# Patient Record
Sex: FEMALE | Race: WHITE | NOT HISPANIC OR LATINO | Employment: OTHER | URBAN - METROPOLITAN AREA
[De-identification: names, ages, dates, MRNs, and addresses within clinical notes are randomized per-mention and may not be internally consistent; named-entity substitution may affect disease eponyms.]

---

## 2018-04-13 ENCOUNTER — OFFICE VISIT (OUTPATIENT)
Dept: FAMILY MEDICINE | Facility: CLINIC | Age: 68
End: 2018-04-13
Payer: MEDICARE

## 2018-04-13 VITALS
HEIGHT: 60 IN | HEART RATE: 80 BPM | RESPIRATION RATE: 18 BRPM | DIASTOLIC BLOOD PRESSURE: 60 MMHG | BODY MASS INDEX: 23.56 KG/M2 | TEMPERATURE: 99 F | SYSTOLIC BLOOD PRESSURE: 129 MMHG | OXYGEN SATURATION: 97 % | WEIGHT: 120 LBS

## 2018-04-13 DIAGNOSIS — K12.1 MOUTH ULCERS: ICD-10-CM

## 2018-04-13 DIAGNOSIS — I10 ESSENTIAL HYPERTENSION: ICD-10-CM

## 2018-04-13 DIAGNOSIS — F41.1 GAD (GENERALIZED ANXIETY DISORDER): Primary | ICD-10-CM

## 2018-04-13 PROCEDURE — 99214 OFFICE O/P EST MOD 30 MIN: CPT | Mod: S$PBB,,, | Performed by: FAMILY MEDICINE

## 2018-04-13 PROCEDURE — 99999 PR PBB SHADOW E&M-EST. PATIENT-LVL III: CPT | Mod: PBBFAC,,, | Performed by: FAMILY MEDICINE

## 2018-04-13 PROCEDURE — 99213 OFFICE O/P EST LOW 20 MIN: CPT | Mod: PBBFAC,PN | Performed by: FAMILY MEDICINE

## 2018-04-13 RX ORDER — FOLIC ACID 1 MG/1
TABLET ORAL
Status: ON HOLD | COMMUNITY
End: 2019-12-10

## 2018-04-13 RX ORDER — FUROSEMIDE 40 MG/1
40 TABLET ORAL 2 TIMES DAILY
Refills: 6 | COMMUNITY
Start: 2018-02-10 | End: 2018-08-29

## 2018-04-13 RX ORDER — CIPROFLOXACIN 500 MG/1
TABLET ORAL
COMMUNITY
End: 2018-07-05

## 2018-04-13 RX ORDER — POTASSIUM CHLORIDE 1500 MG/1
TABLET, EXTENDED RELEASE ORAL
COMMUNITY
End: 2018-08-29

## 2018-04-13 RX ORDER — BUSPIRONE HYDROCHLORIDE 7.5 MG/1
7.5 TABLET ORAL
COMMUNITY
End: 2018-04-13

## 2018-04-13 RX ORDER — CARVEDILOL 3.12 MG/1
3.12 TABLET ORAL 2 TIMES DAILY
Refills: 6 | COMMUNITY
Start: 2018-03-17 | End: 2018-08-29

## 2018-04-13 RX ORDER — SUCRALFATE 1 G/1
TABLET ORAL
COMMUNITY
End: 2019-04-05

## 2018-04-13 RX ORDER — TRIAMCINOLONE ACETONIDE 1 MG/ML
LOTION TOPICAL
Status: ON HOLD | COMMUNITY
End: 2019-12-10

## 2018-04-13 RX ORDER — LANOLIN ALCOHOL/MO/W.PET/CERES
CREAM (GRAM) TOPICAL
COMMUNITY
End: 2019-04-05

## 2018-04-13 RX ORDER — LIDOCAINE HYDROCHLORIDE 20 MG/ML
SOLUTION OROPHARYNGEAL
Status: ON HOLD | COMMUNITY
End: 2019-12-10

## 2018-04-13 RX ORDER — ESCITALOPRAM OXALATE 20 MG/1
20 TABLET ORAL
COMMUNITY
End: 2018-05-23 | Stop reason: SDUPTHER

## 2018-04-13 RX ORDER — FLUTICASONE PROPIONATE 50 MCG
SPRAY, SUSPENSION (ML) NASAL
COMMUNITY

## 2018-04-13 RX ORDER — MONTELUKAST SODIUM 10 MG/1
TABLET ORAL
COMMUNITY
End: 2018-05-24 | Stop reason: SDUPTHER

## 2018-04-13 RX ORDER — BISACODYL 10 MG
SUPPOSITORY, RECTAL RECTAL
COMMUNITY
End: 2018-08-29

## 2018-04-13 RX ORDER — ALLOPURINOL 300 MG/1
300 TABLET ORAL DAILY
Refills: 11 | COMMUNITY
Start: 2018-03-19 | End: 2018-06-12 | Stop reason: SDUPTHER

## 2018-04-13 RX ORDER — GABAPENTIN 300 MG/1
900 CAPSULE ORAL 3 TIMES DAILY
Refills: 11 | COMMUNITY
Start: 2018-01-17 | End: 2018-07-05 | Stop reason: SDUPTHER

## 2018-04-13 RX ORDER — ZOLPIDEM TARTRATE 5 MG/1
TABLET ORAL
COMMUNITY
End: 2019-04-05

## 2018-04-13 RX ORDER — GABAPENTIN 100 MG/1
CAPSULE ORAL
COMMUNITY
End: 2018-07-05

## 2018-04-13 RX ORDER — LOSARTAN POTASSIUM 50 MG/1
TABLET ORAL
COMMUNITY
Start: 2018-03-12 | End: 2018-08-29

## 2018-04-13 RX ORDER — OXYBUTYNIN CHLORIDE 5 MG/1
TABLET, EXTENDED RELEASE ORAL
COMMUNITY
End: 2020-03-05

## 2018-04-13 RX ORDER — ONDANSETRON 4 MG/1
TABLET, ORALLY DISINTEGRATING ORAL
Status: ON HOLD | COMMUNITY
End: 2019-12-10

## 2018-04-13 RX ORDER — BUDESONIDE AND FORMOTEROL FUMARATE DIHYDRATE 160; 4.5 UG/1; UG/1
AEROSOL RESPIRATORY (INHALATION)
COMMUNITY

## 2018-04-13 RX ORDER — DIPHENHYDRAMINE HCL 25 MG
CAPSULE ORAL
COMMUNITY
End: 2019-08-28

## 2018-04-13 RX ORDER — ARIPIPRAZOLE 5 MG/1
TABLET ORAL
COMMUNITY
End: 2019-10-02 | Stop reason: SDUPTHER

## 2018-04-13 RX ORDER — FLUCONAZOLE 150 MG/1
150 TABLET ORAL DAILY
Refills: 0 | COMMUNITY
Start: 2018-02-23 | End: 2018-04-13

## 2018-04-13 RX ORDER — ACETAMINOPHEN AND CODEINE PHOSPHATE 300; 60 MG/1; MG/1
TABLET ORAL
COMMUNITY
End: 2018-06-18

## 2018-04-13 RX ORDER — ESCITALOPRAM OXALATE 20 MG/1
TABLET ORAL
COMMUNITY
End: 2018-05-23 | Stop reason: SDUPTHER

## 2018-04-13 RX ORDER — CYCLOBENZAPRINE HCL 5 MG
TABLET ORAL
COMMUNITY
End: 2018-06-18

## 2018-04-13 RX ORDER — LANCETS 33 GAUGE
EACH MISCELLANEOUS
Status: ON HOLD | COMMUNITY
End: 2019-12-10

## 2018-04-13 RX ORDER — NYSTATIN 100000 [USP'U]/ML
SUSPENSION ORAL
COMMUNITY
End: 2018-04-13

## 2018-04-13 RX ORDER — BUSPIRONE HYDROCHLORIDE 5 MG/1
TABLET ORAL
COMMUNITY
End: 2018-04-13

## 2018-04-13 RX ORDER — PREDNISONE 10 MG/1
TABLET ORAL
COMMUNITY
End: 2018-08-29

## 2018-04-13 RX ORDER — POTASSIUM CHLORIDE 750 MG/1
TABLET, EXTENDED RELEASE ORAL
COMMUNITY
End: 2018-08-29

## 2018-04-13 RX ORDER — PANTOPRAZOLE SODIUM 40 MG/1
TABLET, DELAYED RELEASE ORAL
COMMUNITY
End: 2018-05-24 | Stop reason: SDUPTHER

## 2018-04-13 RX ORDER — BUSPIRONE HYDROCHLORIDE 7.5 MG/1
TABLET ORAL
COMMUNITY
End: 2018-04-13

## 2018-04-13 RX ORDER — NYSTATIN 100000 [USP'U]/ML
4 SUSPENSION ORAL 4 TIMES DAILY
Qty: 160 ML | Refills: 0 | Status: SHIPPED | OUTPATIENT
Start: 2018-04-13 | End: 2018-04-23

## 2018-04-13 RX ORDER — IBUPROFEN 800 MG/1
800 TABLET ORAL 3 TIMES DAILY
Refills: 3 | COMMUNITY
Start: 2018-02-19 | End: 2018-09-17 | Stop reason: SDUPTHER

## 2018-04-13 RX ORDER — LORAZEPAM 0.5 MG/1
TABLET ORAL
COMMUNITY
End: 2018-04-13

## 2018-04-13 RX ORDER — INSULIN PUMP SYRINGE, 3 ML
EACH MISCELLANEOUS
Status: ON HOLD | COMMUNITY
End: 2019-12-10

## 2018-04-13 RX ORDER — BACLOFEN 10 MG/1
TABLET ORAL
COMMUNITY
End: 2018-06-18

## 2018-04-13 RX ORDER — PENICILLIN V POTASSIUM 500 MG/1
TABLET, FILM COATED ORAL
Refills: 0 | COMMUNITY
Start: 2018-02-02 | End: 2018-08-29

## 2018-04-13 RX ORDER — IBUPROFEN 800 MG/1
TABLET ORAL
COMMUNITY
End: 2018-04-20 | Stop reason: SDUPTHER

## 2018-04-13 RX ORDER — BUSPIRONE HYDROCHLORIDE 5 MG/1
5 TABLET ORAL 2 TIMES DAILY
Qty: 60 TABLET | Refills: 11 | Status: SHIPPED | OUTPATIENT
Start: 2018-04-13 | End: 2020-03-18 | Stop reason: SDUPTHER

## 2018-04-13 RX ORDER — CYCLOBENZAPRINE HCL 10 MG
TABLET ORAL
COMMUNITY
End: 2018-06-18 | Stop reason: SDUPTHER

## 2018-04-13 RX ORDER — MUPIROCIN 20 MG/G
OINTMENT TOPICAL
COMMUNITY

## 2018-04-13 RX ORDER — OXYCODONE AND ACETAMINOPHEN 5; 325 MG/1; MG/1
TABLET ORAL
COMMUNITY
End: 2018-06-18 | Stop reason: SDUPTHER

## 2018-04-13 RX ORDER — OXYCODONE HYDROCHLORIDE 10 MG/1
10 TABLET ORAL
COMMUNITY
End: 2018-06-18

## 2018-04-13 NOTE — PROGRESS NOTES
Subjective:       Patient ID: Obdulia Yepez is a 67 y.o. female.    Chief Complaint: Anxiety and Mouth Lesions    Patient complains of anxiety, reports that buspirone works, but is causing some somnolence.    In regards to the patient's hypertension, patient denies chest pain/sob, and has been compliant with the medication regimen.         Review of Systems   Constitutional: Negative for activity change, appetite change, chills, fatigue and fever.   HENT: Negative for congestion, dental problem, facial swelling, nosebleeds, postnasal drip, sinus pain, sore throat, trouble swallowing and voice change.    Eyes: Negative for pain, discharge and visual disturbance.   Respiratory: Negative for apnea, cough, chest tightness and shortness of breath.    Cardiovascular: Negative for chest pain and palpitations.   Gastrointestinal: Negative for abdominal pain, blood in stool, constipation and nausea.   Endocrine: Negative for cold intolerance, polydipsia and polyuria.   Genitourinary: Negative for difficulty urinating, enuresis and flank pain.   Musculoskeletal: Positive for arthralgias and back pain.   Skin: Positive for rash and wound. Negative for color change.   Allergic/Immunologic: Negative for environmental allergies and immunocompromised state.   Neurological: Negative for dizziness and light-headedness.   Hematological: Negative for adenopathy.   Psychiatric/Behavioral: Positive for sleep disturbance. Negative for agitation, behavioral problems, decreased concentration and dysphoric mood. The patient is nervous/anxious.    All other systems reviewed and are negative.      Past Medical History:   Diagnosis Date    Allergy     Arthritis     Asthma     Depression     Gout     Hypertension      Past Surgical History:   Procedure Laterality Date    APPENDECTOMY       SECTION      CHOLECYSTECTOMY      HYSTERECTOMY      STOMACH SURGERY      WRIST SURGERY Left      Social History     Social History     Marital status:      Spouse name: N/A    Number of children: N/A    Years of education: N/A     Occupational History    Not on file.     Social History Main Topics    Smoking status: Never Smoker    Smokeless tobacco: Never Used    Alcohol use Yes    Drug use: No    Sexual activity: Not on file     Other Topics Concern    Not on file     Social History Narrative    No narrative on file     History reviewed. No pertinent family history.    Objective:      /60 (BP Location: Right arm, Patient Position: Sitting)   Pulse 80   Temp 99.1 °F (37.3 °C)   Resp 18   Ht 5' (1.524 m)   Wt 54.4 kg (120 lb)   SpO2 97%   BMI 23.44 kg/m²   Physical Exam   Constitutional: She is oriented to person, place, and time. She appears well-developed and well-nourished. No distress.   HENT:   Head: Normocephalic and atraumatic.   Nose: Nose normal.   Mouth/Throat: Oropharynx is clear and moist. No oropharyngeal exudate.   Eyes: Conjunctivae and EOM are normal. Pupils are equal, round, and reactive to light. No scleral icterus.   Neck: Normal range of motion. Neck supple. No thyromegaly present.   Cardiovascular: Normal rate, regular rhythm and normal heart sounds.  Exam reveals no gallop and no friction rub.    No murmur heard.  Pulmonary/Chest: Effort normal and breath sounds normal. No respiratory distress. She has no wheezes. She has no rales. She exhibits no tenderness.   Abdominal: Soft. Bowel sounds are normal. She exhibits no distension. There is no tenderness. There is no guarding.   Musculoskeletal: Normal range of motion. She exhibits tenderness and deformity. She exhibits no edema.   Lymphadenopathy:     She has no cervical adenopathy.   Neurological: She is alert and oriented to person, place, and time. She displays normal reflexes. No cranial nerve deficit or sensory deficit. She exhibits normal muscle tone.   Skin: Skin is warm and dry. No rash noted. She is not diaphoretic. No erythema. No pallor.    Psychiatric: She has a normal mood and affect. Her behavior is normal. Judgment and thought content normal.   Nursing note and vitals reviewed.      Assessment:       1. JOYCE (generalized anxiety disorder)    2. Mouth ulcers    3. Essential hypertension        Plan:       JOYCE (generalized anxiety disorder)  -     busPIRone (BUSPAR) 5 MG Tab; Take 1 tablet (5 mg total) by mouth 2 (two) times daily.  Dispense: 60 tablet; Refill: 11    Mouth ulcers  -     nystatin (MYCOSTATIN) 100,000 unit/mL suspension; Take 4 mLs (400,000 Units total) by mouth 4 (four) times daily.  Dispense: 160 mL; Refill: 0    Essential hypertension  -     TSH; Future; Expected date: 04/13/2018  -     Comprehensive metabolic panel; Future; Expected date: 04/13/2018  -     CBC auto differential; Future; Expected date: 04/13/2018            Risks, benefits, and side effects were discussed with the patient. All questions were answered to the fullest satisfaction of the patient, and pt verbalized understanding and agreement to treatment plan. Pt was to call with any new or worsening symptoms, or present to the ER.

## 2018-04-16 ENCOUNTER — LAB VISIT (OUTPATIENT)
Dept: LAB | Facility: HOSPITAL | Age: 68
End: 2018-04-16
Attending: FAMILY MEDICINE
Payer: MEDICARE

## 2018-04-16 DIAGNOSIS — I10 ESSENTIAL HYPERTENSION: ICD-10-CM

## 2018-04-16 LAB
ALBUMIN SERPL BCP-MCNC: 4.5 G/DL
ALP SERPL-CCNC: 57 U/L
ALT SERPL W/O P-5'-P-CCNC: 19 U/L
ANION GAP SERPL CALC-SCNC: 9 MMOL/L
AST SERPL-CCNC: 41 U/L
BASOPHILS # BLD AUTO: 0.02 K/UL
BASOPHILS NFR BLD: 0.5 %
BILIRUB SERPL-MCNC: 0.2 MG/DL
BUN SERPL-MCNC: 16 MG/DL
CALCIUM SERPL-MCNC: 10 MG/DL
CHLORIDE SERPL-SCNC: 94 MMOL/L
CO2 SERPL-SCNC: 27 MMOL/L
CREAT SERPL-MCNC: 0.9 MG/DL
DIFFERENTIAL METHOD: ABNORMAL
EOSINOPHIL # BLD AUTO: 0.1 K/UL
EOSINOPHIL NFR BLD: 2.1 %
ERYTHROCYTE [DISTWIDTH] IN BLOOD BY AUTOMATED COUNT: 14.6 %
EST. GFR  (AFRICAN AMERICAN): >60 ML/MIN/1.73 M^2
EST. GFR  (NON AFRICAN AMERICAN): >60 ML/MIN/1.73 M^2
GLUCOSE SERPL-MCNC: 79 MG/DL
HCT VFR BLD AUTO: 34.8 %
HGB BLD-MCNC: 11.9 G/DL
IMM GRANULOCYTES # BLD AUTO: 0.02 K/UL
IMM GRANULOCYTES NFR BLD AUTO: 0.5 %
LYMPHOCYTES # BLD AUTO: 0.9 K/UL
LYMPHOCYTES NFR BLD: 23.7 %
MCH RBC QN AUTO: 32.7 PG
MCHC RBC AUTO-ENTMCNC: 34.2 G/DL
MCV RBC AUTO: 96 FL
MONOCYTES # BLD AUTO: 0.3 K/UL
MONOCYTES NFR BLD: 8.1 %
NEUTROPHILS # BLD AUTO: 2.5 K/UL
NEUTROPHILS NFR BLD: 65.1 %
NRBC BLD-RTO: 0 /100 WBC
PLATELET # BLD AUTO: 188 K/UL
PMV BLD AUTO: 9.3 FL
POTASSIUM SERPL-SCNC: 5.2 MMOL/L
PROT SERPL-MCNC: 6.8 G/DL
RBC # BLD AUTO: 3.64 M/UL
SODIUM SERPL-SCNC: 130 MMOL/L
TSH SERPL DL<=0.005 MIU/L-ACNC: 1.19 UIU/ML
WBC # BLD AUTO: 3.84 K/UL

## 2018-04-16 PROCEDURE — 85025 COMPLETE CBC W/AUTO DIFF WBC: CPT

## 2018-04-16 PROCEDURE — 84443 ASSAY THYROID STIM HORMONE: CPT

## 2018-04-16 PROCEDURE — 80053 COMPREHEN METABOLIC PANEL: CPT

## 2018-04-16 PROCEDURE — 36415 COLL VENOUS BLD VENIPUNCTURE: CPT

## 2018-04-18 ENCOUNTER — TELEPHONE (OUTPATIENT)
Dept: FAMILY MEDICINE | Facility: CLINIC | Age: 68
End: 2018-04-18

## 2018-04-18 NOTE — TELEPHONE ENCOUNTER
----- Message from Og Perez MD sent at 4/18/2018  8:06 AM CDT -----  Would you let this lady know that her results were essentially fine, save that her sodium was a touch low, and that her K was a touch high. I would like for her eat a little more salt, and if she is taking any potassium supplement to discontinue it.

## 2018-04-19 NOTE — TELEPHONE ENCOUNTER
----- Message from Millie Jones sent at 4/19/2018  7:49 AM CDT -----  Contact: self  Type:  RX Refill Request    Who Called:  self  Refill or New Rx:  refill  RX Name and Strength:  Ib profen 800 mg  How is the patient currently taking it? (ex. 1XDay):  1XDay  Is this a 30 day or 90 day RX:  30  Preferred Pharmacy with phone number:  [facility]  Local or Mail Order:  local  Ordering Provider:  Dr Chris Lebron Call Back Number:  [phone]  Additional Information:  Patient states she needs it filled today because she only has 2 pills left

## 2018-04-20 RX ORDER — IBUPROFEN 800 MG/1
800 TABLET ORAL 3 TIMES DAILY PRN
Qty: 90 TABLET | Refills: 3 | Status: SHIPPED | OUTPATIENT
Start: 2018-04-20 | End: 2018-05-20

## 2018-04-20 NOTE — TELEPHONE ENCOUNTER
----- Message from Ivelisse Medina sent at 4/20/2018  9:11 AM CDT -----  Contact: patient   Patient calling to request a new prescription for ibuprofen (ADVIL,MOTRIN) 800 MG tablet. Please advise. Patient states she needs this medication today.   Call back    Thanks!    [facility]/pharmacy #[id number] - [place] - [address]  [address] [place] [address]  Phone: [phone] Fax: [phone]

## 2018-05-23 RX ORDER — ESCITALOPRAM OXALATE 20 MG/1
TABLET ORAL
Qty: 30 TABLET | Refills: 10 | Status: SHIPPED | OUTPATIENT
Start: 2018-05-23 | End: 2018-07-23

## 2018-05-24 RX ORDER — PANTOPRAZOLE SODIUM 40 MG/1
TABLET, DELAYED RELEASE ORAL
Qty: 30 TABLET | Refills: 8 | Status: SHIPPED | OUTPATIENT
Start: 2018-05-24 | End: 2019-03-25 | Stop reason: SDUPTHER

## 2018-05-24 RX ORDER — MONTELUKAST SODIUM 10 MG/1
TABLET ORAL
Qty: 30 TABLET | Refills: 8 | Status: SHIPPED | OUTPATIENT
Start: 2018-05-24 | End: 2019-03-25 | Stop reason: SDUPTHER

## 2018-05-30 ENCOUNTER — TELEPHONE (OUTPATIENT)
Dept: FAMILY MEDICINE | Facility: CLINIC | Age: 68
End: 2018-05-30

## 2018-05-30 NOTE — TELEPHONE ENCOUNTER
----- Message from Irasema Pickering sent at 5/30/2018 11:01 AM CDT -----  Contact: Self  Type:  Patient Returning Call    Who Called:  Patient   Who Left Message for Patient: Tennille   Does the patient know what this is regarding?:  Yes  Best Call Back Number:  [phone] (home)     Additional Information:

## 2018-05-30 NOTE — TELEPHONE ENCOUNTER
Spoke with pt.  She has taken the Losartan this morning but understands to hold it tomorrow morning and we will see her tomorrow. Shahana

## 2018-05-30 NOTE — TELEPHONE ENCOUNTER
Appt made for tomorrow @ 1:50.  Christiaan, home health nurse, also wanted to report pt is on low dose Losartan and Coreg and BPs are running low, last check 98/52 this morning.  Thanks, Shahana

## 2018-05-30 NOTE — TELEPHONE ENCOUNTER
----- Message from Silvina Mchugh sent at 5/30/2018 10:11 AM CDT -----  Contact: Christiana from [facility]  Calling as patient was discharged from [facility] 5/24/18; Dx sepsis with UTI. Patient is scheduled for 7/3/18 but paperwork is asking for follow up within week. Please call [phone]. Thanks!

## 2018-05-31 ENCOUNTER — OFFICE VISIT (OUTPATIENT)
Dept: FAMILY MEDICINE | Facility: CLINIC | Age: 68
End: 2018-05-31
Payer: MEDICARE

## 2018-05-31 VITALS
SYSTOLIC BLOOD PRESSURE: 98 MMHG | HEART RATE: 89 BPM | WEIGHT: 114 LBS | TEMPERATURE: 99 F | HEIGHT: 60 IN | BODY MASS INDEX: 22.38 KG/M2 | OXYGEN SATURATION: 95 % | DIASTOLIC BLOOD PRESSURE: 60 MMHG | RESPIRATION RATE: 16 BRPM

## 2018-05-31 DIAGNOSIS — N39.0 RECURRENT SEPSIS DUE TO URINARY TRACT INFECTION: ICD-10-CM

## 2018-05-31 DIAGNOSIS — N39.0 RECURRENT UTI: Primary | ICD-10-CM

## 2018-05-31 DIAGNOSIS — A41.9 RECURRENT SEPSIS DUE TO URINARY TRACT INFECTION: ICD-10-CM

## 2018-05-31 DIAGNOSIS — J30.5 ALLERGIC RHINITIS DUE TO FOOD: ICD-10-CM

## 2018-05-31 PROCEDURE — 99214 OFFICE O/P EST MOD 30 MIN: CPT | Mod: S$PBB,,, | Performed by: FAMILY MEDICINE

## 2018-05-31 PROCEDURE — 99999 PR PBB SHADOW E&M-EST. PATIENT-LVL III: CPT | Mod: PBBFAC,,, | Performed by: FAMILY MEDICINE

## 2018-05-31 PROCEDURE — 87086 URINE CULTURE/COLONY COUNT: CPT

## 2018-05-31 PROCEDURE — 99213 OFFICE O/P EST LOW 20 MIN: CPT | Mod: PBBFAC,PN | Performed by: FAMILY MEDICINE

## 2018-05-31 RX ORDER — IPRATROPIUM BROMIDE 21 UG/1
2 SPRAY, METERED NASAL 3 TIMES DAILY
Qty: 30 ML | Refills: 2 | Status: SHIPPED | OUTPATIENT
Start: 2018-05-31

## 2018-05-31 NOTE — PROGRESS NOTES
Subjective:       Patient ID: Obdulia Yepez is a 68 y.o. female.    Chief Complaint: Follow-up (blood pressure)    Pt here for hospital follow up.    Had uti, urosepsis, recurrent. States that she hasn't been having dysuria since discharge, but feeling weak, blood pressure low.      Review of Systems   Constitutional: Negative for activity change, appetite change, chills, fatigue and fever.   HENT: Negative for congestion, dental problem, facial swelling, nosebleeds, postnasal drip, sinus pain, sore throat, trouble swallowing and voice change.    Eyes: Negative for pain, discharge and visual disturbance.   Respiratory: Negative for apnea, cough, chest tightness and shortness of breath.    Cardiovascular: Negative for chest pain and palpitations.   Gastrointestinal: Negative for abdominal pain, blood in stool, constipation and nausea.   Endocrine: Negative for cold intolerance, polydipsia and polyuria.   Genitourinary: Negative for difficulty urinating, enuresis and flank pain.   Musculoskeletal: Positive for arthralgias and back pain.   Skin: Positive for rash and wound. Negative for color change.   Allergic/Immunologic: Negative for environmental allergies and immunocompromised state.   Neurological: Negative for dizziness and light-headedness.   Hematological: Negative for adenopathy.   Psychiatric/Behavioral: Positive for sleep disturbance. Negative for agitation, behavioral problems, decreased concentration and dysphoric mood. The patient is nervous/anxious.    All other systems reviewed and are negative.        Reviewed family, medical, surgical, and social history.    Objective:      BP 98/60 (BP Location: Left arm, Patient Position: Sitting, BP Method: Small (Automatic))   Pulse 89   Temp 98.8 °F (37.1 °C) (Tympanic)   Resp 16   Ht 5' (1.524 m)   Wt 51.7 kg (114 lb)   SpO2 95%   BMI 22.26 kg/m²   Physical Exam   Constitutional: She is oriented to person, place, and time. She appears well-developed and  well-nourished. No distress.   HENT:   Head: Normocephalic and atraumatic.   Nose: Nose normal.   Mouth/Throat: Oropharynx is clear and moist. No oropharyngeal exudate.   Eyes: Conjunctivae and EOM are normal. Pupils are equal, round, and reactive to light. No scleral icterus.   Neck: Normal range of motion. Neck supple. No thyromegaly present.   Cardiovascular: Normal rate, regular rhythm and normal heart sounds.  Exam reveals no gallop and no friction rub.    No murmur heard.  Pulmonary/Chest: Effort normal and breath sounds normal. No respiratory distress. She has no wheezes. She has no rales. She exhibits no tenderness.   Abdominal: Soft. Bowel sounds are normal. She exhibits no distension. There is no tenderness. There is no guarding.   Musculoskeletal: Normal range of motion. She exhibits tenderness and deformity. She exhibits no edema.   Lymphadenopathy:     She has no cervical adenopathy.   Neurological: She is alert and oriented to person, place, and time. She displays normal reflexes. No cranial nerve deficit or sensory deficit. She exhibits normal muscle tone.   Skin: Skin is warm and dry. No rash noted. She is not diaphoretic. No erythema. No pallor.   Psychiatric: She has a normal mood and affect. Her behavior is normal. Judgment and thought content normal.   Nursing note and vitals reviewed.      Assessment:       1. Recurrent UTI    2. Recurrent sepsis due to urinary tract infection    3. Allergic rhinitis due to food        Plan:       Recurrent UTI  -     Ambulatory referral to Infectious Disease  -     Urine culture    Recurrent sepsis due to urinary tract infection  -     Ambulatory referral to Infectious Disease  -     CBC auto differential; Future; Expected date: 05/31/2018  -     Comprehensive metabolic panel; Future; Expected date: 05/31/2018    Allergic rhinitis due to food  -     ipratropium (ATROVENT) 0.03 % nasal spray; 2 sprays by Nasal route 3 (three) times daily.  Dispense: 30 mL;  Refill: 2            Risks, benefits, and side effects were discussed with the patient. All questions were answered to the fullest satisfaction of the patient, and pt verbalized understanding and agreement to treatment plan. Pt was to call with any new or worsening symptoms, or present to the ER.

## 2018-06-01 ENCOUNTER — LAB VISIT (OUTPATIENT)
Dept: LAB | Facility: HOSPITAL | Age: 68
End: 2018-06-01
Attending: FAMILY MEDICINE
Payer: MEDICARE

## 2018-06-01 DIAGNOSIS — A41.9 RECURRENT SEPSIS DUE TO URINARY TRACT INFECTION: ICD-10-CM

## 2018-06-01 DIAGNOSIS — N39.0 RECURRENT SEPSIS DUE TO URINARY TRACT INFECTION: ICD-10-CM

## 2018-06-01 LAB
ALBUMIN SERPL BCP-MCNC: 4.1 G/DL
ALP SERPL-CCNC: 64 U/L
ALT SERPL W/O P-5'-P-CCNC: 14 U/L
ANION GAP SERPL CALC-SCNC: 10 MMOL/L
AST SERPL-CCNC: 29 U/L
BACTERIA UR CULT: NORMAL
BACTERIA UR CULT: NORMAL
BASOPHILS # BLD AUTO: 0.05 K/UL
BASOPHILS NFR BLD: 1.2 %
BILIRUB SERPL-MCNC: 0.4 MG/DL
BUN SERPL-MCNC: 10 MG/DL
CALCIUM SERPL-MCNC: 9.9 MG/DL
CHLORIDE SERPL-SCNC: 89 MMOL/L
CO2 SERPL-SCNC: 25 MMOL/L
CREAT SERPL-MCNC: 0.8 MG/DL
DIFFERENTIAL METHOD: ABNORMAL
EOSINOPHIL # BLD AUTO: 0.1 K/UL
EOSINOPHIL NFR BLD: 1.9 %
ERYTHROCYTE [DISTWIDTH] IN BLOOD BY AUTOMATED COUNT: 12.7 %
EST. GFR  (AFRICAN AMERICAN): >60 ML/MIN/1.73 M^2
EST. GFR  (NON AFRICAN AMERICAN): >60 ML/MIN/1.73 M^2
GLUCOSE SERPL-MCNC: 82 MG/DL
HCT VFR BLD AUTO: 31.8 %
HGB BLD-MCNC: 11.1 G/DL
IMM GRANULOCYTES # BLD AUTO: 0 K/UL
IMM GRANULOCYTES NFR BLD AUTO: 0 %
LYMPHOCYTES # BLD AUTO: 1.1 K/UL
LYMPHOCYTES NFR BLD: 26.6 %
MCH RBC QN AUTO: 33.4 PG
MCHC RBC AUTO-ENTMCNC: 34.9 G/DL
MCV RBC AUTO: 96 FL
MONOCYTES # BLD AUTO: 0.3 K/UL
MONOCYTES NFR BLD: 7.8 %
NEUTROPHILS # BLD AUTO: 2.7 K/UL
NEUTROPHILS NFR BLD: 62.5 %
NRBC BLD-RTO: 0 /100 WBC
PLATELET # BLD AUTO: 300 K/UL
PMV BLD AUTO: 9.5 FL
POTASSIUM SERPL-SCNC: 4.5 MMOL/L
PROT SERPL-MCNC: 7 G/DL
RBC # BLD AUTO: 3.32 M/UL
SODIUM SERPL-SCNC: 124 MMOL/L
WBC # BLD AUTO: 4.25 K/UL

## 2018-06-01 PROCEDURE — 36415 COLL VENOUS BLD VENIPUNCTURE: CPT

## 2018-06-01 PROCEDURE — 85025 COMPLETE CBC W/AUTO DIFF WBC: CPT

## 2018-06-01 PROCEDURE — 80053 COMPREHEN METABOLIC PANEL: CPT

## 2018-06-03 DIAGNOSIS — E87.1 HYPONATREMIA: Primary | ICD-10-CM

## 2018-06-04 ENCOUNTER — TELEPHONE (OUTPATIENT)
Dept: FAMILY MEDICINE | Facility: CLINIC | Age: 68
End: 2018-06-04

## 2018-06-04 NOTE — TELEPHONE ENCOUNTER
Advised pt of lab results and to increase her salt intake and have labs redrawn this week.  Pt verbalized understanding.  Shahana

## 2018-06-04 NOTE — TELEPHONE ENCOUNTER
----- Message from Og Perez MD sent at 6/3/2018  1:55 PM CDT -----  Please let this lady know that her sodium is low, and I would like for her increase her salt intake, and recheck this week. I'll place the order.

## 2018-06-04 NOTE — TELEPHONE ENCOUNTER
----- Message from Brian MENDOZA [facility] sent at 6/4/2018  1:54 PM CDT -----  Contact: Dave/[facility]  Dave called in and wanted to report that patient had fallen yesterday 6/3/18.  Patient was at home in laundry room &  was present.  Patient was picked up by  & put in wheelchair.  Patient stated she hurt her left knee.  Knee was examined & nothing appears broken.  Could have left knee strain & is currently using ice.  No cuts or bruising.    Dave' call back number is [phone]

## 2018-06-05 ENCOUNTER — TELEPHONE (OUTPATIENT)
Dept: FAMILY MEDICINE | Facility: CLINIC | Age: 68
End: 2018-06-05

## 2018-06-05 NOTE — TELEPHONE ENCOUNTER
Home health nurse called to report that pt fell Sunday at home and has a skin tear to rt forearm,  lt knee is swollen and bruised.  Pt states she is in some pain from the fall.  Physical therapist examined knee, he thinks it is a sprain.  She has appt here on 6/14.  ThanksShahana

## 2018-06-12 RX ORDER — ALLOPURINOL 300 MG/1
TABLET ORAL
Qty: 90 TABLET | Refills: 4 | Status: SHIPPED | OUTPATIENT
Start: 2018-06-12 | End: 2019-07-03 | Stop reason: SDUPTHER

## 2018-06-14 ENCOUNTER — HOSPITAL ENCOUNTER (OUTPATIENT)
Dept: RADIOLOGY | Facility: HOSPITAL | Age: 68
Discharge: HOME | End: 2018-06-14
Attending: FAMILY MEDICINE
Payer: MEDICARE

## 2018-06-14 ENCOUNTER — OFFICE VISIT (OUTPATIENT)
Dept: FAMILY MEDICINE | Facility: CLINIC | Age: 68
End: 2018-06-14
Payer: MEDICARE

## 2018-06-14 VITALS
BODY MASS INDEX: 22.58 KG/M2 | DIASTOLIC BLOOD PRESSURE: 62 MMHG | SYSTOLIC BLOOD PRESSURE: 124 MMHG | TEMPERATURE: 99 F | OXYGEN SATURATION: 97 % | HEART RATE: 83 BPM | WEIGHT: 115 LBS | RESPIRATION RATE: 18 BRPM | HEIGHT: 60 IN

## 2018-06-14 DIAGNOSIS — M25.562 ACUTE PAIN OF LEFT KNEE: Primary | ICD-10-CM

## 2018-06-14 DIAGNOSIS — M25.562 ACUTE PAIN OF LEFT KNEE: ICD-10-CM

## 2018-06-14 DIAGNOSIS — E87.1 HYPONATREMIA: ICD-10-CM

## 2018-06-14 PROCEDURE — 99999 PR PBB SHADOW E&M-EST. PATIENT-LVL III: CPT | Mod: 25,PBBFAC,, | Performed by: FAMILY MEDICINE

## 2018-06-14 PROCEDURE — 99213 OFFICE O/P EST LOW 20 MIN: CPT | Mod: 25,PBBFAC,PN | Performed by: FAMILY MEDICINE

## 2018-06-14 PROCEDURE — 99213 OFFICE O/P EST LOW 20 MIN: CPT | Mod: S$PBB,,, | Performed by: FAMILY MEDICINE

## 2018-06-14 PROCEDURE — 73562 X-RAY EXAM OF KNEE 3: CPT | Mod: 26,LT,, | Performed by: RADIOLOGY

## 2018-06-14 PROCEDURE — 73562 X-RAY EXAM OF KNEE 3: CPT | Mod: TC,FY,LT

## 2018-06-14 NOTE — PROGRESS NOTES
Subjective:       Patient ID: Obdulia Yepez is a 68 y.o. female.    Chief Complaint: Follow-up and Leg Injury (L leg pain )    Patient had a fall last Sunday, reports left knee pain, with instability in her legs.      Review of Systems   Constitutional: Negative for activity change, appetite change, chills, fatigue and fever.   HENT: Negative for congestion, dental problem, facial swelling, nosebleeds, postnasal drip, sinus pain, sore throat, trouble swallowing and voice change.    Eyes: Negative for pain, discharge and visual disturbance.   Respiratory: Negative for apnea, cough, chest tightness and shortness of breath.    Cardiovascular: Negative for chest pain and palpitations.   Gastrointestinal: Negative for abdominal pain, blood in stool, constipation and nausea.   Endocrine: Negative for cold intolerance, polydipsia and polyuria.   Genitourinary: Negative for difficulty urinating, enuresis and flank pain.   Musculoskeletal: Positive for arthralgias, back pain and myalgias.   Skin: Positive for rash and wound. Negative for color change.   Allergic/Immunologic: Negative for environmental allergies and immunocompromised state.   Neurological: Negative for dizziness and light-headedness.   Hematological: Negative for adenopathy.   Psychiatric/Behavioral: Positive for sleep disturbance. Negative for agitation, behavioral problems, decreased concentration and dysphoric mood. The patient is not nervous/anxious.    All other systems reviewed and are negative.        Reviewed family, medical, surgical, and social history.    Objective:      /62 (BP Location: Left arm, Patient Position: Sitting, BP Method: Small (Automatic))   Pulse 83   Temp 98.7 °F (37.1 °C) (Tympanic)   Resp 18   Ht 5' (1.524 m)   Wt 52.2 kg (115 lb)   SpO2 97%   BMI 22.46 kg/m²   Physical Exam   Constitutional: She is oriented to person, place, and time. She appears well-developed and well-nourished. No distress.   HENT:   Head:  Normocephalic and atraumatic.   Nose: Nose normal.   Mouth/Throat: Oropharynx is clear and moist. No oropharyngeal exudate.   Eyes: Conjunctivae and EOM are normal. Pupils are equal, round, and reactive to light. No scleral icterus.   Neck: Normal range of motion. Neck supple. No thyromegaly present.   Cardiovascular: Normal rate, regular rhythm and normal heart sounds.  Exam reveals no gallop and no friction rub.    No murmur heard.  Pulmonary/Chest: Effort normal and breath sounds normal. No respiratory distress. She has no wheezes. She has no rales. She exhibits no tenderness.   Abdominal: Soft. Bowel sounds are normal. She exhibits no distension. There is no tenderness. There is no guarding.   Musculoskeletal: Normal range of motion. She exhibits tenderness and deformity. She exhibits no edema.   Lymphadenopathy:     She has no cervical adenopathy.   Neurological: She is alert and oriented to person, place, and time. She displays normal reflexes. No cranial nerve deficit or sensory deficit. She exhibits normal muscle tone.   Skin: Skin is warm and dry. No rash noted. She is not diaphoretic. No erythema. No pallor.   Psychiatric: She has a normal mood and affect. Her behavior is normal. Judgment and thought content normal.   Nursing note and vitals reviewed.      Assessment:       1. Acute pain of left knee    2. Hyponatremia        Plan:       Acute pain of left knee  -     X-Ray Knee 3 View Left; Future; Expected date: 06/14/2018    Hyponatremia  -     Basic metabolic panel; Future; Expected date: 06/14/2018            Risks, benefits, and side effects were discussed with the patient. All questions were answered to the fullest satisfaction of the patient, and pt verbalized understanding and agreement to treatment plan. Pt was to call with any new or worsening symptoms, or present to the ER.

## 2018-06-15 ENCOUNTER — TELEPHONE (OUTPATIENT)
Dept: FAMILY MEDICINE | Facility: CLINIC | Age: 68
End: 2018-06-15

## 2018-06-15 DIAGNOSIS — E87.1 HYPONATREMIA: ICD-10-CM

## 2018-06-15 DIAGNOSIS — S82.035A CLOSED NONDISPLACED TRANSVERSE FRACTURE OF LEFT PATELLA, INITIAL ENCOUNTER: Primary | ICD-10-CM

## 2018-06-15 NOTE — TELEPHONE ENCOUNTER
Informed pt. Pt does not have a preference on either the nephrologist nor the orthopedist; she asked that we please get her in to the ortho as soon as we can.  Pt is requesting something for pain.  Thank you, Chelsi

## 2018-06-15 NOTE — TELEPHONE ENCOUNTER
----- Message from Og Perez MD sent at 6/15/2018  9:30 AM CDT -----  Please let the lady know that her sodium was still low, although slightly higher that last time, and I would like to refer her to a nephrologist as per our discussion at the last visit. Also, she has a fracture of her knee cap, and she will need an orthopedist.  If she has a specific recommendation, let me know, otherwise I am happy to choose some good doctors for her.

## 2018-06-18 ENCOUNTER — TELEPHONE (OUTPATIENT)
Dept: FAMILY MEDICINE | Facility: CLINIC | Age: 68
End: 2018-06-18

## 2018-06-18 DIAGNOSIS — S09.90XA HEAD TRAUMA, INITIAL ENCOUNTER: ICD-10-CM

## 2018-06-18 DIAGNOSIS — S82.035A CLOSED NONDISPLACED TRANSVERSE FRACTURE OF LEFT PATELLA, INITIAL ENCOUNTER: Primary | ICD-10-CM

## 2018-06-18 DIAGNOSIS — S09.90XA TRAUMATIC INJURY OF HEAD, INITIAL ENCOUNTER: ICD-10-CM

## 2018-06-18 RX ORDER — CYCLOBENZAPRINE HCL 10 MG
10 TABLET ORAL 2 TIMES DAILY PRN
Qty: 60 TABLET | Refills: 2 | Status: SHIPPED | OUTPATIENT
Start: 2018-06-18 | End: 2019-04-05

## 2018-06-18 RX ORDER — OXYCODONE AND ACETAMINOPHEN 5; 325 MG/1; MG/1
1 TABLET ORAL EVERY 6 HOURS PRN
Qty: 30 TABLET | Refills: 0 | Status: SHIPPED | OUTPATIENT
Start: 2018-06-18 | End: 2018-07-05 | Stop reason: SDUPTHER

## 2018-06-18 NOTE — TELEPHONE ENCOUNTER
----- Message from Ariella Machado sent at 6/18/2018  9:10 AM CDT -----  Contact: self   Patient need to speak with a nurse regarding orders for pain medication, please call back at [phone].

## 2018-06-19 ENCOUNTER — HOSPITAL ENCOUNTER (OUTPATIENT)
Dept: RADIOLOGY | Facility: HOSPITAL | Age: 68
Discharge: HOME | End: 2018-06-19
Attending: FAMILY MEDICINE
Payer: MEDICARE

## 2018-06-19 ENCOUNTER — TELEPHONE (OUTPATIENT)
Dept: FAMILY MEDICINE | Facility: CLINIC | Age: 68
End: 2018-06-19

## 2018-06-19 DIAGNOSIS — S09.90XA HEAD TRAUMA, INITIAL ENCOUNTER: ICD-10-CM

## 2018-06-19 PROCEDURE — 70450 CT HEAD/BRAIN W/O DYE: CPT | Mod: TC

## 2018-06-19 PROCEDURE — 70450 CT HEAD/BRAIN W/O DYE: CPT | Mod: 26,,, | Performed by: RADIOLOGY

## 2018-06-19 NOTE — TELEPHONE ENCOUNTER
----- Message from Chetna Noe sent at 6/19/2018  2:19 PM CDT -----  Contact: [phone]  Patient is returning nurse's phone call.  Please call patient back at [phone].

## 2018-06-19 NOTE — TELEPHONE ENCOUNTER
Spoke with Christiana, home health nurse, who called to report that pt fell on Saturday night.  Has bruising  on the left side of her head.    stated that pt was confused Saturday night and Sunday, Christiana stated that she is less confused today.  Informed that CT scan of the head has been ordered.  Ct of the head scheduled for 3:15 this afternoon, pt informed and understands to arrive 30 minutes prior to appt.  Tennille

## 2018-06-19 NOTE — TELEPHONE ENCOUNTER
Spoke with pt, advised her that I have not called since we spoke earlier regarding her CT scan. Pt also thought CT was scheduled for 3:45, reminded her that it is scheduled for 3:15 and they need to be there 30 minutes prior to the exam, which is in 10 minutes.  Tennille

## 2018-06-19 NOTE — TELEPHONE ENCOUNTER
----- Message from Yadi Aguilar sent at 6/19/2018 10:30 AM CDT -----  Contact: Getachew Villeda in [facility]  Calling to report another fall. Patient has bruising on back of her head. Very urgent...Please call [phone]

## 2018-06-20 ENCOUNTER — TELEPHONE (OUTPATIENT)
Dept: FAMILY MEDICINE | Facility: CLINIC | Age: 68
End: 2018-06-20
Payer: MEDICARE

## 2018-06-20 ENCOUNTER — HOSPITAL ENCOUNTER (OUTPATIENT)
Dept: RADIOLOGY | Facility: HOSPITAL | Age: 68
Discharge: HOME | End: 2018-06-20
Attending: FAMILY MEDICINE
Payer: MEDICARE

## 2018-06-20 ENCOUNTER — TELEPHONE (OUTPATIENT)
Dept: FAMILY MEDICINE | Facility: CLINIC | Age: 68
End: 2018-06-20

## 2018-06-20 DIAGNOSIS — S19.80XA BLUNT TRAUMA OF NECK, INITIAL ENCOUNTER: ICD-10-CM

## 2018-06-20 DIAGNOSIS — S19.80XA BLUNT TRAUMA OF NECK, INITIAL ENCOUNTER: Primary | ICD-10-CM

## 2018-06-20 PROCEDURE — 72125 CT NECK SPINE W/O DYE: CPT | Mod: 26,,, | Performed by: RADIOLOGY

## 2018-06-20 PROCEDURE — 72125 CT NECK SPINE W/O DYE: CPT | Mod: TC

## 2018-06-20 NOTE — TELEPHONE ENCOUNTER
----- Message from Og Perez MD sent at 6/20/2018  3:13 PM CDT -----  Please let this lady know that her CT neck and head did not show any fractures, however her CT of her head did show a bruise, but no fracture.

## 2018-06-21 ENCOUNTER — TELEPHONE (OUTPATIENT)
Dept: FAMILY MEDICINE | Facility: CLINIC | Age: 68
End: 2018-06-21

## 2018-06-21 ENCOUNTER — OFFICE VISIT (OUTPATIENT)
Dept: FAMILY MEDICINE | Facility: CLINIC | Age: 68
End: 2018-06-21
Payer: MEDICARE

## 2018-06-21 VITALS
OXYGEN SATURATION: 99 % | RESPIRATION RATE: 20 BRPM | HEART RATE: 103 BPM | SYSTOLIC BLOOD PRESSURE: 120 MMHG | DIASTOLIC BLOOD PRESSURE: 82 MMHG

## 2018-06-21 DIAGNOSIS — M62.838 MUSCLE SPASMS OF BOTH LOWER EXTREMITIES: ICD-10-CM

## 2018-06-21 DIAGNOSIS — E87.1 HYPONATREMIA: Primary | ICD-10-CM

## 2018-06-21 DIAGNOSIS — S82.045A CLOSED NONDISPLACED COMMINUTED FRACTURE OF LEFT PATELLA, INITIAL ENCOUNTER: ICD-10-CM

## 2018-06-21 PROCEDURE — 99214 OFFICE O/P EST MOD 30 MIN: CPT | Mod: S$PBB,,, | Performed by: FAMILY MEDICINE

## 2018-06-21 PROCEDURE — 99213 OFFICE O/P EST LOW 20 MIN: CPT | Mod: PBBFAC,PN | Performed by: FAMILY MEDICINE

## 2018-06-21 PROCEDURE — 99999 PR PBB SHADOW E&M-EST. PATIENT-LVL III: CPT | Mod: PBBFAC,,, | Performed by: FAMILY MEDICINE

## 2018-06-21 RX ORDER — TIZANIDINE HYDROCHLORIDE 2 MG/1
1 CAPSULE, GELATIN COATED ORAL
Qty: 30 CAPSULE | Refills: 1 | Status: SHIPPED | OUTPATIENT
Start: 2018-06-21 | End: 2018-07-01

## 2018-06-21 NOTE — TELEPHONE ENCOUNTER
PA approved for cyclobenzaprine 10 mg @ [phone].  [id number]    Effective 05/22/201806/21/2019.     Pharmacy notified.  Tennille

## 2018-06-21 NOTE — TELEPHONE ENCOUNTER
----- Message from Millie Jones sent at 6/21/2018  1:12 PM CDT -----  Contact: Manuela with [facility]   Manuela with [facility]  pharmacy [phone] called regarding cyclobenzaprine (FLEXERIL) 10 MG tablet for above patient. They are requesting one of the following: refill request. Pharmacy sent previous request on 06/18/18 Thanks!

## 2018-06-21 NOTE — PROGRESS NOTES
Subjective:       Patient ID: Obdulia Yepez is a 68 y.o. female.    Chief Complaint: Follow-up    Patient had a fall last Sunday, reports left knee pain, with instability in her legs. Since the last visit, her xray revealed a patellar fracture.    Has low sodium, still has not seen nephrologist. Continue to report an increase in her falls.          Review of Systems   Constitutional: Negative for activity change, appetite change, chills, fatigue and fever.   HENT: Negative for congestion, dental problem, facial swelling, nosebleeds, postnasal drip, sinus pain, sore throat, trouble swallowing and voice change.    Eyes: Negative for pain, discharge and visual disturbance.   Respiratory: Negative for apnea, cough, chest tightness and shortness of breath.    Cardiovascular: Negative for chest pain and palpitations.   Gastrointestinal: Negative for abdominal pain, blood in stool, constipation and nausea.   Endocrine: Negative for cold intolerance, polydipsia and polyuria.   Genitourinary: Negative for difficulty urinating, enuresis and flank pain.   Musculoskeletal: Positive for arthralgias, back pain and myalgias.   Skin: Positive for rash and wound. Negative for color change.   Allergic/Immunologic: Negative for environmental allergies and immunocompromised state.   Neurological: Negative for dizziness and light-headedness.   Hematological: Negative for adenopathy.   Psychiatric/Behavioral: Positive for sleep disturbance. Negative for agitation, behavioral problems, decreased concentration and dysphoric mood. The patient is not nervous/anxious.    All other systems reviewed and are negative.        Reviewed family, medical, surgical, and social history.    Objective:      /82 (BP Location: Left arm, Patient Position: Sitting, BP Method: Small (Automatic))   Pulse 103   Resp 20   SpO2 99%   Physical Exam   Constitutional: She is oriented to person, place, and time. No distress.   HENT:   Head: Normocephalic and  atraumatic.   Nose: Nose normal.   Mouth/Throat: Oropharynx is clear and moist. No oropharyngeal exudate.   Eyes: Conjunctivae and EOM are normal. Pupils are equal, round, and reactive to light. No scleral icterus.   Neck: Normal range of motion. Neck supple. No thyromegaly present.   Cardiovascular: Normal rate, regular rhythm and normal heart sounds.  Exam reveals no gallop and no friction rub.    No murmur heard.  Pulmonary/Chest: Effort normal and breath sounds normal. No respiratory distress. She has no wheezes. She has no rales. She exhibits no tenderness.   Abdominal: Soft. Bowel sounds are normal. She exhibits no distension. There is no tenderness. There is no guarding.   Musculoskeletal: Normal range of motion. She exhibits tenderness and deformity. She exhibits no edema.   Lymphadenopathy:     She has no cervical adenopathy.   Neurological: She is alert and oriented to person, place, and time. She displays normal reflexes. No cranial nerve deficit or sensory deficit. She exhibits normal muscle tone.   Skin: Skin is warm and dry. Rash noted. She is not diaphoretic. No erythema. No pallor.   Psychiatric: She has a normal mood and affect. Her behavior is normal. Judgment and thought content normal.   Nursing note and vitals reviewed.      Assessment:       1. Hyponatremia    2. Closed nondisplaced comminuted fracture of left patella, initial encounter    3. Muscle spasms of both lower extremities        Plan:       Hyponatremia    Closed nondisplaced comminuted fracture of left patella, initial encounter    Muscle spasms of both lower extremities  -     tiZANidine 2 mg Cap; Take 1 capsule (2 mg total) by mouth 3 (three) times daily after meals. for 10 days  Dispense: 30 capsule; Refill: 1    Will ensure that referrals were sent, will prescribe low dose muscle relaxant, and patient was counseled about further risk of sedation, but would patient would like further pain control.        Risks, benefits, and side  effects were discussed with the patient. All questions were answered to the fullest satisfaction of the patient, and pt verbalized understanding and agreement to treatment plan. Pt was to call with any new or worsening symptoms, or present to the ER.

## 2018-06-29 ENCOUNTER — TELEPHONE (OUTPATIENT)
Dept: ORTHOPEDICS | Facility: CLINIC | Age: 68
End: 2018-06-29

## 2018-06-29 NOTE — TELEPHONE ENCOUNTER
Called patient to schedule appt. Appointment scheduled for Tuesday, July 3rd at 1:00PM. Patient verbalized understanding of appointment date, time, and location directions.

## 2018-07-02 DIAGNOSIS — M25.562 LEFT KNEE PAIN, UNSPECIFIED CHRONICITY: Primary | ICD-10-CM

## 2018-07-03 ENCOUNTER — OFFICE VISIT (OUTPATIENT)
Dept: ORTHOPEDICS | Facility: CLINIC | Age: 68
End: 2018-07-03
Payer: MEDICARE

## 2018-07-03 ENCOUNTER — HOSPITAL ENCOUNTER (OUTPATIENT)
Dept: RADIOLOGY | Facility: HOSPITAL | Age: 68
Discharge: HOME | End: 2018-07-03
Attending: ORTHOPAEDIC SURGERY
Payer: MEDICARE

## 2018-07-03 ENCOUNTER — TELEPHONE (OUTPATIENT)
Dept: FAMILY MEDICINE | Facility: CLINIC | Age: 68
End: 2018-07-03

## 2018-07-03 VITALS
WEIGHT: 115.06 LBS | DIASTOLIC BLOOD PRESSURE: 63 MMHG | SYSTOLIC BLOOD PRESSURE: 97 MMHG | BODY MASS INDEX: 22.59 KG/M2 | HEIGHT: 60 IN | HEART RATE: 82 BPM

## 2018-07-03 DIAGNOSIS — M25.562 LEFT KNEE PAIN, UNSPECIFIED CHRONICITY: ICD-10-CM

## 2018-07-03 DIAGNOSIS — S82.035D CLOSED NONDISPLACED TRANSVERSE FRACTURE OF LEFT PATELLA WITH ROUTINE HEALING: Primary | ICD-10-CM

## 2018-07-03 DIAGNOSIS — M17.12 ARTHRITIS OF LEFT KNEE: ICD-10-CM

## 2018-07-03 PROCEDURE — 73562 X-RAY EXAM OF KNEE 3: CPT | Mod: TC,FY,LT

## 2018-07-03 PROCEDURE — 27520 TREAT KNEECAP FRACTURE: CPT | Mod: S$PBB,LT,, | Performed by: ORTHOPAEDIC SURGERY

## 2018-07-03 PROCEDURE — 99215 OFFICE O/P EST HI 40 MIN: CPT | Mod: 25,PBBFAC | Performed by: ORTHOPAEDIC SURGERY

## 2018-07-03 PROCEDURE — 73562 X-RAY EXAM OF KNEE 3: CPT | Mod: 26,LT,, | Performed by: RADIOLOGY

## 2018-07-03 PROCEDURE — 27520 TREAT KNEECAP FRACTURE: CPT | Mod: PBBFAC | Performed by: ORTHOPAEDIC SURGERY

## 2018-07-03 PROCEDURE — 99999 PR PBB SHADOW E&M-EST. PATIENT-LVL V: CPT | Mod: 25,PBBFAC,, | Performed by: ORTHOPAEDIC SURGERY

## 2018-07-03 PROCEDURE — 99203 OFFICE O/P NEW LOW 30 MIN: CPT | Mod: S$PBB,57,, | Performed by: ORTHOPAEDIC SURGERY

## 2018-07-03 NOTE — TELEPHONE ENCOUNTER
Spoke with Christiana, gave her VO as below.  She will also fax in updated orders to be signed.  Tennille

## 2018-07-03 NOTE — TELEPHONE ENCOUNTER
----- Message from Naomy Wiseman sent at 7/3/2018 10:22 AM CDT -----  Call Christiana / nurse with [facility] at pt's home now / [phone] .. Has a new wound to bottom area ... Requires orders

## 2018-07-03 NOTE — TELEPHONE ENCOUNTER
Spoke with Christiana. She called to report that pt has a new stage II to her coccyx and may she put Duoderm on it and also, she sees her once a week but is requesting orders to see her 2 x a week for a while.  They would like to add a home health aide to assist in the home once a week;  has hurt his back.  Please adviseShahana

## 2018-07-03 NOTE — LETTER
July 3, 2018      Og Perez MD  [address] [place] [address]           [facility]  [address] [place] [address]  Phone: [phone]  Fax: [phone]          Patient: Obdulia Yepez   MR Number: [id number]   YOB: 1950   Date of Visit: 7/3/2018       Dear Dr. Og Perez:    Thank you for referring Obdulia Yepez to me for evaluation. Attached you will find relevant portions of my assessment and plan of care.    If you have questions, please do not hesitate to call me. I look forward to following Obdulia Yepez along with you.    Sincerely,    Esteban Remy, DO    Enclosure  CC:  No Recipients    If you would like to receive this communication electronically, please contact [email or web address] or [phone] to request more information on [employer] access.    For providers and/or their staff who would like to refer a patient to [facility], please contact us through our one-stop-shop provider referral line, [facility], at [phone].    If you feel you have received this communication in error or would no longer like to receive these types of communications, please e-mail [email or web address]

## 2018-07-03 NOTE — PROGRESS NOTES
Subjective:      Patient ID: Obdulia Yepez is a 68 y.o. female.    Chief Complaint: Injury of the Left Knee    Referring Provider: Og Perez Md  [address] [place] [address]     HPI:  Ms. Yepez is a 68 year old female who was referred by Dr. Perez for consultation on 3 weeks of left knee pain/patella fracture which was incurred when the patient fell while entering her garage on 06/10/2018.  She waited several days before presenting to her PCM where x-rays showed a nondisplaced patella fracture. She has not been wearing her brace, but has seen home PT which recommended she limit flexing her knee. She stated her pain is tolerable.    Past Medical History:   Diagnosis Date    Allergy     Arthritis     Asthma     Depression     Gout     Hypertension      Past Surgical History:   Procedure Laterality Date    APPENDECTOMY       SECTION      CHOLECYSTECTOMY      HYSTERECTOMY      STOMACH SURGERY      WRIST SURGERY Left      Review of patient's allergies indicates:   Allergen Reactions    Latex     Milk containing products     Opioids - morphine analogues     Peanut     Sodium phosphate     Soy     Sulfa (sulfonamide antibiotics)      Social History     Occupational History    Not on file.     Social History Main Topics    Smoking status: Never Smoker    Smokeless tobacco: Never Used    Alcohol use Yes    Drug use: No    Sexual activity: Not on file      History reviewed. No pertinent family history.     Occupation: Retired Administrative Assistance.    Previous Hospitalizations:  Appendectomy, childbirth X3, Gastric bypass     Review of Systems   Constitution: Negative for chills and fever.   HENT: Negative for hoarse voice and sore throat.    Eyes: Negative for blurred vision and redness.   Cardiovascular: Negative for irregular heartbeat.   Respiratory: Negative for cough and snoring.    Endocrine: Negative for polyphagia.   Hematologic/Lymphatic: Does not  bruise/bleed easily.   Skin: Negative for itching and rash.   Musculoskeletal: Positive for joint pain and joint swelling.   Gastrointestinal: Positive for diarrhea. Negative for nausea and vomiting.   Genitourinary: Negative for frequency and urgency.   Neurological: Negative for seizures and sensory change.   Psychiatric/Behavioral: Positive for depression. Negative for substance abuse.          Objective:      Physical Exam:  General: AAOx3.  No acute distress  HEENT: Normocephalic, PEARLA EOMI, Fair Dentition  Neck: Supple, No JVD  Chest: Symetric, equal excursion on inspiration  Abdomen: Soft NTND  Vascular:  Pulses intact and equal bilaterally.  Capillary refill less than 3 seconds and equal bilaterally.  Neurologic:  Pinprick and soft touch intact and equal bilaterally  Integment:  No ecchymosis, no errythema  Extremity: Knee: Extension/flexion left knee 0/>90 degrees. No effusion. Mild crepitus with motion. Relatively nontender with motion or palpation. Mildly positive patella load/compression.  Negative patella apprehension/relocation. Patient able to fully extend left knee against resistance.  Gap palpable mid patella.  Increased excursion with valgus stressing left knee.  Increased excursion with Lachman/drawer both knees. No joint line tenderness. Clarke negative.  Enrrique negative.   Nontender over the medial or lateral femoral epicondyles. Nontender at the anserine insertion bilaterally.  No swelling at the anserine insertion bilaterally.   Radiography:  Personally reviewed x-rays of the left knee completed on 06/14/2018 and 07/03/2018 which showed a nondisplaced transverse fracture of the mid patella.  No change in position of the fracture fragments between 06/14 and 07/03 x-rays. Arthritic changes present.      Assessment:       Impression:     1. Closed nondisplaced transverse fracture of left patella with routine healing    2.      Arthritis, left knee.      Plan:       1.  Discussed physical  examination and radiographic findings with the patient. Obdulia understands that she has a nondisplaced fracture of her  Left patella, but since she has good extension strength and it has not changed position from her index x-rays she could be treated conservatively, but if it changes position thens surgery could be entertained.  Also discussed proceeding with surgery now, she stated she would rather trial conservative treatment first.  2.  Knee immobilizer left knee, she understands it must be worn at all times, even when sleeping.  3.  Since her pain is well controlled, she can continue with pain control with OTC meds dosed per box instructions.  4.  May ambulate with a walker with weight bearing at tolerance.  5.  Hold on PT until fracture is better healed and then will start gentle motion exercises.  Expect to start PT at next visit.  6.  FU in approximately 4 to 5 week with x-ray left knee.  She understands she should follow up sooner if there is a sudden change in pain or ability to move knee.  2.

## 2018-07-05 ENCOUNTER — TELEPHONE (OUTPATIENT)
Dept: FAMILY MEDICINE | Facility: CLINIC | Age: 68
End: 2018-07-05

## 2018-07-05 ENCOUNTER — OFFICE VISIT (OUTPATIENT)
Dept: FAMILY MEDICINE | Facility: CLINIC | Age: 68
End: 2018-07-05
Payer: MEDICARE

## 2018-07-05 VITALS
DIASTOLIC BLOOD PRESSURE: 64 MMHG | OXYGEN SATURATION: 94 % | WEIGHT: 106 LBS | SYSTOLIC BLOOD PRESSURE: 135 MMHG | RESPIRATION RATE: 18 BRPM | BODY MASS INDEX: 20.81 KG/M2 | HEART RATE: 121 BPM | HEIGHT: 60 IN

## 2018-07-05 DIAGNOSIS — G44.329 CHRONIC POST-TRAUMATIC HEADACHE, NOT INTRACTABLE: ICD-10-CM

## 2018-07-05 DIAGNOSIS — E87.1 HYPONATREMIA: Primary | ICD-10-CM

## 2018-07-05 DIAGNOSIS — S09.90XA HEAD TRAUMA, INITIAL ENCOUNTER: ICD-10-CM

## 2018-07-05 DIAGNOSIS — N30.00 ACUTE CYSTITIS WITHOUT HEMATURIA: ICD-10-CM

## 2018-07-05 DIAGNOSIS — M47.12 OSTEOARTHRITIS OF CERVICAL SPINE WITH MYELOPATHY: ICD-10-CM

## 2018-07-05 PROCEDURE — 99214 OFFICE O/P EST MOD 30 MIN: CPT | Mod: S$PBB,,, | Performed by: FAMILY MEDICINE

## 2018-07-05 PROCEDURE — 99999 PR PBB SHADOW E&M-EST. PATIENT-LVL III: CPT | Mod: PBBFAC,,, | Performed by: FAMILY MEDICINE

## 2018-07-05 PROCEDURE — 99213 OFFICE O/P EST LOW 20 MIN: CPT | Mod: PBBFAC,PN | Performed by: FAMILY MEDICINE

## 2018-07-05 RX ORDER — OXYCODONE AND ACETAMINOPHEN 5; 325 MG/1; MG/1
1 TABLET ORAL EVERY 6 HOURS PRN
Qty: 30 TABLET | Refills: 0 | Status: SHIPPED | OUTPATIENT
Start: 2018-07-05 | End: 2018-07-23 | Stop reason: SDUPTHER

## 2018-07-05 RX ORDER — NITROFURANTOIN MACROCRYSTALS 50 MG/1
50 CAPSULE ORAL 4 TIMES DAILY
Qty: 28 CAPSULE | Refills: 0 | Status: SHIPPED | OUTPATIENT
Start: 2018-07-05 | End: 2018-07-12

## 2018-07-05 RX ORDER — GABAPENTIN 300 MG/1
300 CAPSULE ORAL 3 TIMES DAILY
Qty: 90 CAPSULE | Refills: 3 | Status: SHIPPED | OUTPATIENT
Start: 2018-07-05 | End: 2018-10-29 | Stop reason: SDUPTHER

## 2018-07-05 NOTE — PROGRESS NOTES
Subjective:       Patient ID: Obdulia Yepez is a 68 y.o. female.    Chief Complaint: Follow-up and Medication Refill    Patient had a fall last Sunday, reports left knee pain, with instability in her legs. Since the last visit, her xray revealed a patellar fracture for which she has seen ortho. Requests refill on pain medicine today. Also having heaaches with ataxia since her fall, CT head neagtive.    Has low sodium, still has not seen nephrologist. Continue to report an increase in her falls.          Review of Systems   Constitutional: Negative for activity change, appetite change, chills, fatigue and fever.   HENT: Negative for congestion, dental problem, facial swelling, nosebleeds, postnasal drip, sinus pain, sore throat, trouble swallowing and voice change.    Eyes: Negative for pain, discharge and visual disturbance.   Respiratory: Negative for apnea, cough, chest tightness and shortness of breath.    Cardiovascular: Negative for chest pain and palpitations.   Gastrointestinal: Negative for abdominal pain, blood in stool, constipation and nausea.   Endocrine: Negative for cold intolerance, polydipsia and polyuria.   Genitourinary: Negative for difficulty urinating, enuresis and flank pain.   Musculoskeletal: Positive for arthralgias, back pain and myalgias.   Skin: Positive for rash and wound. Negative for color change.   Allergic/Immunologic: Negative for environmental allergies and immunocompromised state.   Neurological: Positive for dizziness and headaches. Negative for light-headedness.   Hematological: Negative for adenopathy.   Psychiatric/Behavioral: Positive for sleep disturbance. Negative for agitation, behavioral problems, decreased concentration and dysphoric mood. The patient is not nervous/anxious.    All other systems reviewed and are negative.        Reviewed family, medical, surgical, and social history.    Objective:      /64 (BP Location: Right arm, Patient Position: Sitting, BP  Method: Small (Automatic))   Pulse (!) 121   Resp 18   Ht 5' (1.524 m)   Wt 48.1 kg (106 lb)   SpO2 (!) 94%   BMI 20.70 kg/m²   Physical Exam   Constitutional: She is oriented to person, place, and time. No distress.   HENT:   Head: Normocephalic and atraumatic.   Nose: Nose normal.   Mouth/Throat: Oropharynx is clear and moist. No oropharyngeal exudate.   Eyes: Conjunctivae and EOM are normal. Pupils are equal, round, and reactive to light. No scleral icterus.   Neck: Normal range of motion. Neck supple. No thyromegaly present.   Cardiovascular: Normal rate, regular rhythm and normal heart sounds.  Exam reveals no gallop and no friction rub.    No murmur heard.  Pulmonary/Chest: Effort normal and breath sounds normal. No respiratory distress. She has no wheezes. She has no rales. She exhibits no tenderness.   Abdominal: Soft. Bowel sounds are normal. She exhibits no distension. There is no tenderness. There is no guarding.   Musculoskeletal: Normal range of motion. She exhibits tenderness and deformity. She exhibits no edema.   Lymphadenopathy:     She has no cervical adenopathy.   Neurological: She is alert and oriented to person, place, and time. She displays normal reflexes. No cranial nerve deficit or sensory deficit. She exhibits normal muscle tone.   Skin: Skin is warm and dry. Rash noted. She is not diaphoretic. No erythema. No pallor.   Psychiatric: She has a normal mood and affect. Her behavior is normal. Judgment and thought content normal.   Nursing note and vitals reviewed.      Assessment:       1. Hyponatremia    2. Osteoarthritis of cervical spine with myelopathy    3. Chronic post-traumatic headache, not intractable    4. Head trauma, initial encounter    5. Acute cystitis without hematuria        Plan:       Hyponatremia    Osteoarthritis of cervical spine with myelopathy    Chronic post-traumatic headache, not intractable    Head trauma, initial encounter  -     MRI Brain Without Contrast;  Future; Expected date: 07/05/2018    Acute cystitis without hematuria  -     nitrofurantoin (MACRODANTIN) 50 MG capsule; Take 1 capsule (50 mg total) by mouth 4 (four) times daily. for 7 days  Dispense: 28 capsule; Refill: 0    Other orders  -     oxyCODONE-acetaminophen (PERCOCET) 5-325 mg per tablet; Take 1 tablet by mouth every 6 (six) hours as needed for Pain.  Dispense: 30 tablet; Refill: 0    25 minutes was spent face to face, with over half in counseling.        Risks, benefits, and side effects were discussed with the patient. All questions were answered to the fullest satisfaction of the patient, and pt verbalized understanding and agreement to treatment plan. Pt was to call with any new or worsening symptoms, or present to the ER.

## 2018-07-05 NOTE — TELEPHONE ENCOUNTER
----- Message from Trevon Fowler sent at 7/5/2018  1:26 PM CDT -----  Contact: Pt  - gustavo   States he's calling for a refill and can be reached at [phone]//thanks/dbw     1. What is the name of the medication you are requesting? gabepentin   2. What is the dose? 300mg   3. How do you take the medication? Orally, topically, etc? Orally   4. How often do you take this medication? 3 times   5. Do you need a 30 day or 90 day supply? 30 days  6. How many refills are you requesting?   7. What is your preferred pharmacy and location of the pharmacy?   8. Who can we contact with further questions? Pt      [facility]/pharmacy #[id number] - [place] - [address]  [address] [place] [address]  Phone: [phone] Fax: [phone]

## 2018-07-09 ENCOUNTER — TELEPHONE (OUTPATIENT)
Dept: FAMILY MEDICINE | Facility: CLINIC | Age: 68
End: 2018-07-09

## 2018-07-09 NOTE — TELEPHONE ENCOUNTER
----- Message from Brian Banegas sent at 7/9/2018 11:33 AM CDT -----  Contact: same  Patient called in and stated Dr. Dno Arroyo never received her approved referral.  Patient would like a call placed to Dr. Arroyo at [phone]    Patient call back number is [phone]

## 2018-07-10 ENCOUNTER — HOSPITAL ENCOUNTER (OUTPATIENT)
Dept: RADIOLOGY | Facility: HOSPITAL | Age: 68
Discharge: HOME | End: 2018-07-10
Attending: FAMILY MEDICINE
Payer: MEDICARE

## 2018-07-10 DIAGNOSIS — S09.90XA HEAD TRAUMA, INITIAL ENCOUNTER: ICD-10-CM

## 2018-07-10 PROCEDURE — 70551 MRI BRAIN STEM W/O DYE: CPT | Mod: 26,,, | Performed by: RADIOLOGY

## 2018-07-10 PROCEDURE — 70551 MRI BRAIN STEM W/O DYE: CPT | Mod: TC

## 2018-07-12 DIAGNOSIS — M25.561 RIGHT KNEE PAIN, UNSPECIFIED CHRONICITY: Primary | ICD-10-CM

## 2018-07-18 ENCOUNTER — OFFICE VISIT (OUTPATIENT)
Dept: ORTHOPEDICS | Facility: CLINIC | Age: 68
End: 2018-07-18
Payer: MEDICARE

## 2018-07-18 ENCOUNTER — HOSPITAL ENCOUNTER (OUTPATIENT)
Dept: RADIOLOGY | Facility: HOSPITAL | Age: 68
Discharge: HOME | End: 2018-07-18
Attending: ORTHOPAEDIC SURGERY
Payer: MEDICARE

## 2018-07-18 ENCOUNTER — TELEPHONE (OUTPATIENT)
Dept: FAMILY MEDICINE | Facility: CLINIC | Age: 68
End: 2018-07-18

## 2018-07-18 VITALS
SYSTOLIC BLOOD PRESSURE: 131 MMHG | DIASTOLIC BLOOD PRESSURE: 81 MMHG | HEART RATE: 102 BPM | BODY MASS INDEX: 20.82 KG/M2 | WEIGHT: 106.06 LBS | HEIGHT: 60 IN

## 2018-07-18 DIAGNOSIS — M25.562 LEFT KNEE PAIN, UNSPECIFIED CHRONICITY: Primary | ICD-10-CM

## 2018-07-18 DIAGNOSIS — M25.561 RIGHT KNEE PAIN, UNSPECIFIED CHRONICITY: ICD-10-CM

## 2018-07-18 DIAGNOSIS — S82.002D CLOSED NONDISPLACED FRACTURE OF LEFT PATELLA WITH ROUTINE HEALING, SUBSEQUENT ENCOUNTER: Primary | ICD-10-CM

## 2018-07-18 PROCEDURE — 99213 OFFICE O/P EST LOW 20 MIN: CPT | Mod: 25,PBBFAC | Performed by: ORTHOPAEDIC SURGERY

## 2018-07-18 PROCEDURE — 99024 POSTOP FOLLOW-UP VISIT: CPT | Mod: S$PBB,,, | Performed by: ORTHOPAEDIC SURGERY

## 2018-07-18 PROCEDURE — 73560 X-RAY EXAM OF KNEE 1 OR 2: CPT | Mod: TC,FY,LT

## 2018-07-18 PROCEDURE — 99999 PR PBB SHADOW E&M-EST. PATIENT-LVL III: CPT | Mod: 25,PBBFAC,, | Performed by: ORTHOPAEDIC SURGERY

## 2018-07-18 PROCEDURE — 73560 X-RAY EXAM OF KNEE 1 OR 2: CPT | Mod: 26,LT,, | Performed by: RADIOLOGY

## 2018-07-18 NOTE — PROGRESS NOTES
Subjective:      Patient ID: Obdulia Yepez is a 68 y.o. female.    Chief Complaint: Pain of the Left Knee    HPI:Ms. Yepez returned today for reevaluation of her left knee.  At her last visit on 07/03/2018 she was treated with a knee immobilizer for a minimally displaced patella fracture of her left knee.  She stated she cant wear the knee immobilizer as it is too long and irritating her, causing pain.  ROS      Objective:    Physical Exam:  Vascular:  Pulses intact and equal bilaterally.  Capillary refill less than 3 seconds and equal bilaterally  Neurologic:  Pinprick and soft touch intact and equal bilaterally  Integment:  Resolving ecchymosis right pretibial area.  Extremity:  Knee: Extension/flexion left knee 0/>90 degrees. No effusion. Mild crepitus with motion. Relatively nontender with motion or palpation. Mildly positive patella load/compression.  Negative patella apprehension/relocation. Patient able to fully extend left knee against resistance.  Gap palpable mid patella.  Increased excursion with valgus stressing left knee.  Increased excursion with Lachman/drawer both knees. No joint line tenderness. Clarke negative.  [place] negative.   Nontender over the medial or lateral femoral epicondyles. Nontender at the anserine insertion bilaterally.  No swelling at the anserine insertion bilaterally.   Radiography:  Personally reviewed x-rays of the left knee completed on 07/18/2018  which showed no change in position of  a minimally displaced transverse fracture of the mid patella.  compared with x-rays completed on 07/03/2018.         Assessment:       Impression:    Closed minimally displaced transverse fracture of left patella with routine healing             Plan:       1.  Discussed physical examination and radiographic findings with the patient. Obdulia understands that she has not changed the position of the patela fracture, but in order to ensure she does not displace it she needs to wear a brace  locked in extension.  If the immobilizer is causing her issues she could get an ROM brace, but must wear the immobilizer until an ROM brace is available.     2.  ROM brace left knee, a prescription was given.  3.  May ambulate with weight bearing at tolerance, but must have the brace on at all times, even when in bed.  4.  Pain control with OTC meds dosed per box instructions.  5.  May continue with home exercises such as straight leg raises.  6.  At her next visit may consider changing motion to 0- 90 degrees depending on xray results.  7.  FU in one frank with an x-ray of the left knee.

## 2018-07-18 NOTE — TELEPHONE ENCOUNTER
----- Message from Irasema Pickering sent at 7/18/2018  9:53 AM CDT -----  Contact: Se lf  Patient missed her appt yesterday and needs to know if she can come today     Please call back [phone]

## 2018-07-19 DIAGNOSIS — M25.562 LEFT KNEE PAIN, UNSPECIFIED CHRONICITY: Primary | ICD-10-CM

## 2018-07-23 ENCOUNTER — OFFICE VISIT (OUTPATIENT)
Dept: FAMILY MEDICINE | Facility: CLINIC | Age: 68
End: 2018-07-23
Payer: MEDICARE

## 2018-07-23 VITALS
RESPIRATION RATE: 18 BRPM | OXYGEN SATURATION: 98 % | HEART RATE: 97 BPM | SYSTOLIC BLOOD PRESSURE: 153 MMHG | DIASTOLIC BLOOD PRESSURE: 99 MMHG

## 2018-07-23 DIAGNOSIS — E87.1 HYPONATREMIA: Primary | ICD-10-CM

## 2018-07-23 DIAGNOSIS — M47.12 OSTEOARTHRITIS OF CERVICAL SPINE WITH MYELOPATHY: ICD-10-CM

## 2018-07-23 PROCEDURE — 99213 OFFICE O/P EST LOW 20 MIN: CPT | Mod: PBBFAC,PN | Performed by: FAMILY MEDICINE

## 2018-07-23 PROCEDURE — 99213 OFFICE O/P EST LOW 20 MIN: CPT | Mod: S$PBB,,, | Performed by: FAMILY MEDICINE

## 2018-07-23 PROCEDURE — 99999 PR PBB SHADOW E&M-EST. PATIENT-LVL III: CPT | Mod: PBBFAC,,, | Performed by: FAMILY MEDICINE

## 2018-07-23 RX ORDER — OXYCODONE AND ACETAMINOPHEN 5; 325 MG/1; MG/1
1 TABLET ORAL EVERY 6 HOURS PRN
Qty: 90 TABLET | Refills: 0 | Status: SHIPPED | OUTPATIENT
Start: 2018-07-23 | End: 2019-01-08 | Stop reason: SDUPTHER

## 2018-07-23 NOTE — PROGRESS NOTES
Subjective:       Patient ID: Obdulia Yepez is a 68 y.o. female.    Chief Complaint: Follow-up    Has not been set up with nephrology, stopped lexapro, which has mildly exacerbated her depression, without si/hi.    Pain from patellar fracture controlled, appropriate follow up with ortho.          Review of Systems   Constitutional: Negative for activity change, appetite change, chills, fatigue and fever.   HENT: Negative for congestion, dental problem, facial swelling, nosebleeds, postnasal drip, sinus pain, sore throat, trouble swallowing and voice change.    Eyes: Negative for pain, discharge and visual disturbance.   Respiratory: Negative for apnea, cough, chest tightness and shortness of breath.    Cardiovascular: Negative for chest pain and palpitations.   Gastrointestinal: Negative for abdominal pain, blood in stool, constipation and nausea.   Endocrine: Negative for cold intolerance, polydipsia and polyuria.   Genitourinary: Negative for difficulty urinating, enuresis and flank pain.   Musculoskeletal: Positive for arthralgias, back pain and myalgias.   Skin: Positive for rash and wound. Negative for color change.   Allergic/Immunologic: Negative for environmental allergies and immunocompromised state.   Neurological: Positive for dizziness and headaches. Negative for light-headedness.   Hematological: Negative for adenopathy.   Psychiatric/Behavioral: Positive for sleep disturbance. Negative for agitation, behavioral problems, decreased concentration and dysphoric mood. The patient is not nervous/anxious.    All other systems reviewed and are negative.        Reviewed family, medical, surgical, and social history.    Objective:      BP (!) 153/99 (BP Location: Left arm, Patient Position: Sitting, BP Method: Small (Automatic))   Pulse 97   Resp 18   SpO2 98%   Physical Exam   Constitutional: She is oriented to person, place, and time. No distress.   HENT:   Head: Normocephalic and atraumatic.   Nose: Nose  normal.   Mouth/Throat: Oropharynx is clear and moist. No oropharyngeal exudate.   Eyes: Conjunctivae and EOM are normal. Pupils are equal, round, and reactive to light. No scleral icterus.   Neck: Normal range of motion. Neck supple. No thyromegaly present.   Cardiovascular: Normal rate, regular rhythm and normal heart sounds.  Exam reveals no gallop and no friction rub.    No murmur heard.  Pulmonary/Chest: Effort normal and breath sounds normal. No respiratory distress. She has no wheezes. She has no rales. She exhibits no tenderness.   Abdominal: Soft. Bowel sounds are normal. She exhibits no distension. There is no tenderness. There is no guarding.   Musculoskeletal: Normal range of motion. She exhibits tenderness and deformity. She exhibits no edema.   Lymphadenopathy:     She has no cervical adenopathy.   Neurological: She is alert and oriented to person, place, and time. She displays normal reflexes. No cranial nerve deficit or sensory deficit. She exhibits normal muscle tone.   Skin: Skin is warm and dry. Rash noted. She is not diaphoretic. No erythema. No pallor.   Psychiatric: She has a normal mood and affect. Her behavior is normal. Judgment and thought content normal.   Nursing note and vitals reviewed.      Assessment:       1. Hyponatremia    2. Osteoarthritis of cervical spine with myelopathy        Plan:       Hyponatremia    Osteoarthritis of cervical spine with myelopathy  -     oxyCODONE-acetaminophen (PERCOCET) 5-325 mg per tablet; Take 1 tablet by mouth every 6 (six) hours as needed for Pain.  Dispense: 90 tablet; Refill: 0    Refill meds, will continue follow up with specialists.        Risks, benefits, and side effects were discussed with the patient. All questions were answered to the fullest satisfaction of the patient, and pt verbalized understanding and agreement to treatment plan. Pt was to call with any new or worsening symptoms, or present to the ER.

## 2018-08-07 ENCOUNTER — TELEPHONE (OUTPATIENT)
Dept: ORTHOPEDICS | Facility: CLINIC | Age: 68
End: 2018-08-07

## 2018-08-07 NOTE — TELEPHONE ENCOUNTER
Called patient to inform her that Dr. akbar will not be in [place] for he appt. Patient agreed to reschedule for [place] location.

## 2018-08-15 ENCOUNTER — OFFICE VISIT (OUTPATIENT)
Dept: ORTHOPEDICS | Facility: CLINIC | Age: 68
End: 2018-08-15
Payer: MEDICARE

## 2018-08-15 ENCOUNTER — HOSPITAL ENCOUNTER (OUTPATIENT)
Dept: RADIOLOGY | Facility: HOSPITAL | Age: 68
Discharge: HOME | End: 2018-08-15
Attending: ORTHOPAEDIC SURGERY
Payer: MEDICARE

## 2018-08-15 VITALS
DIASTOLIC BLOOD PRESSURE: 62 MMHG | SYSTOLIC BLOOD PRESSURE: 105 MMHG | HEART RATE: 84 BPM | WEIGHT: 106.06 LBS | HEIGHT: 60 IN | BODY MASS INDEX: 20.82 KG/M2

## 2018-08-15 DIAGNOSIS — S82.035G: Primary | ICD-10-CM

## 2018-08-15 DIAGNOSIS — M25.562 LEFT KNEE PAIN, UNSPECIFIED CHRONICITY: ICD-10-CM

## 2018-08-15 PROCEDURE — 99999 PR PBB SHADOW E&M-EST. PATIENT-LVL III: CPT | Mod: PBBFAC,,, | Performed by: ORTHOPAEDIC SURGERY

## 2018-08-15 PROCEDURE — 99024 POSTOP FOLLOW-UP VISIT: CPT | Mod: S$PBB,,, | Performed by: ORTHOPAEDIC SURGERY

## 2018-08-15 PROCEDURE — 73560 X-RAY EXAM OF KNEE 1 OR 2: CPT | Mod: 26,LT,, | Performed by: RADIOLOGY

## 2018-08-15 PROCEDURE — 99213 OFFICE O/P EST LOW 20 MIN: CPT | Mod: PBBFAC,25,PN | Performed by: ORTHOPAEDIC SURGERY

## 2018-08-15 PROCEDURE — 73560 X-RAY EXAM OF KNEE 1 OR 2: CPT | Mod: TC,PN,LT

## 2018-08-15 NOTE — PROGRESS NOTES
Subjective:      Patient ID: Obdulia Yepez is a 68 y.o. female.    Chief Complaint: Pain of the Left Knee    HPI:  Ms. Yepez return today for approximate 6 week follow-up on a transverse fracture of her left patella treated with ROM brace.  She stated she is doing well and has been doing physical therapy.  She has been ambulating 1-2 blocks today without pain. She transitioned herself from her ROM brace to a hinged knee brace.  She feels she has good strength in her knee.    ROS:  No new diagnosis/surgery/prescriptions since last visit on 07/18/2018.      Objective:    Physical Exam:  General: AAOx3.  No acute distress  Vascular:  Pulses intact and equal bilaterally.  Capillary refill less than 3 seconds and equal bilaterally  Neurologic:  Pinprick and soft touch intact and equal bilaterally  Integment:  No ecchymosis, no errythema  Extremity:  Knee:  Extension/flexion equal bilaterally 0/128 degrees. No effusion. Mild crepitus with motion both knees.  Negative patellar load/compression both knees.  Negative patella apprehension/relocation both knees.  Patient able to hold her knee extended against strong resistance.  Nontender with resisted extension left knee. Stable with varus/valgus stressing both knees.  Stable with Lachman/drawer both knees.  Clarke negative both knees.  Enrrique negative both knees.  Nontender at the anserine insertion both knees.  Radiography:  Personally reviewed x-rays of the left knee completed on 08/15/2018 which showed no change in position of a transverse fracture of the left patella. No demonstrable callus is noted        Assessment:       Impression:  Nondisplaced transverse fracture patella, left knee.        Plan:       1.  Discussed physical examination and radiographic findings with the patient. 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  Since she has good strength in her leg without  an extension lag she can be treated with further bracing and let it heal with a fibrous union instead of surgery. She stated she would prefer not to do  surgery.  2.  Continue with knee brace for 1 more month then can discontinue.  3.  Continue with physical therapy.  4.  Continue with walker or cane when ambulating for stability.  5.  Any pain can be controlled with over-the-counter medications dosed per box instructions.  6.  Follow up in approximately 6 weeks with an x-ray of the left knee, expect to return the patient to full unrestricted activities at that time.

## 2018-08-22 ENCOUNTER — TELEPHONE (OUTPATIENT)
Dept: FAMILY MEDICINE | Facility: CLINIC | Age: 68
End: 2018-08-22

## 2018-08-22 RX ORDER — CIPROFLOXACIN 500 MG/1
500 TABLET ORAL 2 TIMES DAILY
Qty: 14 TABLET | Refills: 0 | Status: SHIPPED | OUTPATIENT
Start: 2018-08-22 | End: 2018-08-29

## 2018-08-22 NOTE — TELEPHONE ENCOUNTER
----- Message from Miriam Nice sent at 8/22/2018 10:04 AM CDT -----  Contact: Obdulia  She needs a non sulphur antibiotic is requested for a uti.   Do Not Send G.e. Nitrosur-antein ncr 15 mg cap (she tried to spell it)    [facility]/pharmacy #[id number] - [place], [place] - [address]  [address] [place] [address]  Phone: [phone] Fax: [phone]    Please call her when she can  [phone] (home)   thanks

## 2018-08-23 ENCOUNTER — TELEPHONE (OUTPATIENT)
Dept: FAMILY MEDICINE | Facility: CLINIC | Age: 68
End: 2018-08-23

## 2018-08-23 NOTE — TELEPHONE ENCOUNTER
I have spoken with  Christiana and given VO for home health aide to assist with bathing.  Tennille

## 2018-08-23 NOTE — TELEPHONE ENCOUNTER
----- Message from Jacqueline Tolbert sent at 8/23/2018  1:51 PM CDT -----  Type: Needs Medical Advice    Who Called:  Donna in Home/Danette  Best Call Back Number: [phone]  Additional Information: Calling because patient needs a home health aide to help her bathe. They need an order for this. Please fax to [phone].

## 2018-08-29 ENCOUNTER — OFFICE VISIT (OUTPATIENT)
Dept: FAMILY MEDICINE | Facility: CLINIC | Age: 68
End: 2018-08-29
Payer: MEDICARE

## 2018-08-29 VITALS
OXYGEN SATURATION: 97 % | HEIGHT: 60 IN | DIASTOLIC BLOOD PRESSURE: 100 MMHG | WEIGHT: 104 LBS | HEART RATE: 120 BPM | SYSTOLIC BLOOD PRESSURE: 153 MMHG | BODY MASS INDEX: 20.42 KG/M2 | RESPIRATION RATE: 18 BRPM

## 2018-08-29 DIAGNOSIS — N39.0 RECURRENT UTI: Primary | ICD-10-CM

## 2018-08-29 PROCEDURE — 99999 PR PBB SHADOW E&M-EST. PATIENT-LVL III: CPT | Mod: PBBFAC,,, | Performed by: FAMILY MEDICINE

## 2018-08-29 PROCEDURE — 99214 OFFICE O/P EST MOD 30 MIN: CPT | Mod: S$PBB,,, | Performed by: FAMILY MEDICINE

## 2018-08-29 PROCEDURE — 99213 OFFICE O/P EST LOW 20 MIN: CPT | Mod: PBBFAC,PN | Performed by: FAMILY MEDICINE

## 2018-08-29 NOTE — PROGRESS NOTES
Subjective:       Patient ID: Obdulia Yepez is a 68 y.o. female.    Chief Complaint: Follow-up    Follow up    Seeing ortho, knee improving, feeling well.    Still complaining of recurrent uti, on abx, concerned about future infections.      Review of Systems   Constitutional: Negative for activity change, appetite change, chills, fatigue and fever.   HENT: Negative for congestion, dental problem, facial swelling, nosebleeds, postnasal drip, sinus pain, sore throat, trouble swallowing and voice change.    Eyes: Negative for pain, discharge and visual disturbance.   Respiratory: Negative for apnea, cough, chest tightness and shortness of breath.    Cardiovascular: Negative for chest pain and palpitations.   Gastrointestinal: Negative for abdominal pain, blood in stool, constipation and nausea.   Endocrine: Negative for cold intolerance, polydipsia and polyuria.   Genitourinary: Negative for difficulty urinating, enuresis and flank pain.   Musculoskeletal: Positive for arthralgias, back pain and myalgias.   Skin: Positive for rash and wound. Negative for color change.   Allergic/Immunologic: Negative for environmental allergies and immunocompromised state.   Neurological: Positive for dizziness and headaches. Negative for light-headedness.   Hematological: Negative for adenopathy.   Psychiatric/Behavioral: Positive for sleep disturbance. Negative for agitation, behavioral problems, decreased concentration and dysphoric mood. The patient is not nervous/anxious.    All other systems reviewed and are negative.        Reviewed family, medical, surgical, and social history.    Objective:      BP (!) 153/100 (BP Location: Left arm, Patient Position: Sitting, BP Method: Small (Automatic))   Pulse (!) 120   Resp 18   Ht 5' (1.524 m)   Wt 47.2 kg (104 lb)   SpO2 97%   BMI 20.31 kg/m²   Physical Exam   Constitutional: She is oriented to person, place, and time. No distress.   HENT:   Head: Normocephalic and atraumatic.    Nose: Nose normal.   Mouth/Throat: Oropharynx is clear and moist. No oropharyngeal exudate.   Eyes: Conjunctivae and EOM are normal. Pupils are equal, round, and reactive to light. No scleral icterus.   Neck: Normal range of motion. Neck supple. No thyromegaly present.   Cardiovascular: Normal rate, regular rhythm and normal heart sounds. Exam reveals no gallop and no friction rub.   No murmur heard.  Pulmonary/Chest: Effort normal and breath sounds normal. No respiratory distress. She has no wheezes. She has no rales. She exhibits no tenderness.   Abdominal: Soft. Bowel sounds are normal. She exhibits no distension. There is no tenderness. There is no guarding.   Musculoskeletal: Normal range of motion. She exhibits tenderness and deformity. She exhibits no edema.   Lymphadenopathy:     She has no cervical adenopathy.   Neurological: She is alert and oriented to person, place, and time. She displays normal reflexes. No cranial nerve deficit or sensory deficit. She exhibits normal muscle tone.   Skin: Skin is warm and dry. Rash noted. She is not diaphoretic. No erythema. No pallor.   Psychiatric: She has a normal mood and affect. Her behavior is normal. Judgment and thought content normal.   Nursing note and vitals reviewed.      Assessment:       1. Recurrent UTI        Plan:       Recurrent UTI  -     Ambulatory referral to Urology    25 minutes was spent face to face, with over half in counseling.        Risks, benefits, and side effects were discussed with the patient. All questions were answered to the fullest satisfaction of the patient, and pt verbalized understanding and agreement to treatment plan. Pt was to call with any new or worsening symptoms, or present to the ER.

## 2018-09-06 ENCOUNTER — TELEPHONE (OUTPATIENT)
Dept: FAMILY MEDICINE | Facility: CLINIC | Age: 68
End: 2018-09-06

## 2018-09-06 DIAGNOSIS — S82.035D CLOSED NONDISPLACED TRANSVERSE FRACTURE OF LEFT PATELLA WITH ROUTINE HEALING: Primary | ICD-10-CM

## 2018-09-06 DIAGNOSIS — M25.562 LEFT KNEE PAIN, UNSPECIFIED CHRONICITY: Primary | ICD-10-CM

## 2018-09-06 NOTE — TELEPHONE ENCOUNTER
----- Message from Zina Marquez sent at 9/6/2018  1:06 PM CDT -----  Contact: Pt  Name of Who is Calling: Obdulia      What is the request in detail: Patient is calling requesting to have an order for an x ray to be put in the system because she has to see  tomorrow      Can the clinic reply by [facility]: no      [facility] [phone] (home)

## 2018-09-07 ENCOUNTER — OFFICE VISIT (OUTPATIENT)
Dept: ORTHOPEDICS | Facility: CLINIC | Age: 68
End: 2018-09-07
Payer: MEDICARE

## 2018-09-07 ENCOUNTER — HOSPITAL ENCOUNTER (OUTPATIENT)
Dept: RADIOLOGY | Facility: HOSPITAL | Age: 68
Discharge: HOME | End: 2018-09-07
Attending: ORTHOPAEDIC SURGERY
Payer: MEDICARE

## 2018-09-07 VITALS
SYSTOLIC BLOOD PRESSURE: 134 MMHG | HEIGHT: 60 IN | BODY MASS INDEX: 20.43 KG/M2 | DIASTOLIC BLOOD PRESSURE: 81 MMHG | HEART RATE: 80 BPM | WEIGHT: 104.06 LBS

## 2018-09-07 DIAGNOSIS — M25.562 LEFT KNEE PAIN, UNSPECIFIED CHRONICITY: ICD-10-CM

## 2018-09-07 DIAGNOSIS — S82.002A CLOSED NONDISPLACED FRACTURE OF LEFT PATELLA, UNSPECIFIED FRACTURE MORPHOLOGY, INITIAL ENCOUNTER: Primary | ICD-10-CM

## 2018-09-07 PROCEDURE — 99999 PR PBB SHADOW E&M-EST. PATIENT-LVL III: CPT | Mod: PBBFAC,,, | Performed by: ORTHOPAEDIC SURGERY

## 2018-09-07 PROCEDURE — 73562 X-RAY EXAM OF KNEE 3: CPT | Mod: 26,LT,, | Performed by: RADIOLOGY

## 2018-09-07 PROCEDURE — 99024 POSTOP FOLLOW-UP VISIT: CPT | Mod: POP,,, | Performed by: ORTHOPAEDIC SURGERY

## 2018-09-07 PROCEDURE — 99213 OFFICE O/P EST LOW 20 MIN: CPT | Mod: PBBFAC,25,PN | Performed by: ORTHOPAEDIC SURGERY

## 2018-09-07 PROCEDURE — 73562 X-RAY EXAM OF KNEE 3: CPT | Mod: TC,PN,LT

## 2018-09-07 NOTE — PROGRESS NOTES
Subjective:      Patient ID: Obdulia Yepez is a 68 y.o. female.    Chief Complaint: Injury and Pain of the Left Knee      HPI:    Ms. Yepez returns today for follow-up on a nondisplaced patella fracture of her left knee.  She stated she does not like wearing the knee brace.  She has been ambulating with full weight-bearing with a walker and is doing well.  Her pain is well controlled.  She is now approximately 3 months since she injured her knee cap on 06/10/2018 when she fell while entering her garage.    ROS:   No new diagnosis / surgery/prescriptions since last office visit on 08/15/2018.        Objective:    Physical Exam:  General: AAOx3.  No acute distress  Vascular:  Pulses intact and equal bilaterally.  Capillary refill less than 3 seconds and equal bilaterally  Neurologic:  Pinprick and soft touch intact and equal bilaterally  Integment:  No ecchymosis, no errythema  Extremity:  Knee:  Extension / flexion equal bilaterally 0/128 degrees.  Mild crepitus with motion both knees.  No effusion either knee.  Negative patellar load /compression both knees.  Negative patella apprehension/relocation both knees.  Palpable defect left patella.  Good strength with full extension with resisted extension of the left knee.  Equal excursion with varus /valgus stressing both knees.  Equal excursion with Lachman/ drawer both knees.  No joint line tenderness either knee.  Radiography:  Personally reviewed  X-rays of the left knee completed on 09/07/2018 which showed no change in position of a transverse patella fracture with minimal displacement when compared to previous x-rays.        Assessment:       Impression:     1.   Healing relatively nondisplaced transverse patella fracture, left knee             Plan:       1.  Discussed physical examination and radiographic findings with the patient. Obdulia understands that she is healing her patella fracture, but it is healing with a fibrous union.  Since she has good strength with  extension and has no lag she can continue to be treated without surgery.  2. May discontinue ROM bridge and wear other brace as needed.  3.  Any minor pain can be treated with over-the-counter medications dosed per box instructions.  4.  Home exercises to include quadriceps and hamstring exercises were shown discussed with the patient.  5. Continue to use walker as the patient feels she is unstable.    6. Refer to outpatient physical therapy to start motion and strengthening exercises. She is currently doing home health physical therapy, but feel she would get better results if she would attend outpatient physical therapy as there is more quit meant to be utilized.  7.  Follow up p.r.n..

## 2018-09-10 ENCOUNTER — TELEPHONE (OUTPATIENT)
Dept: FAMILY MEDICINE | Facility: CLINIC | Age: 68
End: 2018-09-10

## 2018-09-10 NOTE — TELEPHONE ENCOUNTER
----- Message from Estrella Saab sent at 9/10/2018  9:47 AM CDT -----  Contact: pt  Pt calling states that she needs approval to fill her Lexapro 20 mg..[phone] (home)           .  [facility]/pharmacy #[id number] - [place], [place] - [address]  [address] [place] [address]  Phone: [phone] Fax: [phone]

## 2018-09-17 RX ORDER — ESCITALOPRAM OXALATE 20 MG/1
20 TABLET ORAL DAILY
Qty: 90 TABLET | Refills: 3 | Status: SHIPPED | OUTPATIENT
Start: 2018-09-17 | End: 2019-10-01 | Stop reason: SDUPTHER

## 2018-09-17 RX ORDER — ESCITALOPRAM OXALATE 20 MG/1
20 TABLET ORAL DAILY
COMMUNITY
End: 2018-09-17 | Stop reason: SDUPTHER

## 2018-09-17 RX ORDER — IBUPROFEN 800 MG/1
800 TABLET ORAL 3 TIMES DAILY
Qty: 90 TABLET | Refills: 3 | Status: SHIPPED | OUTPATIENT
Start: 2018-09-17 | End: 2019-01-08 | Stop reason: SDUPTHER

## 2018-09-18 ENCOUNTER — CLINICAL SUPPORT (OUTPATIENT)
Dept: REHABILITATION | Facility: HOSPITAL | Age: 68
End: 2018-09-18
Attending: ORTHOPAEDIC SURGERY
Payer: MEDICARE

## 2018-09-18 DIAGNOSIS — G89.29 CHRONIC PAIN OF LEFT KNEE: ICD-10-CM

## 2018-09-18 DIAGNOSIS — M25.562 CHRONIC PAIN OF LEFT KNEE: ICD-10-CM

## 2018-09-18 DIAGNOSIS — R26.81 GAIT INSTABILITY: Primary | ICD-10-CM

## 2018-09-18 PROCEDURE — G8978 MOBILITY CURRENT STATUS: HCPCS | Mod: CL,PN

## 2018-09-18 PROCEDURE — G8979 MOBILITY GOAL STATUS: HCPCS | Mod: CJ,PN

## 2018-09-18 PROCEDURE — 97161 PT EVAL LOW COMPLEX 20 MIN: CPT | Mod: PN

## 2018-09-18 NOTE — PLAN OF CARE
TIME RECORD    Date: 09/18/2018    Start Time:  10:15 AM  Stop Time:  11:00 AM    OUTPATIENT PHYSICAL THERAPY   PATIENT EVALUATION  Onset Date: 6/10/18  Primary Diagnosis:   1. Gait instability     2. Chronic pain of left knee       Treatment Diagnosis: Non-displaced transverse fracture of left patella  Past Medical History:   Diagnosis Date    Allergy     Arthritis     Asthma     Depression     Gout     Hypertension      Precautions: falls; standard  Prior Therapy: Obdulia has been seen in our physical therapy clinic in the past - it has been over a year ago - following her neck surgery - frequent falling, general debilitation. She has been receiving HHPT since May of this year up until last week.  She has extensive medical history including pelvic fracture following a fall - resulting in cervical fusion later due to cord compression and associated mm weakness. She had an extensive hospitalization a year ago following a fall and  Development of UTI, dehydration and pulmonary problems.  She has been receiving HHPT for several months now - before and after this recent fall in June.     Medications: Obdulia Yepez has a current medication list which includes the following prescription(s): allopurinol, aripiprazole, blood sugar diagnostic, blood-glucose meter, budesonide-formoterol 160-4.5 mcg, buspirone, cholestyramine-aspartame, cyanocobalamin-cobamamide, cyclobenzaprine, diphenhydramine, escitalopram oxalate, fluticasone, folic acid, gabapentin, hydrocortisone, ibuprofen, ipratropium, lancets, lidocaine hcl 2%, loratadine-pseudoephedrine 5-120 mg, montelukast, mupirocin, ondansetron, lancets, oxybutynin, oxycodone-acetaminophen, pantoprazole, pneumoc 13-kandi conj-dip cr(pf), polyethylene glycol 3350, sucralfate, thiamine, triamcinolone acetonide 0.1%, and zolpidem.  Nutrition:  Thin  History of Present Illness: patient fell going into her garage in June - fell and fractured her left patella.   Prior Level of  Function: Assistive Device - using cane into clinic; has RW at home that she uses at night and first thing in the mornings.  Social History: Obdulia lives with her , Xander; he works during the day, so she is home alone. Stated that she is functioning fairly well.  She has hx of frequent falls, but stated that they have now found the reason for her falls - problem with adrenal glands- under producing and now that she is on steroids this has helped with her balance.  Place of Residence (Steps/Adaptations): single story home; enters through the garage which is flat.   Functional Deficits Leading to Referral/Nature of Injury: falls  Patient Therapy Goals: To gain strength and improve her functional mobility.    Subjective     Obdulia Yepez states Now that they have found out what is causing my falls, I am so much better.  The steroids seem to be working well and I haven't fallen since I started on them. Patient stated that she hada problem with her Adrenal gland - creating hyponatremia problems.  Since starting steroids, she is feeling much better. Obdulia reports that she is so glad to be back in therapy at our clinic.  She is looking forward to progressing her activity more.     Pain:  Location: knee   Description: Aching  Activities Which Increase Pain: Sitting and Standing  Activities Which Decrease Pain: rest  Pain Scale: 3/10 at best 3/10 no-/5; w  5/10 at worst    Objective     Posture: Slight forward head posture with flattening of her cervical lordosis due to fusion surgery.  She demonstrates muscle wasting; wide based gait and stance with ataxic gait pattern.   Palpation: no tenderness around knee noted.   Sensation: intact to light touch and pin prick    Range of Motion/Strength: Left Knee:  1 - 138 degrees active;   Right Knee 0 - 138 degrees.   Hip AROM is WFL; decreased ankle dorsiflexion bilateral due to neuropathy.   Left Abduction 4+/5; Left IR 3+/5; Left Hip Extension 4-/5; Knee Flexion 4-/5  Right  Abduction 4-/5; Left IR 3-/5 ; Right Hip Extension 4-/5; Knee Flexion 4-/5    Flexibility: extremity flexibility is good; static and dynamic balance is not therefore she is unable to safely perform activities that would take advantage of this flexibility  Gait: With AD.  Device Used -  Cane and Rolling walker  Analysis: Assistance Obdulia is Independent with ambulation using both her walker and cane, but safety is a continual issue due to sever neuropathy and ataxic gait pattern. Wide-based gait, ataxic LE pattern. FWB   Bed Mobility:Independent  Transfers: Assistive Device     Special Tests: 30 sec sit <>stand from chair:  7 reps - uncontrolled sitting at times; over compensation upon standing resulting in hyper extension of knees and lumbar extension  Rhomberg: Firm surface: EO x 1 min; EC: immediate LOB  Foam Surface: EO x 20 secs; EC x immediate LOB  Functional Reach: 4 inches    Other: Functional Reporting Tool:  LEFS - current 67% limitation; Goal is <40% limitation based on LEFS    Treatment: Low Complexity evaluation completed.     Assessment       Initial Assessment (Pertinent finding, problem list and factors affecting outcome): Obdulia is a 69 yo with recent fall and fracture of her left patella.  Fracture was non-displaced - required no surgery.  She is FWB with use of cane and/or RW. She is able to drive; she has PMHX of cord compression requiring cervical fusion - resulting in ataxic gait pattern and dynamic balance issues, peripheral neuropathy is also present.  She has a history of multiple falls. Obdulia demonstrates gait/balance deficits, strength loss and will benefit from Skilled Physical Therapy Intervention to address above.   Rehab Potiential: good    Short Term Goals (4 Weeks):   1. Improvement in LE strength by 1/2 grade  2. Improvement in ability to perform sit <>stand from chair to 9 reps without LOB  3. Able to stand on compliant surface x 1 min without LOB  4. LEFS improved to <50 %  limitation  Long Term Goals (6 Weeks):   1. Patient Independent with HEP and management of symptoms  2. Patient to have no falls in 6 weeks  3. LEFS improved to <40% limitation    Plan     Certification Period: 9/18/2018 to 10/31/2018  Recommended Treatment Plan: 2 times per week for 6 weeks: Gait Training, Neuromuscular Re-ed, Patient Education, Therapeutic Activites and Therapeutic Exercise      Therapist: Rosalba Thurman PT    I CERTIFY THE NEED FOR THESE SERVICES FURNISHED UNDER THIS PLAN OF TREATMENT AND WHILE UNDER MY CARE    Physician's comments: ________________________________________________________________________________________________________________________________________________      Physician's Name: ___________________________________

## 2018-09-21 ENCOUNTER — OFFICE VISIT (OUTPATIENT)
Dept: ORTHOPEDICS | Facility: CLINIC | Age: 68
End: 2018-09-21
Payer: MEDICARE

## 2018-09-21 ENCOUNTER — HOSPITAL ENCOUNTER (OUTPATIENT)
Dept: RADIOLOGY | Facility: HOSPITAL | Age: 68
Discharge: HOME | End: 2018-09-21
Attending: ORTHOPAEDIC SURGERY
Payer: MEDICARE

## 2018-09-21 ENCOUNTER — DOCUMENTATION ONLY (OUTPATIENT)
Dept: REHABILITATION | Facility: HOSPITAL | Age: 68
End: 2018-09-21

## 2018-09-21 VITALS
HEART RATE: 73 BPM | BODY MASS INDEX: 20.42 KG/M2 | HEIGHT: 60 IN | WEIGHT: 104 LBS | DIASTOLIC BLOOD PRESSURE: 88 MMHG | SYSTOLIC BLOOD PRESSURE: 148 MMHG

## 2018-09-21 DIAGNOSIS — M54.10 RADICULAR SYNDROME OF RIGHT LOWER EXTREMITY: ICD-10-CM

## 2018-09-21 DIAGNOSIS — M25.551 RIGHT HIP PAIN: ICD-10-CM

## 2018-09-21 DIAGNOSIS — M25.551 RIGHT HIP PAIN: Primary | ICD-10-CM

## 2018-09-21 DIAGNOSIS — M25.561 RIGHT KNEE PAIN, UNSPECIFIED CHRONICITY: ICD-10-CM

## 2018-09-21 PROCEDURE — 99213 OFFICE O/P EST LOW 20 MIN: CPT | Mod: S$PBB,24,, | Performed by: ORTHOPAEDIC SURGERY

## 2018-09-21 PROCEDURE — 73502 X-RAY EXAM HIP UNI 2-3 VIEWS: CPT | Mod: 26,RT,, | Performed by: RADIOLOGY

## 2018-09-21 PROCEDURE — 99999 PR PBB SHADOW E&M-EST. PATIENT-LVL III: CPT | Mod: PBBFAC,,, | Performed by: ORTHOPAEDIC SURGERY

## 2018-09-21 PROCEDURE — 73502 X-RAY EXAM HIP UNI 2-3 VIEWS: CPT | Mod: TC,PN,RT

## 2018-09-21 PROCEDURE — 99213 OFFICE O/P EST LOW 20 MIN: CPT | Mod: PBBFAC,25,PN | Performed by: ORTHOPAEDIC SURGERY

## 2018-09-21 NOTE — PROGRESS NOTES
Patient called and canceled appointment this morning. Stated she had fallen again and has an appointment with Dr. Stoner today.    Jonathan Favre, LPTA

## 2018-09-24 ENCOUNTER — TELEPHONE (OUTPATIENT)
Dept: FAMILY MEDICINE | Facility: CLINIC | Age: 68
End: 2018-09-24

## 2018-09-24 NOTE — TELEPHONE ENCOUNTER
LVM to inform pt that we have no available appts today and encouraged pt to go to ER/ Urgent care.       ----- Message from Grecia Rojas RT sent at 9/24/2018 10:42 AM CDT -----  Contact: pt    pt , requesting an appt to be worked in today she fell yesterday, thanks.

## 2018-09-24 NOTE — TELEPHONE ENCOUNTER
Spoke with pt, and notified her that we do not have any sooner appts. Pt has appt scheduled for 10.02.18. Advised pt to go to the urgent care or ER to be evaluated- pt refused to go. Pt states she is not injured or hurting.  No other concerns at this time.       ----- Message from [employer] May sent at 9/24/2018 11:08 AM CDT -----  Contact: self  Type:  Patient Returning Call    Who Called: patient   Who Left Message for Patient:  Nurse   Does the patient know what this is regarding?:    Best Call Back Number:[phone]  Additional Information:

## 2018-09-24 NOTE — TELEPHONE ENCOUNTER
LVM x2 to inform pt that our next available appt is 11.06.18- and encouraged her to go to the ER/Urgent care.       ----- Message from Yadi Aguilar sent at 9/24/2018 10:51 AM CDT -----  Contact: patient  Returning missed call. Please call back [phone]

## 2018-09-25 PROBLEM — M54.10: Status: ACTIVE | Noted: 2018-09-25

## 2018-09-25 NOTE — PROGRESS NOTES
Subjective:      Patient ID: Obdulia Yepez is a 68 y.o. female.    Chief Complaint: Pain of the Right Hip and Pain of the Right Knee      HPI:  Ms. Yepez returns today after she fell 2 days ago while transferring from her bed to a portable body and fell onto a carpeted floor. She stated she has pain in her right buttocks that radiates below her knee of a burning constant nature.  She has taken NSAIDs without help.  Movement increases her symptoms while nothing improves them.    ROS:  No new diagnosis/surgery/prescriptions since last visit on 09/07/2018.        Objective:      Physical Exam:   General: AAOx3.  No acute distress  Vascular:  Pulses intact and equal bilaterally.  Capillary refill less than 3 seconds and equal bilaterally  Neurologic:  Pinprick and soft touch intact and equal bilaterally positive straight leg raising right lower extremity.  Integment:  No ecchymosis, no errythema  Extremity:  Hip:  Flexion/extension equal bilaterally greater than 95/10 degrees. Nontender with hip flexion/extension.  Internal/external rotation equal bilaterally. Mild tenderness in the buttocks with internal rotation. Teo/fabere/logroll/push-pull negative both hips.  Nontender over the greater trochanter both hips.  0bers negative both hips.  Nontender with groin palpation both hips.                     Lumbosacral spine:  Forward flexion/backward flexion 60/10 degrees. Right/left rotation 55/65°, increased symptoms with right rotation. Right/left side bending 25/35°.  Increased symptoms with right side bending.  Tender with palpation lumbosacral spine. Able to heel and toe walk.  Radiography:  Personally reviewed x-rays of the right hip to include an AP pelvis which showed femoral acetabular arthritis and a ruffled greater trochanteric border.        Assessment:       Impression:     1. Radicular syndrome of right lower extremity          Plan:       1.  Discussed physical examination and radiographic findings with the  patient. Obdulia understands that she appears to have radicular right lower extremity pain. Explained to the patient that typically hip pain is felt in the groin and greater trochanteric pain is elicited with palpation of the greater trochanter both of which she does not have.  2.  Discussed with the patient possible referral to spine surgery, she stated she would prefer to talk to her PCM and have them refer her.  3.  Any pain can be treated with over-the-counter medications dosed per box instructions.  4.  Follow-up p.r.n.

## 2018-10-02 ENCOUNTER — TELEPHONE (OUTPATIENT)
Dept: FAMILY MEDICINE | Facility: CLINIC | Age: 68
End: 2018-10-02

## 2018-10-02 ENCOUNTER — OFFICE VISIT (OUTPATIENT)
Dept: FAMILY MEDICINE | Facility: CLINIC | Age: 68
End: 2018-10-02
Payer: MEDICARE

## 2018-10-02 VITALS
WEIGHT: 104 LBS | BODY MASS INDEX: 20.42 KG/M2 | RESPIRATION RATE: 18 BRPM | DIASTOLIC BLOOD PRESSURE: 99 MMHG | OXYGEN SATURATION: 99 % | HEART RATE: 98 BPM | SYSTOLIC BLOOD PRESSURE: 161 MMHG | HEIGHT: 60 IN

## 2018-10-02 DIAGNOSIS — S82.035D CLOSED NONDISPLACED TRANSVERSE FRACTURE OF LEFT PATELLA WITH ROUTINE HEALING: Primary | ICD-10-CM

## 2018-10-02 DIAGNOSIS — M54.30 SCIATICA, UNSPECIFIED LATERALITY: Primary | ICD-10-CM

## 2018-10-02 DIAGNOSIS — I10 ESSENTIAL HYPERTENSION: ICD-10-CM

## 2018-10-02 DIAGNOSIS — N39.0 RECURRENT UTI: ICD-10-CM

## 2018-10-02 DIAGNOSIS — F32.A DEPRESSION, UNSPECIFIED DEPRESSION TYPE: ICD-10-CM

## 2018-10-02 LAB
BILIRUB UR QL STRIP: NEGATIVE
CLARITY UR: CLEAR
COLOR UR: YELLOW
GLUCOSE UR QL STRIP: NEGATIVE
HGB UR QL STRIP: NEGATIVE
KETONES UR QL STRIP: NEGATIVE
LEUKOCYTE ESTERASE UR QL STRIP: NEGATIVE
NITRITE UR QL STRIP: NEGATIVE
PH UR STRIP: 7 [PH] (ref 5–8)
PROT UR QL STRIP: NEGATIVE
SP GR UR STRIP: 1.01 (ref 1–1.03)
URN SPEC COLLECT METH UR: NORMAL
UROBILINOGEN UR STRIP-ACNC: NEGATIVE EU/DL

## 2018-10-02 PROCEDURE — 81003 URINALYSIS AUTO W/O SCOPE: CPT

## 2018-10-02 PROCEDURE — 99999 PR PBB SHADOW E&M-EST. PATIENT-LVL IV: CPT | Mod: PBBFAC,,, | Performed by: FAMILY MEDICINE

## 2018-10-02 PROCEDURE — 87086 URINE CULTURE/COLONY COUNT: CPT

## 2018-10-02 PROCEDURE — 99214 OFFICE O/P EST MOD 30 MIN: CPT | Mod: PBBFAC,PN | Performed by: FAMILY MEDICINE

## 2018-10-02 PROCEDURE — 99214 OFFICE O/P EST MOD 30 MIN: CPT | Mod: S$PBB,,, | Performed by: FAMILY MEDICINE

## 2018-10-02 RX ORDER — MIRTAZAPINE 7.5 MG/1
7.5 TABLET, FILM COATED ORAL NIGHTLY
Qty: 30 TABLET | Refills: 11 | Status: SHIPPED | OUTPATIENT
Start: 2018-10-02 | End: 2019-02-05 | Stop reason: SDUPTHER

## 2018-10-02 NOTE — PROGRESS NOTES
Subjective:       Patient ID: Obdulia Yepez is a 68 y.o. female.    Chief Complaint: Follow-up and right leg pain    Follow up    Another fall  Scraped  R arm  No signs of infection  Complains of knee instability    Also with worsening depression  No si/hi  Taking escitalopram with partial relief.      Review of Systems   Constitutional: Negative for activity change, appetite change, chills, fatigue and fever.   HENT: Negative for congestion, dental problem, facial swelling, nosebleeds, postnasal drip, sinus pain, sore throat, trouble swallowing and voice change.    Eyes: Negative for pain, discharge and visual disturbance.   Respiratory: Negative for apnea, cough, chest tightness and shortness of breath.    Cardiovascular: Negative for chest pain and palpitations.   Gastrointestinal: Negative for abdominal pain, blood in stool, constipation and nausea.   Endocrine: Negative for cold intolerance, polydipsia and polyuria.   Genitourinary: Negative for difficulty urinating, enuresis and flank pain.   Musculoskeletal: Positive for arthralgias, back pain and myalgias.   Skin: Positive for rash and wound. Negative for color change.   Allergic/Immunologic: Negative for environmental allergies and immunocompromised state.   Neurological: Positive for dizziness and headaches. Negative for light-headedness.   Hematological: Negative for adenopathy.   Psychiatric/Behavioral: Positive for sleep disturbance. Negative for agitation, behavioral problems, decreased concentration and dysphoric mood. The patient is not nervous/anxious.    All other systems reviewed and are negative.        Reviewed family, medical, surgical, and social history.    Objective:      BP (!) 161/99 (BP Location: Left arm, Patient Position: Sitting, BP Method: Small (Manual))   Pulse 98   Resp 18   Ht 5' (1.524 m)   Wt 47.2 kg (104 lb)   SpO2 99%   BMI 20.31 kg/m²   Physical Exam   Constitutional: She is oriented to person, place, and time. No  distress.   HENT:   Head: Normocephalic and atraumatic.   Nose: Nose normal.   Mouth/Throat: Oropharynx is clear and moist. No oropharyngeal exudate.   Eyes: Conjunctivae and EOM are normal. Pupils are equal, round, and reactive to light. No scleral icterus.   Neck: Normal range of motion. Neck supple. No thyromegaly present.   Cardiovascular: Normal rate, regular rhythm and normal heart sounds. Exam reveals no gallop and no friction rub.   No murmur heard.  Pulmonary/Chest: Effort normal and breath sounds normal. No respiratory distress. She has no wheezes. She has no rales. She exhibits no tenderness.   Abdominal: Soft. Bowel sounds are normal. She exhibits no distension. There is no tenderness. There is no guarding.   Musculoskeletal: Normal range of motion. She exhibits tenderness and deformity. She exhibits no edema.   Lymphadenopathy:     She has no cervical adenopathy.   Neurological: She is alert and oriented to person, place, and time. She displays normal reflexes. No cranial nerve deficit or sensory deficit. She exhibits normal muscle tone.   Skin: Skin is warm and dry. Rash noted. She is not diaphoretic. No erythema. No pallor.   Psychiatric: She has a normal mood and affect. Her behavior is normal. Judgment and thought content normal.   Nursing note and vitals reviewed.      Assessment:       1. Closed nondisplaced transverse fracture of left patella with routine healing    2. Essential hypertension    3. Recurrent UTI    4. Depression, unspecified depression type        Plan:       Closed nondisplaced transverse fracture of left patella with routine healing    Essential hypertension    Recurrent UTI  -     Ambulatory referral to Urology  -     Urinalysis  -     Urine culture    Depression, unspecified depression type  -     mirtazapine (REMERON) 7.5 MG Tab; Take 1 tablet (7.5 mg total) by mouth every evening.  Dispense: 30 tablet; Refill: 11  -     Basic metabolic panel; Future; Expected date:  10/02/2018    Will monitor sodium, add remeron.        Risks, benefits, and side effects were discussed with the patient. All questions were answered to the fullest satisfaction of the patient, and pt verbalized understanding and agreement to treatment plan. Pt was to call with any new or worsening symptoms, or present to the ER.

## 2018-10-02 NOTE — TELEPHONE ENCOUNTER
Upon checkout, Mrs. Yepez mentioned that she has sciatica and would like this to be included in her physical therapy with [facility] next door; she would like to please have this diagnosis added to her therapy orders.  Please advise and thank you, Tennille

## 2018-10-04 LAB — BACTERIA UR CULT: NORMAL

## 2018-10-09 ENCOUNTER — CLINICAL SUPPORT (OUTPATIENT)
Dept: REHABILITATION | Facility: HOSPITAL | Age: 68
End: 2018-10-09
Attending: FAMILY MEDICINE
Payer: MEDICARE

## 2018-10-09 DIAGNOSIS — R26.81 GAIT INSTABILITY: ICD-10-CM

## 2018-10-09 DIAGNOSIS — S82.035D CLOSED NONDISPLACED TRANSVERSE FRACTURE OF LEFT PATELLA WITH ROUTINE HEALING: Primary | ICD-10-CM

## 2018-10-09 PROCEDURE — 97110 THERAPEUTIC EXERCISES: CPT | Mod: PN

## 2018-10-09 NOTE — PROGRESS NOTES
Physical Therapy Daily Note     Name: Obdulia Yepez  Clinic Number: [id number]  Diagnosis:   Encounter Diagnoses   Name Primary?    Closed nondisplaced transverse fracture of left patella with routine healing Yes    Gait instability      Physician: Og Perez MD  Precautions: falls   Visit #: 2 of 12  PTA Visit #: 0  Time In: 10:00 AM  Time Out: 11:30 AM    Subjective     Pt reports: Obdulia has not been here since her evaluation back in September - she was to come in for 1st visit on 2018, but she fell again on the  and was going to the orthopedist.  Obdulia stated that she was coming from the [facility] with friends and her walker tipped over the curb and she fell.  Hurt her left hip, but xrays were negative.  Because her skin is so fragile, she sustained some skin tears and had to wait for these to heel.    Obdulia is not longer c/o knee pain, stated her knee feels pretty good.   Pain Scale: Obdulia rates pain on a scale of 0-10 to be 4 currently.    Objective     Obdulia received individual therapeutic exercises to develop strength, posture and core stabilization for 45 minutes includin. Recumbent Bike x 20 mins  2. Gait with cane: verbal cueing on technique for using cane - neglect at times - cane lags behind and becomes a safety hazard; several episodes of LOB noted with cane, crosses feet,   3. Bridging x 20   4. Ball Squeezes x 3 mins  5. Side-Stepping on blue mat - required cga to maintain balance;   6. 1/2 kneeling on mat - very difficult for her to maintain balance; unable to get herself back up into standing; practiced using the mat to pull herself up (like from a fall at home) she struggled terribly to pull herself up onto the mat.         Obdulia received the following manual therapy techniques:  were applied to the:  for  minutes including:       The patient received the following direct contact modalities after being cleared for  contraindications:     The patient received the following supervised modalities after being cleared for contradictions:     Education provided re:Recommended to Obdulia that she use the walker exclusively.  She is not safe to use her cane - very unsteady, does not use it correctly and the cane will not keep her from falling at all.  She uses her RW at home all of the time, so suggested that she use it in community as well.   Obdulia verbalized good understanding of education provided.   No spiritual or educational barriers to learning provided    Assessment     Patient tolerated treatment fairly well.  She is very depressed about her present functional level.  This recent fall seems to have knocked her down some.  Gait stability is worse, LE function is worse.   This is a 68 y.o. female referred to outpatient physical therapy and presents with a medical diagnosis of gait instability; LE weakness and demonstrates limitations as described in the problem list. Pt prognosis is Fair. Pt will continue to benefit from skilled outpatient physical therapy to address the deficits listed in the problem list, provide pt/family education and to maximize pt's level of independence in the home and community environment.     Goals as follows:    Short Term Goals (4 Weeks):   1. Improvement in LE strength by 1/2 grade  2. Improvement in ability to perform sit <>stand from chair to 9 reps without LOB  3. Able to stand on compliant surface x 1 min without LOB  4. LEFS improved to <50 % limitation  Long Term Goals (6 Weeks):   1. Patient Independent with HEP and management of symptoms  2. Patient to have no falls in 6 weeks  3. LEFS improved to <40% limitation     Plan     Continue with established Plan of Care towards PT goals.    Therapist: Rosalba Thurman, PT  10/9/2018

## 2018-10-12 ENCOUNTER — CLINICAL SUPPORT (OUTPATIENT)
Dept: REHABILITATION | Facility: HOSPITAL | Age: 68
End: 2018-10-12
Attending: FAMILY MEDICINE
Payer: MEDICARE

## 2018-10-12 DIAGNOSIS — R26.81 GAIT INSTABILITY: Primary | ICD-10-CM

## 2018-10-12 DIAGNOSIS — G89.29 CHRONIC PAIN OF LEFT KNEE: ICD-10-CM

## 2018-10-12 DIAGNOSIS — M25.562 CHRONIC PAIN OF LEFT KNEE: ICD-10-CM

## 2018-10-12 PROCEDURE — 97110 THERAPEUTIC EXERCISES: CPT | Mod: PN

## 2018-10-12 PROCEDURE — 97112 NEUROMUSCULAR REEDUCATION: CPT | Mod: PN

## 2018-10-12 NOTE — PROGRESS NOTES
Physical Therapy Daily Note     Name: Obdulia Yepez  Clinic Number: [id number]  Diagnosis:   Encounter Diagnoses   Name Primary?    Gait instability Yes    Chronic pain of left knee      Physician: Og Perez MD  Precautions: falls   Visit  4 of 12  PTA Visit #: 0  Time In: 11:00 AM  Time Out: 12:15  PM    Subjective     Pt reports: I can take 400 mg of ibuprofen 2x/day to help with my knee pain, so that's what I do.  It makes a difference.    Pain Scale: Obdulia rates pain on a scale of 0-10 to be 4 currently.    Objective     Obdulia received individual therapeutic exercises to develop strength, posture and core stabilization for 45 minutes includin. Recumbent Bike x 20 mins  2. Quadraped - alternate leg - needs cga to keep from falling ,   3. Bridging x 20   4. Ball Squeezes x 3 mins  5. Side-Stepping on blue mat - required cga to maintain balance;   6. 1/2 kneeling on mat - very difficult for her to maintain balance; unable to get herself back up into standing; practiced using the mat to pull herself up (like from a fall at home) she struggled terribly to pull herself up onto the mat.         Obdulia received the following manual therapy techniques:  were applied to the:  for  minutes including:       The patient received the following direct contact modalities after being cleared for contraindications:     The patient received the following supervised modalities after being cleared for contradictions:     Education provided re:Recommended to Obdulia that she use the walker exclusively.  She is not safe to use her cane - very unsteady, does not use it correctly and the cane will not keep her from falling at all.  She uses her RW at home all of the time, so suggested that she use it in community as well.   Obdulia verbalized good understanding of education provided.   No spiritual or educational barriers to learning provided    Assessment     Patient  tolerated treatment fairly well.  She is using her RW all of the time now, which will hopefully help with safety.  She appears in abetter mood today, alittle more upbeat about what she needs to work on.   This is a 68 y.o. female referred to outpatient physical therapy and presents with a medical diagnosis of gait instability; LE weakness and demonstrates limitations as described in the problem list. Pt prognosis is Fair. Pt will continue to benefit from skilled outpatient physical therapy to address the deficits listed in the problem list, provide pt/family education and to maximize pt's level of independence in the home and community environment.     Goals as follows:    Short Term Goals (4 Weeks):   1. Improvement in LE strength by 1/2 grade  2. Improvement in ability to perform sit <>stand from chair to 9 reps without LOB  3. Able to stand on compliant surface x 1 min without LOB  4. LEFS improved to <50 % limitation  Long Term Goals (6 Weeks):   1. Patient Independent with HEP and management of symptoms  2. Patient to have no falls in 6 weeks  3. LEFS improved to <40% limitation     Plan     Continue with established Plan of Care towards PT goals.    Therapist: Rosalba Thurman, PT  10/12/2018

## 2018-10-16 ENCOUNTER — CLINICAL SUPPORT (OUTPATIENT)
Dept: REHABILITATION | Facility: HOSPITAL | Age: 68
End: 2018-10-16
Attending: FAMILY MEDICINE
Payer: MEDICARE

## 2018-10-16 DIAGNOSIS — G89.29 CHRONIC PAIN OF LEFT KNEE: ICD-10-CM

## 2018-10-16 DIAGNOSIS — R26.81 GAIT INSTABILITY: Primary | ICD-10-CM

## 2018-10-16 DIAGNOSIS — M25.562 CHRONIC PAIN OF LEFT KNEE: ICD-10-CM

## 2018-10-16 PROCEDURE — 97110 THERAPEUTIC EXERCISES: CPT | Mod: PN

## 2018-10-16 NOTE — PROGRESS NOTES
"                                                    Physical Therapy Daily Note     Name: Obdulia Yepez  Clinic Number: [id number]  Diagnosis:   Encounter Diagnoses   Name Primary?    Gait instability Yes    Chronic pain of left knee      Physician: Esteban Remy, DO  Precautions: falls   Visit  5 of 12  PTA Visit #: 0  Time In: 10:00 AM  Time Out: 11:20  AM    Subjective     Pt reports:"you're going to kill me" Obdulia fell again last Friday evening.  She was getting up from couch, walker not close and fell onto the coffee table that had some ceramic lighthouses on it - she cut her left arm - required stitches from Urgent care. Arm is wrapped with coban - she is going back to Urgent care today for bandage change.   Pain Scale: Obdulia rates pain on a scale of 0-10 to be 4 currently.    Objective     Obdulia received individual therapeutic exercises to develop strength, posture and core stabilization for 45 minutes includin. Recumbent Bike x 20 mins  2. Quadraped - alternate leg - needs cga to keep from falling ,   3. Bridging x 20   4. Ball Squeezes x 3 mins  5. Side-Stepping on blue mat - required cga to maintain balance;   6. 1/2 kneeling on mat - very difficult for her to maintain balance; unable to get herself back up into standing; practiced using the mat to pull herself up (like from a fall at home) she struggled terribly to pull herself up onto the mat.         Obdulia received the following manual therapy techniques:  were applied to the:  for  minutes including:       The patient received the following direct contact modalities after being cleared for contraindications:     The patient received the following supervised modalities after being cleared for contradictions:     Education provided re:Recommended to Obdulia that she use the walker exclusively.  She is not safe to use her cane - very unsteady, does not use it correctly and the cane will not keep her from falling at all.  She uses her RW at home " all of the time, so suggested that she use it in community as well.   Obdulia verbalized good understanding of education provided.   No spiritual or educational barriers to learning provided    Assessment     Patient tolerated treatment fairly well.  She was not using safety measures when she fell - walker was not by her as she was getting off the couch and she had to walk unspported to get it and fell.  Again reminded her that one of these times she will sustain a severe injury that takes more then suters to heal.  She has got to make sure that her walker is by her at all times. Advised her to discuss wound care needs with the urgent care when she goes back today.    She is using her RW all of the time now, which will hopefully help with safety.  She appears in abetter mood today, alittle more upbeat about what she needs to work on.   This is a 68 y.o. female referred to outpatient physical therapy and presents with a medical diagnosis of gait instability; LE weakness and demonstrates limitations as described in the problem list. Pt prognosis is Fair. Pt will continue to benefit from skilled outpatient physical therapy to address the deficits listed in the problem list, provide pt/family education and to maximize pt's level of independence in the home and community environment.     Goals as follows:    Short Term Goals (4 Weeks):   1. Improvement in LE strength by 1/2 grade  2. Improvement in ability to perform sit <>stand from chair to 9 reps without LOB  3. Able to stand on compliant surface x 1 min without LOB  4. LEFS improved to <50 % limitation  Long Term Goals (6 Weeks):   1. Patient Independent with HEP and management of symptoms  2. Patient to have no falls in 6 weeks  3. LEFS improved to <40% limitation     Plan     Continue with established Plan of Care towards PT goals.    Therapist: Rosalba Thurman, PT  10/16/2018

## 2018-10-18 ENCOUNTER — LAB VISIT (OUTPATIENT)
Dept: LAB | Facility: HOSPITAL | Age: 68
End: 2018-10-18
Attending: FAMILY MEDICINE
Payer: MEDICARE

## 2018-10-18 ENCOUNTER — CLINICAL SUPPORT (OUTPATIENT)
Dept: REHABILITATION | Facility: HOSPITAL | Age: 68
End: 2018-10-18
Attending: FAMILY MEDICINE
Payer: MEDICARE

## 2018-10-18 DIAGNOSIS — M47.12 OSTEOARTHRITIS OF CERVICAL SPINE WITH MYELOPATHY: ICD-10-CM

## 2018-10-18 DIAGNOSIS — M25.562 CHRONIC PAIN OF LEFT KNEE: ICD-10-CM

## 2018-10-18 DIAGNOSIS — R26.81 GAIT INSTABILITY: Primary | ICD-10-CM

## 2018-10-18 DIAGNOSIS — F32.A DEPRESSION, UNSPECIFIED DEPRESSION TYPE: ICD-10-CM

## 2018-10-18 DIAGNOSIS — G89.29 CHRONIC PAIN OF LEFT KNEE: ICD-10-CM

## 2018-10-18 LAB
ANION GAP SERPL CALC-SCNC: 11 MMOL/L
BUN SERPL-MCNC: 13 MG/DL
CALCIUM SERPL-MCNC: 9.3 MG/DL
CHLORIDE SERPL-SCNC: 100 MMOL/L
CO2 SERPL-SCNC: 27 MMOL/L
CREAT SERPL-MCNC: 0.8 MG/DL
EST. GFR  (AFRICAN AMERICAN): >60 ML/MIN/1.73 M^2
EST. GFR  (NON AFRICAN AMERICAN): >60 ML/MIN/1.73 M^2
GLUCOSE SERPL-MCNC: 77 MG/DL
POTASSIUM SERPL-SCNC: 4.7 MMOL/L
SODIUM SERPL-SCNC: 138 MMOL/L

## 2018-10-18 PROCEDURE — 36415 COLL VENOUS BLD VENIPUNCTURE: CPT

## 2018-10-18 PROCEDURE — 97110 THERAPEUTIC EXERCISES: CPT | Mod: PN

## 2018-10-18 PROCEDURE — 80048 BASIC METABOLIC PNL TOTAL CA: CPT

## 2018-10-18 NOTE — PROGRESS NOTES
Physical Therapy Daily Note     Name: Obdulia Yepez  Clinic Number: [id number]  Diagnosis:   Encounter Diagnoses   Name Primary?    Gait instability Yes    Chronic pain of left knee     Osteoarthritis of cervical spine with myelopathy      Physician: Esteban Remy, DO  Precautions: falls   Visit  6 of 12  PTA Visit #: 0  Time In: 10:00 AM  Time Out: 11:00  AM    Subjective     Pt reports: Obdulia reporting nothing new today;  Still has stiches in her arm until next week.  No open wounds - areas scabbed over.   Pain Scale: Obdulia rates pain on a scale of 0-10 to be 4 currently.    Objective     Obdulia received individual therapeutic exercises to develop strength, posture and core stabilization for 45 minutes includin. Recumbent Bike x 20 mins  2. Quadraped - alternate leg - needs cga to keep from falling ,   3. Bridging x 20   4. Ball Squeezes x 3 mins  5. Side-Stepping on blue mat - required cga to maintain balance  6. Stepping on/off the Air-Ex mat - CGA to maintain balance           Obdulia received the following manual therapy techniques:  were applied to the:  for  minutes including:       The patient received the following direct contact modalities after being cleared for contraindications:     The patient received the following supervised modalities after being cleared for contradictions:     Education provided re:Recommended to Obdulia that she use the walker exclusively.  She is not safe to use her cane - very unsteady, does not use it correctly and the cane will not keep her from falling at all.  She uses her RW at home all of the time, so suggested that she use it in community as well.   Obdulia verbalized good understanding of education provided.   No spiritual or educational barriers to learning provided    Assessment     Patient tolerated treatment fairly well.  Using walker all of the time; very unstable at times - posterior LOB when she falters.     She is using her RW all of the time now, which will hopefully help with safety.  She appears in abetter mood today, alittle more upbeat about what she needs to work on.   This is a 68 y.o. female referred to outpatient physical therapy and presents with a medical diagnosis of gait instability; LE weakness and demonstrates limitations as described in the problem list. Pt prognosis is Fair. Pt will continue to benefit from skilled outpatient physical therapy to address the deficits listed in the problem list, provide pt/family education and to maximize pt's level of independence in the home and community environment.     Goals as follows:    Short Term Goals (4 Weeks):   1. Improvement in LE strength by 1/2 grade  2. Improvement in ability to perform sit <>stand from chair to 9 reps without LOB  3. Able to stand on compliant surface x 1 min without LOB  4. LEFS improved to <50 % limitation  Long Term Goals (6 Weeks):   1. Patient Independent with HEP and management of symptoms  2. Patient to have no falls in 6 weeks  3. LEFS improved to <40% limitation     Plan     Continue with established Plan of Care towards PT goals.    Therapist: Rosalba Thurman, PT  10/18/2018

## 2018-10-19 ENCOUNTER — TELEPHONE (OUTPATIENT)
Dept: FAMILY MEDICINE | Facility: CLINIC | Age: 68
End: 2018-10-19

## 2018-10-19 NOTE — TELEPHONE ENCOUNTER
----- Message from Og Perez MD sent at 10/19/2018  4:40 PM CDT -----  Sodium and kidney function are great!

## 2018-10-23 ENCOUNTER — TELEPHONE (OUTPATIENT)
Dept: FAMILY MEDICINE | Facility: CLINIC | Age: 68
End: 2018-10-23

## 2018-10-23 NOTE — TELEPHONE ENCOUNTER
Pt rescheduled appt for tomorrow, same time.   No other concerns at this time.       ----- Message from Nasreen Chapman sent at 10/23/2018 10:30 AM CDT -----  .Type:  Sooner Apoointment Request    Caller is requesting a sooner appointment.  Caller declined first available appointment listed below.  Caller will not accept being placed on the waitlist and is requesting a message be sent to doctor.    Name of Caller: pt  When is the first available appointment?  asap  Symptoms:  stitches  remove  l arm  Best Call Back Number:  [phone]  Additional Information:   Wants to  Be asap

## 2018-10-24 ENCOUNTER — OFFICE VISIT (OUTPATIENT)
Dept: FAMILY MEDICINE | Facility: CLINIC | Age: 68
End: 2018-10-24
Payer: MEDICARE

## 2018-10-24 VITALS
HEIGHT: 60 IN | BODY MASS INDEX: 20.62 KG/M2 | HEART RATE: 102 BPM | OXYGEN SATURATION: 98 % | TEMPERATURE: 97 F | DIASTOLIC BLOOD PRESSURE: 80 MMHG | SYSTOLIC BLOOD PRESSURE: 168 MMHG | WEIGHT: 105 LBS

## 2018-10-24 DIAGNOSIS — Z48.02 VISIT FOR SUTURE REMOVAL: Primary | ICD-10-CM

## 2018-10-24 PROCEDURE — 99999 PR PBB SHADOW E&M-EST. PATIENT-LVL III: CPT | Mod: PBBFAC,,, | Performed by: NURSE PRACTITIONER

## 2018-10-24 PROCEDURE — 99213 OFFICE O/P EST LOW 20 MIN: CPT | Mod: PBBFAC,PN | Performed by: NURSE PRACTITIONER

## 2018-10-24 PROCEDURE — 99024 POSTOP FOLLOW-UP VISIT: CPT | Mod: POP,,, | Performed by: NURSE PRACTITIONER

## 2018-10-24 NOTE — PROGRESS NOTES
Chief Complaint  Chief Complaint   Patient presents with    Suture / Staple Removal     Pt to clinic s/p a fall about 10 days ago and got a laceration to her left forearm that required 9 stitches per patient. Pt is here for suture removal.     Suture / Staple Removal   The sutures were placed 7 to 10 days ago. She tried regular soap and water washings and antibiotic ointment use since the wound repair. The treatment provided significant relief. There has been no drainage from the wound. There is no redness present. There is no swelling present. There is no pain present. She has no difficulty moving the affected extremity or digit.       PAST MEDICAL HISTORY:  Past Medical History:   Diagnosis Date    Allergy     Arthritis     Asthma     Depression     Gout     Hypertension        PAST SURGICAL HISTORY:  Past Surgical History:   Procedure Laterality Date    APPENDECTOMY       SECTION      CHOLECYSTECTOMY      HYSTERECTOMY      STOMACH SURGERY      WRIST SURGERY Left        SOCIAL HISTORY:  Social History     Socioeconomic History    Marital status:      Spouse name: Not on file    Number of children: Not on file    Years of education: Not on file    Highest education level: Not on file   Social Needs    Financial resource strain: Not on file    Food insecurity - worry: Not on file    Food insecurity - inability: Not on file    Transportation needs - medical: Not on file    Transportation needs - non-medical: Not on file   Occupational History    Not on file   Tobacco Use    Smoking status: Never Smoker    Smokeless tobacco: Never Used   Substance and Sexual Activity    Alcohol use: Yes    Drug use: No    Sexual activity: Not on file   Other Topics Concern    Not on file   Social History Narrative    Not on file       FAMILY HISTORY:  History reviewed. No pertinent family history.    ALLERGIES AND MEDICATIONS: updated and reviewed.  Review of patient's allergies indicates:    Allergen Reactions    Latex     Milk containing products     Opioids - morphine analogues     Peanut     Sodium phosphate     Soy     Sulfa (sulfonamide antibiotics)      Current Outpatient Medications   Medication Sig Dispense Refill    allopurinol (ZYLOPRIM) 300 MG tablet TAKE 1 TABLET BY MOUTH DAILY 90 tablet 4    ARIPiprazole (ABILIFY) 5 MG Tab aripiprazole 5 mg tablet   TAKE 2 TABLET BY MOUTH AT BEDTIMES      blood sugar diagnostic (BLOOD GLUCOSE TEST) Strp OneTouch Ultra Test strips   USE AS DIRECTED      blood-glucose meter (ONETOUCH ULTRA2) kit OneTouch Ultra2 kit   USE AS DIRECTED      budesonide-formoterol 160-4.5 mcg (SYMBICORT) 160-4.5 mcg/actuation HFAA Symbicort 160 mcg-4.5 mcg/actuation HFA aerosol inhaler   Inhale 2 puffs twice a day by inhalation route.      busPIRone (BUSPAR) 5 MG Tab Take 1 tablet (5 mg total) by mouth 2 (two) times daily. 60 tablet 11    cholestyramine-aspartame 4 gram Powd cholestyramine (with sugar) 4 gram powder for susp in a packet   DISSOLVE 1 PACKET AS DIRECTED AND TK D      cyanocobalamin-cobamamide 5,000-100 mcg Lozg 1 mL.      cyclobenzaprine (FLEXERIL) 10 MG tablet Take 1 tablet (10 mg total) by mouth 2 (two) times daily as needed for Muscle spasms. 60 tablet 2    diphenhydrAMINE (BENADRYL) 25 mg capsule diphenhydramine 25 mg capsule   Take 2 capsules every 4 hours by oral route.      escitalopram oxalate (LEXAPRO) 20 MG tablet Take 1 tablet (20 mg total) by mouth once daily. 90 tablet 3    fluticasone (FLONASE) 50 mcg/actuation nasal spray fluticasone 50 mcg/actuation nasal spray,suspension   Inhale 1 spray every day by intranasal route.      folic acid (FOLVITE) 1 MG tablet folic acid 1 mg tablet   TAKE 1 TABLET BY MOUTH EVERY DAY      gabapentin (NEURONTIN) 300 MG capsule Take 1 capsule (300 mg total) by mouth 3 (three) times daily. 90 capsule 3    HYDROCORTISONE ORAL Take by mouth.      ibuprofen (ADVIL,MOTRIN) 800 MG tablet Take 1 tablet  (800 mg total) by mouth 3 (three) times daily. 90 tablet 3    ipratropium (ATROVENT) 0.03 % nasal spray 2 sprays by Nasal route 3 (three) times daily. 30 mL 2    lancets (ONETOUCH DELICA LANCETS) 33 gauge Misc OneTouch Delica Lancets 33 gauge   USE AS DIRECTED      lidocaine HCl 2% (LIDOCAINE VISCOUS) 2 % Soln Lidocaine Viscous 2 % mucosal solution   TAKE 5 ML EVERY 3 HOURS BY ORAL ROUTE AS NEEDED.      loratadine-pseudoephedrine 5-120 mg (CLARITIN-D 12-HOUR) 5-120 mg per tablet loratadine 5 mg-pseudoephedrine  mg tablet,extended release,12hr   Take 1 tablet every 12 hours by oral route.      mirtazapine (REMERON) 7.5 MG Tab Take 1 tablet (7.5 mg total) by mouth every evening. 30 tablet 11    montelukast (SINGULAIR) 10 mg tablet TAKE 1 TABLET(S) EVERY DAY BY ORAL ROUTE. 30 tablet 8    mupirocin (BACTROBAN) 2 % ointment mupirocin 2 % topical ointment   APPLY A SMALL AMOUNT TO THE AFFECTED AREA BY TOPICAL ROUTE 2 TIMES PER DAY      ondansetron (ZOFRAN-ODT) 4 MG TbDL ondansetron 4 mg disintegrating tablet   DISINTEGRATE 1 TABLET BY MOUTH EVERY 6 HOURS AS NEEDED FOR NAUSEA AND VOMITING      ONETOUCH ULTRASOFT LANCETS MISC OneTouch UltraSoft Lancets   USE AS DIRECTED      oxybutynin (DITROPAN-XL) 5 MG TR24 oxybutynin chloride ER 5 mg tablet,extended release 24 hr   TAKE 1 TABLET BY MOUTH EVERY DAY      oxyCODONE-acetaminophen (PERCOCET) 5-325 mg per tablet Take 1 tablet by mouth every 6 (six) hours as needed for Pain. 90 tablet 0    pantoprazole (PROTONIX) 40 MG tablet TAKE 1 TABLET(S) EVERY DAY BY ORAL ROUTE. 30 tablet 8    pneumoc 13-kandi conj-dip cr,PF, (PREVNAR 13, PF,) 0.5 mL Syrg Prevnar 13 (PF) 0.5 mL intramuscular syringe   TO BE ADMINISTERED BY PHARMACIST FOR IMMUNIZATION      POLYETHYLENE GLYCOL 3350 (MIRALAX ORAL) Miralax      sucralfate (CARAFATE) 1 gram tablet sucralfate 1 gram tablet   Take 1 tablet 3 times a day by oral route.      thiamine (VITAMIN B-1) 100 MG tablet Vitamin B-1 100  "mg tablet   TAKE 1 TABLET(S) EVERY DAY BY ORAL ROUTE.      triamcinolone acetonide 0.1% (KENALOG) 0.1 % Lotn triamcinolone acetonide 0.1 % lotion   APPLY A THIN LAYER TO THE AFFECTED AREA(S) BY TOPICAL ROUTE 2 TIMES PER DAY      zolpidem (AMBIEN) 5 MG Tab zolpidem 5 mg tablet   Take 1 tablet every day by oral route at bedtime for 10 days.       No current facility-administered medications for this visit.          ROS  Review of Systems   Constitutional: Negative for appetite change, chills, fatigue and fever.   HENT: Negative for congestion, postnasal drip, rhinorrhea and sore throat.    Genitourinary: Negative for dysuria.   Musculoskeletal: Positive for arthralgias. Negative for myalgias.   Skin: Positive for wound. Negative for color change and rash.   Neurological: Negative for dizziness, weakness and headaches.   Psychiatric/Behavioral: Negative for sleep disturbance. The patient is not nervous/anxious.            PHYSICAL EXAM  Vitals:    10/24/18 1131   BP: (!) 168/80   BP Location: Right arm   Patient Position: Sitting   BP Method: Large (Automatic)   Pulse: 102   Temp: 97 °F (36.1 °C)   TempSrc: Oral   SpO2: 98%   Weight: 47.6 kg (105 lb)   Height: 5' (1.524 m)    Body mass index is 20.51 kg/m².  Weight: 47.6 kg (105 lb)   Height: 5' (152.4 cm)       Physical Exam   Constitutional: She is oriented to person, place, and time. She appears well-developed and well-nourished.   HENT:   Head: Normocephalic.   Eyes: Pupils are equal, round, and reactive to light.   Neck: Normal range of motion.   Cardiovascular: Normal rate, S1 normal and S2 normal.   Pulmonary/Chest: Effort normal.   Abdominal: Normal appearance.   Musculoskeletal:   Walks with walker to maintain gait. Knees "give out" at times.    Neurological: She is alert and oriented to person, place, and time.   Skin: Skin is warm and dry. Capillary refill takes less than 2 seconds.        Psychiatric: Her speech is normal. Cognition and memory are normal. "   Vitals reviewed.        Health Maintenance       Date Due Completion Date    Hepatitis C Screening 1950 ---    Lipid Panel 1950 ---    TETANUS VACCINE 05/13/1970 ---    Mammogram 05/13/1992 ---    DEXA SCAN 05/13/1992 ---    Zoster Vaccine 05/13/2012 ---    Pneumococcal (65+) (1 of 2 - PCV13) 05/13/2017 ---    Influenza Vaccine 08/01/2018 ---    Colonoscopy 08/01/2027 8/1/2017               Assessment & Plan    Obdulia was seen today for suture / staple removal.    Diagnoses and all orders for this visit:    Visit for suture removal  -     SUTURE REMOVAL        Follow-up: No Follow-up on file.      Risks, benefits, and side effects were discussed with the patient. All questions were answered to the fullest satisfaction of the patient, and pt verbalized understanding and agreement to treatment plan. Pt was to call with any new or worsening symptoms, or present to the ER.

## 2018-10-29 RX ORDER — GABAPENTIN 300 MG/1
CAPSULE ORAL
Qty: 90 CAPSULE | Refills: 3 | Status: SHIPPED | OUTPATIENT
Start: 2018-10-29 | End: 2019-06-10 | Stop reason: SDUPTHER

## 2018-10-30 ENCOUNTER — CLINICAL SUPPORT (OUTPATIENT)
Dept: REHABILITATION | Facility: HOSPITAL | Age: 68
End: 2018-10-30
Attending: FAMILY MEDICINE
Payer: MEDICARE

## 2018-10-30 DIAGNOSIS — R26.81 GAIT INSTABILITY: Primary | ICD-10-CM

## 2018-10-30 DIAGNOSIS — M25.562 CHRONIC PAIN OF LEFT KNEE: ICD-10-CM

## 2018-10-30 DIAGNOSIS — G89.29 CHRONIC PAIN OF LEFT KNEE: ICD-10-CM

## 2018-10-30 PROCEDURE — 97110 THERAPEUTIC EXERCISES: CPT | Mod: PN

## 2018-10-30 NOTE — PROGRESS NOTES
Physical Therapy Daily Note     Name: Obdulia Yepez  Clinic Number: [id number]  Diagnosis:   Encounter Diagnoses   Name Primary?    Gait instability Yes    Chronic pain of left knee      Physician: Esteban Remy, DO  Precautions: falls   Visit  7 of   PTA Visit #: 0  Time In: 10:00 AM  Time Out: 11:30 AM    Subjective     Pt reports: Obdulia reporting nothing new today;  Has had no falls in the past   Pain Scale: Obdulia rates pain on a scale of 0-10 to be 4 currently.    Objective     Obdulia received individual therapeutic exercises to develop strength, posture and core stabilization for 45 minutes includin. Recumbent Bike x 20 mins  2. Quadraped - alternate leg - needs cga to keep from falling ,   3. Bridging x 20 - theraband around thighs to engage gluteals  4. Ball Squeezes x 3 mins  5. Side-Stepping on blue mat - required cga to maintain balance  6. Stepping on/off the Air-Ex mat - CGA to maintain balance   7. Clams: 20 x each side with blue band  8. Sit <>stand from chair - focus on keeping feet on ground and controlled sitting.  9. Transverse Ab activation with progression to posterior pelvic tilt  10. Nu-Step x 15 mins          Obdulia received the following manual therapy techniques:  were applied to the:  for  minutes including:       The patient received the following direct contact modalities after being cleared for contraindications:     The patient received the following supervised modalities after being cleared for contradictions:     Education provided re:  Rec to continue use of rolling walker at all times.     Obdulia verbalized good understanding of education provided.   No spiritual or educational barriers to learning provided    Assessment     Patient tolerated treatment well.  She is getting over upper respiratory infection, so endurance is down. Continues to demonstrate myelopathic gait with peripheral neuropathy - very unsteady with  supportive device.    She is using her RW all of the time now, which will hopefully help with safety.  She appears in abetter mood today, alittle more upbeat about what she needs to work on.   This is a 68 y.o. female referred to outpatient physical therapy and presents with a medical diagnosis of gait instability; LE weakness and demonstrates limitations as described in the problem list. Pt prognosis is Fair. Pt will continue to benefit from skilled outpatient physical therapy to address the deficits listed in the problem list, provide pt/family education and to maximize pt's level of independence in the home and community environment.     Goals as follows:    Short Term Goals (4 Weeks):   1. Improvement in LE strength by 1/2 grade  2. Improvement in ability to perform sit <>stand from chair to 9 reps without LOB  3. Able to stand on compliant surface x 1 min without LOB  4. LEFS improved to <50 % limitation  Long Term Goals (6 Weeks):   1. Patient Independent with HEP and management of symptoms  2. Patient to have no falls in 6 weeks  3. LEFS improved to <40% limitation     Plan     Continue with established Plan of Care towards PT goals.    Therapist: Rosalba Thurman, PT  10/30/2018

## 2018-11-01 ENCOUNTER — CLINICAL SUPPORT (OUTPATIENT)
Dept: REHABILITATION | Facility: HOSPITAL | Age: 68
End: 2018-11-01
Attending: FAMILY MEDICINE
Payer: MEDICARE

## 2018-11-01 DIAGNOSIS — G89.29 CHRONIC PAIN OF LEFT KNEE: ICD-10-CM

## 2018-11-01 DIAGNOSIS — R26.81 GAIT INSTABILITY: Primary | ICD-10-CM

## 2018-11-01 DIAGNOSIS — M25.562 CHRONIC PAIN OF LEFT KNEE: ICD-10-CM

## 2018-11-01 PROCEDURE — 97110 THERAPEUTIC EXERCISES: CPT | Mod: PN

## 2018-11-01 NOTE — PROGRESS NOTES
Physical Therapy Daily Note     Name: Obdulia Yepez  Clinic Number: [id number]  Diagnosis:   Encounter Diagnoses   Name Primary?    Gait instability Yes    Chronic pain of left knee      Physician: Esteban Remy, DO  Precautions: falls   Visit  7   PTA Visit #: 0  Time In: 10:00 AM  Time Out: 11:30 AM    Subjective     Pt reports: Obdulia reporting nothing new today;  Has had no falls in the past. Obdulia stated that she is using her walker all of the time, trying to watch where she walks to prevent a fall.   Pain Scale: Obdulia rates pain on a scale of 0-10 to be 4 currently.    Objective     Obdulia received individual therapeutic exercises to develop strength, posture and core stabilization for 45 minutes includin. Recumbent Bike x 20 mins  2. Quadraped - alternate leg - needs cga to keep from falling ,   3. Bridging x 20 - theraband around thighs to engage gluteals  4. Ball Squeezes x 3 mins  5. Side-Stepping on blue mat - required cga to maintain balance  6. Stepping on/off the Air-Ex mat - CGA to maintain balance   7. Clams: 20 x each side with blue band  8. Sit <>stand from chair - focus on keeping feet on ground and controlled sitting.  9. Transverse Ab activation with progression to posterior pelvic tilt  10. Nu-Step x 15 mins          Obdulia received the following manual therapy techniques:  were applied to the:  for  minutes including:       The patient received the following direct contact modalities after being cleared for contraindications:     The patient received the following supervised modalities after being cleared for contradictions:     Education provided re:  Rec to continue use of rolling walker at all times.     Obdulia verbalized good understanding of education provided.   No spiritual or educational barriers to learning provided    Assessment     Patient tolerated treatment well.  She is getting over upper respiratory infection, so  endurance is down. Continues to demonstrate myelopathic gait with peripheral neuropathy - very unsteady with supportive device.    She is using her RW all of the time now, which will hopefully help with safety.  She appears in abetter mood today, alittle more upbeat about what she needs to work on.   This is a 68 y.o. female referred to outpatient physical therapy and presents with a medical diagnosis of gait instability; LE weakness and demonstrates limitations as described in the problem list. Pt prognosis is Fair. Pt will continue to benefit from skilled outpatient physical therapy to address the deficits listed in the problem list, provide pt/family education and to maximize pt's level of independence in the home and community environment.     Goals as follows:    Short Term Goals (4 Weeks):   1. Improvement in LE strength by 1/2 grade  2. Improvement in ability to perform sit <>stand from chair to 9 reps without LOB  3. Able to stand on compliant surface x 1 min without LOB  4. LEFS improved to <50 % limitation  Long Term Goals (6 Weeks):   1. Patient Independent with HEP and management of symptoms  2. Patient to have no falls in 6 weeks  3. LEFS improved to <40% limitation     Plan     Continue with established Plan of Care towards PT goals.    Therapist: Rosalba Thurman, PT  11/1/2018

## 2018-11-06 ENCOUNTER — OFFICE VISIT (OUTPATIENT)
Dept: FAMILY MEDICINE | Facility: CLINIC | Age: 68
End: 2018-11-06
Payer: MEDICARE

## 2018-11-06 VITALS
DIASTOLIC BLOOD PRESSURE: 98 MMHG | SYSTOLIC BLOOD PRESSURE: 170 MMHG | HEIGHT: 60 IN | BODY MASS INDEX: 21.46 KG/M2 | WEIGHT: 109.31 LBS | HEART RATE: 77 BPM | RESPIRATION RATE: 18 BRPM | OXYGEN SATURATION: 98 %

## 2018-11-06 DIAGNOSIS — S09.93XA FACIAL INJURY, INITIAL ENCOUNTER: Primary | ICD-10-CM

## 2018-11-06 DIAGNOSIS — S09.90XA HEAD TRAUMA, INITIAL ENCOUNTER: ICD-10-CM

## 2018-11-06 PROCEDURE — 99215 OFFICE O/P EST HI 40 MIN: CPT | Mod: PBBFAC,PN | Performed by: FAMILY MEDICINE

## 2018-11-06 PROCEDURE — 99999 PR PBB SHADOW E&M-EST. PATIENT-LVL V: CPT | Mod: PBBFAC,,, | Performed by: FAMILY MEDICINE

## 2018-11-06 PROCEDURE — 99213 OFFICE O/P EST LOW 20 MIN: CPT | Mod: S$PBB,,, | Performed by: FAMILY MEDICINE

## 2018-11-06 NOTE — PROGRESS NOTES
"Subjective:       Patient ID: Obdulia Yepez is a 68 y.o. female.    Chief Complaint: Follow-up; Eye Problem; and Knee Pain (bilateral knee pain, would like injections)    Follow up    Slipped out of bed  Middle of the night  Struck head above eye  + blurry vision  No pain in eye      Review of Systems   Constitutional: Negative for appetite change, chills, fatigue and fever.   HENT: Negative for congestion, postnasal drip, rhinorrhea and sore throat.    Genitourinary: Negative for dysuria.   Musculoskeletal: Positive for arthralgias. Negative for myalgias.   Skin: Positive for wound. Negative for color change and rash.   Neurological: Negative for dizziness, weakness and headaches.   Psychiatric/Behavioral: Negative for sleep disturbance. The patient is not nervous/anxious.          Reviewed family, medical, surgical, and social history.    Objective:      BP (!) 170/98 (BP Location: Left arm, Patient Position: Sitting, BP Method: Small (Automatic))   Pulse 77   Resp 18   Ht 5' (1.524 m)   Wt 49.6 kg (109 lb 4.8 oz)   SpO2 98%   BMI 21.35 kg/m²   Physical Exam   Constitutional: She is oriented to person, place, and time. She appears well-developed and well-nourished.   HENT:   Head: Normocephalic.   Eyes: Pupils are equal, round, and reactive to light.   Neck: Normal range of motion.   Cardiovascular: Normal rate, S1 normal and S2 normal.   Pulmonary/Chest: Effort normal.   Abdominal: Normal appearance.   Musculoskeletal:   Walks with walker to maintain gait. Knees "give out" at times.    Neurological: She is alert and oriented to person, place, and time.   Skin: Skin is warm and dry. Capillary refill takes less than 2 seconds.        Psychiatric: Her speech is normal. Cognition and memory are normal.   Vitals reviewed.      Assessment:       1. Facial injury, initial encounter    2. Head trauma, initial encounter        Plan:       Facial injury, initial encounter  -     CT Orbits Sella Post Fossa Without " Cont; Future; Expected date: 11/06/2018    Head trauma, initial encounter  -     CT Orbits Sella Post Fossa Without Cont; Future; Expected date: 11/06/2018    Check CT as shown.        Risks, benefits, and side effects were discussed with the patient. All questions were answered to the fullest satisfaction of the patient, and pt verbalized understanding and agreement to treatment plan. Pt was to call with any new or worsening symptoms, or present to the ER.

## 2018-11-07 ENCOUNTER — TELEPHONE (OUTPATIENT)
Dept: FAMILY MEDICINE | Facility: CLINIC | Age: 68
End: 2018-11-07

## 2018-11-07 ENCOUNTER — CLINICAL SUPPORT (OUTPATIENT)
Dept: REHABILITATION | Facility: HOSPITAL | Age: 68
End: 2018-11-07
Attending: FAMILY MEDICINE
Payer: MEDICARE

## 2018-11-07 ENCOUNTER — HOSPITAL ENCOUNTER (OUTPATIENT)
Dept: RADIOLOGY | Facility: HOSPITAL | Age: 68
Discharge: HOME | End: 2018-11-07
Attending: FAMILY MEDICINE
Payer: MEDICARE

## 2018-11-07 DIAGNOSIS — G89.29 CHRONIC PAIN OF LEFT KNEE: ICD-10-CM

## 2018-11-07 DIAGNOSIS — S09.93XA FACIAL INJURY, INITIAL ENCOUNTER: ICD-10-CM

## 2018-11-07 DIAGNOSIS — I10 ESSENTIAL HYPERTENSION: Primary | ICD-10-CM

## 2018-11-07 DIAGNOSIS — R26.81 GAIT INSTABILITY: Primary | ICD-10-CM

## 2018-11-07 DIAGNOSIS — M25.562 CHRONIC PAIN OF LEFT KNEE: ICD-10-CM

## 2018-11-07 DIAGNOSIS — S09.90XA HEAD TRAUMA, INITIAL ENCOUNTER: ICD-10-CM

## 2018-11-07 PROCEDURE — 97112 NEUROMUSCULAR REEDUCATION: CPT | Mod: PN

## 2018-11-07 PROCEDURE — 97110 THERAPEUTIC EXERCISES: CPT | Mod: PN

## 2018-11-07 PROCEDURE — 70480 CT ORBIT/EAR/FOSSA W/O DYE: CPT | Mod: 26,,, | Performed by: RADIOLOGY

## 2018-11-07 PROCEDURE — 70480 CT ORBIT/EAR/FOSSA W/O DYE: CPT | Mod: TC

## 2018-11-07 NOTE — PROGRESS NOTES
Physical Therapy Daily Note     Name: Obdulia Yepez  Clinic Number: [id number]  Diagnosis:   Encounter Diagnoses   Name Primary?    Gait instability Yes    Chronic pain of left knee      Physician: Esteban Remy, DO  Precautions: falls   Visit  8 of 12  PTA Visit #: 0  Time In: :1:00 PM  Time Out:  2: 40 PM    Subjective     Pt reports: Obdulia reporting that she hit her right eye brow area on the corner of the nightstand - . She was trying to get some TUMS out of the drawer while laying in bed and she misjudged things and her right eye hit the edge of the nightstand.  She went to see Dr. Perez yesterday - CT scan to head this morning.  She is going for another CT scan for assessment of swollen node area on clavicle area.    Pain Scale: Obdulia rates pain on a scale of 0-10 to be 4 currently.    Objective     Obdulia received individual therapeutic exercises to develop strength, posture and core stabilization for 45 minutes includin. Recumbent Bike x 20 mins  2. Quadraped - alternate leg - needs cga to keep from falling ,   3. Bridging x 20 - theraband around thighs to engage gluteals  4. Ball Squeezes x 3 mins  5. Lateral and front walking on/off Air-Ex mat with CGA   6. Stepping on/off the Air-Ex mat - CGA to maintain balance   7. Clams: 20 x each side with blue band  8. Sit <>stand from chair - focus on keeping feet on ground and controlled sitting.  9. Transverse Ab activation with progression to posterior pelvic tilt  10. Nu-Step x 15 mins          Obdulia received the following manual therapy techniques:  were applied to the:  for  minutes including:       The patient received the following direct contact modalities after being cleared for contraindications:     The patient received the following supervised modalities after being cleared for contradictions:     Education provided re:  Rec to continue use of rolling walker at all times.     Obdulia  verbalized good understanding of education provided.   No spiritual or educational barriers to learning provided    Assessment     Patient tolerated treatment well.  She is demonstrating improved strength in her LE's - now able to ride the bicycle independently for full 20 mins - can keep the pedals moving by herself. Worked on kneeling - half and double on mat - able to maintain her balance with CGA.  Continues to demonstrate myelopathic gait with peripheral neuropathy - improving in her stability with use of the walker.    She is using her RW all of the time now, which will hopefully help with safety.  She appears in abetter mood today, alittle more upbeat about what she needs to work on.   This is a 68 y.o. female referred to outpatient physical therapy and presents with a medical diagnosis of gait instability; LE weakness and demonstrates limitations as described in the problem list. Pt prognosis is Fair. Pt will continue to benefit from skilled outpatient physical therapy to address the deficits listed in the problem list, provide pt/family education and to maximize pt's level of independence in the home and community environment.     Goals as follows:    Short Term Goals (4 Weeks):   1. Improvement in LE strength by 1/2 grade  2. Improvement in ability to perform sit <>stand from chair to 9 reps without LOB  3. Able to stand on compliant surface x 1 min without LOB  4. LEFS improved to <50 % limitation  Long Term Goals (6 Weeks):   1. Patient Independent with HEP and management of symptoms  2. Patient to have no falls in 6 weeks  3. LEFS improved to <40% limitation     Plan     Continue with established Plan of Care towards PT goals.    Therapist: Rosalba Thurman, PT  11/7/2018

## 2018-11-07 NOTE — TELEPHONE ENCOUNTER
----- Message from Og Perez MD sent at 11/7/2018  2:23 PM CST -----  Please let his lady know that her CT did not show any fractures.

## 2018-11-07 NOTE — TELEPHONE ENCOUNTER
I have spoken with pt and she stated that she needs labs ordered that you are following monthly, prior to her appt on 12/4.    Please advise and thank you, Tennille

## 2018-11-07 NOTE — TELEPHONE ENCOUNTER
I have spoken with pt and informed that the CT showed no fractures. Pt verbalized understanding.  Tennille

## 2018-11-15 ENCOUNTER — CLINICAL SUPPORT (OUTPATIENT)
Dept: REHABILITATION | Facility: HOSPITAL | Age: 68
End: 2018-11-15
Attending: FAMILY MEDICINE
Payer: MEDICARE

## 2018-11-15 DIAGNOSIS — R26.81 GAIT INSTABILITY: Primary | ICD-10-CM

## 2018-11-15 DIAGNOSIS — M25.562 CHRONIC PAIN OF LEFT KNEE: ICD-10-CM

## 2018-11-15 DIAGNOSIS — G89.29 CHRONIC PAIN OF LEFT KNEE: ICD-10-CM

## 2018-11-15 PROCEDURE — 97110 THERAPEUTIC EXERCISES: CPT | Mod: PN

## 2018-11-15 NOTE — PROGRESS NOTES
Physical Therapy Daily Note     Name: Obdulia Yepez  Clinic Number: [id number]  Diagnosis:   Encounter Diagnoses   Name Primary?    Chronic pain of left knee     Gait instability Yes     Physician: Esteban Remy, DO  Precautions: falls   Visit  9 of   PTA Visit #: 0  Time In: :10:00 AM  Time Out:  11:00 AM    Subjective     Pt reports: Other than cold weather, [place] has no c/o's.  Stated that it's hard to get going when it's cold outside. .    Pain Scale: Obdulia rates pain on a scale of 0-10 to be 4 currently.    Objective     Obdulia received individual therapeutic exercises to develop strength, posture and core stabilization for 45 minutes includin. Recumbent Bike x 20 mins  2. Quadraped - alternate leg - needs cga to keep from falling ,   3. Bridging x 20 - theraband around thighs to engage gluteals  4. Ball Squeezes x 3 mins  5. Lateral and front walking on/off Air-Ex mat with CGA   6. Stepping on/off the Air-Ex mat - CGA to maintain balance   7. Clams: 20 x each side with blue band  8. Sit <>stand from chair - focus on keeping feet on ground and controlled sitting. - standing on blue mat  9. Transverse Ab activation with progression to posterior pelvic tilt  10. Nu-Step x 15 mins  11. Alternate level standing - one foot on bench, one of ground - rotation toward flexed hip/knee - control movement and prevent LOB          Obdulia received the following manual therapy techniques:  were applied to the:  for  minutes including:       The patient received the following direct contact modalities after being cleared for contraindications:     The patient received the following supervised modalities after being cleared for contradictions:     Education provided re:  Rec to continue use of rolling walker at all times.     Obdulia verbalized good understanding of education provided.   No spiritual or educational barriers to learning provided    Assessment      Patient tolerated treatment well.  She is demonstrating improved strength in her LE's - now able to ride the bicycle independently for full 20 mins - can keep the pedals moving by herself.   Continues to demonstrate myelopathic gait with peripheral neuropathy - improving in her stability with use of the walker.    She is using her RW all of the time now, which will hopefully help with safety.  She appears in abetter mood today, alittle more upbeat about what she needs to work on.   This is a 68 y.o. female referred to outpatient physical therapy and presents with a medical diagnosis of gait instability; LE weakness and demonstrates limitations as described in the problem list. Pt prognosis is Fair. Pt will continue to benefit from skilled outpatient physical therapy to address the deficits listed in the problem list, provide pt/family education and to maximize pt's level of independence in the home and community environment.     Goals as follows:    Short Term Goals (4 Weeks):   1. Improvement in LE strength by 1/2 grade  2. Improvement in ability to perform sit <>stand from chair to 9 reps without LOB  3. Able to stand on compliant surface x 1 min without LOB  4. LEFS improved to <50 % limitation  Long Term Goals (6 Weeks):   1. Patient Independent with HEP and management of symptoms  2. Patient to have no falls in 6 weeks  3. LEFS improved to <40% limitation     Plan     Continue with established Plan of Care towards PT goals. 10th visit reassess next time    Therapist: Rosalba Thurman, PT  11/15/2018

## 2018-11-16 ENCOUNTER — CLINICAL SUPPORT (OUTPATIENT)
Dept: REHABILITATION | Facility: HOSPITAL | Age: 68
End: 2018-11-16
Attending: FAMILY MEDICINE
Payer: MEDICARE

## 2018-11-16 DIAGNOSIS — G89.29 CHRONIC PAIN OF LEFT KNEE: ICD-10-CM

## 2018-11-16 DIAGNOSIS — M25.562 CHRONIC PAIN OF LEFT KNEE: ICD-10-CM

## 2018-11-16 DIAGNOSIS — R26.81 GAIT INSTABILITY: Primary | ICD-10-CM

## 2018-11-16 PROCEDURE — 97110 THERAPEUTIC EXERCISES: CPT | Mod: PN

## 2018-11-16 PROCEDURE — G8978 MOBILITY CURRENT STATUS: HCPCS | Mod: CK,PN

## 2018-11-16 PROCEDURE — G8979 MOBILITY GOAL STATUS: HCPCS | Mod: CJ,PN

## 2018-11-16 NOTE — PROGRESS NOTES
Physical Therapy Daily Note     Name: Obdulia Yepez  Clinic Number: [id number]  Diagnosis:   Encounter Diagnoses   Name Primary?    Gait instability Yes    Chronic pain of left knee      Physician: Esteban Remy, DO  Precautions: falls   Visit  10 of 12  PTA Visit #: 0  Time In: :10:00 AM  Time Out:  11:00 AM    Subjective     Pt reports: Other than cold weather, [place] has no c/o's.  Stated that it's hard to get going when it's cold outside. .    Pain Scale: Obdulia rates pain on a scale of 0-10 to be 4 currently.    Objective     Obdulia received individual therapeutic exercises to develop strength, posture and core stabilization for 45 minutes includin. Recumbent Bike x 20 mins  2. Quadraped - alternate leg - needs cga to keep from falling ,   3. Bridging x 20 - theraband around thighs to engage gluteals  4. Ball Squeezes x 3 mins  5. Lateral and front walking on/off Air-Ex mat with CGA   6. Stepping on/off the Air-Ex mat - CGA to maintain balance   7. Clams: 20 x each side with blue band  8. Sit <>stand from chair - focus on keeping feet on ground and controlled sitting. - standing on blue mat  9. Transverse Ab activation with progression to posterior pelvic tilt  10. Nu-Step x 15 mins  11. Alternate level standing - one foot on bench, one of ground - rotation toward flexed hip/knee - control movement and prevent LOB          Obdulia received the following manual therapy techniques:  were applied to the:  for  minutes including:       The patient received the following direct contact modalities after being cleared for contraindications:     The patient received the following supervised modalities after being cleared for contradictions:     Education provided re:  Rec to continue use of rolling walker at all times.     Obdulia verbalized good understanding of education provided.   No spiritual or educational barriers to learning provided    Assessment      Patient tolerated treatment well.  She is demonstrating improved strength in her LE's - now able to ride the bicycle independently for full 20 mins - can keep the pedals moving by herself.   Continues to demonstrate myelopathic gait with peripheral neuropathy - improving in her stability with use of the walker.    She is using her RW all of the time now, which will hopefully help with safety.  She appears in abetter mood today, alittle more upbeat about what she needs to work on.   This is a 68 y.o. female referred to outpatient physical therapy and presents with a medical diagnosis of gait instability; LE weakness and demonstrates limitations as described in the problem list. Pt prognosis is Fair. Pt will continue to benefit from skilled outpatient physical therapy to address the deficits listed in the problem list, provide pt/family education and to maximize pt's level of independence in the home and community environment.     Strength Re-assessment:    Left Abduction 4+/5 - no change  Left IR 3+/5 - no change ; Left Hip Extension 4-/5 - improved to 4/5 ; Knee Flexion 4-/5 - no change   Right Abduction 4-/5 - improved to 4/5 ; Left IR 3-/5 - improved to 3+/5 ; Right Hip Extension 4-/5 - improved to 4/5; Knee Flexion 4-/5 - no change       Goals as follows:    Short Term Goals (4 Weeks):   1. Improvement in LE strength by 1/2 grade - goals partially met - see above.   2. Improvement in ability to perform sit <>stand from chair to 9 reps without LOB - goals met  3. Able to stand on compliant surface x 1 min without LOB- exceeded goal - able to stand x 2 mins.   4. LEFS improved to <50 % limitation - No improvement in score.     Long Term Goals (6 Weeks):   1. Patient Independent with HEP and management of symptoms  2. Patient to have no falls in 6 weeks  3. LEFS improved to <40% limitation     Plan     Continue with established Plan of Care towards PT goals. Update POC and send to Dr. Perez as Obdulia has been  discharged from Dr. Chowdhury's service at this time.     Therapist: Rosalba Thurman, PT  11/16/2018

## 2018-11-23 ENCOUNTER — CLINICAL SUPPORT (OUTPATIENT)
Dept: REHABILITATION | Facility: HOSPITAL | Age: 68
End: 2018-11-23
Attending: FAMILY MEDICINE
Payer: MEDICARE

## 2018-11-23 DIAGNOSIS — M25.562 CHRONIC PAIN OF LEFT KNEE: ICD-10-CM

## 2018-11-23 DIAGNOSIS — R26.81 GAIT INSTABILITY: Primary | ICD-10-CM

## 2018-11-23 DIAGNOSIS — G89.29 CHRONIC PAIN OF LEFT KNEE: ICD-10-CM

## 2018-11-23 PROCEDURE — 97110 THERAPEUTIC EXERCISES: CPT | Mod: PN

## 2018-11-23 NOTE — PROGRESS NOTES
Physical Therapy Daily Note     Name: Obdulia Yepez  Clinic Number: [id number]  Diagnosis:   Encounter Diagnoses   Name Primary?    Gait instability Yes    Chronic pain of left knee      Physician: Esteban Remy, DO  Precautions: falls   Visit  12 of 12  PTA Visit #: 0  Time In: :10:00 AM  Time Out:  11:00 AM    Subjective     Pt reports: Other than cold weather, [place] has no c/o's.  Stated that it's hard to get going when it's cold outside. .    Pain Scale: Obdulia rates pain on a scale of 0-10 to be 4 currently.    Objective     Obdulia received individual therapeutic exercises to develop strength, posture and core stabilization for 45 minutes includin. Recumbent Bike x 20 mins  2. Quadraped - alternate leg - needs cga to keep from falling ,   3. Bridging x 20 - theraband around thighs to engage gluteals  4. Ball Squeezes x 3 mins  5. Lateral and front walking on/off Air-Ex mat with CGA   6. Stepping on/off the Air-Ex mat - CGA to maintain balance   7. Clams: 20 x each side with blue band  8. Sit <>stand from chair - focus on keeping feet on ground and controlled sitting. - standing on blue mat  9. Transverse Ab activation with progression to posterior pelvic tilt  10. Nu-Step x 15 mins  11. Alternate level standing - one foot on bench, one of ground - rotation toward flexed hip/knee - control movement and prevent LOB          Obdulia received the following manual therapy techniques:  were applied to the:  for  minutes including:       The patient received the following direct contact modalities after being cleared for contraindications:     The patient received the following supervised modalities after being cleared for contradictions:     Education provided re:  Rec to continue use of rolling walker at all times.     Obdulia verbalized good understanding of education provided.   No spiritual or educational barriers to learning provided    Assessment      Patient tolerated treatment well.  She is demonstrating improved strength in her LE's - now able to ride the bicycle independently for full 20 mins - can keep the pedals moving by herself.   Continues to demonstrate myelopathic gait with peripheral neuropathy - improving in her stability with use of the walker.    She is using her RW all of the time now, which will hopefully help with safety.  She appears in abetter mood today, alittle more upbeat about what she needs to work on.   This is a 68 y.o. female referred to outpatient physical therapy and presents with a medical diagnosis of gait instability; LE weakness and demonstrates limitations as described in the problem list. Pt prognosis is Fair. Pt will continue to benefit from skilled outpatient physical therapy to address the deficits listed in the problem list, provide pt/family education and to maximize pt's level of independence in the home and community environment.     Strength Re-assessment:    Left Abduction 4+/5 - no change  Left IR 3+/5 - no change ; Left Hip Extension 4-/5 - improved to 4/5 ; Knee Flexion 4-/5 - no change   Right Abduction 4-/5 - improved to 4/5 ; Left IR 3-/5 - improved to 3+/5 ; Right Hip Extension 4-/5 - improved to 4/5; Knee Flexion 4-/5 - no change       Goals as follows:    Short Term Goals (4 Weeks):   1. Improvement in LE strength by 1/2 grade - goals partially met - see above.   2. Improvement in ability to perform sit <>stand from chair to 9 reps without LOB - goals met  3. Able to stand on compliant surface x 1 min without LOB- exceeded goal - able to stand x 2 mins.   4. LEFS improved to <50 % limitation - No improvement in score.     Long Term Goals (6 Weeks):   1. Patient Independent with HEP and management of symptoms  2. Patient to have no falls in 6 weeks  3. LEFS improved to <40% limitation     Plan     Continue with established Plan of Care towards PT goals       Therapist: Rosalba Thurman, PT  11/23/2018

## 2018-11-26 NOTE — PROGRESS NOTES
TIME RECORD    Date: 11/26/2018    Start Time:  10:00 AM  Stop Time:  11:00 AM    PHYSICAL THERAPY UPDATED PLAN OF TREATMENT    Patient name: Obdulia Yepez  Onset Date:  6/10/2018  SOC Date:  9/18/2018  Primary Diagnosis:    1. Gait instability     2. Chronic pain of left knee       Treatment Diagnosis:  Non-displaced fx of left patella  Certification Period:  9/18/2018 to 11/20/2018  Precautions:  Standard, fall  Visits from SOC:  12  Functional Level Prior to SOC:  Ambulating with RW/Cane, frequent falls; weakness in LE's secondary to cervical myelopathy and LE neuropathy;     Updated Assessment:  Obdulia has made slow, but steady progress with her gait and LE strengthening; She is using the walker all of the time now as she is not safe to ambulate with a single point cane; She has learned better strategies for recovery of balance when she encounters an uneven surface, she is also now able to get her self up onto an elevated surface from the floor as long as she has something to pull herself up on.  She demonstrates improvement in her LE strength as noted above, She is now able to stand on a compliant surface x 2 mins without LOB. She is now able to ride the recumbent bike and keep the bike going for a continuous 20 mins at level 3.   LE Strength Assessment:  Left :  Hip abduction 4+/5; IR/ER 3+/5; Extension 4/5; Knee flexion 4-/5; knee extension 4+/5; dorsiflexion 3/5; plantar flexion 3-/5  Right:  Hip abduction 4/5; IR/ER 3+/5; Extension 4/5; Knee flex 4-/5; Knee extension 4+/5; dorsiflexion 3/5; plantar flexion 3-/5    Previous Short Term Goals Status:     1. LE strength improved by 1/2 grade - partial achievement  2. Sit <>stand - 9 reps in 30 secs - goal met  3. Able to stand on compliant surface x 1 min - goal exceeded to 2 mins  4. LEFS score <50% limitation - no change remains at >70%    Long Term Goal Status:   continue per initial plan of care.  Reasons for Recertification of Therapy:   Obdulia continues to  make slow, steady progress with her strength and balance.  She is motivated to work on her HEP and is always compliant with activities in the clinic; Secondary to her cervical myelopathy and severe peripheral neuropathy, she will always require the use of an AD for ambulation however continuing to address strength deficits and work on balance challenges is appropriate.     Certification Period: 11/23/2018 to 2/23/2018  Recommended Treatment Plan: 2 times per week for 8 weeks: Gait Training, Manual Therapy, Neuromuscular Re-ed, Patient Education, Therapeutic Activites and Therapeutic Exercise          Therapist's Name: Rosalba Thurman, PT   Date: 11/26/2018    I CERTIFY THE NEED FOR THESE SERVICES FURNISHED UNDER THIS PLAN OF TREATMENT AND WHILE UNDER MY CARE    Physician's comments: ________________________________________________________________________________________________________________________________________________      Physician's Name: ___________________________________

## 2018-11-27 ENCOUNTER — CLINICAL SUPPORT (OUTPATIENT)
Dept: REHABILITATION | Facility: HOSPITAL | Age: 68
End: 2018-11-27
Attending: FAMILY MEDICINE
Payer: MEDICARE

## 2018-11-27 DIAGNOSIS — M47.12 OSTEOARTHRITIS OF CERVICAL SPINE WITH MYELOPATHY: ICD-10-CM

## 2018-11-27 DIAGNOSIS — R26.81 GAIT INSTABILITY: Primary | ICD-10-CM

## 2018-11-27 PROCEDURE — 97110 THERAPEUTIC EXERCISES: CPT | Mod: PN

## 2018-11-27 NOTE — PROGRESS NOTES
Physical Therapy Daily Note     Name: Obdulia Yepez  Clinic Number: [id number]  Diagnosis:   Encounter Diagnoses   Name Primary?    Gait instability Yes    Osteoarthritis of cervical spine with myelopathy      Physician: Dr. Og Perez  Precautions: falls   Visit    PTA Visit #: 0  Time In: :10:00 AM  Time Out:  1130 am     Subjective     Pt reports: Other than cold weather, [place] has no c/o's.     Pain Scale: Obdulia rates pain on a scale of 0-10 to be 4 currently.    Objective     Obdulia received individual therapeutic exercises to develop strength, posture and core stabilization for 45 minutes includin. Recumbent Bike x 20 mins  2. Quadraped - alternate leg - needs cga to keep from falling ,   3. Bridging x 20 - theraband around thighs to engage gluteals  4. Ball Squeezes x 3 mins  5. Lateral and front walking on/off Air-Ex mat with CGA   6. Stepping on/off the Air-Ex mat - CGA to maintain balance   7. Clams: 20 x each side with blue band  8. Sit <>stand from chair - focus on keeping feet on ground and controlled sitting. - standing on blue mat  9. Transverse Ab activation with progression to posterior pelvic tilt  10. Nu-Step x 15 mins  11. Alternate level standing - one foot on bench, one of ground - rotation toward flexed hip/knee - control movement and prevent LOB          Obdulia received the following manual therapy techniques:  were applied to the:  for  minutes including:       The patient received the following direct contact modalities after being cleared for contraindications:     The patient received the following supervised modalities after being cleared for contradictions:     Education provided re:  Rec to continue use of rolling walker at all times.     Obdulia verbalized good understanding of education provided.   No spiritual or educational barriers to learning provided    Assessment     Patient tolerated treatment well.  She is  demonstrating improved strength in her LE's - now able to ride the bicycle independently for full 20 mins - can keep the pedals moving by herself.   Continues to demonstrate myelopathic gait with peripheral neuropathy - improving in her stability with use of the walker.    She is using her RW all of the time now, which will hopefully help with safety.  She appears in abetter mood today, alittle more upbeat about what she needs to work on.   This is a 68 y.o. female referred to outpatient physical therapy and presents with a medical diagnosis of gait instability; LE weakness and demonstrates limitations as described in the problem list. Pt prognosis is Fair. Pt will continue to benefit from skilled outpatient physical therapy to address the deficits listed in the problem list, provide pt/family education and to maximize pt's level of independence in the home and community environment.     Strength Re-assessment:    Left Abduction 4+/5 - no change  Left IR 3+/5 - no change ; Left Hip Extension 4-/5 - improved to 4/5 ; Knee Flexion 4-/5 - no change   Right Abduction 4-/5 - improved to 4/5 ; Left IR 3-/5 - improved to 3+/5 ; Right Hip Extension 4-/5 - improved to 4/5; Knee Flexion 4-/5 - no change       Goals as follows:    Short Term Goals (4 Weeks):   1. Improvement in LE strength by 1/2 grade - goals partially met - see above.   2. Improvement in ability to perform sit <>stand from chair to 9 reps without LOB - goals met  3. Able to stand on compliant surface x 1 min without LOB- exceeded goal - able to stand x 2 mins.   4. LEFS improved to <50 % limitation - No improvement in score.     Long Term Goals (6 Weeks):   1. Patient Independent with HEP and management of symptoms  2. Patient to have no falls in 6 weeks  3. LEFS improved to <40% limitation     Plan     Continue with established Plan of Care towards PT goals. 2x/week x 6 weeks    Therapist: Rosalba Thurman, PT  11/27/2018

## 2018-11-28 ENCOUNTER — TELEPHONE (OUTPATIENT)
Dept: FAMILY MEDICINE | Facility: CLINIC | Age: 68
End: 2018-11-28

## 2018-11-28 DIAGNOSIS — M47.812 SPONDYLOSIS OF CERVICAL REGION WITHOUT MYELOPATHY OR RADICULOPATHY: Primary | ICD-10-CM

## 2018-11-28 NOTE — TELEPHONE ENCOUNTER
I have spoken with patient and she would like a referral to Dr. Esteban Jama at [place].  It is for excruciating neck pain that radiating upwards; you have previously referred her to him for cervical pain.  Please advise and thank you, Tennille

## 2018-11-28 NOTE — TELEPHONE ENCOUNTER
----- Message from Nasreen Chapman sent at 11/28/2018  2:34 PM CST -----    Placed a call to pod , pt  Is  Calling to   Speak to  Tennille   About a  Referral // please call [phone]

## 2018-11-29 NOTE — TELEPHONE ENCOUNTER
Referral and pt info sent to Dr. Jama with confirmation.  LVM informing pt that referral was ordered as requested, and sent to provider.

## 2018-12-04 ENCOUNTER — OFFICE VISIT (OUTPATIENT)
Dept: FAMILY MEDICINE | Facility: CLINIC | Age: 68
End: 2018-12-04
Payer: MEDICARE

## 2018-12-04 ENCOUNTER — CLINICAL SUPPORT (OUTPATIENT)
Dept: REHABILITATION | Facility: HOSPITAL | Age: 68
End: 2018-12-04
Attending: FAMILY MEDICINE
Payer: MEDICARE

## 2018-12-04 ENCOUNTER — LAB VISIT (OUTPATIENT)
Dept: LAB | Facility: HOSPITAL | Age: 68
End: 2018-12-04
Attending: FAMILY MEDICINE
Payer: MEDICARE

## 2018-12-04 VITALS
HEIGHT: 60 IN | OXYGEN SATURATION: 98 % | SYSTOLIC BLOOD PRESSURE: 117 MMHG | RESPIRATION RATE: 20 BRPM | WEIGHT: 113 LBS | DIASTOLIC BLOOD PRESSURE: 64 MMHG | HEART RATE: 72 BPM | BODY MASS INDEX: 22.19 KG/M2

## 2018-12-04 DIAGNOSIS — M25.562 CHRONIC PAIN OF LEFT KNEE: ICD-10-CM

## 2018-12-04 DIAGNOSIS — M47.12 OSTEOARTHRITIS OF CERVICAL SPINE WITH MYELOPATHY: Primary | ICD-10-CM

## 2018-12-04 DIAGNOSIS — G89.29 CHRONIC PAIN OF LEFT KNEE: ICD-10-CM

## 2018-12-04 DIAGNOSIS — I10 ESSENTIAL HYPERTENSION: ICD-10-CM

## 2018-12-04 DIAGNOSIS — R26.81 GAIT INSTABILITY: Primary | ICD-10-CM

## 2018-12-04 LAB
ALBUMIN SERPL BCP-MCNC: 3.9 G/DL
ALP SERPL-CCNC: 59 U/L
ALT SERPL W/O P-5'-P-CCNC: 14 U/L
ANION GAP SERPL CALC-SCNC: 9 MMOL/L
AST SERPL-CCNC: 30 U/L
BASOPHILS # BLD AUTO: 0.04 K/UL
BASOPHILS NFR BLD: 0.9 %
BILIRUB SERPL-MCNC: 0.4 MG/DL
BUN SERPL-MCNC: 19 MG/DL
CALCIUM SERPL-MCNC: 9.5 MG/DL
CHLORIDE SERPL-SCNC: 100 MMOL/L
CO2 SERPL-SCNC: 24 MMOL/L
CREAT SERPL-MCNC: 1.3 MG/DL
DIFFERENTIAL METHOD: ABNORMAL
EOSINOPHIL # BLD AUTO: 0.2 K/UL
EOSINOPHIL NFR BLD: 3.6 %
ERYTHROCYTE [DISTWIDTH] IN BLOOD BY AUTOMATED COUNT: 12.7 %
EST. GFR  (AFRICAN AMERICAN): 49.4 ML/MIN/1.73 M^2
EST. GFR  (NON AFRICAN AMERICAN): 42.9 ML/MIN/1.73 M^2
GLUCOSE SERPL-MCNC: 101 MG/DL
HCT VFR BLD AUTO: 33.6 %
HGB BLD-MCNC: 11.3 G/DL
IMM GRANULOCYTES # BLD AUTO: 0 K/UL
IMM GRANULOCYTES NFR BLD AUTO: 0 %
LYMPHOCYTES # BLD AUTO: 0.9 K/UL
LYMPHOCYTES NFR BLD: 20.7 %
MCH RBC QN AUTO: 34.6 PG
MCHC RBC AUTO-ENTMCNC: 33.6 G/DL
MCV RBC AUTO: 103 FL
MONOCYTES # BLD AUTO: 0.3 K/UL
MONOCYTES NFR BLD: 6.8 %
NEUTROPHILS # BLD AUTO: 3 K/UL
NEUTROPHILS NFR BLD: 68 %
NRBC BLD-RTO: 0 /100 WBC
PLATELET # BLD AUTO: 179 K/UL
PMV BLD AUTO: 9.9 FL
POTASSIUM SERPL-SCNC: 4.1 MMOL/L
PROT SERPL-MCNC: 6.3 G/DL
RBC # BLD AUTO: 3.27 M/UL
SODIUM SERPL-SCNC: 133 MMOL/L
TSH SERPL DL<=0.005 MIU/L-ACNC: 1.54 UIU/ML
WBC # BLD AUTO: 4.44 K/UL

## 2018-12-04 PROCEDURE — 80053 COMPREHEN METABOLIC PANEL: CPT

## 2018-12-04 PROCEDURE — 97112 NEUROMUSCULAR REEDUCATION: CPT | Mod: PN

## 2018-12-04 PROCEDURE — 85025 COMPLETE CBC W/AUTO DIFF WBC: CPT

## 2018-12-04 PROCEDURE — 84443 ASSAY THYROID STIM HORMONE: CPT

## 2018-12-04 PROCEDURE — 99213 OFFICE O/P EST LOW 20 MIN: CPT | Mod: PBBFAC,PN | Performed by: FAMILY MEDICINE

## 2018-12-04 PROCEDURE — 99213 OFFICE O/P EST LOW 20 MIN: CPT | Mod: S$PBB,,, | Performed by: FAMILY MEDICINE

## 2018-12-04 PROCEDURE — 99999 PR PBB SHADOW E&M-EST. PATIENT-LVL III: CPT | Mod: PBBFAC,,, | Performed by: FAMILY MEDICINE

## 2018-12-04 PROCEDURE — 36415 COLL VENOUS BLD VENIPUNCTURE: CPT

## 2018-12-04 PROCEDURE — 97110 THERAPEUTIC EXERCISES: CPT | Mod: PN

## 2018-12-04 NOTE — PROGRESS NOTES
Physical Therapy Daily Note     Name: Obdulia Yepez  Clinic Number: [id number]  Diagnosis:   Encounter Diagnoses   Name Primary?    Gait instability Yes    Chronic pain of left knee      Physician: Dr. Og Perez  Precautions: falls   Visit    PTA Visit #: 1  Time In: :10:00 AM  Time Out:  11:40 am     Subjective     Pt reports: No new c/o's.     Pain Scale: Obdulia rates pain on a scale of 0-10 to be 4 currently.    Objective     Obdulia received individual therapeutic exercises to develop strength, posture and core stabilization for 45 minutes includin. Recumbent Bike x 20 mins  2. Quadraped - alternate leg x 10- needs cga to keep from falling    3. Bridging x 20 - theraband around thighs to engage gluteals  4. Ball Squeezes x 3 mins  5. Lateral and front walking on/off Air-Ex mat with CGA   6. Stepping on/off the Air-Ex mat - CGA to maintain balance   7. Clams: 20 x each side with blue band  8. Sit <>stand from chair - focus on keeping feet on ground and controlled sitting. - standing on blue mat  9. Transverse Ab activation with progression to posterior pelvic tilt  10. Nu-Step x 20 mins  11. Alternate level standing - one foot on bench, one of ground - rotation toward flexed hip/knee - control movement and prevent LOB  12. Stairs x 5   13. [employer] level 7 x10 mins        Obdulia received the following manual therapy techniques:  were applied to the:  for  minutes including:       The patient received the following direct contact modalities after being cleared for contraindications:     The patient received the following supervised modalities after being cleared for contradictions:     Education provided re:  Rec to continue use of rolling walker at all times.     Obdulia verbalized good understanding of education provided.   No spiritual or educational barriers to learning provided    Assessment     Patient tolerated treatment well.  She is  demonstrating improved strength in her LE's - now able to ride the bicycle independently for full 20 mins - can keep the pedals moving by herself.   Continues to demonstrate myelopathic gait with peripheral neuropathy - improving in her stability with use of the walker.    She is using her RW all of the time now, which will hopefully help with safety.  She appears in abetter mood today, alittle more upbeat about what she needs to work on.   This is a 68 y.o. female referred to outpatient physical therapy and presents with a medical diagnosis of gait instability; LE weakness and demonstrates limitations as described in the problem list. Pt prognosis is Fair. Pt will continue to benefit from skilled outpatient physical therapy to address the deficits listed in the problem list, provide pt/family education and to maximize pt's level of independence in the home and community environment.     Strength Re-assessment:    Left Abduction 4+/5 - no change  Left IR 3+/5 - no change ; Left Hip Extension 4-/5 - improved to 4/5 ; Knee Flexion 4-/5 - no change   Right Abduction 4-/5 - improved to 4/5 ; Left IR 3-/5 - improved to 3+/5 ; Right Hip Extension 4-/5 - improved to 4/5; Knee Flexion 4-/5 - no change       Goals as follows:    Short Term Goals (4 Weeks):   1. Improvement in LE strength by 1/2 grade - goals partially met - see above.   2. Improvement in ability to perform sit <>stand from chair to 9 reps without LOB - goals met  3. Able to stand on compliant surface x 1 min without LOB- exceeded goal - able to stand x 2 mins.   4. LEFS improved to <50 % limitation - No improvement in score.     Long Term Goals (6 Weeks):   1. Patient Independent with HEP and management of symptoms  2. Patient to have no falls in 6 weeks  3. LEFS improved to <40% limitation     Plan     Continue with established Plan of Care towards PT goals. 2x/week x 6 weeks    Therapist: Jonathan Favre, PTA  12/4/2018

## 2018-12-04 NOTE — PROGRESS NOTES
"Subjective:       Patient ID: Obdulia Yepez is a 68 y.o. female.    Chief Complaint: Follow-up    Follow up    DJD cervical spine  Worsening  Referral sent to neurosurgery      Review of Systems   Constitutional: Negative for appetite change, chills, fatigue and fever.   HENT: Negative for congestion, postnasal drip, rhinorrhea and sore throat.    Genitourinary: Negative for dysuria.   Musculoskeletal: Positive for arthralgias. Negative for myalgias.   Skin: Positive for wound. Negative for color change and rash.   Neurological: Negative for dizziness, weakness and headaches.   Psychiatric/Behavioral: Negative for sleep disturbance. The patient is not nervous/anxious.          Reviewed family, medical, surgical, and social history.    Objective:      /64 (BP Location: Left arm, Patient Position: Sitting, BP Method: Medium (Automatic))   Pulse 72   Resp 20   Ht 5' (1.524 m)   Wt 51.3 kg (113 lb)   SpO2 98%   BMI 22.07 kg/m²   Physical Exam   Constitutional: She is oriented to person, place, and time. She appears well-developed and well-nourished.   HENT:   Head: Normocephalic.   Eyes: Pupils are equal, round, and reactive to light.   Neck: Normal range of motion.   Cardiovascular: Normal rate, S1 normal and S2 normal.   Pulmonary/Chest: Effort normal.   Abdominal: Normal appearance.   Musculoskeletal:   Walks with walker to maintain gait. Knees "give out" at times.    Neurological: She is alert and oriented to person, place, and time.   Skin: Skin is warm and dry. Capillary refill takes less than 2 seconds.        Psychiatric: Her speech is normal. Cognition and memory are normal.   Vitals reviewed.      Assessment:       1. Osteoarthritis of cervical spine with myelopathy        Plan:       Osteoarthritis of cervical spine with myelopathy            Risks, benefits, and side effects were discussed with the patient. All questions were answered to the fullest satisfaction of the patient, and pt verbalized " understanding and agreement to treatment plan. Pt was to call with any new or worsening symptoms, or present to the ER.

## 2018-12-05 ENCOUNTER — TELEPHONE (OUTPATIENT)
Dept: FAMILY MEDICINE | Facility: CLINIC | Age: 68
End: 2018-12-05

## 2018-12-05 NOTE — TELEPHONE ENCOUNTER
----- Message from Og Perez MD sent at 12/5/2018  8:01 AM CST -----  Please let this lady know that her blood work was fine!

## 2018-12-06 ENCOUNTER — OFFICE VISIT (OUTPATIENT)
Dept: UROLOGY | Facility: CLINIC | Age: 68
End: 2018-12-06
Payer: MEDICARE

## 2018-12-06 VITALS
HEIGHT: 60 IN | HEART RATE: 86 BPM | DIASTOLIC BLOOD PRESSURE: 74 MMHG | TEMPERATURE: 97 F | SYSTOLIC BLOOD PRESSURE: 124 MMHG | BODY MASS INDEX: 21.97 KG/M2 | WEIGHT: 111.88 LBS

## 2018-12-06 DIAGNOSIS — N39.0 RECURRENT UTI: Primary | ICD-10-CM

## 2018-12-06 LAB
BILIRUB SERPL-MCNC: NEGATIVE MG/DL
BLOOD URINE, POC: NEGATIVE
COLOR, POC UA: NORMAL
GLUCOSE UR QL STRIP: NEGATIVE
KETONES UR QL STRIP: NEGATIVE
LEUKOCYTE ESTERASE URINE, POC: NEGATIVE
NITRITE, POC UA: NEGATIVE
PH, POC UA: 5
PROTEIN, POC: NEGATIVE
SPECIFIC GRAVITY, POC UA: 1015
UROBILINOGEN, POC UA: NEGATIVE

## 2018-12-06 PROCEDURE — 87086 URINE CULTURE/COLONY COUNT: CPT

## 2018-12-06 PROCEDURE — 99213 OFFICE O/P EST LOW 20 MIN: CPT | Mod: PBBFAC,PN | Performed by: UROLOGY

## 2018-12-06 PROCEDURE — 81002 URINALYSIS NONAUTO W/O SCOPE: CPT | Mod: PBBFAC,PN | Performed by: UROLOGY

## 2018-12-06 PROCEDURE — 99999 PR PBB SHADOW E&M-EST. PATIENT-LVL III: CPT | Mod: PBBFAC,,, | Performed by: UROLOGY

## 2018-12-06 PROCEDURE — 99204 OFFICE O/P NEW MOD 45 MIN: CPT | Mod: S$PBB,,, | Performed by: UROLOGY

## 2018-12-06 RX ORDER — TAMSULOSIN HYDROCHLORIDE 0.4 MG/1
0.4 CAPSULE ORAL DAILY
Qty: 30 CAPSULE | Refills: 11 | Status: SHIPPED | OUTPATIENT
Start: 2018-12-06 | End: 2019-04-05

## 2018-12-06 NOTE — PROGRESS NOTES
Subjective:       Patient ID: Obdulia Yepez is a 68 y.o. female.    Chief Complaint:  OFFICE NOTE    CHIEF COMPLAINT:  Recurrent severe urinary tract infections.  This is a  consultation with Dr. Og Perez.    HISTORY OF PRESENT ILLNESS:  Ms. Yepez is a 68-year-old female who in  October 2017, was admitted with urinary sepsis.  She was treated at  [facility] and eventually discharged and have another  episode of urinary tract infection, not as severe in May 2018.  At that  time, her blood culture grew Staphylococcus.  Since then, the patient had  several infections that were treated with p.o. antibiotics.  The patient is  referred to our office for further evaluation and treatment.    She refers that the infections are characterized by fever and dysuria.  The  patient denies seeing any gross hematuria.  She normally has nocturia x3 to  4.  She feels that she empties the bladder satisfactory.  She is  incontinent of urine and she uses approximately five pads in 24 hours.  She  have normal bowel movements one or twice a day, but the hygiene in the area  is not done proper, since she wipes from the back to the front.    The patient referred that during the admission at [facility], imaging of the kidneys was performed that shows that she has  kidney stones, but the stone disease has not been addressed.  At the  present time, the patient is free of any symptoms of infection.    The patient was catheterized today and 180 mL of urine were obtained.   Before the catheterization, the patient was unable to void.  The urine from  the catheterized specimen was within normal limits.  Despite of that, we  are going to send that for culture and sensitivity in an attempt to  identify the bacteria that is affecting Ms. Yepez.    The past medical history, the past surgical history, the current  medications, and the allergies are well documented in the medical record  and all this was reviewed by me  during this visit.        EOR/HN dd: 12/06/2018 12:55:32 (CST)   td: 12/06/2018 20:51:05 (CST)  Doc ID #[id number]   Job ID #[id number]    CC: Og Perez M.D.            [facility]  Review of Systems   Constitutional: Negative.  Negative for activity change.   HENT: Negative.  Negative for facial swelling.    Eyes: Negative for discharge.   Respiratory: Negative for cough and shortness of breath.    Cardiovascular: Negative for chest pain and palpitations.   Gastrointestinal: Negative for abdominal distention, blood in stool and constipation.   Genitourinary: Negative for dysuria, flank pain, frequency, hematuria, urgency and vaginal pain.   Musculoskeletal: Positive for arthralgias.   Skin: Negative.    Neurological: Negative.  Negative for dizziness.   Hematological: Negative for adenopathy.   Psychiatric/Behavioral: The patient is not nervous/anxious.        Objective:      Physical Exam   Constitutional: She appears well-developed.   HENT:   Head: Normocephalic.   Eyes: Pupils are equal, round, and reactive to light.   Neck: Normal range of motion.   Cardiovascular: Normal rate.    Pulmonary/Chest: Effort normal.   Abdominal: Soft. She exhibits no distension and no mass. There is no tenderness. Hernia confirmed negative in the right inguinal area and confirmed negative in the left inguinal area.   Genitourinary: Vagina normal. No erythema, tenderness or bleeding in the vagina.   Musculoskeletal: Normal range of motion.   Neurological: She is alert.   Skin: Skin is warm.     Psychiatric: She has a normal mood and affect.       Assessment:       1. Recurrent UTI        Plan:       Recurrent UTI  -     X-Ray Abdomen AP 1 View; Future; Expected date: 12/06/2018  -     Urine culture  -     POCT URINE DIPSTICK WITHOUT MICROSCOPE    Other orders  -     tamsulosin (FLOMAX) 0.4 mg Cap; Take 1 capsule (0.4 mg total) by mouth once daily.  Dispense: 30 capsule; Refill: 11    Obtain records from [facility] for kidney imaging  studies. She may need an  ESWL to treat her stone that can be a factor on her UTI's.

## 2018-12-07 LAB — BACTERIA UR CULT: NO GROWTH

## 2018-12-18 ENCOUNTER — CLINICAL SUPPORT (OUTPATIENT)
Dept: REHABILITATION | Facility: HOSPITAL | Age: 68
End: 2018-12-18
Attending: FAMILY MEDICINE
Payer: MEDICARE

## 2018-12-18 DIAGNOSIS — R26.81 GAIT INSTABILITY: Primary | ICD-10-CM

## 2018-12-18 PROCEDURE — 97112 NEUROMUSCULAR REEDUCATION: CPT | Mod: PN

## 2018-12-18 PROCEDURE — 97110 THERAPEUTIC EXERCISES: CPT | Mod: PN

## 2018-12-18 NOTE — PROGRESS NOTES
Physical Therapy Daily Note     Name: Obdulia Yepez  Clinic Number: [id number]  Diagnosis:   Encounter Diagnosis   Name Primary?    Gait instability Yes     Physician: Dr. Og Perez  Precautions: falls   Visit  3/12  PTA Visit #: 2  Time In: :10:00 AM  Time Out:  11:20  AM     Subjective     Pt reports: No new c/o's.     Pain Scale: Obdulia rates pain on a scale of 0-10 to be 8 currently.    Objective     Obdulia received individual therapeutic exercises to develop strength, posture and core stabilization for 45 minutes includin. Recumbent Bike x 20 mins  2. Quadraped - alternate leg x 10- needs cga to keep from falling    3. Bridging x 20 - blue theraband around thighs to engage gluteals  4. Ball Squeezes x 3 mins  5. Lateral and front walking on/off Air-Ex mat with CGA   6. Stepping on/off the Air-Ex mat - CGA to maintain balance   7. Clams: 20 x each side with blue band  8. Sit <>stand from chair - focus on keeping feet on ground and controlled sitting. - standing on blue mat  9. Transverse Ab activation with progression to posterior pelvic tilt  10. Nu-Step x 20 mins  11. Alternate level standing - one foot on bench, one of ground - rotation toward flexed hip/knee - control movement and prevent LOB  12. Stairs x 5   13. Vigor Gym level 7 x10 mins  14. Toe Taps on Airex mat x 2 mins        Obdulia received the following manual therapy techniques:  were applied to the:  for  minutes including:       The patient received the following direct contact modalities after being cleared for contraindications:     The patient received the following supervised modalities after being cleared for contradictions:     Education provided re:  Rec to continue use of rolling walker at all times.     Obdulia verbalized good understanding of education provided.   No spiritual or educational barriers to learning provided    Assessment     Patient tolerated treatment well.  She  is demonstrating improved strength in her LE's - now able to ride the bicycle independently for full 20 mins - can keep the pedals moving by herself.   Continues to demonstrate myelopathic gait with peripheral neuropathy - improving in her stability with use of the walker.    She is using her RW all of the time now, which will hopefully help with safety.  She appears in abetter mood today, alittle more upbeat about what she needs to work on.   This is a 68 y.o. female referred to outpatient physical therapy and presents with a medical diagnosis of gait instability; LE weakness and demonstrates limitations as described in the problem list. Pt prognosis is Fair. Pt will continue to benefit from skilled outpatient physical therapy to address the deficits listed in the problem list, provide pt/family education and to maximize pt's level of independence in the home and community environment.     Strength Re-assessment:    Left Abduction 4+/5 - no change  Left IR 3+/5 - no change ; Left Hip Extension 4-/5 - improved to 4/5 ; Knee Flexion 4-/5 - no change   Right Abduction 4-/5 - improved to 4/5 ; Left IR 3-/5 - improved to 3+/5 ; Right Hip Extension 4-/5 - improved to 4/5; Knee Flexion 4-/5 - no change       Goals as follows:    Short Term Goals (4 Weeks):   1. Improvement in LE strength by 1/2 grade - goals partially met - see above.   2. Improvement in ability to perform sit <>stand from chair to 9 reps without LOB - goals met  3. Able to stand on compliant surface x 1 min without LOB- exceeded goal - able to stand x 2 mins.   4. LEFS improved to <50 % limitation - No improvement in score.     Long Term Goals (6 Weeks):   1. Patient Independent with HEP and management of symptoms  2. Patient to have no falls in 6 weeks  3. LEFS improved to <40% limitation     Plan     Continue with established Plan of Care towards PT goals. 2x/week x 6 weeks    Therapist: Jonathan Favre, PTA  12/18/2018

## 2018-12-20 ENCOUNTER — CLINICAL SUPPORT (OUTPATIENT)
Dept: REHABILITATION | Facility: HOSPITAL | Age: 68
End: 2018-12-20
Attending: FAMILY MEDICINE
Payer: MEDICARE

## 2018-12-20 DIAGNOSIS — M25.562 CHRONIC PAIN OF LEFT KNEE: ICD-10-CM

## 2018-12-20 DIAGNOSIS — R26.81 GAIT INSTABILITY: Primary | ICD-10-CM

## 2018-12-20 DIAGNOSIS — G89.29 CHRONIC PAIN OF LEFT KNEE: ICD-10-CM

## 2018-12-20 PROCEDURE — 97112 NEUROMUSCULAR REEDUCATION: CPT | Mod: PN

## 2018-12-20 PROCEDURE — 97110 THERAPEUTIC EXERCISES: CPT | Mod: PN

## 2019-01-08 ENCOUNTER — TELEPHONE (OUTPATIENT)
Dept: FAMILY MEDICINE | Facility: CLINIC | Age: 69
End: 2019-01-08

## 2019-01-08 ENCOUNTER — OFFICE VISIT (OUTPATIENT)
Dept: FAMILY MEDICINE | Facility: CLINIC | Age: 69
End: 2019-01-08
Payer: MEDICARE

## 2019-01-08 VITALS
WEIGHT: 111 LBS | BODY MASS INDEX: 21.68 KG/M2 | OXYGEN SATURATION: 97 % | SYSTOLIC BLOOD PRESSURE: 88 MMHG | HEART RATE: 86 BPM | TEMPERATURE: 98 F | DIASTOLIC BLOOD PRESSURE: 60 MMHG

## 2019-01-08 DIAGNOSIS — M47.12 OSTEOARTHRITIS OF CERVICAL SPINE WITH MYELOPATHY: ICD-10-CM

## 2019-01-08 DIAGNOSIS — R30.0 DYSURIA: Primary | ICD-10-CM

## 2019-01-08 PROCEDURE — 99999 PR PBB SHADOW E&M-EST. PATIENT-LVL IV: ICD-10-PCS | Mod: PBBFAC,,, | Performed by: FAMILY MEDICINE

## 2019-01-08 PROCEDURE — 87186 SC STD MICRODIL/AGAR DIL: CPT

## 2019-01-08 PROCEDURE — 99214 PR OFFICE/OUTPT VISIT, EST, LEVL IV, 30-39 MIN: ICD-10-PCS | Mod: S$PBB,,, | Performed by: FAMILY MEDICINE

## 2019-01-08 PROCEDURE — 99214 OFFICE O/P EST MOD 30 MIN: CPT | Mod: S$PBB,,, | Performed by: FAMILY MEDICINE

## 2019-01-08 PROCEDURE — 87077 CULTURE AEROBIC IDENTIFY: CPT

## 2019-01-08 PROCEDURE — 99999 PR PBB SHADOW E&M-EST. PATIENT-LVL IV: CPT | Mod: PBBFAC,,, | Performed by: FAMILY MEDICINE

## 2019-01-08 PROCEDURE — 87086 URINE CULTURE/COLONY COUNT: CPT

## 2019-01-08 PROCEDURE — 99214 OFFICE O/P EST MOD 30 MIN: CPT | Mod: PBBFAC,PN | Performed by: FAMILY MEDICINE

## 2019-01-08 PROCEDURE — 87088 URINE BACTERIA CULTURE: CPT

## 2019-01-08 PROCEDURE — 87184 SC STD DISK METHOD PER PLATE: CPT

## 2019-01-08 RX ORDER — OXYCODONE AND ACETAMINOPHEN 5; 325 MG/1; MG/1
1 TABLET ORAL EVERY 6 HOURS PRN
Qty: 90 TABLET | Refills: 0 | Status: SHIPPED | OUTPATIENT
Start: 2019-01-08 | End: 2019-02-05 | Stop reason: SDUPTHER

## 2019-01-08 RX ORDER — IBUPROFEN 800 MG/1
800 TABLET ORAL 3 TIMES DAILY
Qty: 90 TABLET | Refills: 3 | Status: SHIPPED | OUTPATIENT
Start: 2019-01-08 | End: 2019-03-07 | Stop reason: SDUPTHER

## 2019-01-08 RX ORDER — TIZANIDINE 2 MG/1
2 TABLET ORAL EVERY 8 HOURS
Qty: 90 TABLET | Refills: 5 | Status: SHIPPED | OUTPATIENT
Start: 2019-01-08 | End: 2019-01-18

## 2019-01-08 NOTE — PROGRESS NOTES
"Subjective:       Patient ID: Obdulia Yepez is a 68 y.o. female.    Chief Complaint: Follow-up    Follow up    DJD cervical spine  Worsening  Taking pain meds as prescribed    Also with dysuria  No flank pain          Review of Systems   Constitutional: Negative for appetite change, chills, fatigue and fever.   HENT: Negative for congestion, postnasal drip, rhinorrhea and sore throat.    Genitourinary: Negative for dysuria.   Musculoskeletal: Positive for arthralgias. Negative for myalgias.   Skin: Positive for wound. Negative for color change and rash.   Neurological: Negative for dizziness, weakness and headaches.   Psychiatric/Behavioral: Negative for sleep disturbance. The patient is not nervous/anxious.          Reviewed family, medical, surgical, and social history.    Objective:      BP (!) 88/60 (BP Location: Right arm, Patient Position: Sitting, BP Method: Medium (Automatic))   Pulse 86   Temp 97.9 °F (36.6 °C) (Tympanic)   Wt 50.3 kg (111 lb)   SpO2 97%   BMI 21.68 kg/m²   Physical Exam   Constitutional: She is oriented to person, place, and time. She appears well-developed and well-nourished.   HENT:   Head: Normocephalic.   Eyes: Pupils are equal, round, and reactive to light.   Neck: Normal range of motion.   Cardiovascular: Normal rate, S1 normal and S2 normal.   Pulmonary/Chest: Effort normal.   Abdominal: Normal appearance.   Musculoskeletal:   Walks with walker to maintain gait. Knees "give out" at times.    Neurological: She is alert and oriented to person, place, and time.   Skin: Skin is warm and dry. Capillary refill takes less than 2 seconds.        Psychiatric: Her speech is normal. Cognition and memory are normal.   Vitals reviewed.      Assessment:       1. Dysuria    2. Osteoarthritis of cervical spine with myelopathy        Plan:       Dysuria  -     Urinalysis  -     Urine culture    Osteoarthritis of cervical spine with myelopathy  -     oxyCODONE-acetaminophen (PERCOCET) 5-325 mg " per tablet; Take 1 tablet by mouth every 6 (six) hours as needed for Pain.  Dispense: 90 tablet; Refill: 0  -     ibuprofen (ADVIL,MOTRIN) 800 MG tablet; Take 1 tablet (800 mg total) by mouth 3 (three) times daily.  Dispense: 90 tablet; Refill: 3  -     Ambulatory Referral to Physical/Occupational Therapy    Other orders  -     tiZANidine (ZANAFLEX) 2 MG tablet; Take 1 tablet (2 mg total) by mouth every 8 (eight) hours. for 10 days  Dispense: 90 tablet; Refill: 5  -     ciprofloxacin HCl (CIPRO) 250 MG tablet; Take 1 tablet (250 mg total) by mouth 2 (two) times daily. for 3 days  Dispense: 6 tablet; Refill: 0     reviewed        Risks, benefits, and side effects were discussed with the patient. All questions were answered to the fullest satisfaction of the patient, and pt verbalized understanding and agreement to treatment plan. Pt was to call with any new or worsening symptoms, or present to the ER.

## 2019-01-09 RX ORDER — CIPROFLOXACIN 250 MG/1
250 TABLET, FILM COATED ORAL 2 TIMES DAILY
Qty: 6 TABLET | Refills: 0 | Status: SHIPPED | OUTPATIENT
Start: 2019-01-09 | End: 2019-01-12

## 2019-01-10 ENCOUNTER — TELEPHONE (OUTPATIENT)
Dept: FAMILY MEDICINE | Facility: CLINIC | Age: 69
End: 2019-01-10

## 2019-01-10 NOTE — TELEPHONE ENCOUNTER
Pt requesting UA results.   Please advise, thank you.      ----- Message from Cinthia Flores sent at 1/10/2019  3:22 PM CST -----  Contact: pt [phone]  Patient called and asked for a call back with the UA results please.

## 2019-01-14 ENCOUNTER — TELEPHONE (OUTPATIENT)
Dept: FAMILY MEDICINE | Facility: CLINIC | Age: 69
End: 2019-01-14

## 2019-01-14 NOTE — TELEPHONE ENCOUNTER
----- Message from Og Perez MD sent at 1/14/2019 11:20 AM CST -----  Her urine culture confirmed an infection, and that abt, cipro, should have been effective against it. Please see how she is doing.

## 2019-01-14 NOTE — TELEPHONE ENCOUNTER
I have spoken with pt and she stated that [facility] did not have the Cipro ready on Friday when she attempted to pick it up. I have called and spoken with [facility] pharmacy and they did not have the cipro in stock at the time that it was sent in, however, pt nor this office was notified.  It was ready for  on the 12th, however, pt was not notified.   Pt notified by myself that prescription is now ready for .   Tennille

## 2019-01-15 LAB
BACTERIA UR CULT: NORMAL
BACTERIA UR CULT: NORMAL

## 2019-02-05 ENCOUNTER — LAB VISIT (OUTPATIENT)
Dept: LAB | Facility: HOSPITAL | Age: 69
End: 2019-02-05
Attending: FAMILY MEDICINE
Payer: MEDICARE

## 2019-02-05 ENCOUNTER — OFFICE VISIT (OUTPATIENT)
Dept: FAMILY MEDICINE | Facility: CLINIC | Age: 69
End: 2019-02-05
Payer: MEDICARE

## 2019-02-05 VITALS
HEART RATE: 89 BPM | OXYGEN SATURATION: 97 % | HEIGHT: 60 IN | DIASTOLIC BLOOD PRESSURE: 73 MMHG | SYSTOLIC BLOOD PRESSURE: 112 MMHG | RESPIRATION RATE: 20 BRPM | WEIGHT: 108 LBS | BODY MASS INDEX: 21.2 KG/M2

## 2019-02-05 DIAGNOSIS — Z02.83 ENCOUNTER FOR DRUG SCREENING: ICD-10-CM

## 2019-02-05 DIAGNOSIS — M25.562 ACUTE PAIN OF LEFT KNEE: ICD-10-CM

## 2019-02-05 DIAGNOSIS — F32.A DEPRESSION, UNSPECIFIED DEPRESSION TYPE: ICD-10-CM

## 2019-02-05 DIAGNOSIS — M79.642 HAND PAIN, LEFT: ICD-10-CM

## 2019-02-05 DIAGNOSIS — M47.12 OSTEOARTHRITIS OF CERVICAL SPINE WITH MYELOPATHY: Primary | ICD-10-CM

## 2019-02-05 PROCEDURE — 99214 OFFICE O/P EST MOD 30 MIN: CPT | Mod: S$PBB,,, | Performed by: FAMILY MEDICINE

## 2019-02-05 PROCEDURE — 99213 OFFICE O/P EST LOW 20 MIN: CPT | Mod: PBBFAC,PN | Performed by: FAMILY MEDICINE

## 2019-02-05 PROCEDURE — 99999 PR PBB SHADOW E&M-EST. PATIENT-LVL III: CPT | Mod: PBBFAC,,, | Performed by: FAMILY MEDICINE

## 2019-02-05 PROCEDURE — 99214 PR OFFICE/OUTPT VISIT, EST, LEVL IV, 30-39 MIN: ICD-10-PCS | Mod: S$PBB,,, | Performed by: FAMILY MEDICINE

## 2019-02-05 PROCEDURE — 99999 PR PBB SHADOW E&M-EST. PATIENT-LVL III: ICD-10-PCS | Mod: PBBFAC,,, | Performed by: FAMILY MEDICINE

## 2019-02-05 PROCEDURE — 80307 DRUG TEST PRSMV CHEM ANLYZR: CPT

## 2019-02-05 RX ORDER — MIRTAZAPINE 15 MG/1
15 TABLET, FILM COATED ORAL NIGHTLY
Qty: 30 TABLET | Refills: 11 | Status: ON HOLD | OUTPATIENT
Start: 2019-02-05 | End: 2019-12-10

## 2019-02-05 RX ORDER — MONTELUKAST SODIUM 4 MG/1
1 TABLET, CHEWABLE ORAL 2 TIMES DAILY
Qty: 180 TABLET | Refills: 3 | Status: SHIPPED | OUTPATIENT
Start: 2019-02-05 | End: 2020-02-05

## 2019-02-05 RX ORDER — OXYCODONE AND ACETAMINOPHEN 5; 325 MG/1; MG/1
1 TABLET ORAL EVERY 6 HOURS PRN
Qty: 90 TABLET | Refills: 0 | Status: SHIPPED | OUTPATIENT
Start: 2019-02-05 | End: 2019-03-07 | Stop reason: SDUPTHER

## 2019-02-05 NOTE — PROGRESS NOTES
"Subjective:       Patient ID: Obdulia Yepez is a 68 y.o. female.    Chief Complaint: Follow-up and Fall    Follow up    Patient reports that her pain is well controlled, and that medication is preservingher quality of life. Patient requests refill today, and denies any change in her pain. No abuse concerns.  reviewed.      Having some depression  Improved with remeron  No si/hi    Also fell  Hurt keft knee and left hand      Review of Systems   Constitutional: Negative for appetite change, chills, fatigue and fever.   HENT: Negative for congestion, postnasal drip, rhinorrhea and sore throat.    Genitourinary: Negative for dysuria.   Musculoskeletal: Positive for arthralgias. Negative for myalgias.   Skin: Positive for wound. Negative for color change and rash.   Neurological: Negative for dizziness, weakness and headaches.   Psychiatric/Behavioral: Negative for sleep disturbance. The patient is not nervous/anxious.          Reviewed family, medical, surgical, and social history.    Objective:      /73 (BP Location: Left arm, Patient Position: Sitting, BP Method: Small (Automatic))   Pulse 89   Resp 20   Ht 5' (1.524 m)   Wt 49 kg (108 lb)   SpO2 97%   BMI 21.09 kg/m²   Physical Exam   Constitutional: She is oriented to person, place, and time. She appears well-developed and well-nourished.   HENT:   Head: Normocephalic.   Eyes: Pupils are equal, round, and reactive to light.   Neck: Normal range of motion.   Cardiovascular: Normal rate, S1 normal and S2 normal.   Pulmonary/Chest: Effort normal.   Abdominal: Normal appearance.   Musculoskeletal:   Walks with walker to maintain gait. Knees "give out" at times.    Neurological: She is alert and oriented to person, place, and time.   Skin: Skin is warm and dry. Capillary refill takes less than 2 seconds.        Psychiatric: Her speech is normal. Cognition and memory are normal.   Vitals reviewed.      Assessment:       1. Osteoarthritis of cervical spine " with myelopathy    2. Encounter for drug screening    3. Acute pain of left knee    4. Hand pain, left    5. Depression, unspecified depression type        Plan:       Osteoarthritis of cervical spine with myelopathy  -     oxyCODONE-acetaminophen (PERCOCET) 5-325 mg per tablet; Take 1 tablet by mouth every 6 (six) hours as needed for Pain.  Dispense: 90 tablet; Refill: 0    Encounter for drug screening  -     Cancel: [facility] Drug Screen  -     [facility] Drug Screen; Future; Expected date: 02/05/2019    Acute pain of left knee  -     X-Ray Knee 3 View Left; Future; Expected date: 02/05/2019    Hand pain, left  -     X-Ray Hand Complete Left; Future; Expected date: 02/05/2019    Depression, unspecified depression type  -     mirtazapine (REMERON) 15 MG tablet; Take 1 tablet (15 mg total) by mouth every evening.  Dispense: 30 tablet; Refill: 11    Other orders  -     colestipol (COLESTID) 1 gram Tab; Take 1 tablet (1 g total) by mouth 2 (two) times daily.  Dispense: 180 tablet; Refill: 3     reviewed and consistent  Will refill medicine as shown given that patient receives greater than 30% benefit from medicine and keeps patient functional and able to complete ADL's.  UDS reviewed  Pain contract reviewed  No abuse concerns          Risks, benefits, and side effects were discussed with the patient. All questions were answered to the fullest satisfaction of the patient, and pt verbalized understanding and agreement to treatment plan. Pt was to call with any new or worsening symptoms, or present to the ER.

## 2019-02-10 LAB
6MAM UR QL: NOT DETECTED
7AMINOCLONAZEPAM UR QL: NOT DETECTED
A-OH ALPRAZ UR QL: NOT DETECTED
ALPRAZ UR QL: NOT DETECTED
AMPHET UR QL SCN: NOT DETECTED
ANNOTATION COMMENT IMP: NORMAL
ANNOTATION COMMENT IMP: NORMAL
BARBITURATES UR QL: NOT DETECTED
BUPRENORPHINE UR QL: NOT DETECTED
BZE UR QL: NOT DETECTED
CARBOXYTHC UR QL: NOT DETECTED
CARISOPRODOL UR QL: NOT DETECTED
CLONAZEPAM UR QL: NOT DETECTED
CODEINE UR QL: NOT DETECTED
CREAT UR-MCNC: 69.6 MG/DL (ref 20–400)
DIAZEPAM UR QL: NOT DETECTED
ETHYL GLUCURONIDE UR QL: PRESENT
FENTANYL UR QL: NOT DETECTED
HYDROCODONE UR QL: NOT DETECTED
HYDROMORPHONE UR QL: NOT DETECTED
LORAZEPAM UR QL: NOT DETECTED
MDA UR QL: NOT DETECTED
MDEA UR QL: NOT DETECTED
MDMA UR QL: NOT DETECTED
ME-PHENIDATE UR QL: NOT DETECTED
MEPERIDINE UR QL: NOT DETECTED
METHADONE UR QL: NOT DETECTED
METHAMPHET UR QL: NOT DETECTED
MIDAZOLAM UR QL SCN: NOT DETECTED
MORPHINE UR QL: NOT DETECTED
NORBUPRENORPHINE UR QL CFM: NOT DETECTED
NORDIAZEPAM UR QL: NOT DETECTED
NORFENTANYL UR QL: NOT DETECTED
NORHYDROCODONE UR QL CFM: NOT DETECTED
NOROXYCODONE UR QL CFM: NOT DETECTED
NOROXYMORPHONE: NOT DETECTED
OXAZEPAM UR QL: NOT DETECTED
OXYCODONE UR QL: NOT DETECTED
OXYMORPHONE UR QL: NOT DETECTED
PATHOLOGY STUDY: NORMAL
PCP UR QL: NOT DETECTED
PHENTERMINE UR QL: NOT DETECTED
PROPOXYPH UR QL: NOT DETECTED
SERVICE CMNT-IMP: NORMAL
TAPENTADOL UR QL SCN: NOT DETECTED
TAPENTADOL-O-SULF: NOT DETECTED
TEMAZEPAM UR QL: NOT DETECTED
TRAMADOL UR QL: NOT DETECTED
ZOLPIDEM UR QL: NOT DETECTED

## 2019-02-11 ENCOUNTER — TELEPHONE (OUTPATIENT)
Dept: FAMILY MEDICINE | Facility: CLINIC | Age: 69
End: 2019-02-11

## 2019-02-11 RX ORDER — ALPRAZOLAM 0.25 MG/1
0.25 TABLET ORAL 3 TIMES DAILY
Qty: 21 TABLET | Refills: 0 | Status: SHIPPED | OUTPATIENT
Start: 2019-02-11 | End: 2019-11-07

## 2019-02-11 NOTE — TELEPHONE ENCOUNTER
----- Message from Etienne Leiva sent at 2/11/2019  8:56 AM CST -----  Contact: Patient  Type: Needs Medical Advice    Who Called:  Patient  Symptoms (please be specific):  depression  How long has patient had these symptoms:  1 day  Pharmacy name and phone #:    [id number] - [place], [place] - [address]  [address] [place] [address]  Phone: [phone] Fax: [phone]  Best Call Back Number: [phone]  Additional Information: Patient's son passed away in MVA yesterday and she is requesting medication to cope. Please advise

## 2019-02-11 NOTE — TELEPHONE ENCOUNTER
I have spoken with pt. She would like something for depression and anxiety.   Please advise and thank you, Tennille

## 2019-02-15 DIAGNOSIS — M47.12 OSTEOARTHRITIS OF CERVICAL SPINE WITH MYELOPATHY: ICD-10-CM

## 2019-02-15 RX ORDER — DICLOFENAC SODIUM 75 MG/1
75 TABLET, DELAYED RELEASE ORAL 2 TIMES DAILY
Qty: 10 TABLET | Refills: 0 | Status: SHIPPED | OUTPATIENT
Start: 2019-02-15 | End: 2019-03-07

## 2019-02-15 RX ORDER — OXYCODONE AND ACETAMINOPHEN 5; 325 MG/1; MG/1
1 TABLET ORAL EVERY 6 HOURS PRN
Qty: 90 TABLET | Refills: 0 | Status: CANCELLED | OUTPATIENT
Start: 2019-02-15

## 2019-02-15 NOTE — TELEPHONE ENCOUNTER
"Pt states she did not file a police report.   Pt is requesting "any kind of pain reliever that will give some relief" sent in.   Please advise, thank you.   "

## 2019-02-15 NOTE — TELEPHONE ENCOUNTER
----- Message from Ynes Cavazos sent at 2/15/2019  8:34 AM CST -----  Contact: Obdulia  Type: Needs Medical Advice    Who Called:  patient  Pharmacy name and phone #:    [id number] - [place], [place] - [address]  [address] [place] [address]  Phone: [phone] Fax: [phone]    Best Call Back Number: [phone]  Additional Information: patient states that a cleaning crew came in to her home & took her Hydrocodone--I only see Percocet on her medication list but she said that she needs her medication since they've disappeared--please advise--thank you

## 2019-03-04 ENCOUNTER — TELEPHONE (OUTPATIENT)
Dept: FAMILY MEDICINE | Facility: CLINIC | Age: 69
End: 2019-03-04

## 2019-03-04 NOTE — TELEPHONE ENCOUNTER
----- Message from Hannah Houston sent at 3/4/2019  3:57 PM CST -----  Contact: pt  ..Type: Needs Medical Advice    Who Called:  pt  Best Call Back Number:   Additional Information: Pt would like to speak with a nurse to reschedule her appt from 3-7-19 to -3-6-19 if possible   Please advise  Thanks

## 2019-03-04 NOTE — TELEPHONE ENCOUNTER
Spoke with patient, patient stated that her son passed away and they need to leave town Thursday to get to [place] for his . Patient advised I couldn't move it to Wednesday but advised patient to come for 0800 on Thursday morning. Patient verbalized understanding, also advised patient we would work her in at that time, so it is possible she could have a little wait. Patient agreeable to this. No further questions at this time.

## 2019-03-07 ENCOUNTER — OFFICE VISIT (OUTPATIENT)
Dept: FAMILY MEDICINE | Facility: CLINIC | Age: 69
End: 2019-03-07
Payer: MEDICARE

## 2019-03-07 VITALS
HEIGHT: 60 IN | WEIGHT: 109 LBS | RESPIRATION RATE: 20 BRPM | DIASTOLIC BLOOD PRESSURE: 84 MMHG | OXYGEN SATURATION: 98 % | SYSTOLIC BLOOD PRESSURE: 158 MMHG | HEART RATE: 79 BPM | BODY MASS INDEX: 21.4 KG/M2

## 2019-03-07 DIAGNOSIS — M47.12 OSTEOARTHRITIS OF CERVICAL SPINE WITH MYELOPATHY: ICD-10-CM

## 2019-03-07 PROCEDURE — 99213 PR OFFICE/OUTPT VISIT, EST, LEVL III, 20-29 MIN: ICD-10-PCS | Mod: S$PBB,,, | Performed by: FAMILY MEDICINE

## 2019-03-07 PROCEDURE — 99999 PR PBB SHADOW E&M-EST. PATIENT-LVL III: ICD-10-PCS | Mod: PBBFAC,,, | Performed by: FAMILY MEDICINE

## 2019-03-07 PROCEDURE — 99213 OFFICE O/P EST LOW 20 MIN: CPT | Mod: PBBFAC,PN | Performed by: FAMILY MEDICINE

## 2019-03-07 PROCEDURE — 99999 PR PBB SHADOW E&M-EST. PATIENT-LVL III: CPT | Mod: PBBFAC,,, | Performed by: FAMILY MEDICINE

## 2019-03-07 PROCEDURE — 99213 OFFICE O/P EST LOW 20 MIN: CPT | Mod: S$PBB,,, | Performed by: FAMILY MEDICINE

## 2019-03-07 RX ORDER — OXYCODONE AND ACETAMINOPHEN 5; 325 MG/1; MG/1
1 TABLET ORAL EVERY 6 HOURS PRN
Qty: 90 TABLET | Refills: 0 | Status: SHIPPED | OUTPATIENT
Start: 2019-03-07 | End: 2019-06-06 | Stop reason: SDUPTHER

## 2019-03-07 RX ORDER — IBUPROFEN 800 MG/1
800 TABLET ORAL 3 TIMES DAILY
Qty: 90 TABLET | Refills: 3 | Status: SHIPPED | OUTPATIENT
Start: 2019-03-07 | End: 2019-11-14

## 2019-03-07 NOTE — PROGRESS NOTES
"Subjective:       Patient ID: Obdulia Yepez is a 68 y.o. female.    Chief Complaint: Follow-up and Medication Refill    Follow up    Patient reports that her pain is well controlled, and that medication is preservingher quality of life. Patient requests refill today, and denies any change in her pain. No abuse concerns.  reviewed.        Review of Systems   Constitutional: Negative for appetite change, chills, fatigue and fever.   HENT: Negative for congestion, postnasal drip, rhinorrhea and sore throat.    Genitourinary: Negative for dysuria.   Musculoskeletal: Positive for arthralgias. Negative for myalgias.   Skin: Positive for wound. Negative for color change and rash.   Neurological: Negative for dizziness, weakness and headaches.   Psychiatric/Behavioral: Negative for sleep disturbance. The patient is not nervous/anxious.          Reviewed family, medical, surgical, and social history.    Objective:      BP (!) 158/84 (BP Location: Left arm, Patient Position: Sitting, BP Method: Small (Automatic))   Pulse 79   Resp 20   Ht 5' (1.524 m)   Wt 49.4 kg (109 lb)   SpO2 98%   BMI 21.29 kg/m²   Physical Exam   Constitutional: She is oriented to person, place, and time. She appears well-developed and well-nourished.   HENT:   Head: Normocephalic.   Eyes: Pupils are equal, round, and reactive to light.   Neck: Normal range of motion.   Cardiovascular: Normal rate, S1 normal and S2 normal.   Pulmonary/Chest: Effort normal.   Abdominal: Normal appearance.   Musculoskeletal:   Walks with walker to maintain gait. Knees "give out" at times.    Neurological: She is alert and oriented to person, place, and time.   Skin: Skin is warm and dry. Capillary refill takes less than 2 seconds.        Psychiatric: Her speech is normal. Cognition and memory are normal.   Vitals reviewed.      Assessment:       1. Osteoarthritis of cervical spine with myelopathy        Plan:       Osteoarthritis of cervical spine with " myelopathy  -     ibuprofen (ADVIL,MOTRIN) 800 MG tablet; Take 1 tablet (800 mg total) by mouth 3 (three) times daily.  Dispense: 90 tablet; Refill: 3  -     oxyCODONE-acetaminophen (PERCOCET) 5-325 mg per tablet; Take 1 tablet by mouth every 6 (six) hours as needed for Pain.  Dispense: 90 tablet; Refill: 0     reviewed and consistent  Will refill medicine as shown given that patient receives greater than 30% benefit from medicine and keeps patient functional and able to complete ADL's.  UDS reviewed  Pain contract reviewed  No abuse concerns          Risks, benefits, and side effects were discussed with the patient. All questions were answered to the fullest satisfaction of the patient, and pt verbalized understanding and agreement to treatment plan. Pt was to call with any new or worsening symptoms, or present to the ER.

## 2019-03-18 ENCOUNTER — HOSPITAL ENCOUNTER (OUTPATIENT)
Dept: RADIOLOGY | Facility: HOSPITAL | Age: 69
Discharge: HOME | End: 2019-03-18
Attending: ORTHOPAEDIC SURGERY
Payer: MEDICARE

## 2019-03-18 ENCOUNTER — OFFICE VISIT (OUTPATIENT)
Dept: ORTHOPEDICS | Facility: CLINIC | Age: 69
End: 2019-03-18
Payer: MEDICARE

## 2019-03-18 VITALS — RESPIRATION RATE: 18 BRPM | WEIGHT: 108.94 LBS | BODY MASS INDEX: 21.39 KG/M2 | HEIGHT: 60 IN

## 2019-03-18 DIAGNOSIS — Z01.818 PRE-OP EXAM: Primary | ICD-10-CM

## 2019-03-18 DIAGNOSIS — M25.511 RIGHT SHOULDER PAIN, UNSPECIFIED CHRONICITY: Primary | ICD-10-CM

## 2019-03-18 DIAGNOSIS — S42.291A OTHER CLOSED DISPLACED FRACTURE OF PROXIMAL END OF RIGHT HUMERUS, INITIAL ENCOUNTER: ICD-10-CM

## 2019-03-18 DIAGNOSIS — S42.201A TRAUMATIC CLOSED FX OF PROXIMAL HUMERUS WITH MINIMAL DISPLACEMENT, RIGHT, INITIAL ENCOUNTER: Primary | ICD-10-CM

## 2019-03-18 DIAGNOSIS — M19.011 ARTHRITIS OF RIGHT ACROMIOCLAVICULAR JOINT: ICD-10-CM

## 2019-03-18 DIAGNOSIS — M25.511 RIGHT SHOULDER PAIN, UNSPECIFIED CHRONICITY: ICD-10-CM

## 2019-03-18 DIAGNOSIS — S42.209A: ICD-10-CM

## 2019-03-18 PROCEDURE — 73030 X-RAY EXAM OF SHOULDER: CPT | Mod: TC,FY,RT

## 2019-03-18 PROCEDURE — 99214 OFFICE O/P EST MOD 30 MIN: CPT | Mod: PBBFAC,25 | Performed by: ORTHOPAEDIC SURGERY

## 2019-03-18 PROCEDURE — 73030 X-RAY EXAM OF SHOULDER: CPT | Mod: 26,RT,, | Performed by: RADIOLOGY

## 2019-03-18 PROCEDURE — 99214 OFFICE O/P EST MOD 30 MIN: CPT | Mod: 57,S$PBB,, | Performed by: ORTHOPAEDIC SURGERY

## 2019-03-18 PROCEDURE — 99999 PR PBB SHADOW E&M-EST. PATIENT-LVL IV: ICD-10-PCS | Mod: PBBFAC,,, | Performed by: ORTHOPAEDIC SURGERY

## 2019-03-18 PROCEDURE — 99999 PR PBB SHADOW E&M-EST. PATIENT-LVL IV: CPT | Mod: PBBFAC,,, | Performed by: ORTHOPAEDIC SURGERY

## 2019-03-18 PROCEDURE — 99214 PR OFFICE/OUTPT VISIT, EST, LEVL IV, 30-39 MIN: ICD-10-PCS | Mod: 57,S$PBB,, | Performed by: ORTHOPAEDIC SURGERY

## 2019-03-18 PROCEDURE — 73030 XR SHOULDER COMPLETE 2 OR MORE VIEWS RIGHT: ICD-10-PCS | Mod: 26,RT,, | Performed by: RADIOLOGY

## 2019-03-18 RX ORDER — MUPIROCIN 20 MG/G
OINTMENT TOPICAL
Status: CANCELLED | OUTPATIENT
Start: 2019-03-18

## 2019-03-18 RX ORDER — SODIUM CHLORIDE 9 MG/ML
INJECTION, SOLUTION INTRAVENOUS CONTINUOUS
Status: CANCELLED | OUTPATIENT
Start: 2019-03-18

## 2019-03-18 NOTE — H&P
Chief Complaint: Pain of the Right Shoulder    HPI:  Ms. Yepez is a 68-year-old right-hand-dominant female who returns today with complaints of 2 days of right shoulder pain which began on 2019 after she was turning a corner with her walker and fell onto her shoulder causing extreme pain. When her pain did not improve she schedule an appointment for evaluation. Motion of her shoulder increases her symptoms while laying flat and not moving it improves them. She self-treated with the sling without improvement.  She denied deltoid numbness or tingling.    Past Medical History:   Diagnosis Date    Allergy     Arthritis     Asthma     Depression     Gout     Hypertension      Past Surgical History:   Procedure Laterality Date    APPENDECTOMY       SECTION      CHOLECYSTECTOMY      HYSTERECTOMY      STOMACH SURGERY      WRIST SURGERY Left        Review of patient's allergies indicates:   Allergen Reactions    Latex     Milk containing products     Opioids - morphine analogues     Peanut     Sodium phosphate     Soy     Sulfa (sulfonamide antibiotics)        Social History     Occupational History    Retired      Tobacco Use    Smoking status: Never Smoker    Smokeless tobacco: Never Used   Substance and Sexual Activity    Alcohol use: Yes    Drug use: No    Sexual activity: Not on file      Family history:  Father:  .  Mother:  .  Son:  , MVA.    Previous Hospitalizations:  Childbirth, stomach surgery.    ROS:  No new diagnosis/surgery since last office visit on 2018.  Constitution: Negative for chills and fever.   HENT: Negative for hoarse voice and sore throat.    Eyes: Negative for blurred vision and redness.   Cardiovascular: Negative for irregular heartbeat.   Respiratory: Negative for cough and snoring.    Endocrine: Negative for polyphagia.   Hematologic/Lymphatic: Does not bruise/bleed easily.   Skin: Negative for itching  and rash.   Musculoskeletal: Positive for joint pain and joint swelling.   Gastrointestinal: Positive for diarrhea. Negative for nausea and vomiting.   Genitourinary: Negative for frequency and urgency.   Neurological: Negative for seizures and sensory change.   Psychiatric/Behavioral: Positive for depression. Negative for substance abuse.      Objective:      Physical Exam:   General: AAOx3.  No acute distress  HEENT: Normocephalic, PEARLA EOMI, poor dentition  Neck: Supple, No JVD  Chest: Symetric, equal excursion on inspiration  Abdomen: Soft NTND  Vascular:  Pulses intact and equal bilaterally.  Capillary refill less than 3 seconds and equal bilaterally  Neurologic:  Pinprick and soft touch intact and equal bilaterally  Integment:  Right upper arm ecchymosis.  Extremity:  Shoulder:  Forward flexion/abduction 15/15 degrees. Tender with motion.  Tender with palpation.  Swelling right shoulder.  Active motion the patient's elbow intact but she has pain in her shoulder with elbow motion. Mild tenderness over the AC joint.  Radiography:  Personally reviewed x-rays of the right shoulder completed on 03/18/2019 which showed a comminuted mildly displaced  surgical neck fracture of the proximal humerus, AC arthritis, and postsurgical changes consistent with a C-spine fusion.    Assessment:       Impression:    1.  Displaced surgical neck fracture right proximal humerus  2.  AC arthritis right shoulder.      Plan:       1.  Discussed physical examination and radiographic findings with the patient. Obdulia understands that she has a proximal humerus fracture and she could either treated conservatively with shoulder immobilizer or with surgery to stabilize her shoulder she stated she would prefer to proceed with surgery.  2.  Possible complications of surgery to include bleeding, infection, scarring, nerve/blood vessel/tendon damage, need for further surgery, failed surgery, failure to improve, possible persistent pain,  possible arthrofibrosis, possible malalignment, possible fracture, possible hardware failure, and possible hardware breakage were discussed with the patient. The patient was permitted to ask questions and all concerns were addressed to her satisfaction.  3.  Consent for reduction and internal fixation right proximal humerus.  4.  Tentatively schedule surgery for 03/21/2019.  5.  Ultram 50 mg, 1 p.o. Q 6 hr p.r.n. pain, dispense 30, refill 0.  6.  Keflex 500 mg, 1 p.o. q.i.d., dispense 20, refill 0.  7.  Shoulder immobilizer right upper extremity.  One was fitted to the patient.  8.  One-handed activities with the left hand only.  9.  [facility] portal was discussed with the patient and information was given.  The patient was encouraged to use the portal for future encounters.  10.  Follow up approximately 10-12 days postoperatively.

## 2019-03-18 NOTE — PROGRESS NOTES
Subjective:      Patient ID: Obdulia Yepez is a 68 y.o. female.    Chief Complaint: Pain of the Right Shoulder    Referring Provider: No referring provider defined for this encounter.    HPI:  Ms. Yepez is a 68-year-old right-hand-dominant female who returns today with complaints of 2 days of right shoulder pain which began on 2019 after she was turning a corner with her walker and fell onto her shoulder causing extreme pain. When her pain did not improve she schedule an appointment for evaluation. Motion of her shoulder increases her symptoms while laying flat and not moving it improves them. She self-treated with the sling without improvement.  She denied deltoid numbness or tingling.    Past Medical History:   Diagnosis Date    Allergy     Arthritis     Asthma     Depression     Gout     Hypertension      Past Surgical History:   Procedure Laterality Date    APPENDECTOMY       SECTION      CHOLECYSTECTOMY      HYSTERECTOMY      STOMACH SURGERY      WRIST SURGERY Left        Review of patient's allergies indicates:   Allergen Reactions    Latex     Milk containing products     Opioids - morphine analogues     Peanut     Sodium phosphate     Soy     Sulfa (sulfonamide antibiotics)        Social History     Occupational History    Retired      Tobacco Use    Smoking status: Never Smoker    Smokeless tobacco: Never Used   Substance and Sexual Activity    Alcohol use: Yes    Drug use: No    Sexual activity: Not on file      Family history:  Father:  .  Mother:  .  Son:  , MVA.    Previous Hospitalizations:  Childbirth, stomach surgery.    ROS:  No new diagnosis/surgery since last office visit on 2018.  Constitution: Negative for chills and fever.   HENT: Negative for hoarse voice and sore throat.    Eyes: Negative for blurred vision and redness.   Cardiovascular: Negative for irregular heartbeat.   Respiratory: Negative for  cough and snoring.    Endocrine: Negative for polyphagia.   Hematologic/Lymphatic: Does not bruise/bleed easily.   Skin: Negative for itching and rash.   Musculoskeletal: Positive for joint pain and joint swelling.   Gastrointestinal: Positive for diarrhea. Negative for nausea and vomiting.   Genitourinary: Negative for frequency and urgency.   Neurological: Negative for seizures and sensory change.   Psychiatric/Behavioral: Positive for depression. Negative for substance abuse.      Objective:      Physical Exam:   General: AAOx3.  No acute distress  HEENT: Normocephalic, PEARLA EOMI, poor dentition  Neck: Supple, No JVD  Chest: Symetric, equal excursion on inspiration  Abdomen: Soft NTND  Vascular:  Pulses intact and equal bilaterally.  Capillary refill less than 3 seconds and equal bilaterally  Neurologic:  Pinprick and soft touch intact and equal bilaterally  Integment:  Right upper arm ecchymosis.  Extremity:  Shoulder:  Forward flexion/abduction 15/15 degrees. Tender with motion.  Tender with palpation.  Swelling right shoulder.  Active motion the patient's elbow intact but she has pain in her shoulder with elbow motion. Mild tenderness over the AC joint.  Radiography:  Personally reviewed x-rays of the right shoulder completed on 03/18/2019 which showed a comminuted mildly displaced  surgical neck fracture of the proximal humerus, AC arthritis, and postsurgical changes consistent with a C-spine fusion.    Assessment:       Impression:    1.  Displaced surgical neck fracture right proximal humerus  2.  AC arthritis right shoulder.      Plan:       1.  Discussed physical examination and radiographic findings with the patient. Obdulia understands that she has a proximal humerus fracture and she could either treated conservatively with shoulder immobilizer or with surgery to stabilize her shoulder she stated she would prefer to proceed with surgery.  2.  Possible complications of surgery to include bleeding,  infection, scarring, nerve/blood vessel/tendon damage, need for further surgery, failed surgery, failure to improve, possible persistent pain, possible arthrofibrosis, possible malalignment, possible fracture, possible hardware failure, and possible hardware breakage were discussed with the patient. The patient was permitted to ask questions and all concerns were addressed to her satisfaction.  3.  Consent for reduction and internal fixation right proximal humerus.  4.  Tentatively schedule surgery for 03/21/2019.  5.  Ultram 50 mg, 1 p.o. Q 6 hr p.r.n. pain, dispense 30, refill 0.  6.  Keflex 500 mg, 1 p.o. q.i.d., dispense 20, refill 0.  7.  Shoulder immobilizer right upper extremity.  One was fitted to the patient.  8.  One-handed activities with the left hand only.  9.  [facility] portal was discussed with the patient and information was given.  The patient was encouraged to use the portal for future encounters.  10.  Follow up approximately 10-12 days postoperatively.

## 2019-03-20 ENCOUNTER — ANESTHESIA EVENT (OUTPATIENT)
Dept: SURGERY | Facility: HOSPITAL | Age: 69
End: 2019-03-20

## 2019-03-20 ENCOUNTER — HOSPITAL ENCOUNTER (OUTPATIENT)
Dept: PREADMISSION TESTING | Facility: HOSPITAL | Age: 69
Discharge: HOME | End: 2019-03-20
Attending: ORTHOPAEDIC SURGERY
Payer: MEDICARE

## 2019-03-20 PROCEDURE — 99900103 DSU ONLY-NO CHARGE-INITIAL HR (STAT)

## 2019-03-20 NOTE — ANESTHESIA PREPROCEDURE EVALUATION
03/20/2019  Obdulia Yepez is a 68 y.o., female.    Pre-op Assessment    I have reviewed the Patient Summary Reports.    I have reviewed the Nursing Notes.   I have reviewed the Medications.     Review of Systems  Anesthesia Hx:  Neg history of prior surgery. Denies Family Hx of Anesthesia complications.   Denies Personal Hx of Anesthesia complications.   Social:  Non-Smoker    Cardiovascular:   Hypertension    Pulmonary:   Asthma    Hepatic/GI:   GERD, well controlled    Neurological:   Neuromuscular Disease,    Endocrine:   Adrenal insuffiencey   Psych:   depression          Physical Exam  General:  Well nourished    Airway/Jaw/Neck:  AIRWAY FINDINGS: Normal      Eyes/Ears/Nose:  EYES/EARS/NOSE FINDINGS: Normal   Dental:  DENTAL FINDINGS: Normal   Chest/Lungs:  Chest/Lungs Clear    Heart/Vascular:  Heart Findings: Normal Heart murmur: negative Vascular Findings: Normal    Abdomen:  Abdomen Findings: Normal    Musculoskeletal:  Musculoskeletal Findings: Normal   Skin:  Skin Findings: Normal         Anesthesia Plan  Type of Anesthesia, risks & benefits discussed:  Anesthesia Type:  general  Patient's Preference:   Intra-op Monitoring Plan: standard ASA monitors  Intra-op Monitoring Plan Comments:   Post Op Pain Control Plan:   Post Op Pain Control Plan Comments:   Induction:   IV  Beta Blocker:  Patient is not currently on a Beta-Blocker (No further documentation required).       Informed Consent: Patient understands risks and agrees with Anesthesia plan.  Questions answered. Anesthesia consent signed with patient.  ASA Score: 3     Day of Surgery Review of History & Physical:    H&P update referred to the provider.

## 2019-03-21 ENCOUNTER — ANESTHESIA (OUTPATIENT)
Dept: SURGERY | Facility: HOSPITAL | Age: 69
End: 2019-03-21

## 2019-03-22 ENCOUNTER — OFFICE VISIT (OUTPATIENT)
Dept: FAMILY MEDICINE | Facility: CLINIC | Age: 69
End: 2019-03-22
Payer: MEDICARE

## 2019-03-22 VITALS
OXYGEN SATURATION: 95 % | SYSTOLIC BLOOD PRESSURE: 102 MMHG | BODY MASS INDEX: 21.27 KG/M2 | TEMPERATURE: 97 F | DIASTOLIC BLOOD PRESSURE: 59 MMHG | HEART RATE: 74 BPM | RESPIRATION RATE: 18 BRPM | HEIGHT: 60 IN

## 2019-03-22 DIAGNOSIS — R30.0 DYSURIA: ICD-10-CM

## 2019-03-22 DIAGNOSIS — Z01.818 PRE-OP EVALUATION: ICD-10-CM

## 2019-03-22 DIAGNOSIS — D53.9 MACROCYTIC ANEMIA: Primary | ICD-10-CM

## 2019-03-22 PROCEDURE — 99214 OFFICE O/P EST MOD 30 MIN: CPT | Mod: S$PBB,,, | Performed by: FAMILY MEDICINE

## 2019-03-22 PROCEDURE — 99999 PR PBB SHADOW E&M-EST. PATIENT-LVL V: ICD-10-PCS | Mod: PBBFAC,,, | Performed by: FAMILY MEDICINE

## 2019-03-22 PROCEDURE — 99215 OFFICE O/P EST HI 40 MIN: CPT | Mod: PBBFAC,PN | Performed by: FAMILY MEDICINE

## 2019-03-22 PROCEDURE — 99214 PR OFFICE/OUTPT VISIT, EST, LEVL IV, 30-39 MIN: ICD-10-PCS | Mod: S$PBB,,, | Performed by: FAMILY MEDICINE

## 2019-03-22 PROCEDURE — 99999 PR PBB SHADOW E&M-EST. PATIENT-LVL V: CPT | Mod: PBBFAC,,, | Performed by: FAMILY MEDICINE

## 2019-03-22 RX ORDER — METOPROLOL SUCCINATE 25 MG/1
25 TABLET, EXTENDED RELEASE ORAL DAILY
Refills: 5 | COMMUNITY
Start: 2019-03-04 | End: 2019-07-31 | Stop reason: SDUPTHER

## 2019-03-22 NOTE — PROGRESS NOTES
"Subjective:       Patient ID: Obdulia Yepez is a 68 y.o. female.    Chief Complaint: Follow-up    Follow up    S/p humeral fracture  Pain controlled with oxycodone  + fatigue, worsening since fracture    Also complains of some dysuria as well  Anemia noted      Review of Systems   Constitutional: Negative for appetite change, chills, fatigue and fever.   HENT: Negative for congestion, postnasal drip, rhinorrhea and sore throat.    Genitourinary: Negative for dysuria.   Musculoskeletal: Positive for arthralgias, gait problem, joint swelling and myalgias.   Skin: Positive for wound. Negative for color change and rash.   Neurological: Negative for dizziness, weakness and headaches.   Psychiatric/Behavioral: Negative for sleep disturbance. The patient is not nervous/anxious.          Reviewed family, medical, surgical, and social history.    Objective:      BP (!) 102/59 (BP Location: Left arm, Patient Position: Sitting, BP Method: Small (Automatic))   Pulse 74   Temp 96.8 °F (36 °C)   Resp 18   Ht 5' (1.524 m)   SpO2 95%   BMI 21.27 kg/m²   Physical Exam   Constitutional: She is oriented to person, place, and time. She appears well-developed and well-nourished.   HENT:   Head: Normocephalic.   Eyes: Pupils are equal, round, and reactive to light.   Neck: Normal range of motion.   Cardiovascular: Normal rate, S1 normal and S2 normal.   Pulmonary/Chest: Effort normal.   Abdominal: Normal appearance.   Musculoskeletal: She exhibits tenderness and deformity.   Walks with walker to maintain gait. Knees "give out" at times.    Neurological: She is alert and oriented to person, place, and time.   Skin: Skin is warm and dry. Capillary refill takes less than 2 seconds.        Psychiatric: Her speech is normal. Cognition and memory are normal.   Vitals reviewed.      Assessment:       1. Macrocytic anemia    2. Pre-op evaluation    3. Dysuria        Plan:       Macrocytic anemia  -     TYPE & SCREEN; Future; Expected date: " Bedside and Verbal shift change report given to [address] [place] [address] (oncoming nurse) by Russell Mobley (offgoing nurse). Report included the following information SBAR and Recent Results. 03/22/2019  -     Ferritin; Future; Expected date: 03/22/2019  -     Iron and TIBC; Future; Expected date: 03/22/2019  -     Vitamin B12; Future; Expected date: 03/22/2019  -     Folate; Future; Expected date: 03/22/2019  -     CBC auto differential; Future; Expected date: 03/22/2019  -     Urinalysis; Future; Expected date: 03/22/2019  -     Urine culture; Future; Expected date: 03/22/2019    Pre-op evaluation  -     Urinalysis; Future; Expected date: 03/22/2019  -     Urine culture; Future; Expected date: 03/22/2019    Dysuria  -     Urinalysis; Future; Expected date: 03/22/2019  -     Urine culture; Future; Expected date: 03/22/2019    Macrocytic anemia, initiate workup  Will arrange for transfusion early next week based on labs  Will treat uti if present        Risks, benefits, and side effects were discussed with the patient. All questions were answered to the fullest satisfaction of the patient, and pt verbalized understanding and agreement to treatment plan. Pt was to call with any new or worsening symptoms, or present to the ER.

## 2019-03-25 RX ORDER — MONTELUKAST SODIUM 10 MG/1
TABLET ORAL
Qty: 30 TABLET | Refills: 11 | Status: SHIPPED | OUTPATIENT
Start: 2019-03-25 | End: 2019-12-23

## 2019-03-25 RX ORDER — PANTOPRAZOLE SODIUM 40 MG/1
TABLET, DELAYED RELEASE ORAL
Qty: 30 TABLET | Refills: 11 | Status: SHIPPED | OUTPATIENT
Start: 2019-03-25 | End: 2019-11-11 | Stop reason: SDUPTHER

## 2019-03-27 ENCOUNTER — TELEPHONE (OUTPATIENT)
Dept: FAMILY MEDICINE | Facility: CLINIC | Age: 69
End: 2019-03-27

## 2019-03-27 DIAGNOSIS — D53.9 MACROCYTIC ANEMIA: Primary | ICD-10-CM

## 2019-03-27 RX ORDER — HYDROCODONE BITARTRATE AND ACETAMINOPHEN 500; 5 MG/1; MG/1
TABLET ORAL ONCE
Status: CANCELLED | OUTPATIENT
Start: 2019-03-27 | End: 2019-03-27

## 2019-03-27 RX ORDER — ACETAMINOPHEN 325 MG/1
650 TABLET ORAL
Status: CANCELLED | OUTPATIENT
Start: 2019-03-27

## 2019-03-29 ENCOUNTER — INFUSION (OUTPATIENT)
Dept: INFUSION THERAPY | Facility: HOSPITAL | Age: 69
End: 2019-03-29
Attending: FAMILY MEDICINE
Payer: MEDICARE

## 2019-03-29 VITALS
HEART RATE: 80 BPM | SYSTOLIC BLOOD PRESSURE: 134 MMHG | DIASTOLIC BLOOD PRESSURE: 72 MMHG | TEMPERATURE: 99 F | RESPIRATION RATE: 20 BRPM

## 2019-03-29 VITALS
OXYGEN SATURATION: 97 % | SYSTOLIC BLOOD PRESSURE: 156 MMHG | RESPIRATION RATE: 20 BRPM | DIASTOLIC BLOOD PRESSURE: 75 MMHG | HEART RATE: 71 BPM | TEMPERATURE: 99 F

## 2019-03-29 DIAGNOSIS — D64.9 ANEMIA, UNSPECIFIED TYPE: Primary | ICD-10-CM

## 2019-03-29 DIAGNOSIS — D53.9 MACROCYTIC ANEMIA: ICD-10-CM

## 2019-03-29 PROCEDURE — 25000003 PHARM REV CODE 250: Performed by: FAMILY MEDICINE

## 2019-03-29 PROCEDURE — 36430 TRANSFUSION BLD/BLD COMPNT: CPT

## 2019-03-29 RX ORDER — HYDROCODONE BITARTRATE AND ACETAMINOPHEN 500; 5 MG/1; MG/1
TABLET ORAL
Status: DISCONTINUED | OUTPATIENT
Start: 2019-03-29 | End: 2020-04-01

## 2019-03-29 RX ORDER — ACETAMINOPHEN 325 MG/1
650 TABLET ORAL
Status: ACTIVE | OUTPATIENT
Start: 2019-03-29

## 2019-03-29 RX ORDER — HYDROCODONE BITARTRATE AND ACETAMINOPHEN 500; 5 MG/1; MG/1
TABLET ORAL ONCE
Status: COMPLETED | OUTPATIENT
Start: 2019-03-29 | End: 2019-03-29

## 2019-03-29 RX ADMIN — SODIUM CHLORIDE: 9 INJECTION, SOLUTION INTRAVENOUS at 08:03

## 2019-03-29 NOTE — NURSING
1317 blood transfusion complete. Vs stable. Low grade temp 99. Gave patient post transfusion instruction sheet and explained possible delayed reactions and symptoms which require emergent medical attention. Pt verbalized understanding. piv removed 22g jelco from left wrist. Pressure dressing applied and secured with coban. Pt tolerated well.

## 2019-04-01 DIAGNOSIS — N30.00 ACUTE CYSTITIS WITHOUT HEMATURIA: Primary | ICD-10-CM

## 2019-04-01 RX ORDER — AMOXICILLIN AND CLAVULANATE POTASSIUM 875; 125 MG/1; MG/1
1 TABLET, FILM COATED ORAL 2 TIMES DAILY
Qty: 20 TABLET | Refills: 0 | Status: SHIPPED | OUTPATIENT
Start: 2019-04-01 | End: 2019-10-15

## 2019-04-04 ENCOUNTER — OFFICE VISIT (OUTPATIENT)
Dept: FAMILY MEDICINE | Facility: CLINIC | Age: 69
End: 2019-04-04
Payer: MEDICARE

## 2019-04-04 VITALS
OXYGEN SATURATION: 99 % | HEIGHT: 60 IN | WEIGHT: 105.81 LBS | RESPIRATION RATE: 20 BRPM | SYSTOLIC BLOOD PRESSURE: 144 MMHG | BODY MASS INDEX: 20.77 KG/M2 | DIASTOLIC BLOOD PRESSURE: 83 MMHG | HEART RATE: 75 BPM

## 2019-04-04 DIAGNOSIS — D53.9 MACROCYTIC ANEMIA: ICD-10-CM

## 2019-04-04 DIAGNOSIS — N39.0 RECURRENT UTI: Primary | ICD-10-CM

## 2019-04-04 PROCEDURE — 99213 OFFICE O/P EST LOW 20 MIN: CPT | Mod: S$PBB,,, | Performed by: FAMILY MEDICINE

## 2019-04-04 PROCEDURE — 99213 PR OFFICE/OUTPT VISIT, EST, LEVL III, 20-29 MIN: ICD-10-PCS | Mod: S$PBB,,, | Performed by: FAMILY MEDICINE

## 2019-04-04 PROCEDURE — 99213 OFFICE O/P EST LOW 20 MIN: CPT | Mod: PBBFAC,PN | Performed by: FAMILY MEDICINE

## 2019-04-04 PROCEDURE — 99999 PR PBB SHADOW E&M-EST. PATIENT-LVL III: CPT | Mod: PBBFAC,,, | Performed by: FAMILY MEDICINE

## 2019-04-04 PROCEDURE — 99999 PR PBB SHADOW E&M-EST. PATIENT-LVL III: ICD-10-PCS | Mod: PBBFAC,,, | Performed by: FAMILY MEDICINE

## 2019-04-04 NOTE — PROGRESS NOTES
"Subjective:       Patient ID: Obdulia Yepez is a 68 y.o. female.    Chief Complaint: Follow-up    Follow up    Feeling better after transfusion, energy level improved  On abx for uti, compliant, no fever, mental status improved    Review of Systems   Constitutional: Negative for appetite change, chills, fatigue and fever.   HENT: Negative for congestion, postnasal drip, rhinorrhea and sore throat.    Genitourinary: Negative for dysuria.   Musculoskeletal: Positive for arthralgias, gait problem, joint swelling and myalgias.   Skin: Positive for wound. Negative for color change and rash.   Neurological: Negative for dizziness, weakness and headaches.   Psychiatric/Behavioral: Negative for sleep disturbance. The patient is not nervous/anxious.          Reviewed family, medical, surgical, and social history.    Objective:      BP (!) 144/83 (BP Location: Left arm, Patient Position: Sitting, BP Method: Small (Automatic))   Pulse 75   Resp 20   Ht 5' (1.524 m)   Wt 48 kg (105 lb 12.8 oz)   SpO2 99%   BMI 20.66 kg/m²   Physical Exam   Constitutional: She is oriented to person, place, and time. She appears well-developed and well-nourished.   HENT:   Head: Normocephalic.   Eyes: Pupils are equal, round, and reactive to light.   Neck: Normal range of motion.   Cardiovascular: Normal rate, S1 normal and S2 normal.   Pulmonary/Chest: Effort normal.   Abdominal: Normal appearance.   Musculoskeletal: She exhibits tenderness and deformity.   Walks with walker to maintain gait. Knees "give out" at times.    Neurological: She is alert and oriented to person, place, and time.   Skin: Skin is warm and dry. Capillary refill takes less than 2 seconds.        Psychiatric: Her speech is normal. Cognition and memory are normal.   Vitals reviewed.      Assessment:       1. Recurrent UTI    2. Macrocytic anemia        Plan:       Recurrent UTI    Macrocytic anemia    Much improved  Has appt tomorrow to discuss surgery with  " Letonoff        Risks, benefits, and side effects were discussed with the patient. All questions were answered to the fullest satisfaction of the patient, and pt verbalized understanding and agreement to treatment plan. Pt was to call with any new or worsening symptoms, or present to the ER.

## 2019-04-05 ENCOUNTER — OFFICE VISIT (OUTPATIENT)
Dept: ORTHOPEDICS | Facility: CLINIC | Age: 69
End: 2019-04-05
Payer: MEDICARE

## 2019-04-05 ENCOUNTER — HOSPITAL ENCOUNTER (OUTPATIENT)
Dept: RADIOLOGY | Facility: HOSPITAL | Age: 69
Discharge: HOME | End: 2019-04-05
Attending: ORTHOPAEDIC SURGERY | Admitting: ORTHOPAEDIC SURGERY
Payer: MEDICARE

## 2019-04-05 VITALS
DIASTOLIC BLOOD PRESSURE: 60 MMHG | SYSTOLIC BLOOD PRESSURE: 93 MMHG | BODY MASS INDEX: 20.77 KG/M2 | HEART RATE: 116 BPM | HEIGHT: 60 IN | WEIGHT: 105.81 LBS

## 2019-04-05 DIAGNOSIS — S42.201D CLOSED FRACTURE OF PROXIMAL END OF RIGHT HUMERUS WITH ROUTINE HEALING, SUBSEQUENT ENCOUNTER: Primary | ICD-10-CM

## 2019-04-05 DIAGNOSIS — M25.511 RIGHT SHOULDER PAIN, UNSPECIFIED CHRONICITY: ICD-10-CM

## 2019-04-05 DIAGNOSIS — M25.511 RIGHT SHOULDER PAIN, UNSPECIFIED CHRONICITY: Primary | ICD-10-CM

## 2019-04-05 PROCEDURE — 73030 XR SHOULDER COMPLETE 2 OR MORE VIEWS RIGHT: ICD-10-PCS | Mod: 26,RT,, | Performed by: RADIOLOGY

## 2019-04-05 PROCEDURE — 99999 PR PBB SHADOW E&M-EST. PATIENT-LVL III: ICD-10-PCS | Mod: PBBFAC,,, | Performed by: ORTHOPAEDIC SURGERY

## 2019-04-05 PROCEDURE — 99999 PR PBB SHADOW E&M-EST. PATIENT-LVL III: CPT | Mod: PBBFAC,,, | Performed by: ORTHOPAEDIC SURGERY

## 2019-04-05 PROCEDURE — 99024 PR POST-OP FOLLOW-UP VISIT: ICD-10-PCS | Mod: POP,,, | Performed by: ORTHOPAEDIC SURGERY

## 2019-04-05 PROCEDURE — 99024 POSTOP FOLLOW-UP VISIT: CPT | Mod: POP,,, | Performed by: ORTHOPAEDIC SURGERY

## 2019-04-05 PROCEDURE — 73030 X-RAY EXAM OF SHOULDER: CPT | Mod: 26,RT,, | Performed by: RADIOLOGY

## 2019-04-05 PROCEDURE — 99213 OFFICE O/P EST LOW 20 MIN: CPT | Mod: PBBFAC,25,PN | Performed by: ORTHOPAEDIC SURGERY

## 2019-04-05 PROCEDURE — 73030 X-RAY EXAM OF SHOULDER: CPT | Mod: TC,PN,RT

## 2019-04-05 RX ORDER — HYDROCORTISONE 10 MG/1
TABLET ORAL
Refills: 0 | COMMUNITY
Start: 2019-03-31

## 2019-04-07 NOTE — PROGRESS NOTES
Subjective:      Patient ID: Obdulia Yepez is a 68 y.o. female.    Chief Complaint: Post-op Evaluation and Injury of the Right Shoulder      HPI:  Ms. Rucker returns today for follow-up on a surgical neck fracture of her right humerus.  At her last visit on 03/18/2019 she was scheduled for surgery on 03/21/2019 for ORIF of a right shoulder fracture. Her shoulder surgery was postponed by anesthesia due to anemia.  She originally injured her right shoulder on 03/16/2019 after she was turning a corner with her walker and fell onto her shoulder causing extreme pain. Although her pain has improved she is still having significant pain. She has been wearing a shoulder immobilizer.    ROS:  New diagnosis/surgery/prescriptions since last office visit on 03/18/2019:  Anemia      Objective:      Physical Exam:   General: AAOx3.  No acute distress  Vascular:  Pulses intact and equal bilaterally.  Capillary refill less than 3 seconds and equal bilaterally  Neurologic:  Pinprick and soft touch intact and equal bilaterally  Integment:  No ecchymosis, no errythema  Extremity: Shoulder:  Forward flexion/abduction 15/15 degrees. Tender with motion.  Tender with palpation.  Swelling right shoulder.  Active motion the patient's elbow intact but she has pain in her shoulder with elbow motion. Mild tenderness over the AC joint.  Radiography:  Personally reviewed x-rays of her right shoulder completed on 04/05/2019 which showed displacement of a previously nondisplaced surgical neck fracture and copious callus within the soft tissues.      Assessment:       Impression:  Displaced surgical neck fracture, right proximal humerus.      Plan:       1.  Discussed physical examination and radiographic findings with the patient. Obdulia understands that she has displaced her previously nondisplaced fracture and now has extensive callus forming.  At this point her fracture exceeds the capabilities of this medical facility and she will need to be  referred to an orthopedic adult reconstructive surgeon to address her shoulder.  2.  Referred to adult reconstruction Dr. Myers to either ORIF or place a hemiarthroplasty in this patient has right shoulder.  3.  Offered pain med she declined.  4.  Continue with shoulder immobilizer.  5.  Continue with one-handed activities with the left hand only.  6.  [facility] portal was discussed with the patient and information was given. Patient encouraged to use the portal for future encounters.  7.  Follow up after shoulder fracture has been addressed.

## 2019-04-08 ENCOUNTER — TELEPHONE (OUTPATIENT)
Dept: ORTHOPEDICS | Facility: CLINIC | Age: 69
End: 2019-04-08

## 2019-04-08 NOTE — TELEPHONE ENCOUNTER
Obtained verbal verification from Kayley at Dr. Myers's office that referral fax was received and that she will call office to schedule an appointment with Dr. Myers.

## 2019-05-13 ENCOUNTER — OFFICE VISIT (OUTPATIENT)
Dept: FAMILY MEDICINE | Facility: CLINIC | Age: 69
End: 2019-05-13
Payer: MEDICARE

## 2019-05-13 ENCOUNTER — LAB VISIT (OUTPATIENT)
Dept: LAB | Facility: HOSPITAL | Age: 69
End: 2019-05-13
Attending: FAMILY MEDICINE
Payer: MEDICARE

## 2019-05-13 VITALS
DIASTOLIC BLOOD PRESSURE: 80 MMHG | OXYGEN SATURATION: 97 % | BODY MASS INDEX: 19.73 KG/M2 | WEIGHT: 100.5 LBS | HEIGHT: 60 IN | SYSTOLIC BLOOD PRESSURE: 158 MMHG | HEART RATE: 120 BPM | RESPIRATION RATE: 16 BRPM

## 2019-05-13 DIAGNOSIS — I10 ESSENTIAL HYPERTENSION: ICD-10-CM

## 2019-05-13 DIAGNOSIS — M47.812 SPONDYLOSIS OF CERVICAL REGION WITHOUT MYELOPATHY OR RADICULOPATHY: Primary | ICD-10-CM

## 2019-05-13 DIAGNOSIS — Z02.83 ENCOUNTER FOR DRUG SCREENING: ICD-10-CM

## 2019-05-13 LAB
ALBUMIN SERPL BCP-MCNC: 4.1 G/DL (ref 3.5–5.2)
ALP SERPL-CCNC: 75 U/L (ref 55–135)
ALT SERPL W/O P-5'-P-CCNC: 20 U/L (ref 10–44)
ANION GAP SERPL CALC-SCNC: 12 MMOL/L (ref 8–16)
AST SERPL-CCNC: 42 U/L (ref 10–40)
BASOPHILS # BLD AUTO: 0.02 K/UL (ref 0–0.2)
BASOPHILS NFR BLD: 0.5 % (ref 0–1.9)
BILIRUB SERPL-MCNC: 0.7 MG/DL (ref 0.1–1)
BUN SERPL-MCNC: 13 MG/DL (ref 8–23)
CALCIUM SERPL-MCNC: 9.5 MG/DL (ref 8.7–10.5)
CHLORIDE SERPL-SCNC: 95 MMOL/L (ref 95–110)
CO2 SERPL-SCNC: 23 MMOL/L (ref 23–29)
CREAT SERPL-MCNC: 0.8 MG/DL (ref 0.5–1.4)
DIFFERENTIAL METHOD: ABNORMAL
EOSINOPHIL # BLD AUTO: 0.1 K/UL (ref 0–0.5)
EOSINOPHIL NFR BLD: 1.7 % (ref 0–8)
ERYTHROCYTE [DISTWIDTH] IN BLOOD BY AUTOMATED COUNT: 17.3 % (ref 11.5–14.5)
EST. GFR  (AFRICAN AMERICAN): >60 ML/MIN/1.73 M^2
EST. GFR  (NON AFRICAN AMERICAN): >60 ML/MIN/1.73 M^2
GLUCOSE SERPL-MCNC: 97 MG/DL (ref 70–110)
HCT VFR BLD AUTO: 33.6 % (ref 37–48.5)
HGB BLD-MCNC: 11.3 G/DL (ref 12–16)
IMM GRANULOCYTES # BLD AUTO: 0.01 K/UL (ref 0–0.04)
IMM GRANULOCYTES NFR BLD AUTO: 0.2 % (ref 0–0.5)
LYMPHOCYTES # BLD AUTO: 0.5 K/UL (ref 1–4.8)
LYMPHOCYTES NFR BLD: 11.4 % (ref 18–48)
MCH RBC QN AUTO: 32.4 PG (ref 27–31)
MCHC RBC AUTO-ENTMCNC: 33.6 G/DL (ref 32–36)
MCV RBC AUTO: 96 FL (ref 82–98)
MONOCYTES # BLD AUTO: 0.2 K/UL (ref 0.3–1)
MONOCYTES NFR BLD: 5.1 % (ref 4–15)
NEUTROPHILS # BLD AUTO: 3.4 K/UL (ref 1.8–7.7)
NEUTROPHILS NFR BLD: 81.1 % (ref 38–73)
NRBC BLD-RTO: 0 /100 WBC
PLATELET # BLD AUTO: 204 K/UL (ref 150–350)
PMV BLD AUTO: 9.6 FL (ref 9.2–12.9)
POTASSIUM SERPL-SCNC: 4 MMOL/L (ref 3.5–5.1)
PROT SERPL-MCNC: 7 G/DL (ref 6–8.4)
RBC # BLD AUTO: 3.49 M/UL (ref 4–5.4)
SODIUM SERPL-SCNC: 130 MMOL/L (ref 136–145)
WBC # BLD AUTO: 4.14 K/UL (ref 3.9–12.7)

## 2019-05-13 PROCEDURE — 99214 PR OFFICE/OUTPT VISIT, EST, LEVL IV, 30-39 MIN: ICD-10-PCS | Mod: S$PBB,,, | Performed by: FAMILY MEDICINE

## 2019-05-13 PROCEDURE — 80307 DRUG TEST PRSMV CHEM ANLYZR: CPT

## 2019-05-13 PROCEDURE — 99213 OFFICE O/P EST LOW 20 MIN: CPT | Mod: PBBFAC,PN | Performed by: FAMILY MEDICINE

## 2019-05-13 PROCEDURE — 99214 OFFICE O/P EST MOD 30 MIN: CPT | Mod: S$PBB,,, | Performed by: FAMILY MEDICINE

## 2019-05-13 PROCEDURE — 99999 PR PBB SHADOW E&M-EST. PATIENT-LVL III: CPT | Mod: PBBFAC,,, | Performed by: FAMILY MEDICINE

## 2019-05-13 PROCEDURE — 36415 COLL VENOUS BLD VENIPUNCTURE: CPT

## 2019-05-13 PROCEDURE — 85025 COMPLETE CBC W/AUTO DIFF WBC: CPT

## 2019-05-13 PROCEDURE — 99999 PR PBB SHADOW E&M-EST. PATIENT-LVL III: ICD-10-PCS | Mod: PBBFAC,,, | Performed by: FAMILY MEDICINE

## 2019-05-13 PROCEDURE — 80053 COMPREHEN METABOLIC PANEL: CPT

## 2019-05-13 NOTE — PROGRESS NOTES
"Subjective:       Patient ID: Obdulia Yepez is a 69 y.o. female.    Chief Complaint: Follow-up    Follow up    Patient reports that her pain is well controlled, and that medication is preservingher quality of life. Patient requests refill today, and denies any change in her pain. No abuse concerns.  reviewed.      Review of Systems   Constitutional: Negative for appetite change, chills, fatigue and fever.   HENT: Negative for congestion, postnasal drip, rhinorrhea and sore throat.    Genitourinary: Negative for dysuria.   Musculoskeletal: Positive for arthralgias, gait problem, joint swelling and myalgias.   Skin: Positive for wound. Negative for color change and rash.   Neurological: Negative for dizziness, weakness and headaches.   Psychiatric/Behavioral: Negative for sleep disturbance. The patient is not nervous/anxious.          Reviewed family, medical, surgical, and social history.    Objective:      BP (!) 158/80 (BP Location: Left arm, Patient Position: Sitting, BP Method: Small (Manual))   Pulse (!) 120   Resp 16   Ht 5' (1.524 m)   Wt 45.6 kg (100 lb 8 oz)   SpO2 97%   BMI 19.63 kg/m²   Physical Exam   Constitutional: She is oriented to person, place, and time. She appears well-developed and well-nourished.   HENT:   Head: Normocephalic.   Eyes: Pupils are equal, round, and reactive to light.   Neck: Normal range of motion.   Cardiovascular: Normal rate, S1 normal and S2 normal.   Pulmonary/Chest: Effort normal.   Abdominal: Normal appearance.   Musculoskeletal: She exhibits tenderness and deformity.   Walks with walker to maintain gait. Knees "give out" at times.    Neurological: She is alert and oriented to person, place, and time.   Skin: Skin is warm and dry. Capillary refill takes less than 2 seconds.        Psychiatric: Her speech is normal. Cognition and memory are normal.   Vitals reviewed.      Assessment:       1. Spondylosis of cervical region without myelopathy or radiculopathy    2. " Encounter for drug screening    3. Essential hypertension        Plan:       Spondylosis of cervical region without myelopathy or radiculopathy    Encounter for drug screening  -     [facility] Drug Screen    Essential hypertension  -     CBC auto differential; Future; Expected date: 05/13/2019  -     Comprehensive metabolic panel; Future; Expected date: 05/13/2019     reviewed and consistent  Will refill medicine as shown given that patient receives greater than 30% benefit from medicine and keeps patient functional and able to complete ADL's.  UDS reviewed  Pain contract reviewed  No abuse concerns          Risks, benefits, and side effects were discussed with the patient. All questions were answered to the fullest satisfaction of the patient, and pt verbalized understanding and agreement to treatment plan. Pt was to call with any new or worsening symptoms, or present to the ER.

## 2019-05-14 ENCOUNTER — TELEPHONE (OUTPATIENT)
Dept: FAMILY MEDICINE | Facility: CLINIC | Age: 69
End: 2019-05-14

## 2019-05-14 NOTE — TELEPHONE ENCOUNTER
----- Message from Og Perez MD sent at 5/14/2019  7:31 AM CDT -----  Sodium was fine at 130, blood count is the best it has been!

## 2019-05-15 ENCOUNTER — TELEPHONE (OUTPATIENT)
Dept: ORTHOPEDICS | Facility: CLINIC | Age: 69
End: 2019-05-15

## 2019-05-15 DIAGNOSIS — M17.0 OSTEOARTHRITIS OF BOTH KNEES, UNSPECIFIED OSTEOARTHRITIS TYPE: Primary | ICD-10-CM

## 2019-05-15 NOTE — TELEPHONE ENCOUNTER
"----- Message from Jalil Flowers sent at 5/15/2019 12:39 PM CDT -----  Contact: Patient  Type: Needs Medical Advice    Patient wants to speak to staff regarding ordering a "rooster's comb"  Best Call Back Number: [phone]    "

## 2019-05-16 DIAGNOSIS — M25.511 RIGHT SHOULDER PAIN, UNSPECIFIED CHRONICITY: Primary | ICD-10-CM

## 2019-05-16 DIAGNOSIS — M17.0 PRIMARY OSTEOARTHRITIS OF BOTH KNEES: Primary | ICD-10-CM

## 2019-05-16 LAB

## 2019-05-29 ENCOUNTER — OFFICE VISIT (OUTPATIENT)
Dept: ORTHOPEDICS | Facility: CLINIC | Age: 69
End: 2019-05-29
Payer: MEDICARE

## 2019-05-29 ENCOUNTER — HOSPITAL ENCOUNTER (OUTPATIENT)
Dept: RADIOLOGY | Facility: HOSPITAL | Age: 69
Discharge: HOME | End: 2019-05-29
Attending: ORTHOPAEDIC SURGERY
Payer: MEDICARE

## 2019-05-29 VITALS
WEIGHT: 100.5 LBS | HEIGHT: 60 IN | BODY MASS INDEX: 19.73 KG/M2 | WEIGHT: 100.5 LBS | RESPIRATION RATE: 18 BRPM | BODY MASS INDEX: 19.73 KG/M2 | HEIGHT: 60 IN

## 2019-05-29 DIAGNOSIS — M25.511 RIGHT SHOULDER PAIN, UNSPECIFIED CHRONICITY: ICD-10-CM

## 2019-05-29 DIAGNOSIS — M71.20 BAKER CYST, UNSPECIFIED LATERALITY: ICD-10-CM

## 2019-05-29 DIAGNOSIS — M17.0 PRIMARY OSTEOARTHRITIS OF BOTH KNEES: Primary | ICD-10-CM

## 2019-05-29 DIAGNOSIS — Z96.611 PRESENCE OF ARTIFICIAL SHOULDER JOINT, RIGHT: ICD-10-CM

## 2019-05-29 DIAGNOSIS — Z96.611 STATUS POST TOTAL SHOULDER ARTHROPLASTY, RIGHT: ICD-10-CM

## 2019-05-29 DIAGNOSIS — M17.0 BILATERAL PRIMARY OSTEOARTHRITIS OF KNEE: Primary | ICD-10-CM

## 2019-05-29 PROCEDURE — 73030 X-RAY EXAM OF SHOULDER: CPT | Mod: 26,RT,, | Performed by: RADIOLOGY

## 2019-05-29 PROCEDURE — 99999 PR PBB SHADOW E&M-EST. PATIENT-LVL III: CPT | Mod: PBBFAC,,, | Performed by: ORTHOPAEDIC SURGERY

## 2019-05-29 PROCEDURE — 99214 OFFICE O/P EST MOD 30 MIN: CPT | Mod: 25,S$PBB,, | Performed by: ORTHOPAEDIC SURGERY

## 2019-05-29 PROCEDURE — 20610 DRAIN/INJ JOINT/BURSA W/O US: CPT | Mod: 50,PBBFAC,PN | Performed by: ORTHOPAEDIC SURGERY

## 2019-05-29 PROCEDURE — 73030 X-RAY EXAM OF SHOULDER: CPT | Mod: TC,PN,RT

## 2019-05-29 PROCEDURE — 99213 OFFICE O/P EST LOW 20 MIN: CPT | Mod: PBBFAC,25,PN | Performed by: ORTHOPAEDIC SURGERY

## 2019-05-29 PROCEDURE — 20610 DRAIN/INJ JOINT/BURSA W/O US: CPT | Mod: PBBFAC,PN | Performed by: ORTHOPAEDIC SURGERY

## 2019-05-29 PROCEDURE — 73030 XR SHOULDER COMPLETE 2 OR MORE VIEWS RIGHT: ICD-10-PCS | Mod: 26,RT,, | Performed by: RADIOLOGY

## 2019-05-29 PROCEDURE — 99212 OFFICE O/P EST SF 10 MIN: CPT | Mod: PBBFAC,25,27,PN | Performed by: ORTHOPAEDIC SURGERY

## 2019-05-29 PROCEDURE — 99214 PR OFFICE/OUTPT VISIT, EST, LEVL IV, 30-39 MIN: ICD-10-PCS | Mod: 25,S$PBB,, | Performed by: ORTHOPAEDIC SURGERY

## 2019-05-29 PROCEDURE — 20610 LARGE JOINT ASPIRATION/INJECTION: R KNEE, L KNEE: ICD-10-PCS | Mod: 50,S$PBB,, | Performed by: ORTHOPAEDIC SURGERY

## 2019-05-29 PROCEDURE — 99499 UNLISTED E&M SERVICE: CPT | Mod: S$PBB,,, | Performed by: ORTHOPAEDIC SURGERY

## 2019-05-29 PROCEDURE — 99999 PR PBB SHADOW E&M-EST. PATIENT-LVL II: CPT | Mod: PBBFAC,,, | Performed by: ORTHOPAEDIC SURGERY

## 2019-05-29 PROCEDURE — 99999 PR PBB SHADOW E&M-EST. PATIENT-LVL II: ICD-10-PCS | Mod: PBBFAC,,, | Performed by: ORTHOPAEDIC SURGERY

## 2019-05-29 PROCEDURE — 99999 PR PBB SHADOW E&M-EST. PATIENT-LVL III: ICD-10-PCS | Mod: PBBFAC,,, | Performed by: ORTHOPAEDIC SURGERY

## 2019-05-29 PROCEDURE — 99499 NO LOS: ICD-10-PCS | Mod: S$PBB,,, | Performed by: ORTHOPAEDIC SURGERY

## 2019-05-29 RX ORDER — TRIAMCINOLONE ACETONIDE 40 MG/ML
80 INJECTION, SUSPENSION INTRA-ARTICULAR; INTRAMUSCULAR
Status: SHIPPED | OUTPATIENT
Start: 2019-05-29

## 2019-05-29 RX ADMIN — Medication 48 MG: at 04:05

## 2019-05-29 RX ADMIN — TRIAMCINOLONE ACETONIDE 80 MG: 40 INJECTION, SUSPENSION INTRA-ARTICULAR; INTRAMUSCULAR at 04:05

## 2019-05-29 NOTE — PROCEDURES
Large Joint Aspiration/Injection: R knee, L knee  Date/Time: 5/29/2019 4:03 PM  Performed by: Esteban Remy DO  Authorized by: Esteban Remy, DO     Consent Done?:  Yes (Verbal)  Indications:  Pain, joint swelling and diagnostic evaluation  Procedure site marked: Yes    Timeout: Prior to procedure the correct patient, procedure, and site was verified      Location:  Knee  Site:  R knee and L knee  Prep: Patient was prepped and draped in usual sterile fashion    Ultrasonic Guidance for needle placement: No  Needle size:  22 G  Approach:  Anterolateral  Medications:  80 mg triamcinolone acetonide 40 mg/mL; 48 mg hylan g-f 20 48 mg/6 mL; 48 mg hylan g-f 20 48 mg/6 mL  Patient tolerance:  Patient tolerated the procedure well with no immediate complications

## 2019-05-29 NOTE — PROGRESS NOTES
Subjective:      Patient ID: Obdluia Yepez is a 69 y.o. female.    Chief Complaint: Injections (bilateral knees)      HPI: Ms. Yepez returned today with new complaints of bilateral knee pain which have gone on for approximately 5 years.  She stated her pain exacerbated after the moved to the [place] area. Over the last 6 months her pain has been greatly increasing.  She states she gets intermittent swelling but denies giving way or locking.  Has not taken NSAIDs.  She is currently doing physical therapy for her shoulder and her knee is helping.  She had an injection in the remote past which helped.  She does not wear braces on her knees.  At her last visit on 04/05/2019 she was forwarded for treatment of a displaced chronic surgical neck fracture of her humerus.  On 04/16/2019 she had a reverse total shoulder arthroplasty completed by another orthopedic surgeon.  She stated that her shoulder feels good and she is doing home physical therapy on it. She is gaining her motion.    ROS:  New diagnosis/surgery/prescriptions since last office visit on 04/05/2019:  Reverse right total shoulder arthroplasty.  Constitution: Negative for chills and fever.   HENT: Negative for hoarse voice and sore throat.    Eyes: Negative for blurred vision and redness.   Cardiovascular: Negative for irregular heartbeat.   Respiratory: Negative for cough and snoring.    Endocrine: Negative for polyphagia.   Hematologic/Lymphatic: Does not bruise/bleed easily.   Skin: Negative for itching and rash.   Musculoskeletal: Positive for joint pain and joint swelling.   Gastrointestinal: Positive for diarrhea. Negative for nausea and vomiting.   Genitourinary: Negative for frequency and urgency.   Neurological: Negative for seizures and sensory change.   Psychiatric/Behavioral: Positive for depression. Negative for substance abuse.       Objective:      Physical Exam:   General: AAOx3.  No acute distress  Vascular:  Pulses intact and equal  bilaterally.  Capillary refill less than 3 seconds and equal bilaterally  Neurologic:  Pinprick and soft touch intact and equal bilaterally  Integment:  Right shoulder incision well approximated and healing well.  Extremity:  Knee:  Extension/flexion both knees equal 0/118 degrees. Mild varus alignment both knees.  Baker cyst both knees.  Crepitus with motion both knees.  Mild effusion both knees.  Negative patellar load/compression bilaterally. Negative patella apprehension/relocation bilaterally.  Clarke negative both knees.  Varus/valgus stressing equal bilaterally with endpoint.  Lachman's/drawer equal bilaterally with endpoint.  Positive joint line tenderness both knees.  Enrrique mildly positive both knees.  Nontender at the anserine insertion both knees.  No swelling at the anserine insertion both knees.                      Shoulder:  Forward flexion/abduction right shoulder 0/100 degrees. Moderate scapular incorporation with right shoulder motion.  Nontender with shoulder motion.  Nontender with shoulder palpation.  No shoulder swelling  Radiography:  Personally reviewed x-rays of the right shoulder completed on 05/29/2019 showed a well-seated well-aligned reverse total shoulder arthroplasty without signs of loosening.        Assessment:       Impression:   1.  Symptomatic tricompartmental arthritis, both knees.  2.  Baker cyst both knees.  3.  Right total shoulder arthroplasty.      Plan:       1.  Discussed physical examination and radiographic findings with the patient. Obdulia understands that she has a reverse total shoulder which was the treatment for her displaced surgical neck humerus fracture. She understands she appears to be doing well.  She also understands she has symptomatic tricompartmental arthritis which she can be treated conservatively.  And if she fails conservative management she could consider surgical intervention on her knees.  2.  Offered Synvisc One injections to both knees, she  elected to proceed.  3.  Refer to physical therapy to start a total shoulder rehab program.  4.  Home exercises to include Codman exercises and motion exercises were shown discussed with the patient.  5.  Any pain can be treated with over-the-counter medications dosed per box instructions.  6.  [facility] portal was discussed with the patient and information was given.  The patient was encouraged to use the portal for future encounters.  7.  Follow up in 1 month with an x-ray of the right shoulder and standing AP x-ray of both knees.

## 2019-05-31 ENCOUNTER — TELEPHONE (OUTPATIENT)
Dept: FAMILY MEDICINE | Facility: CLINIC | Age: 69
End: 2019-05-31

## 2019-05-31 NOTE — TELEPHONE ENCOUNTER
I have spoken with pt and advised ER per Dr. Perez.  Pt stated that she has too much to do today and may go sometime this weekend. Pt was encouraged to go to ER and stated that she may.  Tennille

## 2019-05-31 NOTE — TELEPHONE ENCOUNTER
----- Message from Mona Walker sent at 5/31/2019  8:16 AM CDT -----  Contact: Self  Type:  Same Day Appointment Request    Caller is requesting a same day appointment.  Caller declined first available appointment listed below.      Name of Caller:  patient  When is the first available appointment?  6/10  Symptoms:  Pain in right side and is getting worse everyday  Best Call Back Number:  [phone] (home)   Additional Information:   She tried to wait but the pain is getting worse and she is running low grade temp on and off.

## 2019-05-31 NOTE — TELEPHONE ENCOUNTER
I returned patient's call.  Pt stated that the pain began last week and continues to intensify. It is right-sided below the rib cage.  Pt is running a low grade fever intermittently, 101.9 on Wednesday.  Pain is 6/10 with a pain pill or 8-9/10 without a pain pill.  Pt is taking ibuprofen.  Pt has had pancreatitis approximately 12 years ago.  Please advise and thank you, Tennille

## 2019-06-06 DIAGNOSIS — M47.12 OSTEOARTHRITIS OF CERVICAL SPINE WITH MYELOPATHY: ICD-10-CM

## 2019-06-06 RX ORDER — OXYCODONE AND ACETAMINOPHEN 5; 325 MG/1; MG/1
1 TABLET ORAL EVERY 6 HOURS PRN
Qty: 90 TABLET | Refills: 0 | Status: SHIPPED | OUTPATIENT
Start: 2019-06-06 | End: 2019-07-08

## 2019-06-06 RX ORDER — AMOXICILLIN AND CLAVULANATE POTASSIUM 875; 125 MG/1; MG/1
1 TABLET, FILM COATED ORAL 2 TIMES DAILY
Qty: 20 TABLET | Refills: 0 | Status: SHIPPED | OUTPATIENT
Start: 2019-06-06 | End: 2019-06-16

## 2019-06-06 NOTE — TELEPHONE ENCOUNTER
----- Message from Day Hoang sent at 6/6/2019  2:03 PM CDT -----  Type:  RX Request    Who Called: Patient  RX Name and Strength:  oxyCODONE-acetaminophen (PERCOCET) 5-325 mg per tablet  Preferred Pharmacy with phone number:  [facility] in [facility] Call Back Number:  [phone]  Additional Information:  Patient also would like medication ordered for Urinary Tract Infection/please call back to advise.

## 2019-06-10 ENCOUNTER — LAB VISIT (OUTPATIENT)
Dept: LAB | Facility: HOSPITAL | Age: 69
End: 2019-06-10
Attending: FAMILY MEDICINE
Payer: MEDICARE

## 2019-06-10 ENCOUNTER — OFFICE VISIT (OUTPATIENT)
Dept: FAMILY MEDICINE | Facility: CLINIC | Age: 69
End: 2019-06-10
Payer: MEDICARE

## 2019-06-10 VITALS
HEIGHT: 60 IN | WEIGHT: 108 LBS | RESPIRATION RATE: 20 BRPM | DIASTOLIC BLOOD PRESSURE: 47 MMHG | BODY MASS INDEX: 21.2 KG/M2 | SYSTOLIC BLOOD PRESSURE: 86 MMHG | OXYGEN SATURATION: 98 % | HEART RATE: 76 BPM

## 2019-06-10 DIAGNOSIS — D53.9 MACROCYTIC ANEMIA: ICD-10-CM

## 2019-06-10 DIAGNOSIS — S42.224S: ICD-10-CM

## 2019-06-10 DIAGNOSIS — M47.812 SPONDYLOSIS OF CERVICAL REGION WITHOUT MYELOPATHY OR RADICULOPATHY: ICD-10-CM

## 2019-06-10 DIAGNOSIS — M47.812 SPONDYLOSIS OF CERVICAL REGION WITHOUT MYELOPATHY OR RADICULOPATHY: Primary | ICD-10-CM

## 2019-06-10 DIAGNOSIS — N30.00 ACUTE CYSTITIS WITHOUT HEMATURIA: ICD-10-CM

## 2019-06-10 LAB
ALBUMIN SERPL BCP-MCNC: 3.7 G/DL (ref 3.5–5.2)
ALP SERPL-CCNC: 81 U/L (ref 55–135)
ALT SERPL W/O P-5'-P-CCNC: 33 U/L (ref 10–44)
ANION GAP SERPL CALC-SCNC: 10 MMOL/L (ref 8–16)
AST SERPL-CCNC: 55 U/L (ref 10–40)
BASOPHILS # BLD AUTO: 0.02 K/UL (ref 0–0.2)
BASOPHILS NFR BLD: 0.4 % (ref 0–1.9)
BILIRUB SERPL-MCNC: 0.4 MG/DL (ref 0.1–1)
BUN SERPL-MCNC: 18 MG/DL (ref 8–23)
CALCIUM SERPL-MCNC: 9.1 MG/DL (ref 8.7–10.5)
CHLORIDE SERPL-SCNC: 104 MMOL/L (ref 95–110)
CO2 SERPL-SCNC: 23 MMOL/L (ref 23–29)
CREAT SERPL-MCNC: 0.9 MG/DL (ref 0.5–1.4)
DIFFERENTIAL METHOD: ABNORMAL
EOSINOPHIL # BLD AUTO: 0.1 K/UL (ref 0–0.5)
EOSINOPHIL NFR BLD: 1.1 % (ref 0–8)
ERYTHROCYTE [DISTWIDTH] IN BLOOD BY AUTOMATED COUNT: 16.1 % (ref 11.5–14.5)
EST. GFR  (AFRICAN AMERICAN): >60 ML/MIN/1.73 M^2
EST. GFR  (NON AFRICAN AMERICAN): >60 ML/MIN/1.73 M^2
GLUCOSE SERPL-MCNC: 137 MG/DL (ref 70–110)
HCT VFR BLD AUTO: 33.6 % (ref 37–48.5)
HGB BLD-MCNC: 10.7 G/DL (ref 12–16)
IMM GRANULOCYTES # BLD AUTO: 0.01 K/UL (ref 0–0.04)
IMM GRANULOCYTES NFR BLD AUTO: 0.2 % (ref 0–0.5)
LYMPHOCYTES # BLD AUTO: 0.7 K/UL (ref 1–4.8)
LYMPHOCYTES NFR BLD: 14.8 % (ref 18–48)
MCH RBC QN AUTO: 33.1 PG (ref 27–31)
MCHC RBC AUTO-ENTMCNC: 31.8 G/DL (ref 32–36)
MCV RBC AUTO: 104 FL (ref 82–98)
MONOCYTES # BLD AUTO: 0.2 K/UL (ref 0.3–1)
MONOCYTES NFR BLD: 4.7 % (ref 4–15)
NEUTROPHILS # BLD AUTO: 3.5 K/UL (ref 1.8–7.7)
NEUTROPHILS NFR BLD: 78.8 % (ref 38–73)
NRBC BLD-RTO: 0 /100 WBC
PLATELET # BLD AUTO: 200 K/UL (ref 150–350)
PMV BLD AUTO: 9.6 FL (ref 9.2–12.9)
POTASSIUM SERPL-SCNC: 4.3 MMOL/L (ref 3.5–5.1)
PROT SERPL-MCNC: 6.4 G/DL (ref 6–8.4)
RBC # BLD AUTO: 3.23 M/UL (ref 4–5.4)
SODIUM SERPL-SCNC: 137 MMOL/L (ref 136–145)
WBC # BLD AUTO: 4.45 K/UL (ref 3.9–12.7)

## 2019-06-10 PROCEDURE — 99214 PR OFFICE/OUTPT VISIT, EST, LEVL IV, 30-39 MIN: ICD-10-PCS | Mod: S$PBB,,, | Performed by: FAMILY MEDICINE

## 2019-06-10 PROCEDURE — 36415 COLL VENOUS BLD VENIPUNCTURE: CPT

## 2019-06-10 PROCEDURE — 82607 VITAMIN B-12: CPT

## 2019-06-10 PROCEDURE — 99215 OFFICE O/P EST HI 40 MIN: CPT | Mod: PBBFAC,PN | Performed by: FAMILY MEDICINE

## 2019-06-10 PROCEDURE — 99999 PR PBB SHADOW E&M-EST. PATIENT-LVL V: CPT | Mod: PBBFAC,,, | Performed by: FAMILY MEDICINE

## 2019-06-10 PROCEDURE — 80053 COMPREHEN METABOLIC PANEL: CPT

## 2019-06-10 PROCEDURE — 99999 PR PBB SHADOW E&M-EST. PATIENT-LVL V: ICD-10-PCS | Mod: PBBFAC,,, | Performed by: FAMILY MEDICINE

## 2019-06-10 PROCEDURE — 85025 COMPLETE CBC W/AUTO DIFF WBC: CPT

## 2019-06-10 PROCEDURE — 99214 OFFICE O/P EST MOD 30 MIN: CPT | Mod: S$PBB,,, | Performed by: FAMILY MEDICINE

## 2019-06-10 RX ORDER — CYCLOBENZAPRINE HCL 10 MG
10 TABLET ORAL 3 TIMES DAILY PRN
Refills: 0 | COMMUNITY
Start: 2019-04-19 | End: 2019-07-08 | Stop reason: SDUPTHER

## 2019-06-10 RX ORDER — GABAPENTIN 300 MG/1
CAPSULE ORAL
Qty: 90 CAPSULE | Refills: 3 | Status: SHIPPED | OUTPATIENT
Start: 2019-06-10 | End: 2019-10-01 | Stop reason: SDUPTHER

## 2019-06-10 RX ORDER — NITROFURANTOIN MACROCRYSTALS 50 MG/1
50 CAPSULE ORAL EVERY 6 HOURS
Qty: 20 CAPSULE | Refills: 0 | Status: SHIPPED | OUTPATIENT
Start: 2019-06-10 | End: 2019-06-15

## 2019-06-10 RX ORDER — CLINDAMYCIN HYDROCHLORIDE 300 MG/1
CAPSULE ORAL
Refills: 0 | COMMUNITY
Start: 2019-04-19 | End: 2019-11-11

## 2019-06-10 NOTE — PROGRESS NOTES
"Subjective:       Patient ID: Obdulia Yepez is a 69 y.o. female.    Chief Complaint: Follow-up    Follow up    Patient reports that her pain is well controlled, and that medication is preservingher quality of life. Patient requests refill today, and denies any change in her pain. No abuse concerns.  reviewed.      Review of Systems   Constitutional: Negative for appetite change, chills, fatigue and fever.   HENT: Negative for congestion, postnasal drip, rhinorrhea and sore throat.    Genitourinary: Negative for dysuria.   Musculoskeletal: Positive for arthralgias, gait problem, joint swelling and myalgias.   Skin: Positive for wound. Negative for color change and rash.   Neurological: Negative for dizziness, weakness and headaches.   Psychiatric/Behavioral: Negative for sleep disturbance. The patient is not nervous/anxious.          Reviewed family, medical, surgical, and social history.    Objective:      BP (!) 86/47 (BP Location: Left arm, Patient Position: Sitting, BP Method: Small (Automatic))   Pulse 76   Resp 20   Ht 5' (1.524 m)   Wt 49 kg (108 lb)   SpO2 98%   BMI 21.09 kg/m²   Physical Exam   Constitutional: She is oriented to person, place, and time. She appears well-developed and well-nourished.   HENT:   Head: Normocephalic.   Eyes: Pupils are equal, round, and reactive to light.   Neck: Normal range of motion.   Cardiovascular: Normal rate, S1 normal and S2 normal.   Pulmonary/Chest: Effort normal.   Abdominal: Normal appearance.   Musculoskeletal: She exhibits tenderness and deformity.   Walks with walker to maintain gait. Knees "give out" at times.    Neurological: She is alert and oriented to person, place, and time.   Skin: Skin is warm and dry. Capillary refill takes less than 2 seconds.        Psychiatric: Her speech is normal. Cognition and memory are normal.   Vitals reviewed.      Assessment:       1. Spondylosis of cervical region without myelopathy or radiculopathy    2. Closed " 2-part nondisplaced fracture of surgical neck of right humerus, sequela    3. Acute cystitis without hematuria    4. Macrocytic anemia        Plan:       Spondylosis of cervical region without myelopathy or radiculopathy  -     CBC auto differential; Future; Expected date: 06/10/2019  -     Comprehensive metabolic panel; Future; Expected date: 06/10/2019    Closed 2-part nondisplaced fracture of surgical neck of right humerus, sequela  -     Ambulatory Referral to Physical/Occupational Therapy  -     CBC auto differential; Future; Expected date: 06/10/2019  -     Comprehensive metabolic panel; Future; Expected date: 06/10/2019    Acute cystitis without hematuria  -     nitrofurantoin (MACRODANTIN) 50 MG capsule; Take 1 capsule (50 mg total) by mouth every 6 (six) hours. for 5 days  Dispense: 20 capsule; Refill: 0  -     CBC auto differential; Future; Expected date: 06/10/2019  -     Comprehensive metabolic panel; Future; Expected date: 06/10/2019    Macrocytic anemia  -     Vitamin B12; Future; Expected date: 06/10/2019     reviewed and consistent  Will refill medicine as shown given that patient receives greater than 30% benefit from medicine and keeps patient functional and able to complete ADL's.  UDS reviewed  Pain contract reviewed  No abuse concerns          Risks, benefits, and side effects were discussed with the patient. All questions were answered to the fullest satisfaction of the patient, and pt verbalized understanding and agreement to treatment plan. Pt was to call with any new or worsening symptoms, or present to the ER.

## 2019-06-11 LAB — VIT B12 SERPL-MCNC: 329 PG/ML (ref 210–950)

## 2019-06-11 NOTE — TELEPHONE ENCOUNTER
----- Message from Og Perez MD sent at 6/11/2019  4:16 AM CDT -----  Please let this lady know that her vitamin b12 and sodium are normal, and her anemia is still mild, but is stable from last time.

## 2019-06-21 ENCOUNTER — PATIENT OUTREACH (OUTPATIENT)
Dept: ADMINISTRATIVE | Facility: HOSPITAL | Age: 69
End: 2019-06-21

## 2019-06-21 NOTE — LETTER
June 21, 2019    Obdulia Yepez  [address] [place] [address]             [facility]  [address]  [place] [address]  Phone: [phone] Dear Obdulian [facility] is committed to your overall health and would like to ensure that you are up to date on your recommended test and/or procedures.   Og Perez MD  has found that your chart shows you may be due for the following:    BONE MINERAL DENSITY SCAN  MAMMOGRAM  LABS    If you have had any of the above done at another facility, please let us know so that we may obtain copies from that facility.  If you have a copy of these records, please provide a copy for us to scan into your chart.  You are welcome to request that the report be faxed to us at  ([phone]).     Otherwise, please schedule these appointments at your earliest convenience by calling [phone] or going to [email or web address].    If you have an upcoming scheduled appointment for the item above, please disregard this letter.    Sincerely,  Your [facility]  MD Silvia Brown L.P.N. Clinical Care Coordinator  [address] [place] [address], [place] [address] [phone]

## 2019-06-21 NOTE — LETTER
June 21, 2019    [facility]  [address]  [facility] [address]  Phone: [phone] June 21, 2019     Patient: Obdulia Yepez    YOB: 1950   Date of Visit: 6/21/2019       To Whom It May Concern:    [facility]    We are seeing Obdulia Yepez in the clinic today at [facility].  Og Perez MD is their PCP.  She/He has an outstanding lab/procedure at this time when reviewing their chart.  To help with our Health Maintenance records will you please supply the following:      []  Mammogram                                      [x]  Colonoscopy   []  Pap Smear                                         []  Outside Lab Results   []  Dexa scans                                        []  Eye Exam   []  Foot Exam                                         [] Other___________   []  Outside Immunizations                                               Please Fax to [facility] at [phone]    If you have any questions please contact me     Silvia Gold L.P.N. Clinical Care Coordinator  [address] [place] [address], [place] [address] [phone]

## 2019-06-25 ENCOUNTER — CLINICAL SUPPORT (OUTPATIENT)
Dept: REHABILITATION | Facility: HOSPITAL | Age: 69
End: 2019-06-25
Attending: ORTHOPAEDIC SURGERY
Payer: MEDICARE

## 2019-06-25 DIAGNOSIS — M25.562 BILATERAL CHRONIC KNEE PAIN: Primary | ICD-10-CM

## 2019-06-25 DIAGNOSIS — M25.562 PAIN IN BOTH KNEES, UNSPECIFIED CHRONICITY: ICD-10-CM

## 2019-06-25 DIAGNOSIS — M25.511 RIGHT SHOULDER PAIN, UNSPECIFIED CHRONICITY: Primary | ICD-10-CM

## 2019-06-25 DIAGNOSIS — R29.898 WEAKNESS OF BOTH LOWER EXTREMITIES: ICD-10-CM

## 2019-06-25 DIAGNOSIS — R26.81 GAIT INSTABILITY: ICD-10-CM

## 2019-06-25 DIAGNOSIS — M25.561 PAIN IN BOTH KNEES, UNSPECIFIED CHRONICITY: ICD-10-CM

## 2019-06-25 DIAGNOSIS — M25.561 BILATERAL CHRONIC KNEE PAIN: Primary | ICD-10-CM

## 2019-06-25 DIAGNOSIS — G89.29 BILATERAL CHRONIC KNEE PAIN: Primary | ICD-10-CM

## 2019-06-25 PROCEDURE — G8978 MOBILITY CURRENT STATUS: HCPCS | Mod: CK,PN

## 2019-06-25 PROCEDURE — G8979 MOBILITY GOAL STATUS: HCPCS | Mod: CJ,PN

## 2019-06-25 PROCEDURE — 97161 PT EVAL LOW COMPLEX 20 MIN: CPT | Mod: PN

## 2019-06-25 NOTE — PLAN OF CARE
Physical Therapy Evaluation/Plan of Care    Name: Obdulia Yepez  Clinic Number: [id number]    Therapy Diagnosis:   Encounter Diagnoses   Name Primary?    Gait instability     Bilateral chronic knee pain Yes    Weakness of both lower extremities      Physician: Esteban Remy DO    Physician Orders: PT Eval and Treat   Medical Diagnosis: Bilateral Knee OA   Evaluation Date: 2019  Authorization period Expiration: 2019  Plan of Care Certification Period: 2019    Visit #: 1/ Visits authorized: 12  Time In:9:00 AM   Time Out: 10:00 AM   Total Billable Time: 50  minutes    Precautions: Standard and Fall      Subjective   Date of onset: chronic   Date of Surgery: N/A        Past Medical History:   Diagnosis Date    Allergy     Arthritis     Asthma     Depression     Gout     Hypertension      Obdulia Yepez  has a past surgical history that includes  section; Hysterectomy; Cholecystectomy; Stomach surgery; Appendectomy; Wrist surgery (Left); and Shoulder surgery (Right, 2019).    Obdulia has a current medication list which includes the following prescription(s): allopurinol, alprazolam, amoxicillin-clavulanate 875-125mg, aripiprazole, blood sugar diagnostic, blood-glucose meter, budesonide-formoterol 160-4.5 mcg, buspirone, clindamycin, colestipol, cyclobenzaprine, diphenhydramine, escitalopram oxalate, fluticasone propionate, folic acid, gabapentin, hydrocortisone, ibuprofen, ipratropium, lancets, lidocaine hcl 2%, loratadine-pseudoephedrine 5-120 mg, metoprolol succinate, mirtazapine, montelukast, mupirocin, ondansetron, lancets, oxybutynin, oxycodone-acetaminophen, pantoprazole, and triamcinolone acetonide 0.1%, and the following Facility-Administered Medications: sodium chloride, acetaminophen, hylan g-f 20, hylan g-f 20, and triamcinolone acetonide.    Review of patient's allergies indicates:   Allergen Reactions    Amoxicillin Diarrhea    Latex     Milk containing products   "   Opioids - morphine analogues     Peanut     Sodium phosphate     Soy     Sulfa (sulfonamide antibiotics)           Prior Therapy: Obdulia has been seen multiple times in our therapy department for gait instability and bilateral LE weakness  for current condition  Social History: Patient lives in a single level home with no steps to enter   Occupation: retired  Prior Level of Function: ambulatory with RW, has had balance problems as result of Cervical Stenosis with myelopathies 2 years ago.   Current Level of Function: ambulatory with RW; Fell in April 2019 - fracture of Right shoulder resulting in Reverse TSR - currently seeing OT for this.     Pain:  Current 9/10, worst 9/10, best 5/10   Location: knee  bilateral  Description: Aching  Aggravating Factors: Walking and Flexing  Easing Factors: rest, pain medicine       Onset/SHANTA: gradual    Primary concern/ Chief complaints: bilateral knee pain as result of OA, not a surgical candidate for TKR due to neuropathy issues in her legs; she has noted an improvement in her knee pain as result of HH therapy following her shoulder surgery and by going back to using the RW for ambulation.   History of current condition - Obdulia reports: chronic history of symptoms, including balance, gait instability, LE weakness, LE neuropathy, Obdulia has been seen for physical therapy over the past 4-5 years as result of initial fall and pelvic fracture then development cervcial myelopathy with cord impingement that required fusion. Obdulia has extensive history of falls, decreased safety awareness, that have resulted in several orthopedic issues over the years as well as multiple skin tears, open wounds.  She now seems to be doing better in this respect.  She has given up using the cane and uses her walker all of the time.  She still reports some "minor" falls - falls into walls, cuts the corners to quickly and bangs her elbow/arm, but quanity of falls over the past several months has " "decreased.     Pts goals: To get my legs stronger.     Objective     Observation: Obdulia has recovered well from her Right Reverse TSR; She is alert/oriented; ambulatory with use of RW mod I;     Posture: thin, decreased mm mass throughout UE/LE secondary to atrophy, severe neuropathy in her LE's.       Edema: no edema present in eight knee     Range of Motion:   Knee Left active Left Passive Right Active R passive   Flexion 140 140 140 140   Extension 0 0 0 0       Lower Extremity Strength  Right LE  Left LE    Knee extension: 4/5 Knee extension: 4/5   Knee flexion: 3+/5 Knee flexion: 3+/5    Hip flexion: 3+/5 Hip flexion: 3+/5   Hip extension:  3/5 Hip extension: 3+/5   Hip abduction: 3/5 Hip abduction: 3+/5   Hip adduction: 3+/5 Hip adduction 3+/5   Ankle dorsiflexion: 3-/5 Ankle dorsiflexion: 3-/5   Ankle plantarflexion: 3-/5 Ankle plantarflexion: 3-/5       Special Tests:   Left Right   Valgus Stress Test Negative Negative   Varus Stress test Negative    Negative      Lachman's test Negative    Negative      Posterior Lachman Negative    Negative      Vikash's Test Negative    Negative      Apley's Compression NT    NT      Apley's Distraction NT       NT   Koroma's compression test NT NT   Thessaly's Test NT NT   Patellar Grind Test NT NT      Step down test: requries UE assist secondary to neuropathy/balance issues. Poor eccentric control of both right and left quad.     Function:    - SLS R: with UE support, able to  left foot to stand on right and hold x 10 secs  - SLS L: with UE support, able to  right foot to stand on left x 10 secs.   - Squat: mini squat, decreased ability to perform hip hinge noted, poor balance    - Sit <--> Stand: 5 reps x 30 secs  from mat with arms crossed at chest - unstable - using back of mat to stabilize at times.     Joint Mobility: Patellar unrestricted,   Tibiofemoral unrestricted,       Sensation: severe neuropathy bilateral LE"s proximally to knees     Talley " Balance Test:  Score 32/56 for 43% impairment      PT Evaluation Completed: Yes  Discussed Plan of Care with patient: Yes      Home Exercises and Patient Education Provided    Education provided re:   - progress towards goals   - role of therapy in multi - disciplinary team, goals for therapy  Pt educated on condition, POC, and expectations in therapy.  No spiritual or educational barriers to learning provided    Home exercises:  Pt will be provided HEP during course of treatment with progressions as appropriate. Pt was advised to perform these exercises free of pain, and to stop performing them if pain occurs.   Obdulia demonstrated good  understanding of the education provided.       Functional Limitations Reports - G Codes  Category: Mobility, Body position, Carrying, Self care, Other  Tool: Talley Balance   Score: 43% limitation   Current:CK = at least 40% but < 60% impaired, limited or restricted  Goal:CJ = at least 20% but < 40% impaired, limited or restricted      Assessment   Obdulia is a 69 y.o. female referred to outpatient physical therapy and presents to PT with bilateral knee pain, LE weakness and gait instability/balnce impairement  . Patient demonstrates limitations as described in the problem list. Pt will benefit from physcial therapy services in order to maximize pain free and/or functional use of bilateral LE's . The following goals were discussed with the patient and patient is in agreement with them as to be addressed in the treatment plan.   Pt prognosis is Good.   Pt will benefit from skilled outpatient Physical Therapy to address the deficits stated above and in the chart below, provide pt/family education, and to maximize pt's level of independence.     Plan of care discussed with patient: Yes  Pt's spiritual, cultural and educational needs considered and pt agreeable to plan of care and goals as stated below:     Anticipated Barriers for therapy: none    Medical necessity is demonstrated by the  following IMPAIRMENTS/PROBLEM LIST:    weakness, impaired endurance, impaired sensation, impaired functional mobility, gait instability, impaired balance, decreased upper extremity function, decreased lower extremity function, pain and decreased ROM    GOALS:     Long Term Goals: 6 weeks  Pain: Decrease pain to no more than 4/10 to allow for improved ability to perform daily activities   Strength: Improve strength in Bilateral LE's  to 4/5 for improved LE stability  Functional scale: Improve score on Talley Balance to 41/56   Walking: Increase walking distance/duration to 1/4 mile without pain  Transfers: Perform Sit To Stand transfers without UE assist - 7 reps in 30 secs.   Exercise: demonstrate independence with home exercise program to maintain gains made in therapy.          Plan   Certification Period: 6/25/2019 to 9/25/2019.    Outpatient physical therapy 2 times weekly to include: Gait Training, Neuromuscular Re-ed, Patient Education and Therapeutic Exercise. Cont PT for 2 months.   Pt may be seen by PTA as part of the rehabilitation team.     I certify the need for these services furnished under this plan of treatment and while under my care.    Rosalba Thurman, PT          Attestation:   I have seen the patient, reviewed the therapist's plan of care, and I agree with the plan of care.   I certify the need for these services furnished under this plan of treatment and while under my care.         _______________            ________                                               _____________________  Physician/Referring Practitioner                                                            Date of Signature

## 2019-06-26 ENCOUNTER — OFFICE VISIT (OUTPATIENT)
Dept: ORTHOPEDICS | Facility: CLINIC | Age: 69
End: 2019-06-26
Payer: MEDICARE

## 2019-06-26 ENCOUNTER — HOSPITAL ENCOUNTER (OUTPATIENT)
Dept: RADIOLOGY | Facility: HOSPITAL | Age: 69
Discharge: HOME | End: 2019-06-26
Attending: ORTHOPAEDIC SURGERY
Payer: MEDICARE

## 2019-06-26 VITALS — RESPIRATION RATE: 20 BRPM | BODY MASS INDEX: 21.2 KG/M2 | WEIGHT: 108 LBS | HEIGHT: 60 IN

## 2019-06-26 DIAGNOSIS — S42.212A: ICD-10-CM

## 2019-06-26 DIAGNOSIS — M89.8X2 PAIN OF LEFT HUMERUS: ICD-10-CM

## 2019-06-26 DIAGNOSIS — Z96.612 PRESENCE OF ARTIFICIAL SHOULDER JOINT, LEFT: Primary | ICD-10-CM

## 2019-06-26 DIAGNOSIS — M89.8X2 PAIN OF LEFT HUMERUS: Primary | ICD-10-CM

## 2019-06-26 PROCEDURE — 99213 OFFICE O/P EST LOW 20 MIN: CPT | Mod: PBBFAC,25,PN | Performed by: ORTHOPAEDIC SURGERY

## 2019-06-26 PROCEDURE — 73060 X-RAY EXAM OF HUMERUS: CPT | Mod: 26,LT,, | Performed by: RADIOLOGY

## 2019-06-26 PROCEDURE — 73060 XR HUMERUS 2 VIEW LEFT: ICD-10-PCS | Mod: 26,LT,, | Performed by: RADIOLOGY

## 2019-06-26 PROCEDURE — 73060 X-RAY EXAM OF HUMERUS: CPT | Mod: TC,PN,LT

## 2019-06-26 PROCEDURE — 99213 PR OFFICE/OUTPT VISIT, EST, LEVL III, 20-29 MIN: ICD-10-PCS | Mod: S$PBB,,, | Performed by: ORTHOPAEDIC SURGERY

## 2019-06-26 PROCEDURE — 99999 PR PBB SHADOW E&M-EST. PATIENT-LVL III: ICD-10-PCS | Mod: PBBFAC,,, | Performed by: ORTHOPAEDIC SURGERY

## 2019-06-26 PROCEDURE — 99213 OFFICE O/P EST LOW 20 MIN: CPT | Mod: S$PBB,,, | Performed by: ORTHOPAEDIC SURGERY

## 2019-06-26 PROCEDURE — 99999 PR PBB SHADOW E&M-EST. PATIENT-LVL III: CPT | Mod: PBBFAC,,, | Performed by: ORTHOPAEDIC SURGERY

## 2019-06-26 RX ORDER — HYDROCODONE BITARTRATE AND ACETAMINOPHEN 7.5; 325 MG/1; MG/1
1 TABLET ORAL EVERY 4 HOURS PRN
COMMUNITY
End: 2019-07-08

## 2019-06-26 NOTE — PROGRESS NOTES
Subjective:      Patient ID: Obdulia Yepez is a 69 y.o. female.    Chief Complaint: Pain and Injury of the Left Upper Arm      HPI: Ms. Yepez returned today with new complaints of left shoulder pain. She stated that she was traversing from her living room to her bed room last night and tripped and fell onto her shoulder causing severe left shoulder pain. Date of injury 06/25/2019.  She decided to wait till today to come for a visit instead of going to the emergency room. She denied numbness and tingling in her arm.  Recently has she has been falling while use of her walker.  She stated her right shoulder feels good and she is gaining daily motion. She had a reverse total shoulder done for a displaced proximal humerus fracture of her right shoulder on 04/16/2019.    ROS:  No new diagnosis/surgery/prescriptions since last office visit on 05/29/2019.  Reverse right total shoulder arthroplasty.  Constitution: Negative for chills and fever.   HENT: Negative for hoarse voice and sore throat.    Eyes: Negative for blurred vision and redness.   Cardiovascular: Negative for irregular heartbeat.   Respiratory: Negative for cough and snoring.    Endocrine: Negative for polyphagia.   Hematologic/Lymphatic: Does not bruise/bleed easily.   Skin: Negative for itching and rash.   Musculoskeletal: Positive for joint pain and joint swelling.   Gastrointestinal: Positive for diarrhea. Negative for nausea and vomiting.   Genitourinary: Negative for frequency and urgency.   Neurological: Negative for seizures and sensory change.   Psychiatric/Behavioral: Positive for depression. Negative for substance abuse.      Objective:      Physical Exam:   General: AAOx3.  No acute distress  Vascular:  Pulses intact and equal bilaterally.  Capillary refill less than 3 seconds and equal bilaterally  Neurologic:  Pinprick and soft touch over both deltoids intact and equal bilaterally  Integment:  Early ecchymosis left shoulder.  Incision well-healed  right shoulder.  Extremity:  Shoulder:  Forward flexion/abduction left shoulder 0/20 degrees. Tender with motion left shoulder.  Tender with palpation left shoulder.  Crepitus felt with motion left shoulder.  Mild swelling left shoulder.  Right shoulder motion 0/122 degrees. Relatively nontender with motion right shoulder.  Radiography:  Personally reviewed x-rays of the left shoulder completed on 0 6/26/2019 showed a displaced rotated surgical next fracture of the proximal humerus.        Assessment:       Impression:     1.  2. Traumatic closed displaced fracture of surgical neck of humerus, left, initial encounter   Right total shoulder arthroplasty         Plan:       1.  Discussed physical examination and radiographic findings with the patient. Obdulia understands that she has fractured her left shoulder proximal humerus similar to what she did to her right shoulder.  She understands that surgical repair is warranted.  It appears she will need similar surgery she had done on her right shoulder.  She stated she would like to return to her operative surgeon to have her left shoulder operated on if possible.  2.  Refer to Dr. Myers her operative right total shoulder surgeon to complete a reverse total shoulder on her left shoulder.  3.  Sling left upper extremity.  4.  Norco 7.5/325, 1 p.o. q.4-6 hours p.r.n. pain, dispense 30, refill 0.  5.  One-handed activities with the right hand only.  6.  [facility] portal was discussed with the patient and information was given.  The patient was encouraged to use the portal for future encounters.  7.  Follow up after surgical treatment for the left shoulder.  The patient understands that this office will provide her postoperative care if she desires.

## 2019-07-03 RX ORDER — ALLOPURINOL 300 MG/1
TABLET ORAL
Qty: 30 TABLET | Refills: 14 | Status: SHIPPED | OUTPATIENT
Start: 2019-07-03

## 2019-07-08 ENCOUNTER — OFFICE VISIT (OUTPATIENT)
Dept: FAMILY MEDICINE | Facility: CLINIC | Age: 69
End: 2019-07-08
Payer: MEDICARE

## 2019-07-08 VITALS
OXYGEN SATURATION: 98 % | DIASTOLIC BLOOD PRESSURE: 70 MMHG | BODY MASS INDEX: 20.54 KG/M2 | HEART RATE: 88 BPM | RESPIRATION RATE: 20 BRPM | SYSTOLIC BLOOD PRESSURE: 116 MMHG | TEMPERATURE: 98 F | WEIGHT: 104.63 LBS | HEIGHT: 60 IN

## 2019-07-08 DIAGNOSIS — R30.0 DYSURIA: ICD-10-CM

## 2019-07-08 DIAGNOSIS — S42.212A: Primary | ICD-10-CM

## 2019-07-08 DIAGNOSIS — M47.12 OSTEOARTHRITIS OF CERVICAL SPINE WITH MYELOPATHY: ICD-10-CM

## 2019-07-08 DIAGNOSIS — E87.1 HYPONATREMIA: ICD-10-CM

## 2019-07-08 PROCEDURE — 99999 PR PBB SHADOW E&M-EST. PATIENT-LVL III: CPT | Mod: PBBFAC,,, | Performed by: FAMILY MEDICINE

## 2019-07-08 PROCEDURE — 99213 OFFICE O/P EST LOW 20 MIN: CPT | Mod: PBBFAC,PN | Performed by: FAMILY MEDICINE

## 2019-07-08 PROCEDURE — 99214 PR OFFICE/OUTPT VISIT, EST, LEVL IV, 30-39 MIN: ICD-10-PCS | Mod: S$PBB,,, | Performed by: FAMILY MEDICINE

## 2019-07-08 PROCEDURE — 99214 OFFICE O/P EST MOD 30 MIN: CPT | Mod: S$PBB,,, | Performed by: FAMILY MEDICINE

## 2019-07-08 PROCEDURE — 99999 PR PBB SHADOW E&M-EST. PATIENT-LVL III: ICD-10-PCS | Mod: PBBFAC,,, | Performed by: FAMILY MEDICINE

## 2019-07-08 RX ORDER — OXYCODONE AND ACETAMINOPHEN 10; 325 MG/1; MG/1
1 TABLET ORAL EVERY 8 HOURS PRN
Qty: 90 TABLET | Refills: 0 | Status: SHIPPED | OUTPATIENT
Start: 2019-07-08 | End: 2019-08-14 | Stop reason: SDUPTHER

## 2019-07-08 RX ORDER — CYCLOBENZAPRINE HCL 10 MG
10 TABLET ORAL 3 TIMES DAILY PRN
Qty: 90 TABLET | Refills: 0 | Status: SHIPPED | OUTPATIENT
Start: 2019-07-08 | End: 2019-08-14 | Stop reason: SDUPTHER

## 2019-07-08 NOTE — PROGRESS NOTES
"Subjective:       Patient ID: Obdulia Yepez is a 69 y.o. female.    Chief Complaint: Follow-up    Follow up    Reports significant postoperative pain  Worsening  Associated with weakness  Has appt with ortho for follow up        Review of Systems   Constitutional: Negative for appetite change, chills, fatigue and fever.   HENT: Negative for congestion, postnasal drip, rhinorrhea and sore throat.    Genitourinary: Negative for dysuria.   Musculoskeletal: Positive for arthralgias, gait problem, joint swelling and myalgias.   Skin: Positive for wound. Negative for color change and rash.   Neurological: Negative for dizziness, weakness and headaches.   Psychiatric/Behavioral: Negative for sleep disturbance. The patient is not nervous/anxious.          Reviewed family, medical, surgical, and social history.    Objective:      /70 (BP Location: Right arm, Patient Position: Sitting, BP Method: Small (Automatic))   Pulse 88   Temp 97.6 °F (36.4 °C)   Resp 20   Ht 5' (1.524 m)   Wt 47.4 kg (104 lb 9.6 oz)   SpO2 98%   BMI 20.43 kg/m²   Physical Exam   Constitutional: She is oriented to person, place, and time. She appears well-developed and well-nourished.   HENT:   Head: Normocephalic.   Eyes: Pupils are equal, round, and reactive to light.   Neck: Normal range of motion.   Cardiovascular: Normal rate, S1 normal and S2 normal.   Pulmonary/Chest: Effort normal.   Abdominal: Normal appearance.   Musculoskeletal: She exhibits tenderness and deformity.   Walks with walker to maintain gait. Knees "give out" at times.    Neurological: She is alert and oriented to person, place, and time.   Skin: Skin is warm and dry. Capillary refill takes less than 2 seconds.        Psychiatric: Her speech is normal. Cognition and memory are normal.   Vitals reviewed.      Assessment:       1. Traumatic closed displaced fracture of surgical neck of humerus, left, initial encounter    2. Hyponatremia    3. Osteoarthritis of cervical " spine with myelopathy    4. Dysuria        Plan:       Traumatic closed displaced fracture of surgical neck of humerus, left, initial encounter  -     cyclobenzaprine (FLEXERIL) 10 MG tablet; Take 1 tablet (10 mg total) by mouth 3 (three) times daily as needed.  Dispense: 90 tablet; Refill: 0  -     CBC auto differential; Future; Expected date: 07/08/2019  -     Comprehensive metabolic panel; Future; Expected date: 07/08/2019    Hyponatremia  -     cyclobenzaprine (FLEXERIL) 10 MG tablet; Take 1 tablet (10 mg total) by mouth 3 (three) times daily as needed.  Dispense: 90 tablet; Refill: 0  -     CBC auto differential; Future; Expected date: 07/08/2019  -     Comprehensive metabolic panel; Future; Expected date: 07/08/2019    Osteoarthritis of cervical spine with myelopathy  -     oxyCODONE-acetaminophen (PERCOCET)  mg per tablet; Take 1 tablet by mouth every 8 (eight) hours as needed for Pain.  Dispense: 90 tablet; Refill: 0    Dysuria  -     Urinalysis; Future; Expected date: 07/08/2019  -     Urine culture; Future; Expected date: 07/08/2019     reviewed        Risks, benefits, and side effects were discussed with the patient. All questions were answered to the fullest satisfaction of the patient, and pt verbalized understanding and agreement to treatment plan. Pt was to call with any new or worsening symptoms, or present to the ER.

## 2019-07-10 ENCOUNTER — TELEPHONE (OUTPATIENT)
Dept: ORTHOPEDICS | Facility: CLINIC | Age: 69
End: 2019-07-10

## 2019-07-10 ENCOUNTER — LAB VISIT (OUTPATIENT)
Dept: LAB | Facility: HOSPITAL | Age: 69
End: 2019-07-10
Attending: FAMILY MEDICINE
Payer: MEDICARE

## 2019-07-10 DIAGNOSIS — S42.212A: ICD-10-CM

## 2019-07-10 DIAGNOSIS — M25.512 BILATERAL SHOULDER PAIN, UNSPECIFIED CHRONICITY: Primary | ICD-10-CM

## 2019-07-10 DIAGNOSIS — M25.511 BILATERAL SHOULDER PAIN, UNSPECIFIED CHRONICITY: Primary | ICD-10-CM

## 2019-07-10 DIAGNOSIS — E87.1 HYPONATREMIA: ICD-10-CM

## 2019-07-10 LAB
ALBUMIN SERPL BCP-MCNC: 3.4 G/DL (ref 3.5–5.2)
ALP SERPL-CCNC: 99 U/L (ref 55–135)
ALT SERPL W/O P-5'-P-CCNC: 16 U/L (ref 10–44)
ANION GAP SERPL CALC-SCNC: 18 MMOL/L (ref 8–16)
AST SERPL-CCNC: 38 U/L (ref 10–40)
BASOPHILS # BLD AUTO: 0.06 K/UL (ref 0–0.2)
BASOPHILS NFR BLD: 0.7 % (ref 0–1.9)
BILIRUB SERPL-MCNC: 1.6 MG/DL (ref 0.1–1)
BUN SERPL-MCNC: 16 MG/DL (ref 8–23)
CALCIUM SERPL-MCNC: 9.3 MG/DL (ref 8.7–10.5)
CHLORIDE SERPL-SCNC: 93 MMOL/L (ref 95–110)
CO2 SERPL-SCNC: 18 MMOL/L (ref 23–29)
CREAT SERPL-MCNC: 0.9 MG/DL (ref 0.5–1.4)
DIFFERENTIAL METHOD: ABNORMAL
EOSINOPHIL # BLD AUTO: 0.3 K/UL (ref 0–0.5)
EOSINOPHIL NFR BLD: 4 % (ref 0–8)
ERYTHROCYTE [DISTWIDTH] IN BLOOD BY AUTOMATED COUNT: 16.1 % (ref 11.5–14.5)
EST. GFR  (AFRICAN AMERICAN): >60 ML/MIN/1.73 M^2
EST. GFR  (NON AFRICAN AMERICAN): >60 ML/MIN/1.73 M^2
GLUCOSE SERPL-MCNC: 62 MG/DL (ref 70–110)
HCT VFR BLD AUTO: 33.2 % (ref 37–48.5)
HGB BLD-MCNC: 10.8 G/DL (ref 12–16)
IMM GRANULOCYTES # BLD AUTO: 0.05 K/UL (ref 0–0.04)
IMM GRANULOCYTES NFR BLD AUTO: 0.6 % (ref 0–0.5)
LYMPHOCYTES # BLD AUTO: 1 K/UL (ref 1–4.8)
LYMPHOCYTES NFR BLD: 12 % (ref 18–48)
MCH RBC QN AUTO: 31.6 PG (ref 27–31)
MCHC RBC AUTO-ENTMCNC: 32.5 G/DL (ref 32–36)
MCV RBC AUTO: 97 FL (ref 82–98)
MONOCYTES # BLD AUTO: 0.7 K/UL (ref 0.3–1)
MONOCYTES NFR BLD: 7.8 % (ref 4–15)
NEUTROPHILS # BLD AUTO: 6.3 K/UL (ref 1.8–7.7)
NEUTROPHILS NFR BLD: 74.9 % (ref 38–73)
NRBC BLD-RTO: 0 /100 WBC
PLATELET # BLD AUTO: 307 K/UL (ref 150–350)
PMV BLD AUTO: 9.1 FL (ref 9.2–12.9)
POTASSIUM SERPL-SCNC: 4.3 MMOL/L (ref 3.5–5.1)
PROT SERPL-MCNC: 6.5 G/DL (ref 6–8.4)
RBC # BLD AUTO: 3.42 M/UL (ref 4–5.4)
SODIUM SERPL-SCNC: 129 MMOL/L (ref 136–145)
WBC # BLD AUTO: 8.34 K/UL (ref 3.9–12.7)

## 2019-07-10 PROCEDURE — 80053 COMPREHEN METABOLIC PANEL: CPT

## 2019-07-10 PROCEDURE — 85025 COMPLETE CBC W/AUTO DIFF WBC: CPT

## 2019-07-10 PROCEDURE — 36415 COLL VENOUS BLD VENIPUNCTURE: CPT

## 2019-07-10 NOTE — TELEPHONE ENCOUNTER
Returned call to patient to schedule an appointment with Dr. Remy. Appointment made and  Patient voiced understanding of appointment date, time, and location.

## 2019-07-10 NOTE — TELEPHONE ENCOUNTER
While being seen by her PCP, patient requested to schedule an appointment with Dr. Remy. Called patient to schedule an appointment. No answer at phone number [phone] and left voicemail for patient to call office to schedule an appointment with Dr. Remy.

## 2019-07-10 NOTE — TELEPHONE ENCOUNTER
----- Message from Dian Chaney sent at 7/10/2019  1:22 PM CDT -----  Contact: self / [phone]  Patient is requesting a call back regarding, appointment. Please advise

## 2019-07-11 RX ORDER — CIPROFLOXACIN 500 MG/1
500 TABLET ORAL 2 TIMES DAILY
Qty: 20 TABLET | Refills: 0 | Status: SHIPPED | OUTPATIENT
Start: 2019-07-11 | End: 2019-10-15

## 2019-07-12 ENCOUNTER — OFFICE VISIT (OUTPATIENT)
Dept: ORTHOPEDICS | Facility: CLINIC | Age: 69
End: 2019-07-12
Payer: MEDICARE

## 2019-07-12 ENCOUNTER — HOSPITAL ENCOUNTER (OUTPATIENT)
Dept: RADIOLOGY | Facility: HOSPITAL | Age: 69
Discharge: HOME | End: 2019-07-12
Attending: ORTHOPAEDIC SURGERY
Payer: MEDICARE

## 2019-07-12 VITALS
HEIGHT: 60 IN | DIASTOLIC BLOOD PRESSURE: 61 MMHG | WEIGHT: 104.5 LBS | BODY MASS INDEX: 20.52 KG/M2 | HEART RATE: 103 BPM | SYSTOLIC BLOOD PRESSURE: 91 MMHG

## 2019-07-12 DIAGNOSIS — M25.512 LEFT SHOULDER PAIN, UNSPECIFIED CHRONICITY: ICD-10-CM

## 2019-07-12 DIAGNOSIS — T84.029A: Primary | ICD-10-CM

## 2019-07-12 DIAGNOSIS — M25.512 BILATERAL SHOULDER PAIN, UNSPECIFIED CHRONICITY: ICD-10-CM

## 2019-07-12 DIAGNOSIS — Z96.611 PRESENCE OF ARTIFICIAL SHOULDER JOINT, RIGHT: ICD-10-CM

## 2019-07-12 DIAGNOSIS — M25.512 LEFT SHOULDER PAIN, UNSPECIFIED CHRONICITY: Primary | ICD-10-CM

## 2019-07-12 DIAGNOSIS — M25.511 BILATERAL SHOULDER PAIN, UNSPECIFIED CHRONICITY: ICD-10-CM

## 2019-07-12 DIAGNOSIS — Z96.612 PRESENCE OF ARTIFICIAL SHOULDER JOINT, LEFT: ICD-10-CM

## 2019-07-12 PROCEDURE — 99999 PR PBB SHADOW E&M-EST. PATIENT-LVL V: CPT | Mod: PBBFAC,,, | Performed by: ORTHOPAEDIC SURGERY

## 2019-07-12 PROCEDURE — 73030 X-RAY EXAM OF SHOULDER: CPT | Mod: TC,50,PN

## 2019-07-12 PROCEDURE — 73030 X-RAY EXAM OF SHOULDER: CPT | Mod: 26,50,, | Performed by: RADIOLOGY

## 2019-07-12 PROCEDURE — 99213 PR OFFICE/OUTPT VISIT, EST, LEVL III, 20-29 MIN: ICD-10-PCS | Mod: S$PBB,,, | Performed by: ORTHOPAEDIC SURGERY

## 2019-07-12 PROCEDURE — 73030 XR SHOULDER COMPLETE 2 OR MORE VIEWS BILATERAL: ICD-10-PCS | Mod: 26,50,, | Performed by: RADIOLOGY

## 2019-07-12 PROCEDURE — 73020 X-RAY EXAM OF SHOULDER: CPT | Mod: TC,PN,LT,59

## 2019-07-12 PROCEDURE — 99999 PR PBB SHADOW E&M-EST. PATIENT-LVL V: ICD-10-PCS | Mod: PBBFAC,,, | Performed by: ORTHOPAEDIC SURGERY

## 2019-07-12 PROCEDURE — 99213 OFFICE O/P EST LOW 20 MIN: CPT | Mod: S$PBB,,, | Performed by: ORTHOPAEDIC SURGERY

## 2019-07-12 PROCEDURE — 99215 OFFICE O/P EST HI 40 MIN: CPT | Mod: PBBFAC,25,PN | Performed by: ORTHOPAEDIC SURGERY

## 2019-07-12 PROCEDURE — 73020 X-RAY EXAM OF SHOULDER: CPT | Mod: 26,59,LT, | Performed by: RADIOLOGY

## 2019-07-12 PROCEDURE — 73020 XR SHOULDER 1 VIEW LEFT: ICD-10-PCS | Mod: 26,59,LT, | Performed by: RADIOLOGY

## 2019-07-12 RX ORDER — OXYCODONE AND ACETAMINOPHEN 5; 325 MG/1; MG/1
1 TABLET ORAL EVERY 4 HOURS PRN
Status: ON HOLD | COMMUNITY
End: 2019-11-13 | Stop reason: HOSPADM

## 2019-07-12 RX ORDER — HYDROCODONE BITARTRATE AND ACETAMINOPHEN 7.5; 325 MG/1; MG/1
1 TABLET ORAL EVERY 6 HOURS PRN
COMMUNITY
End: 2019-07-12 | Stop reason: CLARIF

## 2019-07-12 NOTE — PROGRESS NOTES
Subjective:      Patient ID: Obdulia Yepez is a 69 y.o. female.    Chief Complaint: Pain of the Right Shoulder and Pain of the Left Shoulder      HPI:  Ms. Yepez returned today for follow-up on left shoulder fracture. At her last visit on 06/26/2019 she arrived with left shoulder pain after she fell on her left shoulder while she was traversing a rug in her house on 06/25/2019..  At her visit she was found to have a displaced proximal humerus fracture and was forwarded to another orthopedic surgeon to perform shoulder arthroplasty for treatment of her fracture. She had her surgery approximately 2 weeks ago and after she was discharged to home she noticed that she was getting progressive pain and then deformity of her shoulder.  She was not wearing her shoulder immobilizer or sling. She stated she feels like her shoulder has been not right for approximately 2 weeks.  She denied numbness and tingling in her hands.    ROS:  New diagnosis/surgery/prescriptions since last office visit on 06/26/2019:  Reverse total shoulder arthroplasty, left shoulder.  Constitution: Negative for chills and fever.   HENT: Negative for hoarse voice and sore throat.    Eyes: Negative for blurred vision and redness.   Cardiovascular: Negative for irregular heartbeat.   Respiratory: Negative for cough and snoring.    Endocrine: Negative for polyphagia.   Hematologic/Lymphatic: Does not bruise/bleed easily.   Skin: Negative for itching and rash.   Musculoskeletal: Positive for joint pain and joint swelling.   Gastrointestinal: Positive for diarrhea. Negative for nausea and vomiting.   Genitourinary: Negative for frequency and urgency.   Neurological: Negative for seizures and sensory change.   Psychiatric/Behavioral: Positive for depression. Negative for substance abuse.      Objective:      Physical Exam:   General: AAOx3.  No acute distress  Vascular:  Pulses intact and equal bilaterally.  Capillary refill less than 3 seconds and equal  bilaterally  Neurologic:  Pinprick and soft touch bilateral deltoids intact and equal bilaterally. Pinprick and soft touch in all distributions of the left upper extremity intact. Motor intact all distributions left upper extremity.  Integment:  Incision well approximated and healing well. Dependent ecchymosis left elbow.  Extremity:  Shoulder:  Anterior prominence left shoulder.  Tender with palpation left shoulder.  Decreased motion left shoulder secondary to pain. Tender with palpation cyst attempts of motion. Pitting edema left upper extremity.  Active flexion extension of the elbow intact.  Active flexion extension of the wrist and fingers intact. Attempts at abduction able to fire deltoid.  Radiography:  Personally reviewed x-rays of the left shoulder completed on 07/12/2019 showed a displaced periprosthetic dislocation with changes from x-rays from 06/26/2019 where the patient had a displaced surgical neck proximal humerus fracture. Also there is a appropriate aligned right total shoulder arthroplasty without signs of loosening or fracture. Post reduction x-rays showed a persistent periprosthetic dislocation.      Assessment:       Impression:   1.  Periprosthetic dislocation, left total shoulder arthroplasty.  2.  Presence of left total shoulder arthroplasty.  3.  Presence of right total shoulder arthroplasty.      Plan:       1.  Discussed physical examination and radiographic findings with the patient. Obdulia understands that she has a periprosthetic dislocation of her left shoulder and a reduction should be attempted.  A reduction in the office was attempted and it was unsuccessful.  At this point her operative surgeon was contacted and since the patient has had her periprosthetic dislocation for an extended period of time, approximately 2 weeks, will have the patient follow up with her operative surgeon on Monday, 0 7/15/2019.  She understands she is to be NPO after midnight on 07/14/2019 in case she  needs to go to surgery on Monday morning.   2.  Sling left upper extremity.  3.  Percocet 5/325, 1 p.o. Q 4-6 hours, p.r.n. pain, dispense 20, refill 0.  4.  One-handed activities with the right hand only.  5.  Precautions given to the patient she understands if she has a sudden change in her neurovascular status she has to go to the emergency room at [facility] immediately.  6.  [facility] portal was discussed with the patient and information was given.  The patient was encouraged to use the portal for future encounters.  7.  Follow up p.r.n..

## 2019-07-15 ENCOUNTER — TELEPHONE (OUTPATIENT)
Dept: FAMILY MEDICINE | Facility: CLINIC | Age: 69
End: 2019-07-15

## 2019-07-15 NOTE — TELEPHONE ENCOUNTER
Pts  states patient is currently in surgery for her dislocated surgery at [facility].  States they will call us tomorrow if/when she is discharged.         ----- Message from Og Perez MD sent at 7/15/2019 12:38 PM CDT -----  Would you let this lady know that her urine culture grew a bacteria that is only susceptible to IV abx, and I would like to set her up for daily infusions for about a week, with her permission

## 2019-07-16 ENCOUNTER — TELEPHONE (OUTPATIENT)
Dept: FAMILY MEDICINE | Facility: CLINIC | Age: 69
End: 2019-07-16

## 2019-07-16 DIAGNOSIS — R30.0 DYSURIA: Primary | ICD-10-CM

## 2019-07-16 NOTE — TELEPHONE ENCOUNTER
Pts  states pt is our of the hospital (all went well) and is ready to start infusion antibiotic treatments.   Please advise, thank you!       ----- Message from Torrey Felix sent at 7/16/2019 12:39 PM CDT -----  Contact: Pawan Yepez - Spouse  Type: Needs Medical Advice    Who Called:  Pawan Lebron Call Back Number: [phone]  Additional Information: Caller would like to discuss infusion treatment. Please call to advise. Thanks!

## 2019-07-17 ENCOUNTER — PATIENT OUTREACH (OUTPATIENT)
Dept: ADMINISTRATIVE | Facility: HOSPITAL | Age: 69
End: 2019-07-17

## 2019-07-17 ENCOUNTER — TELEPHONE (OUTPATIENT)
Dept: FAMILY MEDICINE | Facility: CLINIC | Age: 69
End: 2019-07-17

## 2019-07-17 NOTE — TELEPHONE ENCOUNTER
----- Message from Maddie Patel sent at 7/17/2019 12:46 PM CDT -----  Contact: Gil/[facility]  Type: Needs Medical Advice    Who Called:  gil Lebron Call Back Number: [phone] fax [phone]  Additional Information: pt was discharged on yesterday 7-16-19 need to get orders for therapy. Please contact for any info

## 2019-07-17 NOTE — TELEPHONE ENCOUNTER
They most likely gave her some abx during her surgery, which may have cleared this up. Let's repeat her urine studies to see if it will still be necessary.

## 2019-07-17 NOTE — TELEPHONE ENCOUNTER
I have spoken with Christiana and VO have been given for PT and home health aide.  Orders will be faxed to this office, [phone].  [place]

## 2019-07-22 ENCOUNTER — TELEPHONE (OUTPATIENT)
Dept: FAMILY MEDICINE | Facility: CLINIC | Age: 69
End: 2019-07-22

## 2019-07-22 NOTE — TELEPHONE ENCOUNTER
Spoke with pt  an informed him that Dr. Perez wants to recheck a urine culture before moving forward with IV antibiotic therapy. Urine culture has been ordered. Chavez Lucho voiced understanding.

## 2019-07-22 NOTE — TELEPHONE ENCOUNTER
----- Message from Susana Samuel sent at 7/22/2019  9:55 AM CDT -----  Contact: Pawan Ypeez (Spouse)  Type: Needs Medical Advice    Who Called:  Pawan Yepez (Spouse)  Best Call Back Number: Pawan at   Additional Information: Calling to speak with the Nurse about starting the Infusion. Please advise

## 2019-07-24 ENCOUNTER — LAB VISIT (OUTPATIENT)
Dept: LAB | Facility: HOSPITAL | Age: 69
End: 2019-07-24
Attending: FAMILY MEDICINE
Payer: MEDICARE

## 2019-07-24 DIAGNOSIS — D64.9 CHRONIC ANEMIA: ICD-10-CM

## 2019-07-24 DIAGNOSIS — R53.83 LETHARGY: ICD-10-CM

## 2019-07-24 DIAGNOSIS — Z96.611 S/P BILATERAL SHOULDER JOINT REPLACEMENT: Primary | ICD-10-CM

## 2019-07-24 DIAGNOSIS — R30.0 DYSURIA: ICD-10-CM

## 2019-07-24 DIAGNOSIS — Z96.612 S/P BILATERAL SHOULDER JOINT REPLACEMENT: Primary | ICD-10-CM

## 2019-07-24 LAB
ALBUMIN SERPL BCP-MCNC: 3 G/DL (ref 3.5–5.2)
ALBUMIN SERPL BCP-MCNC: 3 G/DL (ref 3.5–5.2)
ANION GAP SERPL CALC-SCNC: 13 MMOL/L (ref 8–16)
BASOPHILS NFR BLD: 0 % (ref 0–1.9)
BUN SERPL-MCNC: 30 MG/DL (ref 8–23)
CALCIUM SERPL-MCNC: 8.5 MG/DL (ref 8.7–10.5)
CHLORIDE SERPL-SCNC: 91 MMOL/L (ref 95–110)
CO2 SERPL-SCNC: 20 MMOL/L (ref 23–29)
CREAT SERPL-MCNC: 1.7 MG/DL (ref 0.5–1.4)
DIFFERENTIAL METHOD: ABNORMAL
EOSINOPHIL NFR BLD: 0 % (ref 0–8)
ERYTHROCYTE [DISTWIDTH] IN BLOOD BY AUTOMATED COUNT: 15.6 % (ref 11.5–14.5)
EST. GFR  (AFRICAN AMERICAN): 35.5 ML/MIN/1.73 M^2
EST. GFR  (NON AFRICAN AMERICAN): 30.8 ML/MIN/1.73 M^2
GLUCOSE SERPL-MCNC: 104 MG/DL (ref 70–110)
HCT VFR BLD AUTO: 30.1 % (ref 37–48.5)
HGB BLD-MCNC: 9.9 G/DL (ref 12–16)
IMM GRANULOCYTES # BLD AUTO: ABNORMAL K/UL
IMM GRANULOCYTES NFR BLD AUTO: ABNORMAL %
LYMPHOCYTES NFR BLD: 3 % (ref 18–48)
MCH RBC QN AUTO: 31.6 PG (ref 27–31)
MCHC RBC AUTO-ENTMCNC: 32.9 G/DL (ref 32–36)
MCV RBC AUTO: 96 FL (ref 82–98)
MONOCYTES NFR BLD: 4 % (ref 4–15)
NEUTROPHILS NFR BLD: 93 % (ref 38–73)
NRBC BLD-RTO: 0 /100 WBC
PHOSPHATE SERPL-MCNC: 2.4 MG/DL (ref 2.7–4.5)
PLATELET # BLD AUTO: 291 K/UL (ref 150–350)
PMV BLD AUTO: 9.1 FL (ref 9.2–12.9)
POTASSIUM SERPL-SCNC: 4.5 MMOL/L (ref 3.5–5.1)
RBC # BLD AUTO: 3.13 M/UL (ref 4–5.4)
SODIUM SERPL-SCNC: 124 MMOL/L (ref 136–145)
WBC # BLD AUTO: 17.79 K/UL (ref 3.9–12.7)

## 2019-07-24 PROCEDURE — 36415 COLL VENOUS BLD VENIPUNCTURE: CPT

## 2019-07-24 PROCEDURE — 87086 URINE CULTURE/COLONY COUNT: CPT

## 2019-07-24 PROCEDURE — 81000 URINALYSIS NONAUTO W/SCOPE: CPT

## 2019-07-24 PROCEDURE — 80069 RENAL FUNCTION PANEL: CPT

## 2019-07-24 PROCEDURE — 85007 BL SMEAR W/DIFF WBC COUNT: CPT

## 2019-07-24 PROCEDURE — 85027 COMPLETE CBC AUTOMATED: CPT

## 2019-07-31 ENCOUNTER — LAB VISIT (OUTPATIENT)
Dept: LAB | Facility: HOSPITAL | Age: 69
End: 2019-07-31
Attending: FAMILY MEDICINE
Payer: MEDICARE

## 2019-07-31 ENCOUNTER — OFFICE VISIT (OUTPATIENT)
Dept: FAMILY MEDICINE | Facility: CLINIC | Age: 69
End: 2019-07-31
Payer: MEDICARE

## 2019-07-31 VITALS
HEIGHT: 60 IN | WEIGHT: 97.13 LBS | DIASTOLIC BLOOD PRESSURE: 123 MMHG | SYSTOLIC BLOOD PRESSURE: 177 MMHG | BODY MASS INDEX: 19.07 KG/M2 | RESPIRATION RATE: 20 BRPM | HEART RATE: 132 BPM | OXYGEN SATURATION: 96 %

## 2019-07-31 DIAGNOSIS — Z11.59 NEED FOR HEPATITIS C SCREENING TEST: ICD-10-CM

## 2019-07-31 DIAGNOSIS — Z11.59 ENCOUNTER FOR HEPATITIS C SCREENING TEST FOR LOW RISK PATIENT: Primary | ICD-10-CM

## 2019-07-31 DIAGNOSIS — R00.0 TACHYCARDIA: ICD-10-CM

## 2019-07-31 DIAGNOSIS — Z12.39 BREAST CANCER SCREENING: ICD-10-CM

## 2019-07-31 DIAGNOSIS — Z78.0 ASYMPTOMATIC MENOPAUSAL STATE: ICD-10-CM

## 2019-07-31 LAB
ALBUMIN SERPL BCP-MCNC: 2.8 G/DL (ref 3.5–5.2)
ALP SERPL-CCNC: 93 U/L (ref 55–135)
ALT SERPL W/O P-5'-P-CCNC: 19 U/L (ref 10–44)
ANION GAP SERPL CALC-SCNC: 11 MMOL/L (ref 8–16)
AST SERPL-CCNC: 48 U/L (ref 10–40)
BASOPHILS # BLD AUTO: 0.03 K/UL (ref 0–0.2)
BASOPHILS NFR BLD: 0.5 % (ref 0–1.9)
BILIRUB SERPL-MCNC: 0.8 MG/DL (ref 0.1–1)
BUN SERPL-MCNC: 11 MG/DL (ref 8–23)
CALCIUM SERPL-MCNC: 9.5 MG/DL (ref 8.7–10.5)
CHLORIDE SERPL-SCNC: 101 MMOL/L (ref 95–110)
CO2 SERPL-SCNC: 21 MMOL/L (ref 23–29)
CREAT SERPL-MCNC: 0.9 MG/DL (ref 0.5–1.4)
DIFFERENTIAL METHOD: ABNORMAL
EOSINOPHIL # BLD AUTO: 0.2 K/UL (ref 0–0.5)
EOSINOPHIL NFR BLD: 3.5 % (ref 0–8)
ERYTHROCYTE [DISTWIDTH] IN BLOOD BY AUTOMATED COUNT: 15.7 % (ref 11.5–14.5)
EST. GFR  (AFRICAN AMERICAN): >60 ML/MIN/1.73 M^2
EST. GFR  (NON AFRICAN AMERICAN): >60 ML/MIN/1.73 M^2
GLUCOSE SERPL-MCNC: 88 MG/DL (ref 70–110)
HCT VFR BLD AUTO: 28.8 % (ref 37–48.5)
HGB BLD-MCNC: 9.4 G/DL (ref 12–16)
IMM GRANULOCYTES # BLD AUTO: 0.11 K/UL (ref 0–0.04)
IMM GRANULOCYTES NFR BLD AUTO: 1.7 % (ref 0–0.5)
LYMPHOCYTES # BLD AUTO: 1.1 K/UL (ref 1–4.8)
LYMPHOCYTES NFR BLD: 16.3 % (ref 18–48)
MCH RBC QN AUTO: 31.2 PG (ref 27–31)
MCHC RBC AUTO-ENTMCNC: 32.6 G/DL (ref 32–36)
MCV RBC AUTO: 96 FL (ref 82–98)
MONOCYTES # BLD AUTO: 0.6 K/UL (ref 0.3–1)
MONOCYTES NFR BLD: 8.8 % (ref 4–15)
NEUTROPHILS # BLD AUTO: 4.6 K/UL (ref 1.8–7.7)
NEUTROPHILS NFR BLD: 69.2 % (ref 38–73)
NRBC BLD-RTO: 0 /100 WBC
PLATELET # BLD AUTO: 326 K/UL (ref 150–350)
PMV BLD AUTO: 9.4 FL (ref 9.2–12.9)
POTASSIUM SERPL-SCNC: 3.6 MMOL/L (ref 3.5–5.1)
PROT SERPL-MCNC: 6.5 G/DL (ref 6–8.4)
RBC # BLD AUTO: 3.01 M/UL (ref 4–5.4)
SODIUM SERPL-SCNC: 133 MMOL/L (ref 136–145)
T4 FREE SERPL-MCNC: 0.91 NG/DL (ref 0.71–1.51)
TSH SERPL DL<=0.005 MIU/L-ACNC: 3.41 UIU/ML (ref 0.34–5.6)
WBC # BLD AUTO: 6.57 K/UL (ref 3.9–12.7)

## 2019-07-31 PROCEDURE — 85025 COMPLETE CBC W/AUTO DIFF WBC: CPT

## 2019-07-31 PROCEDURE — 84439 ASSAY OF FREE THYROXINE: CPT

## 2019-07-31 PROCEDURE — 99213 OFFICE O/P EST LOW 20 MIN: CPT | Mod: PBBFAC,PN | Performed by: FAMILY MEDICINE

## 2019-07-31 PROCEDURE — 99214 PR OFFICE/OUTPT VISIT, EST, LEVL IV, 30-39 MIN: ICD-10-PCS | Mod: S$PBB,,, | Performed by: FAMILY MEDICINE

## 2019-07-31 PROCEDURE — 84443 ASSAY THYROID STIM HORMONE: CPT

## 2019-07-31 PROCEDURE — 80053 COMPREHEN METABOLIC PANEL: CPT

## 2019-07-31 PROCEDURE — 86803 HEPATITIS C AB TEST: CPT

## 2019-07-31 PROCEDURE — 99214 OFFICE O/P EST MOD 30 MIN: CPT | Mod: S$PBB,,, | Performed by: FAMILY MEDICINE

## 2019-07-31 PROCEDURE — 36415 COLL VENOUS BLD VENIPUNCTURE: CPT

## 2019-07-31 PROCEDURE — 99999 PR PBB SHADOW E&M-EST. PATIENT-LVL III: CPT | Mod: PBBFAC,,, | Performed by: FAMILY MEDICINE

## 2019-07-31 PROCEDURE — 99999 PR PBB SHADOW E&M-EST. PATIENT-LVL III: ICD-10-PCS | Mod: PBBFAC,,, | Performed by: FAMILY MEDICINE

## 2019-07-31 RX ORDER — METOPROLOL SUCCINATE 50 MG/1
50 TABLET, EXTENDED RELEASE ORAL DAILY
Qty: 30 TABLET | Refills: 2 | Status: ON HOLD | OUTPATIENT
Start: 2019-07-31 | End: 2019-12-10

## 2019-07-31 RX ORDER — MEROPENEM 1 G/1
INJECTION, POWDER, FOR SOLUTION INTRAVENOUS
Refills: 0 | COMMUNITY
Start: 2019-07-29 | End: 2019-11-11

## 2019-07-31 NOTE — PROGRESS NOTES
"Subjective:       Patient ID: Obdulia Yepez is a 69 y.o. female.    Chief Complaint: Hospital Follow Up ([facility])    Went to ER for tachycardia  On abx for uti  Improving  Follow up with cards in two days    Review of Systems   Constitutional: Negative for appetite change, chills, fatigue and fever.   HENT: Negative for congestion, postnasal drip, rhinorrhea and sore throat.    Genitourinary: Negative for dysuria.   Musculoskeletal: Positive for arthralgias, gait problem, joint swelling and myalgias.   Skin: Positive for wound. Negative for color change and rash.   Neurological: Negative for dizziness, weakness and headaches.   Psychiatric/Behavioral: Negative for sleep disturbance. The patient is not nervous/anxious.          Reviewed family, medical, surgical, and social history.    Objective:      BP (!) 177/123 (BP Location: Left arm, Patient Position: Sitting, BP Method: Small (Automatic))   Pulse (!) 132   Resp 20   Ht 5' (1.524 m)   Wt 44 kg (97 lb 1.6 oz)   SpO2 96%   BMI 18.96 kg/m²   Physical Exam   Constitutional: She is oriented to person, place, and time. She appears well-developed and well-nourished.   HENT:   Head: Normocephalic.   Eyes: Pupils are equal, round, and reactive to light.   Neck: Normal range of motion.   Cardiovascular: Normal rate, S1 normal and S2 normal.   Pulmonary/Chest: Effort normal.   Abdominal: Normal appearance.   Musculoskeletal: She exhibits tenderness and deformity.   Walks with walker to maintain gait. Knees "give out" at times.    Neurological: She is alert and oriented to person, place, and time.   Skin: Skin is warm and dry. Capillary refill takes less than 2 seconds.        Psychiatric: Her speech is normal. Cognition and memory are normal.   Vitals reviewed.      Assessment:       1. Encounter for hepatitis C screening test for low risk patient    2. Need for hepatitis C screening test    3. Asymptomatic menopausal state    4. Breast cancer screening    5. " Tachycardia        Plan:       Encounter for hepatitis C screening test for low risk patient    Need for hepatitis C screening test  -     Hepatitis C antibody; Future; Expected date: 01/17/2020    Asymptomatic menopausal state  -     DXA Bone Density Spine And Hip; Future; Expected date: 01/17/2020    Breast cancer screening  -     Mammo Digital Screening Bilateral With CAD; Future; Expected date: 01/17/2020    Tachycardia  -     metoprolol succinate (TOPROL-XL) 50 MG 24 hr tablet; Take 1 tablet (50 mg total) by mouth once daily.  Dispense: 30 tablet; Refill: 2  -     Comprehensive metabolic panel; Future; Expected date: 07/31/2019  -     CBC auto differential; Future; Expected date: 07/31/2019  -     TSH; Future; Expected date: 07/31/2019  -     T4, free; Future; Expected date: 07/31/2019    Follow up in two weeks with me  Increase metoprolol        Risks, benefits, and side effects were discussed with the patient. All questions were answered to the fullest satisfaction of the patient, and pt verbalized understanding and agreement to treatment plan. Pt was to call with any new or worsening symptoms, or present to the ER.

## 2019-08-01 LAB — HCV AB SERPL QL IA: NEGATIVE

## 2019-08-05 ENCOUNTER — TELEPHONE (OUTPATIENT)
Dept: FAMILY MEDICINE | Facility: CLINIC | Age: 69
End: 2019-08-05

## 2019-08-05 NOTE — TELEPHONE ENCOUNTER
[facility] RN is requesting VO to pull pts PICC line due to pt completing IV ABX.    No repeat urinalysis/culture noted. Per Dr. Perez, PICC line should remain in place until labs verifying pt is free of infection.   Asked if pt had labs completed elsewhere- Renee states she is unsure is she has, but will fax results to our office if so.    No other concerns at this time.           ----- Message from Christina Hensley sent at 8/5/2019 12:37 PM CDT -----  Contact: Renee--Donna Padron   Type: Needs Medical Advice    Who Called:  Renee/SHAWN  Best Call Back Number: [phone]  Additional Information: Caller is wondering if it is ok to pull the patient's pick line please call to advise asap.

## 2019-08-06 ENCOUNTER — TELEPHONE (OUTPATIENT)
Dept: FAMILY MEDICINE | Facility: CLINIC | Age: 69
End: 2019-08-06

## 2019-08-06 LAB
BILIRUB UR QL STRIP: NEGATIVE
CLARITY UR: CLEAR
COLOR UR: YELLOW
GLUCOSE UR QL STRIP: NEGATIVE
HGB UR QL STRIP: ABNORMAL
KETONES UR QL STRIP: NEGATIVE
LEUKOCYTE ESTERASE UR QL STRIP: ABNORMAL
MICROSCOPIC COMMENT: ABNORMAL
NITRITE UR QL STRIP: NEGATIVE
PH UR STRIP: 7 [PH] (ref 5–8)
PROT UR QL STRIP: NEGATIVE
RBC #/AREA URNS HPF: 1 /HPF (ref 0–4)
SP GR UR STRIP: 1.01 (ref 1–1.03)
URN SPEC COLLECT METH UR: ABNORMAL
UROBILINOGEN UR STRIP-ACNC: NEGATIVE EU/DL
WBC #/AREA URNS HPF: 12 /HPF (ref 0–5)

## 2019-08-06 NOTE — TELEPHONE ENCOUNTER
----- Message from Naomy Wiseman sent at 8/6/2019  2:38 PM CDT -----  Type:  Test Results    Who Called:  self  Name of Test (Lab/Mammo/Etc):  Urine analyses   Date of Test:  Today   Ordering Provider:     Where the test was performed:     Best Call Back Number:  [phone]   Additional Information:

## 2019-08-07 ENCOUNTER — TELEPHONE (OUTPATIENT)
Dept: HOME HEALTH SERVICES | Facility: HOSPITAL | Age: 69
End: 2019-08-07
Payer: MEDICARE

## 2019-08-07 LAB
BACTERIA UR CULT: NORMAL
BACTERIA UR CULT: NORMAL

## 2019-08-09 ENCOUNTER — TELEPHONE (OUTPATIENT)
Dept: FAMILY MEDICINE | Facility: CLINIC | Age: 69
End: 2019-08-09

## 2019-08-09 NOTE — TELEPHONE ENCOUNTER
----- Message from Brian MENDOZA Frisard sent at 8/9/2019 10:36 AM CDT -----  Contact: Christiana/[facility]  Christiana called in regarding the attached patient.  Christiana stated she is trying to get results on a urine sample that was dropped off on Monday 8/5/19.  Christiana stated patients  dropped off at Dr. Perez office.    Christiana stated she needs these results to see if patients PIC line can be pulled.    Christiana's call back number is [phone]

## 2019-08-14 ENCOUNTER — OFFICE VISIT (OUTPATIENT)
Dept: FAMILY MEDICINE | Facility: CLINIC | Age: 69
End: 2019-08-14
Payer: MEDICARE

## 2019-08-14 VITALS
BODY MASS INDEX: 18.81 KG/M2 | OXYGEN SATURATION: 98 % | SYSTOLIC BLOOD PRESSURE: 125 MMHG | RESPIRATION RATE: 20 BRPM | HEART RATE: 91 BPM | HEIGHT: 60 IN | DIASTOLIC BLOOD PRESSURE: 70 MMHG | WEIGHT: 95.81 LBS

## 2019-08-14 DIAGNOSIS — M47.12 OSTEOARTHRITIS OF CERVICAL SPINE WITH MYELOPATHY: ICD-10-CM

## 2019-08-14 DIAGNOSIS — E87.1 HYPONATREMIA: ICD-10-CM

## 2019-08-14 DIAGNOSIS — S42.212A: ICD-10-CM

## 2019-08-14 PROCEDURE — 99214 PR OFFICE/OUTPT VISIT, EST, LEVL IV, 30-39 MIN: ICD-10-PCS | Mod: S$PBB,,, | Performed by: FAMILY MEDICINE

## 2019-08-14 PROCEDURE — 99999 PR PBB SHADOW E&M-EST. PATIENT-LVL III: CPT | Mod: PBBFAC,,, | Performed by: FAMILY MEDICINE

## 2019-08-14 PROCEDURE — 99213 OFFICE O/P EST LOW 20 MIN: CPT | Mod: PBBFAC,PN | Performed by: FAMILY MEDICINE

## 2019-08-14 PROCEDURE — 99214 OFFICE O/P EST MOD 30 MIN: CPT | Mod: S$PBB,,, | Performed by: FAMILY MEDICINE

## 2019-08-14 PROCEDURE — 99999 PR PBB SHADOW E&M-EST. PATIENT-LVL III: ICD-10-PCS | Mod: PBBFAC,,, | Performed by: FAMILY MEDICINE

## 2019-08-14 RX ORDER — POTASSIUM PHOSPHATE, MONOBASIC 500 MG/1
TABLET, SOLUBLE ORAL
Refills: 0 | Status: ON HOLD | COMMUNITY
Start: 2019-07-30 | End: 2019-12-10

## 2019-08-14 RX ORDER — CYCLOBENZAPRINE HCL 10 MG
10 TABLET ORAL 3 TIMES DAILY PRN
Qty: 90 TABLET | Refills: 0 | Status: SHIPPED | OUTPATIENT
Start: 2019-08-14

## 2019-08-14 RX ORDER — OXYCODONE AND ACETAMINOPHEN 10; 325 MG/1; MG/1
1 TABLET ORAL EVERY 8 HOURS PRN
Qty: 90 TABLET | Refills: 0 | Status: ON HOLD | OUTPATIENT
Start: 2019-08-14 | End: 2019-11-13 | Stop reason: HOSPADM

## 2019-08-14 NOTE — PROGRESS NOTES
"Subjective:       Patient ID: Obdulia Yepez is a 69 y.o. female.    Chief Complaint: Follow-up (BW reveiw) and Medication Refill    Went to ER for tachycardia  On abx for uti  Improving  Follow up with cards in two days    Since the last visit, continues to be in pain, needs refill, pain medicine effective.    Review of Systems   Constitutional: Negative for appetite change, chills, fatigue and fever.   HENT: Negative for congestion, postnasal drip, rhinorrhea and sore throat.    Genitourinary: Negative for dysuria.   Musculoskeletal: Positive for arthralgias, gait problem, joint swelling and myalgias.   Skin: Positive for wound. Negative for color change and rash.   Neurological: Negative for dizziness, weakness and headaches.   Psychiatric/Behavioral: Negative for sleep disturbance. The patient is not nervous/anxious.          Reviewed family, medical, surgical, and social history.    Objective:      /70 (BP Location: Left arm, Patient Position: Sitting, BP Method: Small (Automatic))   Pulse 91   Resp 20   Ht 5' (1.524 m)   Wt 43.5 kg (95 lb 12.8 oz)   SpO2 98%   BMI 18.71 kg/m²   Physical Exam   Constitutional: She is oriented to person, place, and time. She appears well-developed and well-nourished.   HENT:   Head: Normocephalic.   Eyes: Pupils are equal, round, and reactive to light.   Neck: Normal range of motion.   Cardiovascular: Normal rate, S1 normal and S2 normal.   Pulmonary/Chest: Effort normal.   Abdominal: Normal appearance.   Musculoskeletal: She exhibits tenderness and deformity.   Walks with walker to maintain gait. Knees "give out" at times.    Neurological: She is alert and oriented to person, place, and time.   Skin: Skin is warm and dry. Capillary refill takes less than 2 seconds.        Psychiatric: Her speech is normal. Cognition and memory are normal.   Vitals reviewed.      Assessment:       1. Traumatic closed displaced fracture of surgical neck of humerus, left, initial " encounter    2. Hyponatremia    3. Osteoarthritis of cervical spine with myelopathy        Plan:       Traumatic closed displaced fracture of surgical neck of humerus, left, initial encounter  -     cyclobenzaprine (FLEXERIL) 10 MG tablet; Take 1 tablet (10 mg total) by mouth 3 (three) times daily as needed.  Dispense: 90 tablet; Refill: 0    Hyponatremia  -     cyclobenzaprine (FLEXERIL) 10 MG tablet; Take 1 tablet (10 mg total) by mouth 3 (three) times daily as needed.  Dispense: 90 tablet; Refill: 0    Osteoarthritis of cervical spine with myelopathy  -     oxyCODONE-acetaminophen (PERCOCET)  mg per tablet; Take 1 tablet by mouth every 8 (eight) hours as needed for Pain.  Dispense: 90 tablet; Refill: 0            Risks, benefits, and side effects were discussed with the patient. All questions were answered to the fullest satisfaction of the patient, and pt verbalized understanding and agreement to treatment plan. Pt was to call with any new or worsening symptoms, or present to the ER.

## 2019-08-20 ENCOUNTER — PATIENT OUTREACH (OUTPATIENT)
Dept: ADMINISTRATIVE | Facility: HOSPITAL | Age: 69
End: 2019-08-20

## 2019-08-20 NOTE — LETTER
August 20, 2019    Obdulia Yepez  [address] [place] [address]             [facility]  [address]  [facility] [address]  Phone: [phone] Dear Evelyn [facility] is committed to your overall health and would like to ensure that you are up to date on your recommended test and/or procedures.   Og Perez MD  has found that your chart shows you may be due for the following:    MAMMOGRAM  BONE MINERAL DENSITY SCAN    If you have had any of the above done at another facility, please let us know so that we may obtain copies from that facility.  If you have a copy of these records, please provide a copy for us to scan into your chart.  You are welcome to request that the report be faxed to us at  ([phone]).     Otherwise, please schedule these appointments at your earliest convenience by calling [phone] or going to [email or web address].    If you have an upcoming scheduled appointment for the item above, please disregard this letter.    Sincerely,  Your [facility]  MD Silvia Brown L.P.N. Clinical Care Coordinator  [address] [place] [address], [place] [address] [phone]

## 2019-08-22 ENCOUNTER — TELEPHONE (OUTPATIENT)
Dept: FAMILY MEDICINE | Facility: CLINIC | Age: 69
End: 2019-08-22

## 2019-08-22 NOTE — TELEPHONE ENCOUNTER
----- Message from Lola Doll sent at 8/22/2019  1:01 PM CDT -----  Contact: patient  Type: Needs Medical Advice    Who Called:  patient  Best Call Back Number: [phone]  Additional Information: patient states that she is still waiting on a call back from the nurse to get a sooner appointment. Please advise

## 2019-08-22 NOTE — TELEPHONE ENCOUNTER
Spoke with pt and she is upset that she was not scheduled for an earlier appt. Pt stated that Dr. Perez wanted to see her two weeks from 08/14/19 and she's scheduled 09/04/19, a week later. At this time there are no earlier appts available.

## 2019-08-28 ENCOUNTER — OFFICE VISIT (OUTPATIENT)
Dept: FAMILY MEDICINE | Facility: CLINIC | Age: 69
End: 2019-08-28
Payer: MEDICARE

## 2019-08-28 VITALS
DIASTOLIC BLOOD PRESSURE: 66 MMHG | HEART RATE: 72 BPM | RESPIRATION RATE: 20 BRPM | BODY MASS INDEX: 18.16 KG/M2 | HEIGHT: 60 IN | SYSTOLIC BLOOD PRESSURE: 101 MMHG | WEIGHT: 92.5 LBS | OXYGEN SATURATION: 96 %

## 2019-08-28 DIAGNOSIS — Z78.0 ASYMPTOMATIC MENOPAUSAL STATE: ICD-10-CM

## 2019-08-28 DIAGNOSIS — M47.812 SPONDYLOSIS OF CERVICAL REGION WITHOUT MYELOPATHY OR RADICULOPATHY: ICD-10-CM

## 2019-08-28 DIAGNOSIS — R63.4 WEIGHT LOSS: ICD-10-CM

## 2019-08-28 DIAGNOSIS — I10 ESSENTIAL HYPERTENSION: Primary | ICD-10-CM

## 2019-08-28 PROCEDURE — 99213 OFFICE O/P EST LOW 20 MIN: CPT | Mod: S$PBB,,, | Performed by: FAMILY MEDICINE

## 2019-08-28 PROCEDURE — 99999 PR PBB SHADOW E&M-EST. PATIENT-LVL III: CPT | Mod: PBBFAC,,, | Performed by: FAMILY MEDICINE

## 2019-08-28 PROCEDURE — 99213 OFFICE O/P EST LOW 20 MIN: CPT | Mod: PBBFAC,PN | Performed by: FAMILY MEDICINE

## 2019-08-28 PROCEDURE — 99213 PR OFFICE/OUTPT VISIT, EST, LEVL III, 20-29 MIN: ICD-10-PCS | Mod: S$PBB,,, | Performed by: FAMILY MEDICINE

## 2019-08-28 PROCEDURE — 99999 PR PBB SHADOW E&M-EST. PATIENT-LVL III: ICD-10-PCS | Mod: PBBFAC,,, | Performed by: FAMILY MEDICINE

## 2019-08-28 RX ORDER — CYPROHEPTADINE HYDROCHLORIDE 4 MG/1
4 TABLET ORAL 3 TIMES DAILY PRN
Qty: 90 TABLET | Refills: 2 | Status: SHIPPED | OUTPATIENT
Start: 2019-08-28 | End: 2019-09-19 | Stop reason: SDUPTHER

## 2019-08-28 RX ORDER — PREDNISONE 20 MG/1
20 TABLET ORAL 2 TIMES DAILY
Qty: 10 TABLET | Refills: 0 | Status: SHIPPED | OUTPATIENT
Start: 2019-08-28 | End: 2019-09-02

## 2019-08-28 NOTE — PROGRESS NOTES
"Subjective:       Patient ID: Obdulia Yepez is a 69 y.o. female.    Chief Complaint: Follow-up (Mammogram completed @ [facility], Would like bone density)    Went to ER for tachycardia  On abx for uti  Improving  Follow up with cards in two days    Since the last visit, continues to be in pain, needs refill, pain medicine effective.    Review of Systems   Constitutional: Negative for appetite change, chills, fatigue and fever.   HENT: Negative for congestion, postnasal drip, rhinorrhea and sore throat.    Genitourinary: Negative for dysuria.   Musculoskeletal: Positive for arthralgias, gait problem, joint swelling and myalgias.   Skin: Positive for wound. Negative for color change and rash.   Neurological: Negative for dizziness, weakness and headaches.   Psychiatric/Behavioral: Negative for sleep disturbance. The patient is not nervous/anxious.          Reviewed family, medical, surgical, and social history.    Objective:      /66 (BP Location: Left arm, Patient Position: Sitting, BP Method: Small (Automatic))   Pulse 72   Resp 20   Ht 5' (1.524 m)   Wt 42 kg (92 lb 8 oz)   SpO2 96%   BMI 18.07 kg/m²   Physical Exam   Constitutional: She is oriented to person, place, and time. She appears well-developed and well-nourished.   HENT:   Head: Normocephalic.   Eyes: Pupils are equal, round, and reactive to light.   Neck: Normal range of motion.   Cardiovascular: Normal rate, S1 normal and S2 normal.   Pulmonary/Chest: Effort normal.   Abdominal: Normal appearance.   Musculoskeletal: She exhibits tenderness and deformity.   Walks with walker to maintain gait. Knees "give out" at times.    Neurological: She is alert and oriented to person, place, and time.   Skin: Skin is warm and dry. Capillary refill takes less than 2 seconds.        Psychiatric: Her speech is normal. Cognition and memory are normal.   Vitals reviewed.      Assessment:       1. Essential hypertension    2. Spondylosis of cervical region without " myelopathy or radiculopathy    3. Weight loss    4. Asymptomatic menopausal state        Plan:       Essential hypertension    Spondylosis of cervical region without myelopathy or radiculopathy    Weight loss  -     predniSONE (DELTASONE) 20 MG tablet; Take 1 tablet (20 mg total) by mouth 2 (two) times daily. for 5 days  Dispense: 10 tablet; Refill: 0  -     cyproheptadine (PERIACTIN) 4 mg tablet; Take 1 tablet (4 mg total) by mouth 3 (three) times daily as needed.  Dispense: 90 tablet; Refill: 2    Asymptomatic menopausal state  -     DXA Bone Density Spine And Hip; Future; Expected date: 08/28/2019            Risks, benefits, and side effects were discussed with the patient. All questions were answered to the fullest satisfaction of the patient, and pt verbalized understanding and agreement to treatment plan. Pt was to call with any new or worsening symptoms, or present to the ER.

## 2019-08-30 ENCOUNTER — TELEPHONE (OUTPATIENT)
Dept: FAMILY MEDICINE | Facility: CLINIC | Age: 69
End: 2019-08-30

## 2019-08-30 RX ORDER — FLUCONAZOLE 150 MG/1
150 TABLET ORAL DAILY
Qty: 1 TABLET | Refills: 0 | Status: SHIPPED | OUTPATIENT
Start: 2019-08-30 | End: 2019-08-31

## 2019-08-30 NOTE — TELEPHONE ENCOUNTER
----- Message from Ynes Cavazos sent at 8/30/2019 12:40 PM CDT -----  Contact: Obdulia  Type: Needs Medical Advice    Who Called:  patient  Symptoms (please be specific):  Burning & itching  How long has patient had these symptoms:  1 week  Pharmacy name and phone #:    [id number] - [place], [place] - [address]  [address] [place] [address]  Phone: [phone] Fax: [phone]    Best Call Back Number: [phone] (home)   Additional Information: patient has a yeast infection due to Abx & wants to know if something can be called in for her? Please advise--thank you

## 2019-09-03 ENCOUNTER — TELEPHONE (OUTPATIENT)
Dept: ORTHOPEDICS | Facility: CLINIC | Age: 69
End: 2019-09-03

## 2019-09-03 NOTE — TELEPHONE ENCOUNTER
----- Message from Hilda Shell MA sent at 9/3/2019  3:13 PM CDT -----  Contact: pt   Pt states she is in pain, and cannot wait for 09/11/2019 (next available appt)  Call back

## 2019-09-03 NOTE — TELEPHONE ENCOUNTER
Returned a call to the patient to discuss scheduling her an appointment for her left shoulder. She says she is in immense pain that began last night 9/2. Advised that unfortunately, Dr. Remy has absolutely no openings this week due to him being out of town. She verbalized understanding and accepted an appointment on 09/11 at 3pm.

## 2019-09-04 ENCOUNTER — PATIENT OUTREACH (OUTPATIENT)
Dept: ADMINISTRATIVE | Facility: HOSPITAL | Age: 69
End: 2019-09-04

## 2019-09-04 NOTE — LETTER
September 4, 2019    Obdulia Yepez  [address] [place] [address]             [facility]  [address]  [facility] [address]  Phone: [phone] Dear [facility] is committed to your overall health and would like to ensure that you are up to date on your recommended test and/or procedures.   Og Perez MD  has found that your chart shows you may be due for the following:    MAMMOGRAM  BONE MINERAL DENSITY SCAN    If you have had any of the above done at another facility, please let us know so that we may obtain copies from that facility.  If you have a copy of these records, please provide a copy for us to scan into your chart.  You are welcome to request that the report be faxed to us at  ([phone]).     Otherwise, please schedule these appointments at your earliest convenience by calling [phone] or going to [email or web address].    If you have an upcoming scheduled appointment for the item above, please disregard this letter.    Sincerely,  Your [facility]  MD Silvia Brown L.P.N. Clinical Care Coordinator  [address] [place] [address], [place] [address] [phone]

## 2019-09-05 ENCOUNTER — TELEPHONE (OUTPATIENT)
Dept: FAMILY MEDICINE | Facility: CLINIC | Age: 69
End: 2019-09-05

## 2019-09-05 NOTE — TELEPHONE ENCOUNTER
----- Message from Mayte Lu sent at 9/5/2019  1:33 PM CDT -----  Contact: Christiana with [facility]  Type: Needs Medical Advice    Who Called:  Christiana  Best Call Back Number: [phone]  Additional Information: Christiana is stating that the patient had an unwitnessed fall and denies injury.Please call back and advise.

## 2019-09-05 NOTE — TELEPHONE ENCOUNTER
BRENDAI:  Christiana,   stated that pt was on the floor when she went to check on her this morning, but she did not have any visible injuries other than two abrasions on her back from scooting around on the floor. Pt admitted that she had taken a muscle relaxer for shoulder pain and when she went to stand up, she just fell over. Pt vitals were all WNL. Christiana will go back out tomorrow to check on the pt. This nurse called the pt to check on her and pt stated that she is feeling fine.

## 2019-09-06 NOTE — TELEPHONE ENCOUNTER
Pt informed to d/c muscle relaxer-voiced understanding.   States she is feeling much better, and has seen Dr. Myers post fall.  No other concerns at this time.

## 2019-09-16 ENCOUNTER — TELEPHONE (OUTPATIENT)
Dept: FAMILY MEDICINE | Facility: CLINIC | Age: 69
End: 2019-09-16

## 2019-09-16 NOTE — TELEPHONE ENCOUNTER
----- Message from Kae Menon sent at 9/16/2019  1:51 PM CDT -----  Contact: [facility] [phone]  She is calling to report that she had an unattended fall Saturday night and another yesterday afternoon.  Thank you!

## 2019-09-18 ENCOUNTER — LAB VISIT (OUTPATIENT)
Dept: LAB | Facility: HOSPITAL | Age: 69
End: 2019-09-18
Attending: FAMILY MEDICINE
Payer: MEDICARE

## 2019-09-18 ENCOUNTER — OFFICE VISIT (OUTPATIENT)
Dept: FAMILY MEDICINE | Facility: CLINIC | Age: 69
End: 2019-09-18
Payer: MEDICARE

## 2019-09-18 VITALS
DIASTOLIC BLOOD PRESSURE: 64 MMHG | RESPIRATION RATE: 20 BRPM | OXYGEN SATURATION: 99 % | HEIGHT: 60 IN | WEIGHT: 91.81 LBS | HEART RATE: 85 BPM | SYSTOLIC BLOOD PRESSURE: 101 MMHG | BODY MASS INDEX: 18.02 KG/M2

## 2019-09-18 DIAGNOSIS — R63.4 WEIGHT LOSS: Primary | ICD-10-CM

## 2019-09-18 DIAGNOSIS — Z23 NEED FOR VACCINATION: ICD-10-CM

## 2019-09-18 DIAGNOSIS — R63.4 WEIGHT LOSS: ICD-10-CM

## 2019-09-18 DIAGNOSIS — K92.1 HEMATOCHEZIA: ICD-10-CM

## 2019-09-18 LAB
ALBUMIN SERPL BCP-MCNC: 2.9 G/DL (ref 3.5–5.2)
ALP SERPL-CCNC: 125 U/L (ref 55–135)
ALT SERPL W/O P-5'-P-CCNC: 17 U/L (ref 10–44)
ANION GAP SERPL CALC-SCNC: 11 MMOL/L (ref 8–16)
AST SERPL-CCNC: 24 U/L (ref 10–40)
BASOPHILS # BLD AUTO: 0.02 K/UL (ref 0–0.2)
BASOPHILS NFR BLD: 0.2 % (ref 0–1.9)
BILIRUB SERPL-MCNC: 0.4 MG/DL (ref 0.1–1)
BUN SERPL-MCNC: 21 MG/DL (ref 8–23)
CALCIUM SERPL-MCNC: 9 MG/DL (ref 8.7–10.5)
CHLORIDE SERPL-SCNC: 99 MMOL/L (ref 95–110)
CO2 SERPL-SCNC: 21 MMOL/L (ref 23–29)
CREAT SERPL-MCNC: 1 MG/DL (ref 0.5–1.4)
DIFFERENTIAL METHOD: ABNORMAL
EOSINOPHIL # BLD AUTO: 0.1 K/UL (ref 0–0.5)
EOSINOPHIL NFR BLD: 0.6 % (ref 0–8)
ERYTHROCYTE [DISTWIDTH] IN BLOOD BY AUTOMATED COUNT: 14.6 % (ref 11.5–14.5)
ERYTHROCYTE [SEDIMENTATION RATE] IN BLOOD BY WESTERGREN METHOD: 43 MM/HR (ref 0–20)
EST. GFR  (AFRICAN AMERICAN): >60 ML/MIN/1.73 M^2
EST. GFR  (NON AFRICAN AMERICAN): 58.4 ML/MIN/1.73 M^2
GLUCOSE SERPL-MCNC: 102 MG/DL (ref 70–110)
HCT VFR BLD AUTO: 30.8 % (ref 37–48.5)
HGB BLD-MCNC: 10.1 G/DL (ref 12–16)
IMM GRANULOCYTES # BLD AUTO: 0.02 K/UL (ref 0–0.04)
IMM GRANULOCYTES NFR BLD AUTO: 0.2 % (ref 0–0.5)
LDH SERPL L TO P-CCNC: 122 U/L (ref 110–260)
LYMPHOCYTES # BLD AUTO: 0.5 K/UL (ref 1–4.8)
LYMPHOCYTES NFR BLD: 4.7 % (ref 18–48)
MCH RBC QN AUTO: 33 PG (ref 27–31)
MCHC RBC AUTO-ENTMCNC: 32.8 G/DL (ref 32–36)
MCV RBC AUTO: 101 FL (ref 82–98)
MONOCYTES # BLD AUTO: 0.4 K/UL (ref 0.3–1)
MONOCYTES NFR BLD: 3.5 % (ref 4–15)
NEUTROPHILS # BLD AUTO: 9.5 K/UL (ref 1.8–7.7)
NEUTROPHILS NFR BLD: 90.8 % (ref 38–73)
NRBC BLD-RTO: 0 /100 WBC
PLATELET # BLD AUTO: 258 K/UL (ref 150–350)
PMV BLD AUTO: 9.9 FL (ref 9.2–12.9)
POTASSIUM SERPL-SCNC: 4.2 MMOL/L (ref 3.5–5.1)
PROT SERPL-MCNC: 6 G/DL (ref 6–8.4)
RBC # BLD AUTO: 3.06 M/UL (ref 4–5.4)
SODIUM SERPL-SCNC: 131 MMOL/L (ref 136–145)
T4 FREE SERPL-MCNC: 1.05 NG/DL (ref 0.71–1.51)
TSH SERPL DL<=0.005 MIU/L-ACNC: 1.11 UIU/ML (ref 0.34–5.6)
URATE SERPL-MCNC: 1.7 MG/DL (ref 2.4–5.7)
WBC # BLD AUTO: 10.42 K/UL (ref 3.9–12.7)

## 2019-09-18 PROCEDURE — 99999 PR PBB SHADOW E&M-EST. PATIENT-LVL III: ICD-10-PCS | Mod: PBBFAC,,, | Performed by: FAMILY MEDICINE

## 2019-09-18 PROCEDURE — 85025 COMPLETE CBC W/AUTO DIFF WBC: CPT

## 2019-09-18 PROCEDURE — 99213 OFFICE O/P EST LOW 20 MIN: CPT | Mod: PBBFAC,PN,25 | Performed by: FAMILY MEDICINE

## 2019-09-18 PROCEDURE — 99214 PR OFFICE/OUTPT VISIT, EST, LEVL IV, 30-39 MIN: ICD-10-PCS | Mod: S$PBB,,, | Performed by: FAMILY MEDICINE

## 2019-09-18 PROCEDURE — 84443 ASSAY THYROID STIM HORMONE: CPT

## 2019-09-18 PROCEDURE — 36415 COLL VENOUS BLD VENIPUNCTURE: CPT

## 2019-09-18 PROCEDURE — 99999 PR PBB SHADOW E&M-EST. PATIENT-LVL III: CPT | Mod: PBBFAC,,, | Performed by: FAMILY MEDICINE

## 2019-09-18 PROCEDURE — 84439 ASSAY OF FREE THYROXINE: CPT

## 2019-09-18 PROCEDURE — 80053 COMPREHEN METABOLIC PANEL: CPT

## 2019-09-18 PROCEDURE — 85651 RBC SED RATE NONAUTOMATED: CPT

## 2019-09-18 PROCEDURE — 90662 IIV NO PRSV INCREASED AG IM: CPT | Mod: PBBFAC,PN

## 2019-09-18 PROCEDURE — 99214 OFFICE O/P EST MOD 30 MIN: CPT | Mod: S$PBB,,, | Performed by: FAMILY MEDICINE

## 2019-09-18 PROCEDURE — 83615 LACTATE (LD) (LDH) ENZYME: CPT

## 2019-09-18 PROCEDURE — 84550 ASSAY OF BLOOD/URIC ACID: CPT

## 2019-09-18 NOTE — PROGRESS NOTES
"Subjective:       Patient ID: Obdulia Yepez is a 69 y.o. female.    Chief Complaint: Follow-up and Flu Vaccine    Weight loss  Anemia  Despite adequate nutrition  No fever  Some hematochezia    Review of Systems   Constitutional: Negative for appetite change, chills, fatigue and fever.   HENT: Negative for congestion, postnasal drip, rhinorrhea and sore throat.    Genitourinary: Negative for dysuria.   Musculoskeletal: Positive for arthralgias, gait problem, joint swelling and myalgias.   Skin: Positive for wound. Negative for color change and rash.   Neurological: Negative for dizziness, weakness and headaches.   Psychiatric/Behavioral: Negative for sleep disturbance. The patient is not nervous/anxious.          Reviewed family, medical, surgical, and social history.    Objective:      /64 (BP Location: Left arm, Patient Position: Sitting, BP Method: Small (Automatic))   Pulse 85   Resp 20   Ht 5' (1.524 m)   Wt 41.6 kg (91 lb 12.8 oz)   SpO2 99%   BMI 17.93 kg/m²   Physical Exam   Constitutional: She is oriented to person, place, and time. She appears well-developed and well-nourished.   HENT:   Head: Normocephalic.   Eyes: Pupils are equal, round, and reactive to light.   Neck: Normal range of motion.   Cardiovascular: Normal rate, S1 normal and S2 normal.   Pulmonary/Chest: Effort normal.   Abdominal: Normal appearance.   Musculoskeletal: She exhibits tenderness and deformity.   Walks with walker to maintain gait. Knees "give out" at times.    Neurological: She is alert and oriented to person, place, and time.   Skin: Skin is warm and dry. Capillary refill takes less than 2 seconds.        Psychiatric: Her speech is normal. Cognition and memory are normal.   Vitals reviewed.      Assessment:       1. Weight loss    2. Need for vaccination    3. Hematochezia        Plan:       Weight loss  -     CBC auto differential; Future; Expected date: 09/18/2019  -     Lactate dehydrogenase (LDH); Future; " Expected date: 09/18/2019  -     Uric acid; Future; Expected date: 09/18/2019  -     Sedimentation rate; Future; Expected date: 09/18/2019  -     Comprehensive metabolic panel; Future; Expected date: 09/18/2019  -     Urinalysis  -     TSH; Future; Expected date: 09/18/2019  -     T4, free; Future; Expected date: 09/18/2019  -     Ambulatory referral to Gastroenterology    Need for vaccination  -     Influenza - High Dose (65+) (PF) (IM)  -     CBC auto differential; Future; Expected date: 09/18/2019  -     Lactate dehydrogenase (LDH); Future; Expected date: 09/18/2019  -     Uric acid; Future; Expected date: 09/18/2019  -     Sedimentation rate; Future; Expected date: 09/18/2019  -     Comprehensive metabolic panel; Future; Expected date: 09/18/2019  -     Urinalysis  -     TSH; Future; Expected date: 09/18/2019  -     T4, free; Future; Expected date: 09/18/2019    Hematochezia            Risks, benefits, and side effects were discussed with the patient. All questions were answered to the fullest satisfaction of the patient, and pt verbalized understanding and agreement to treatment plan. Pt was to call with any new or worsening symptoms, or present to the ER.

## 2019-09-19 DIAGNOSIS — R63.4 WEIGHT LOSS: ICD-10-CM

## 2019-09-19 RX ORDER — CYPROHEPTADINE HYDROCHLORIDE 4 MG/1
4 TABLET ORAL 3 TIMES DAILY PRN
Qty: 90 TABLET | Refills: 2 | Status: SHIPPED | OUTPATIENT
Start: 2019-09-19

## 2019-10-01 RX ORDER — GABAPENTIN 300 MG/1
CAPSULE ORAL
Qty: 90 CAPSULE | Refills: 3 | Status: SHIPPED | OUTPATIENT
Start: 2019-10-01 | End: 2019-10-02 | Stop reason: SDUPTHER

## 2019-10-02 ENCOUNTER — OFFICE VISIT (OUTPATIENT)
Dept: FAMILY MEDICINE | Facility: CLINIC | Age: 69
End: 2019-10-02
Payer: MEDICARE

## 2019-10-02 VITALS
HEIGHT: 60 IN | WEIGHT: 87 LBS | RESPIRATION RATE: 20 BRPM | HEART RATE: 84 BPM | DIASTOLIC BLOOD PRESSURE: 72 MMHG | BODY MASS INDEX: 17.08 KG/M2 | SYSTOLIC BLOOD PRESSURE: 96 MMHG

## 2019-10-02 DIAGNOSIS — R31.0 GROSS HEMATURIA: ICD-10-CM

## 2019-10-02 DIAGNOSIS — R63.4 WEIGHT LOSS: Primary | ICD-10-CM

## 2019-10-02 DIAGNOSIS — D64.9 ANEMIA, UNSPECIFIED TYPE: ICD-10-CM

## 2019-10-02 PROCEDURE — 99999 PR PBB SHADOW E&M-EST. PATIENT-LVL IV: ICD-10-PCS | Mod: PBBFAC,,, | Performed by: FAMILY MEDICINE

## 2019-10-02 PROCEDURE — 99999 PR PBB SHADOW E&M-EST. PATIENT-LVL IV: CPT | Mod: PBBFAC,,, | Performed by: FAMILY MEDICINE

## 2019-10-02 PROCEDURE — 99214 OFFICE O/P EST MOD 30 MIN: CPT | Mod: PBBFAC,PN | Performed by: FAMILY MEDICINE

## 2019-10-02 PROCEDURE — 99214 PR OFFICE/OUTPT VISIT, EST, LEVL IV, 30-39 MIN: ICD-10-PCS | Mod: S$PBB,,, | Performed by: FAMILY MEDICINE

## 2019-10-02 PROCEDURE — 99214 OFFICE O/P EST MOD 30 MIN: CPT | Mod: S$PBB,,, | Performed by: FAMILY MEDICINE

## 2019-10-02 RX ORDER — ESCITALOPRAM OXALATE 20 MG/1
TABLET ORAL
Qty: 30 TABLET | Refills: 11 | Status: ON HOLD | OUTPATIENT
Start: 2019-10-02 | End: 2019-12-10

## 2019-10-02 RX ORDER — GABAPENTIN 300 MG/1
900 CAPSULE ORAL 3 TIMES DAILY
Qty: 270 CAPSULE | Refills: 3 | Status: SHIPPED | OUTPATIENT
Start: 2019-10-02

## 2019-10-02 RX ORDER — ARIPIPRAZOLE 5 MG/1
TABLET ORAL
Qty: 30 TABLET | Refills: 2 | Status: ON HOLD | OUTPATIENT
Start: 2019-10-02 | End: 2019-12-10

## 2019-10-02 NOTE — PROGRESS NOTES
"Subjective:       Patient ID: Obdulia Yepez is a 69 y.o. female.    Chief Complaint: Follow-up (BW review)    Weight loss  Anemia  Despite adequate nutrition  No fever  Some hematochezia    Review of Systems   Constitutional: Negative for appetite change, chills, fatigue and fever.   HENT: Negative for congestion, postnasal drip, rhinorrhea and sore throat.    Genitourinary: Negative for dysuria.   Musculoskeletal: Positive for arthralgias, gait problem, joint swelling and myalgias.   Skin: Positive for wound. Negative for color change and rash.   Neurological: Negative for dizziness, weakness and headaches.   Psychiatric/Behavioral: Negative for sleep disturbance. The patient is not nervous/anxious.          Reviewed family, medical, surgical, and social history.    Objective:      BP 96/72 (BP Location: Left arm, Patient Position: Standing, BP Method: Small (Automatic))   Pulse 84   Resp 20   Ht 5' (1.524 m)   Wt 39.5 kg (87 lb)   BMI 16.99 kg/m²   Physical Exam   Constitutional: She is oriented to person, place, and time. She appears well-developed and well-nourished.   HENT:   Head: Normocephalic.   Eyes: Pupils are equal, round, and reactive to light.   Neck: Normal range of motion.   Cardiovascular: Normal rate, S1 normal and S2 normal.   Pulmonary/Chest: Effort normal.   Abdominal: Normal appearance.   Musculoskeletal: She exhibits tenderness and deformity.   Walks with walker to maintain gait. Knees "give out" at times.    Neurological: She is alert and oriented to person, place, and time.   Skin: Skin is warm and dry. Capillary refill takes less than 2 seconds.        Psychiatric: Her speech is normal. Cognition and memory are normal.   Vitals reviewed.      Assessment:       1. Weight loss    2. Anemia, unspecified type    3. Gross hematuria        Plan:       Weight loss  -     Ambulatory referral to Gastroenterology  -     Ambulatory referral to Hematology / Oncology    Anemia, unspecified type  -    "  Ambulatory referral to Gastroenterology  -     Ambulatory referral to Hematology / Oncology    Gross hematuria  -     Cancel: Urinalysis  -     Cancel: Urine culture  -     Urinalysis; Future; Expected date: 10/02/2019  -     Urine culture; Future; Expected date: 10/02/2019    Other orders  -     ARIPiprazole (ABILIFY) 5 MG Tab; aripiprazole 5 mg tablet  TAKE 1 TABLET BY MOUTH AT BEDTIMES  Dispense: 30 tablet; Refill: 2  -     gabapentin (NEURONTIN) 300 MG capsule; Take 3 capsules (900 mg total) by mouth 3 (three) times daily.  Dispense: 270 capsule; Refill: 3            Risks, benefits, and side effects were discussed with the patient. All questions were answered to the fullest satisfaction of the patient, and pt verbalized understanding and agreement to treatment plan. Pt was to call with any new or worsening symptoms, or present to the ER.

## 2019-10-03 ENCOUNTER — OFFICE VISIT (OUTPATIENT)
Dept: GASTROENTEROLOGY | Facility: CLINIC | Age: 69
End: 2019-10-03
Payer: MEDICARE

## 2019-10-03 VITALS
DIASTOLIC BLOOD PRESSURE: 82 MMHG | SYSTOLIC BLOOD PRESSURE: 117 MMHG | BODY MASS INDEX: 16.69 KG/M2 | WEIGHT: 85 LBS | HEIGHT: 60 IN | HEART RATE: 102 BPM | RESPIRATION RATE: 16 BRPM | OXYGEN SATURATION: 98 %

## 2019-10-03 DIAGNOSIS — E53.8 B12 DEFICIENCY: Primary | ICD-10-CM

## 2019-10-03 DIAGNOSIS — Z98.84 HISTORY OF ROUX-EN-Y GASTRIC BYPASS: ICD-10-CM

## 2019-10-03 DIAGNOSIS — R63.4 WEIGHT LOSS, NON-INTENTIONAL: ICD-10-CM

## 2019-10-03 DIAGNOSIS — Z90.49 HISTORY OF CHOLECYSTECTOMY: ICD-10-CM

## 2019-10-03 DIAGNOSIS — K59.00 CONSTIPATION, UNSPECIFIED CONSTIPATION TYPE: ICD-10-CM

## 2019-10-03 PROCEDURE — 99203 OFFICE O/P NEW LOW 30 MIN: CPT | Mod: S$PBB,,, | Performed by: INTERNAL MEDICINE

## 2019-10-03 PROCEDURE — 99999 PR PBB SHADOW E&M-EST. PATIENT-LVL III: CPT | Mod: PBBFAC,,, | Performed by: INTERNAL MEDICINE

## 2019-10-03 PROCEDURE — 99213 OFFICE O/P EST LOW 20 MIN: CPT | Mod: PBBFAC,PN | Performed by: INTERNAL MEDICINE

## 2019-10-03 PROCEDURE — 99999 PR PBB SHADOW E&M-EST. PATIENT-LVL III: ICD-10-PCS | Mod: PBBFAC,,, | Performed by: INTERNAL MEDICINE

## 2019-10-03 PROCEDURE — 96372 THER/PROPH/DIAG INJ SC/IM: CPT | Mod: PBBFAC,PN

## 2019-10-03 PROCEDURE — 99203 PR OFFICE/OUTPT VISIT, NEW, LEVL III, 30-44 MIN: ICD-10-PCS | Mod: S$PBB,,, | Performed by: INTERNAL MEDICINE

## 2019-10-03 RX ORDER — CYANOCOBALAMIN 1000 UG/ML
1000 INJECTION, SOLUTION INTRAMUSCULAR; SUBCUTANEOUS
Status: COMPLETED | OUTPATIENT
Start: 2019-10-03 | End: 2019-10-03

## 2019-10-03 RX ADMIN — CYANOCOBALAMIN 1000 MCG: 1000 INJECTION INTRAMUSCULAR; SUBCUTANEOUS at 11:10

## 2019-10-03 NOTE — PATIENT INSTRUCTIONS
She will try the 3 meals per day.  Continue her protein shakes.  She is given the vitamin B12 today.  She will need a cardiac clearance.  In the interval schedule a barium enema and I have requested the [facility] ([place], [place]) emergency records.  Do not take the ibuprofen

## 2019-10-03 NOTE — LETTER
Cardiac Clearance Form    PLEASE PROVIDE ALL INFORMATION      Date : 10/03/2019  Dr. Shakeel Perez     Please provide us with WRITTEN Cardiac Clearance on Ms. Obdulia Yepez.  : 1950.        Please send any test results such as : EKG, Holter Monitor, Echocardiogram and or Stress tests.      Patient will be scheduled for an EGD under General anesthesia.       PLEASE FAX THIS CLEARANCE WITH TEST RESULTS -[phone]  Thank you for your help in this matter.    Sincerely,  Bozena Garg RN  Phone- [phone]  Fax- [phone]

## 2019-10-03 NOTE — PROGRESS NOTES
Subjective:       Patient ID: Obdulia Yepez is a 69 y.o. female.    Chief Complaint: Weight Loss    She is here for weight loss.  Thirteen years ago she had gastric bypass surgery.  She lost from 224 lb to 120 lb a year later.  She states that she was told to chew her food very well. Therefore it took her a long time to ingest the food and this helped her weight loss.  She was eating fairly well until the sudden death of her son in February in an automobile accident.  She believes that she has internalized the depression and sadness associated with this accident.  She is eating very poorly.  She states that she is allergic to lactose soy  and peanuts.  She has been using almond milk.  She frequently does not heat breakfast or lunch.  She eats a lot of pasta.  It appears that she is ingesting 800-1000 calories per day.  She has lost approximately 20 lb since December.  She has become very weak I could not open the refrigerator door .  She ambulates with a walker because of frequent falling.  Her balance is poor.  She had 3 surgeries on her shoulders over 3 months.  Time after falling.  She has physical therapy and occupational therapy.  She went to the emergency room recently for a rhythm disturbance.  She has been followed by her cardiologist Dr. Aniket Joyner and was told her cardiac status is stable. She is on a beta-blocker.  She denies cardiopulmonary symptoms but she is not physically active.  She has had occasional diarrhea.  She denies dysphagia nausea vomiting and generally has normal bowel movements.  She denies hematemesis hematochezia jaundice or bleeding.  She denies fever chills.  Two years ago she was hospitalized for several weeks because of sepsis due to urinary tract infection .  She went from the hospital to the rehab portion.  A year later she had a similar event.  She denies  symptoms at this time.      Allergies:  Review of patient's allergies indicates:   Allergen Reactions    Amoxicillin  Diarrhea    Latex     Milk containing products     Opioids - morphine analogues     Peanut     Sodium phosphate     Soy     Sulfa (sulfonamide antibiotics)        Medications:    Current Outpatient Medications:     allopurinol (ZYLOPRIM) 300 MG tablet, TAKE 1 TABLET BY MOUTH DAILY, Disp: 30 tablet, Rfl: 14    ALPRAZolam (XANAX) 0.25 MG tablet, Take 1 tablet (0.25 mg total) by mouth 3 (three) times daily. for 7 days, Disp: 21 tablet, Rfl: 0    amoxicillin-clavulanate 875-125mg (AUGMENTIN) 875-125 mg per tablet, Take 1 tablet by mouth 2 (two) times daily., Disp: 20 tablet, Rfl: 0    ARIPiprazole (ABILIFY) 5 MG Tab, aripiprazole 5 mg tablet  TAKE 1 TABLET BY MOUTH AT BEDTIMES, Disp: 30 tablet, Rfl: 2    blood sugar diagnostic (BLOOD GLUCOSE TEST) Strp, OneTouch Ultra Test strips  USE AS DIRECTED, Disp: , Rfl:     blood-glucose meter (ONETOUCH ULTRA2) kit, OneTouch Ultra2 kit  USE AS DIRECTED, Disp: , Rfl:     budesonide-formoterol 160-4.5 mcg (SYMBICORT) 160-4.5 mcg/actuation HFAA, Symbicort 160 mcg-4.5 mcg/actuation HFA aerosol inhaler  Inhale 2 puffs twice a day by inhalation route., Disp: , Rfl:     busPIRone (BUSPAR) 5 MG Tab, Take 1 tablet (5 mg total) by mouth 2 (two) times daily., Disp: 60 tablet, Rfl: 11    ciprofloxacin HCl (CIPRO) 500 MG tablet, Take 1 tablet (500 mg total) by mouth 2 (two) times daily., Disp: 20 tablet, Rfl: 0    clindamycin (CLEOCIN) 300 MG capsule, TAKE ONE CAPSULE 3 TIMES DAILY, Disp: , Rfl: 0    colestipol (COLESTID) 1 gram Tab, Take 1 tablet (1 g total) by mouth 2 (two) times daily., Disp: 180 tablet, Rfl: 3    cyclobenzaprine (FLEXERIL) 10 MG tablet, Take 1 tablet (10 mg total) by mouth 3 (three) times daily as needed., Disp: 90 tablet, Rfl: 0    cyproheptadine (PERIACTIN) 4 mg tablet, TAKE 1 TABLET (4 MG TOTAL) BY MOUTH 3 (THREE) TIMES DAILY AS NEEDED., Disp: 90 tablet, Rfl: 2    escitalopram oxalate (LEXAPRO) 20 MG tablet, TAKE 1 TABLET BY MOUTH EVERY DAY, Disp:  30 tablet, Rfl: 11    fluticasone (FLONASE) 50 mcg/actuation nasal spray, fluticasone 50 mcg/actuation nasal spray,suspension  Inhale 1 spray every day by intranasal route., Disp: , Rfl:     folic acid (FOLVITE) 1 MG tablet, folic acid 1 mg tablet  TAKE 1 TABLET BY MOUTH EVERY DAY, Disp: , Rfl:     gabapentin (NEURONTIN) 300 MG capsule, Take 3 capsules (900 mg total) by mouth 3 (three) times daily., Disp: 270 capsule, Rfl: 3    hydrocortisone (CORTEF) 10 MG Tab, TAKE 2 TABLETS BY MOUTH IN THE MORNING AND 1 TABLET IN THE EVENING, Disp: , Rfl: 0    ibuprofen (ADVIL,MOTRIN) 800 MG tablet, Take 1 tablet (800 mg total) by mouth 3 (three) times daily., Disp: 90 tablet, Rfl: 3    ipratropium (ATROVENT) 0.03 % nasal spray, 2 sprays by Nasal route 3 (three) times daily., Disp: 30 mL, Rfl: 2    K-PHOS ORIGINAL 500 mg TbSO, TAKE ONE TABLET BY MOUTH WITH MEALS AT BEDTIME, Disp: , Rfl: 0    lancets (ONETOUCH DELICA LANCETS) 33 gauge Misc, OneTouch Delica Lancets 33 gauge  USE AS DIRECTED, Disp: , Rfl:     lidocaine HCl 2% (LIDOCAINE VISCOUS) 2 % Soln, Lidocaine Viscous 2 % mucosal solution  TAKE 5 ML EVERY 3 HOURS BY ORAL ROUTE AS NEEDED., Disp: , Rfl:     meropenem (MERREM) 1 gram injection, , Disp: , Rfl: 0    metoprolol succinate (TOPROL-XL) 50 MG 24 hr tablet, Take 1 tablet (50 mg total) by mouth once daily., Disp: 30 tablet, Rfl: 2    mirtazapine (REMERON) 15 MG tablet, Take 1 tablet (15 mg total) by mouth every evening., Disp: 30 tablet, Rfl: 11    montelukast (SINGULAIR) 10 mg tablet, TAKE 1 TABLET(S) EVERY DAY BY ORAL ROUTE., Disp: 30 tablet, Rfl: 11    mupirocin (BACTROBAN) 2 % ointment, mupirocin 2 % topical ointment  APPLY A SMALL AMOUNT TO THE AFFECTED AREA BY TOPICAL ROUTE 2 TIMES PER DAY, Disp: , Rfl:     ondansetron (ZOFRAN-ODT) 4 MG TbDL, ondansetron 4 mg disintegrating tablet  DISINTEGRATE 1 TABLET BY MOUTH EVERY 6 HOURS AS NEEDED FOR NAUSEA AND VOMITING, Disp: , Rfl:     ONETOUCH ULTRASOFT  LANCETS MISC, OneTouch UltraSoft Lancets  USE AS DIRECTED, Disp: , Rfl:     oxybutynin (DITROPAN-XL) 5 MG TR24, oxybutynin chloride ER 5 mg tablet,extended release 24 hr  TAKE 1 TABLET BY MOUTH EVERY DAY, Disp: , Rfl:     oxyCODONE-acetaminophen (PERCOCET)  mg per tablet, Take 1 tablet by mouth every 8 (eight) hours as needed for Pain., Disp: 90 tablet, Rfl: 0    oxyCODONE-acetaminophen (PERCOCET) 5-325 mg per tablet, Take 1 tablet by mouth every 4 (four) hours as needed for Pain., Disp: , Rfl:     pantoprazole (PROTONIX) 40 MG tablet, TAKE 1 TABLET(S) EVERY DAY BY ORAL ROUTE., Disp: 30 tablet, Rfl: 11    triamcinolone acetonide 0.1% (KENALOG) 0.1 % Lotn, triamcinolone acetonide 0.1 % lotion  APPLY A THIN LAYER TO THE AFFECTED AREA(S) BY TOPICAL ROUTE 2 TIMES PER DAY, Disp: , Rfl:     Current Facility-Administered Medications:     0.9%  NaCl infusion (for blood administration), , Intravenous, Q24H PRN, Og Perez MD    acetaminophen tablet 650 mg, 650 mg, Oral, PRN, Og Perez MD    cyanocobalamin injection 1,000 mcg, 1,000 mcg, Intramuscular, 1 time in Clinic/HOD, Shakeel Perez MD    Facility-Administered Medications Ordered in Other Visits:     hylan g-f 20 48 mg/6 mL injection 48 mg, 48 mg, Intra-articular, , Esteban Remy DO, 48 mg at 19 1603    hylan g-f 20 48 mg/6 mL injection 48 mg, 48 mg, Intra-articular, , Esteban Remy DO, 48 mg at 19 1603    triamcinolone acetonide injection 80 mg, 80 mg, Intra-articular, , Esteban Remy DO, 80 mg at 19 1603    Past Medical History:   Diagnosis Date    Allergy     Arthritis     Asthma     Depression     Gout     Hypertension        Past Surgical History:   Procedure Laterality Date    APPENDECTOMY       SECTION      CHOLECYSTECTOMY      HYSTERECTOMY      SHOULDER SURGERY Right 2019    STOMACH SURGERY      WRIST SURGERY Left          Review of Systems   Constitutional: Negative for  appetite change, fever and unexpected weight change.   HENT: Negative for trouble swallowing.         No jaundice.   Respiratory: Negative for cough, shortness of breath and wheezing.         She has never been a cigarette smoker.  She denies chronic cough aspiration or chronic sputum production.   Cardiovascular: Negative for chest pain.   Gastrointestinal: Negative for abdominal distention, abdominal pain, anal bleeding, blood in stool, constipation, diarrhea, nausea, rectal pain and vomiting.        She has had occasional episodes of diarrhea.  She cannot pinpoint a precipitating factor.  This is been a minor problem.  She denies abdominal pain.  She has had a cholecystectomy.   Musculoskeletal: Positive for arthralgias and gait problem. Negative for back pain and neck pain.        She describes as being very weak.  She can use a rolling walker.  She has been operated on her shoulders and has discomfort.  She has lost a lot of muscle mass and she feels that she can't do the simple things I can't open the refrigerator door.  She can ambulate with a rolling walker but because of the muscle weakness her duration of activity is limited.   Skin: Negative for pallor and rash.   Neurological: Negative for dizziness, seizures, syncope, speech difficulty, weakness and numbness.   Hematological: Negative for adenopathy.   Psychiatric/Behavioral: Negative for confusion.        She is very sad and tearful when she talks about the death of her son an automobile accident in February.  He burned to death .  She has had difficulty sleeping.       Objective:      Physical Exam   Constitutional: She is oriented to person, place, and time.   Thin nonicteric white female.  She has sarcopenia.   HENT:   Head: Normocephalic.   Eyes: Pupils are equal, round, and reactive to light. EOM are normal.   Neck: Normal range of motion. Neck supple. No tracheal deviation present. No thyromegaly present.   Cardiovascular: Normal rate,  regular rhythm and normal heart sounds.   Pulmonary/Chest: Effort normal and breath sounds normal.   Abdominal: Soft. Bowel sounds are normal.   The abdomen is soft on plane with healed scars without tenderness masses organomegaly.  Bowel sounds are normal.   Musculoskeletal: Normal range of motion.   She has trouble getting from the sitting to the standing position. She needs the walker for assistance.  She cannot get on the examination table without assistance.  She needs assistance to get from the supine to the sitting position on the exam table and then assistance to get on the floor to use her walker to ambulate to the chair   Lymphadenopathy:     She has no cervical adenopathy.   Neurological: She is alert and oriented to person, place, and time. No cranial nerve deficit.   Skin: Skin is warm and dry.   Psychiatric: She has a normal mood and affect. Her behavior is normal.   Vitals reviewed.        Plan:       B12 deficiency  -     cyanocobalamin injection 1,000 mcg    Constipation, unspecified constipation type  -     FL Barium Enema Air Contrast; Future; Expected date: 10/03/2019    Weight loss, non-intentional    History of Husam-en-Y gastric bypass    History of cholecystectomy     .  The  will encourage her oral intake she will take her protein shakes.  She will be scheduled for barium enema.  I do not think that she can tolerate anesthesia at this time.  She has profound muscle weakness. She will continue her home therapy with physical therapy and occupational therapy.  She is given the vitamin B12 today.  She will avoid the anti-inflammatory agents.  She will continue her vitamins and minerals.  She will need to have upper endoscopy.  Initially the barium enema will be a screening test.  Cardiac clearance has been requested from her cardiologist.

## 2019-10-08 ENCOUNTER — NURSE TRIAGE (OUTPATIENT)
Dept: ADMINISTRATIVE | Facility: CLINIC | Age: 69
End: 2019-10-08

## 2019-10-08 NOTE — TELEPHONE ENCOUNTER
Pt c/o BS=221/60 with dizziness for approx 3 days. Pt advised to call 911 and pt hung up phone.     Reason for Disposition   Difficult to awaken or acting confused  (e.g., disoriented, slurred speech)    Additional Information   Negative: Systolic BP < 90 and feeling dizzy, lightheaded, or weak   Negative: Started suddenly after an allergic medicine, an allergic food, or bee sting   Negative: Shock suspected (e.g., cold/pale/clammy skin, too weak to stand, low BP, rapid pulse)    Protocols used: LOW BLOOD PRESSURE-A-OH

## 2019-10-10 NOTE — PROGRESS NOTES
Requested cardiac clearance from Dr. Joyner's office via fax, second attempt.  Dr. Joyner fax #[phone] office# [phone]

## 2019-10-14 ENCOUNTER — TELEPHONE (OUTPATIENT)
Dept: FAMILY MEDICINE | Facility: CLINIC | Age: 69
End: 2019-10-14

## 2019-10-14 NOTE — TELEPHONE ENCOUNTER
----- Message from Millie Jones sent at 10/14/2019  8:45 AM CDT -----  Contact: self  Type:  Same Day Appointment Request    Caller is requesting a same day appointment.  Caller declined first available appointment listed below.      Name of Caller:  self  When is the first available appointment?    Symptoms:  Bleeding from vaginal area all weekend  Best Call Back Number:  [phone] (home)   Additional Information:   Patient would like a call back from Romelia. Thanks!

## 2019-10-14 NOTE — TELEPHONE ENCOUNTER
----- Message from Chetna Noe sent at 10/14/2019  9:54 AM CDT -----  Contact: [phone]  Patient is requesting a call back from the nurse stated she's bleeding from the vaginal area.   Requesting to see Dr Perez today.     Please call the patient upon request at phone number [phone].

## 2019-10-15 ENCOUNTER — OFFICE VISIT (OUTPATIENT)
Dept: FAMILY MEDICINE | Facility: CLINIC | Age: 69
End: 2019-10-15
Payer: MEDICARE

## 2019-10-15 ENCOUNTER — LAB VISIT (OUTPATIENT)
Dept: LAB | Facility: HOSPITAL | Age: 69
End: 2019-10-15
Attending: FAMILY MEDICINE
Payer: MEDICARE

## 2019-10-15 VITALS
RESPIRATION RATE: 20 BRPM | HEIGHT: 60 IN | BODY MASS INDEX: 16.76 KG/M2 | SYSTOLIC BLOOD PRESSURE: 102 MMHG | HEART RATE: 128 BPM | OXYGEN SATURATION: 95 % | WEIGHT: 85.38 LBS | DIASTOLIC BLOOD PRESSURE: 75 MMHG

## 2019-10-15 DIAGNOSIS — R63.4 UNEXPLAINED WEIGHT LOSS: ICD-10-CM

## 2019-10-15 DIAGNOSIS — D64.9 ANEMIA, UNSPECIFIED TYPE: Primary | ICD-10-CM

## 2019-10-15 DIAGNOSIS — N93.9 VAGINAL BLEEDING: ICD-10-CM

## 2019-10-15 DIAGNOSIS — D49.89 NEOPLASM OF ABDOMEN: ICD-10-CM

## 2019-10-15 DIAGNOSIS — D64.9 ANEMIA, UNSPECIFIED TYPE: ICD-10-CM

## 2019-10-15 LAB
ALBUMIN SERPL BCP-MCNC: 2.9 G/DL (ref 3.5–5.2)
ALP SERPL-CCNC: 100 U/L (ref 55–135)
ALT SERPL W/O P-5'-P-CCNC: 15 U/L (ref 10–44)
ANION GAP SERPL CALC-SCNC: 14 MMOL/L (ref 8–16)
AST SERPL-CCNC: 25 U/L (ref 10–40)
BASOPHILS # BLD AUTO: 0.05 K/UL (ref 0–0.2)
BASOPHILS NFR BLD: 0.7 % (ref 0–1.9)
BILIRUB SERPL-MCNC: 0.6 MG/DL (ref 0.1–1)
BUN SERPL-MCNC: 19 MG/DL (ref 8–23)
CALCIUM SERPL-MCNC: 9.3 MG/DL (ref 8.7–10.5)
CHLORIDE SERPL-SCNC: 109 MMOL/L (ref 95–110)
CO2 SERPL-SCNC: 17 MMOL/L (ref 23–29)
CREAT SERPL-MCNC: 1.3 MG/DL (ref 0.5–1.4)
DIFFERENTIAL METHOD: ABNORMAL
EOSINOPHIL # BLD AUTO: 0.1 K/UL (ref 0–0.5)
EOSINOPHIL NFR BLD: 0.8 % (ref 0–8)
ERYTHROCYTE [DISTWIDTH] IN BLOOD BY AUTOMATED COUNT: 14.2 % (ref 11.5–14.5)
EST. GFR  (AFRICAN AMERICAN): 49.1 ML/MIN/1.73 M^2
EST. GFR  (NON AFRICAN AMERICAN): 42.6 ML/MIN/1.73 M^2
GLUCOSE SERPL-MCNC: 147 MG/DL (ref 70–110)
HCT VFR BLD AUTO: 32.4 % (ref 37–48.5)
HGB BLD-MCNC: 10.4 G/DL (ref 12–16)
IMM GRANULOCYTES # BLD AUTO: 0.05 K/UL (ref 0–0.04)
IMM GRANULOCYTES NFR BLD AUTO: 0.7 % (ref 0–0.5)
LYMPHOCYTES # BLD AUTO: 0.9 K/UL (ref 1–4.8)
LYMPHOCYTES NFR BLD: 11.2 % (ref 18–48)
MCH RBC QN AUTO: 32.6 PG (ref 27–31)
MCHC RBC AUTO-ENTMCNC: 32.1 G/DL (ref 32–36)
MCV RBC AUTO: 102 FL (ref 82–98)
MONOCYTES # BLD AUTO: 0.3 K/UL (ref 0.3–1)
MONOCYTES NFR BLD: 3.3 % (ref 4–15)
NEUTROPHILS # BLD AUTO: 6.4 K/UL (ref 1.8–7.7)
NEUTROPHILS NFR BLD: 83.3 % (ref 38–73)
NRBC BLD-RTO: 0 /100 WBC
PLATELET # BLD AUTO: 248 K/UL (ref 150–350)
PMV BLD AUTO: 10.4 FL (ref 9.2–12.9)
POTASSIUM SERPL-SCNC: 4.1 MMOL/L (ref 3.5–5.1)
PROT SERPL-MCNC: 6 G/DL (ref 6–8.4)
RBC # BLD AUTO: 3.19 M/UL (ref 4–5.4)
SODIUM SERPL-SCNC: 140 MMOL/L (ref 136–145)
WBC # BLD AUTO: 7.67 K/UL (ref 3.9–12.7)

## 2019-10-15 PROCEDURE — 99999 PR PBB SHADOW E&M-EST. PATIENT-LVL III: CPT | Mod: PBBFAC,,, | Performed by: FAMILY MEDICINE

## 2019-10-15 PROCEDURE — 85025 COMPLETE CBC W/AUTO DIFF WBC: CPT

## 2019-10-15 PROCEDURE — 80053 COMPREHEN METABOLIC PANEL: CPT

## 2019-10-15 PROCEDURE — 99213 OFFICE O/P EST LOW 20 MIN: CPT | Mod: PBBFAC,PN | Performed by: FAMILY MEDICINE

## 2019-10-15 PROCEDURE — 99214 OFFICE O/P EST MOD 30 MIN: CPT | Mod: S$PBB,,, | Performed by: FAMILY MEDICINE

## 2019-10-15 PROCEDURE — 36415 COLL VENOUS BLD VENIPUNCTURE: CPT

## 2019-10-15 PROCEDURE — 99214 PR OFFICE/OUTPT VISIT, EST, LEVL IV, 30-39 MIN: ICD-10-PCS | Mod: S$PBB,,, | Performed by: FAMILY MEDICINE

## 2019-10-15 PROCEDURE — 99999 PR PBB SHADOW E&M-EST. PATIENT-LVL III: ICD-10-PCS | Mod: PBBFAC,,, | Performed by: FAMILY MEDICINE

## 2019-10-15 NOTE — PROGRESS NOTES
Subjective:       Patient ID: Obdulia Yepez is a 69 y.o. female.    Chief Complaint: Vaginal Bleeding    HPI   Ms. Yepez presents with complaints of vaginal bleeding, including clots, starting last week. 2 weeks before that, she was having bleeding that she thought was coming from her rectum. Has lost 6 lb in the past month. Is s/p hysterectomy. Has an appt with her hematologist, Dr. Flores, in approx 2 weeks. Sees her gastroenterologist in 2 days. Denies fever, night sweats.    Review of Systems   Constitutional: Positive for fatigue and unexpected weight change. Negative for chills, diaphoresis and fever.   Genitourinary: Positive for vaginal bleeding. Negative for flank pain, vaginal discharge and vaginal pain.   Neurological: Positive for dizziness, weakness and light-headedness. Negative for seizures.       Past Medical History:   Diagnosis Date    Allergy     Arthritis     Asthma     Depression     Gout     Hypertension      Past Surgical History:   Procedure Laterality Date    APPENDECTOMY       SECTION      CHOLECYSTECTOMY      HYSTERECTOMY      SHOULDER SURGERY Right 2019    STOMACH SURGERY      WRIST SURGERY Left      Social History     Socioeconomic History    Marital status:      Spouse name: Not on file    Number of children: Not on file    Years of education: Not on file    Highest education level: Not on file   Occupational History    Not on file   Social Needs    Financial resource strain: Not on file    Food insecurity:     Worry: Not on file     Inability: Not on file    Transportation needs:     Medical: Not on file     Non-medical: Not on file   Tobacco Use    Smoking status: Never Smoker    Smokeless tobacco: Never Used   Substance and Sexual Activity    Alcohol use: Yes    Drug use: No    Sexual activity: Not on file   Lifestyle    Physical activity:     Days per week: Not on file     Minutes per session: Not on file    Stress: Not on file    Relationships    Social connections:     Talks on phone: Not on file     Gets together: Not on file     Attends Church service: Not on file     Active member of club or organization: Not on file     Attends meetings of clubs or organizations: Not on file     Relationship status: Not on file   Other Topics Concern    Not on file   Social History Narrative    Not on file     Family History   Problem Relation Age of Onset    Asthma Mother     Pleurisy Father     Bipolar disorder Sister     Colon cancer Maternal Grandmother     Colon cancer Maternal Grandfather        Objective:      /75 (BP Location: Left arm, Patient Position: Sitting, BP Method: Small (Automatic))   Pulse (!) 128   Resp 20   Ht 5' (1.524 m)   Wt 38.7 kg (85 lb 6.4 oz)   SpO2 95%   BMI 16.68 kg/m²   Physical Exam   Constitutional: She is oriented to person, place, and time. No distress.   Elderly, frail   Cardiovascular: Regular rhythm and normal heart sounds.   No murmur heard.  Tachycardic rate   Pulmonary/Chest: Effort normal and breath sounds normal. No stridor. No respiratory distress.   Neurological: She is alert and oriented to person, place, and time.   Skin: Skin is warm and dry. She is not diaphoretic. There is pallor.   Vitals reviewed.      Assessment:       1. Anemia, unspecified type    2. Vaginal bleeding    3. Unexplained weight loss    4. Neoplasm of abdomen        Plan:       Repeat cbc, cmp, u/a; has heme/onc appt and barium enema sched 10/28; get ct chest/abd/pelvis in the meantime; f/u w/pcp on 10/30 as scheduled    Anemia, unspecified type  -     Comprehensive metabolic panel; Future; Expected date: 10/15/2019  -     CBC auto differential; Future; Expected date: 10/15/2019    Vaginal bleeding  -     Comprehensive metabolic panel; Future; Expected date: 10/15/2019  -     CBC auto differential; Future; Expected date: 10/15/2019  -     URINALYSIS; Future; Expected date: 10/15/2019    Unexplained weight  loss  -     Comprehensive metabolic panel; Future; Expected date: 10/15/2019  -     CBC auto differential; Future; Expected date: 10/15/2019  -     URINALYSIS; Future; Expected date: 10/15/2019  -     CT Chest Abdomen Pelvis W W/O Contrast (XPD); Future; Expected date: 10/15/2019    Neoplasm of abdomen  -     CT Chest Abdomen Pelvis W W/O Contrast (XPD); Future; Expected date: 10/15/2019            Risks, benefits, and side effects were discussed with the patient. All questions were answered to the fullest satisfaction of the patient, and pt verbalized understanding and agreement to treatment plan. Pt was to call with any new or worsening symptoms, or present to the ER.

## 2019-10-16 ENCOUNTER — TELEPHONE (OUTPATIENT)
Dept: FAMILY MEDICINE | Facility: CLINIC | Age: 69
End: 2019-10-16

## 2019-10-16 NOTE — TELEPHONE ENCOUNTER
Pt informed of lab results and to increase fluid intake before CT.  Pt voiced understanding.         ----- Message from Mandie Malin sent at 10/16/2019  4:35 PM CDT -----  Contact: self  Type:  Patient Returning Call    Who Called:  Patient   Who Left Message for Patient: Olesya   Does the patient know what this is regarding?:    Best Call Back Number:  [phone] (home)   \  Additional Information:

## 2019-10-30 ENCOUNTER — TELEPHONE (OUTPATIENT)
Dept: HOME HEALTH SERVICES | Facility: HOSPITAL | Age: 69
End: 2019-10-30

## 2019-10-30 ENCOUNTER — OFFICE VISIT (OUTPATIENT)
Dept: FAMILY MEDICINE | Facility: CLINIC | Age: 69
End: 2019-10-30
Payer: MEDICARE

## 2019-10-30 VITALS
BODY MASS INDEX: 17.11 KG/M2 | SYSTOLIC BLOOD PRESSURE: 109 MMHG | HEIGHT: 60 IN | OXYGEN SATURATION: 97 % | DIASTOLIC BLOOD PRESSURE: 76 MMHG | WEIGHT: 87.13 LBS | HEART RATE: 141 BPM | RESPIRATION RATE: 16 BRPM

## 2019-10-30 DIAGNOSIS — Z23 NEED FOR VACCINATION: ICD-10-CM

## 2019-10-30 DIAGNOSIS — M85.9 DISORDER OF BONE DENSITY AND STRUCTURE, UNSPECIFIED: ICD-10-CM

## 2019-10-30 DIAGNOSIS — E87.1 HYPONATREMIA: ICD-10-CM

## 2019-10-30 DIAGNOSIS — N95.1 MENOPAUSAL STATE: ICD-10-CM

## 2019-10-30 DIAGNOSIS — M81.6 LOCALIZED OSTEOPOROSIS (LEQUESNE): ICD-10-CM

## 2019-10-30 DIAGNOSIS — R00.0 TACHYCARDIA: Primary | ICD-10-CM

## 2019-10-30 PROCEDURE — 99999 PR PBB SHADOW E&M-EST. PATIENT-LVL V: CPT | Mod: PBBFAC,,, | Performed by: FAMILY MEDICINE

## 2019-10-30 PROCEDURE — 99215 OFFICE O/P EST HI 40 MIN: CPT | Mod: PBBFAC,PN,25 | Performed by: FAMILY MEDICINE

## 2019-10-30 PROCEDURE — G0009 ADMIN PNEUMOCOCCAL VACCINE: HCPCS | Mod: PBBFAC,PN

## 2019-10-30 PROCEDURE — 99999 PR PBB SHADOW E&M-EST. PATIENT-LVL V: ICD-10-PCS | Mod: PBBFAC,,, | Performed by: FAMILY MEDICINE

## 2019-10-30 PROCEDURE — 99213 PR OFFICE/OUTPT VISIT, EST, LEVL III, 20-29 MIN: ICD-10-PCS | Mod: S$PBB,,, | Performed by: FAMILY MEDICINE

## 2019-10-30 PROCEDURE — 99213 OFFICE O/P EST LOW 20 MIN: CPT | Mod: S$PBB,,, | Performed by: FAMILY MEDICINE

## 2019-10-30 NOTE — PROGRESS NOTES
"Subjective:       Patient ID: Obdulia Yepez is a 69 y.o. female.    Chief Complaint: Follow-up    Weight loss  Anemia  Despite adequate nutrition  No fever  Some hematochezia    Since the last visit, she has gained two pounds, but with persistent tachycardia, intermittent.    Review of Systems   Constitutional: Negative for appetite change, chills, fatigue and fever.   HENT: Negative for congestion, postnasal drip, rhinorrhea and sore throat.    Genitourinary: Negative for dysuria.   Musculoskeletal: Positive for arthralgias, gait problem, joint swelling and myalgias.   Skin: Positive for wound. Negative for color change and rash.   Neurological: Negative for dizziness, weakness and headaches.   Psychiatric/Behavioral: Negative for sleep disturbance. The patient is not nervous/anxious.          Reviewed family, medical, surgical, and social history.    Objective:      /76 (BP Location: Left arm, Patient Position: Sitting, BP Method: Small (Automatic))   Pulse (!) 141   Resp 16   Ht 5' (1.524 m)   Wt 39.5 kg (87 lb 1.6 oz)   SpO2 97%   BMI 17.01 kg/m²   Physical Exam   Constitutional: She is oriented to person, place, and time. She appears well-developed and well-nourished.   HENT:   Head: Normocephalic.   Eyes: Pupils are equal, round, and reactive to light.   Neck: Normal range of motion.   Cardiovascular: Normal rate, S1 normal and S2 normal.   Pulmonary/Chest: Effort normal.   Abdominal: Normal appearance.   Musculoskeletal: She exhibits tenderness and deformity.   Walks with walker to maintain gait. Knees "give out" at times.    Neurological: She is alert and oriented to person, place, and time.   Skin: Skin is warm and dry. Capillary refill takes less than 2 seconds.        Psychiatric: Her speech is normal. Cognition and memory are normal.   Vitals reviewed.      Assessment:       1. Tachycardia    2. Need for vaccination    3. Hyponatremia    4. Menopausal state    5. Disorder of bone density and " structure, unspecified     6. Localized osteoporosis ([place])         Plan:       Tachycardia  -     Ambulatory referral to Cardiology    Need for vaccination  -     Pneumococcal Conjugate Vaccine (13 Valent) (IM)    Hyponatremia    Menopausal state  -     DXA Bone Density Spine And Hip; Future; Expected date: 10/30/2019    Disorder of bone density and structure, unspecified   -     DXA Bone Density Spine And Hip; Future; Expected date: 10/30/2019    Localized osteoporosis ([place])   -     DXA Bone Density Spine And Hip; Future; Expected date: 10/30/2019    Pulse rate returned to 98 after a few minutes of conversation.        Risks, benefits, and side effects were discussed with the patient. All questions were answered to the fullest satisfaction of the patient, and pt verbalized understanding and agreement to treatment plan. Pt was to call with any new or worsening symptoms, or present to the ER.

## 2019-10-31 ENCOUNTER — HOSPITAL ENCOUNTER (OUTPATIENT)
Dept: RADIOLOGY | Facility: HOSPITAL | Age: 69
Discharge: HOME | End: 2019-10-31
Attending: FAMILY MEDICINE
Payer: MEDICARE

## 2019-10-31 DIAGNOSIS — M81.6 LOCALIZED OSTEOPOROSIS (LEQUESNE): ICD-10-CM

## 2019-10-31 DIAGNOSIS — N95.1 MENOPAUSAL STATE: ICD-10-CM

## 2019-10-31 DIAGNOSIS — R63.4 UNEXPLAINED WEIGHT LOSS: ICD-10-CM

## 2019-10-31 DIAGNOSIS — M85.9 DISORDER OF BONE DENSITY AND STRUCTURE, UNSPECIFIED: ICD-10-CM

## 2019-10-31 DIAGNOSIS — D49.89 NEOPLASM OF ABDOMEN: ICD-10-CM

## 2019-10-31 PROCEDURE — 74177 CT CHEST ABDOMEN PELVIS WITH CONTRAST (XPD): ICD-10-PCS | Mod: 26,,, | Performed by: RADIOLOGY

## 2019-10-31 PROCEDURE — 71260 CT CHEST ABDOMEN PELVIS WITH CONTRAST (XPD): ICD-10-PCS | Mod: 26,,, | Performed by: RADIOLOGY

## 2019-10-31 PROCEDURE — 77080 DXA BONE DENSITY AXIAL: CPT | Mod: TC

## 2019-10-31 PROCEDURE — 74177 CT ABD & PELVIS W/CONTRAST: CPT | Mod: 26,,, | Performed by: RADIOLOGY

## 2019-10-31 PROCEDURE — 74177 CT ABD & PELVIS W/CONTRAST: CPT | Mod: TC

## 2019-10-31 PROCEDURE — 77080 DXA BONE DENSITY AXIAL: CPT | Mod: 26,,, | Performed by: RADIOLOGY

## 2019-10-31 PROCEDURE — 25500020 PHARM REV CODE 255: Performed by: FAMILY MEDICINE

## 2019-10-31 PROCEDURE — 71260 CT THORAX DX C+: CPT | Mod: 26,,, | Performed by: RADIOLOGY

## 2019-10-31 PROCEDURE — 77080 DEXA BONE DENSITY SPINE HIP: ICD-10-PCS | Mod: 26,,, | Performed by: RADIOLOGY

## 2019-10-31 RX ADMIN — IOHEXOL 15 ML: 300 INJECTION, SOLUTION INTRAVENOUS at 10:10

## 2019-10-31 RX ADMIN — IOHEXOL 37 ML: 350 INJECTION, SOLUTION INTRAVENOUS at 11:10

## 2019-11-04 ENCOUNTER — TELEPHONE (OUTPATIENT)
Dept: HOME HEALTH SERVICES | Facility: HOSPITAL | Age: 69
End: 2019-11-04

## 2019-11-04 ENCOUNTER — TELEPHONE (OUTPATIENT)
Dept: CARDIOLOGY | Facility: CLINIC | Age: 69
End: 2019-11-04

## 2019-11-04 NOTE — TELEPHONE ENCOUNTER
Appt scheduled on 11.14.2019 @ 0930.    ----- Message from Torrey Felix sent at 11/4/2019  1:38 PM CST -----  Contact: Patient  Type: Needs Medical Advice    Who Called:  Patient  Best Call Back Number: [phone]  Additional Information: Patient went to the wrong location and missed their appointment and needs to reschedule it. Next available appointment visible is January. Please call to advise. Thanks!

## 2019-11-05 DIAGNOSIS — N32.89 BLADDER WALL THICKENING: Primary | ICD-10-CM

## 2019-11-07 ENCOUNTER — OFFICE VISIT (OUTPATIENT)
Dept: UROLOGY | Facility: CLINIC | Age: 69
End: 2019-11-07
Payer: MEDICARE

## 2019-11-07 ENCOUNTER — OFFICE VISIT (OUTPATIENT)
Dept: GASTROENTEROLOGY | Facility: CLINIC | Age: 69
End: 2019-11-07
Payer: MEDICARE

## 2019-11-07 VITALS
BODY MASS INDEX: 16.92 KG/M2 | HEART RATE: 112 BPM | DIASTOLIC BLOOD PRESSURE: 62 MMHG | HEIGHT: 60 IN | RESPIRATION RATE: 16 BRPM | WEIGHT: 86.19 LBS | SYSTOLIC BLOOD PRESSURE: 85 MMHG | TEMPERATURE: 98 F

## 2019-11-07 VITALS
HEIGHT: 60 IN | OXYGEN SATURATION: 97 % | WEIGHT: 86 LBS | SYSTOLIC BLOOD PRESSURE: 92 MMHG | BODY MASS INDEX: 16.88 KG/M2 | RESPIRATION RATE: 16 BRPM | HEART RATE: 122 BPM | DIASTOLIC BLOOD PRESSURE: 68 MMHG

## 2019-11-07 DIAGNOSIS — N32.89 BLADDER WALL THICKENING: ICD-10-CM

## 2019-11-07 DIAGNOSIS — N95.0 POSTMENOPAUSAL VAGINAL BLEEDING: Primary | ICD-10-CM

## 2019-11-07 DIAGNOSIS — Z98.84 HISTORY OF ROUX-EN-Y GASTRIC BYPASS: ICD-10-CM

## 2019-11-07 DIAGNOSIS — Z90.49 HISTORY OF CHOLECYSTECTOMY: Primary | ICD-10-CM

## 2019-11-07 DIAGNOSIS — K59.00 CONSTIPATION, UNSPECIFIED CONSTIPATION TYPE: ICD-10-CM

## 2019-11-07 DIAGNOSIS — E53.8 B12 DEFICIENCY: ICD-10-CM

## 2019-11-07 PROCEDURE — 99999 PR PBB SHADOW E&M-EST. PATIENT-LVL III: ICD-10-PCS | Mod: PBBFAC,,, | Performed by: NURSE PRACTITIONER

## 2019-11-07 PROCEDURE — 99999 PR PBB SHADOW E&M-EST. PATIENT-LVL V: ICD-10-PCS | Mod: PBBFAC,,, | Performed by: INTERNAL MEDICINE

## 2019-11-07 PROCEDURE — 99999 PR PBB SHADOW E&M-EST. PATIENT-LVL III: CPT | Mod: PBBFAC,,, | Performed by: NURSE PRACTITIONER

## 2019-11-07 PROCEDURE — 99213 PR OFFICE/OUTPT VISIT, EST, LEVL III, 20-29 MIN: ICD-10-PCS | Mod: S$PBB,,, | Performed by: INTERNAL MEDICINE

## 2019-11-07 PROCEDURE — 99214 PR OFFICE/OUTPT VISIT, EST, LEVL IV, 30-39 MIN: ICD-10-PCS | Mod: S$PBB,,, | Performed by: NURSE PRACTITIONER

## 2019-11-07 PROCEDURE — 99213 OFFICE O/P EST LOW 20 MIN: CPT | Mod: PBBFAC,PN | Performed by: NURSE PRACTITIONER

## 2019-11-07 PROCEDURE — 99999 PR PBB SHADOW E&M-EST. PATIENT-LVL V: CPT | Mod: PBBFAC,,, | Performed by: INTERNAL MEDICINE

## 2019-11-07 PROCEDURE — 99214 OFFICE O/P EST MOD 30 MIN: CPT | Mod: S$PBB,,, | Performed by: NURSE PRACTITIONER

## 2019-11-07 PROCEDURE — 99215 OFFICE O/P EST HI 40 MIN: CPT | Mod: PBBFAC,27,PN | Performed by: INTERNAL MEDICINE

## 2019-11-07 PROCEDURE — 99213 OFFICE O/P EST LOW 20 MIN: CPT | Mod: S$PBB,,, | Performed by: INTERNAL MEDICINE

## 2019-11-07 NOTE — PATIENT INSTRUCTIONS
She will continue her current medications vitamins and minerals.  She was started on MiraLax on a daily basis.  The CT scan shows that she was severely constipated.  She has had a gastric bypass.  She has chronic pain with arthritis particularly of the knees.  She has been taking ibuprofen.  She stopped the Protonix and the Singulair because she wants to take  fewer  medications.  She has her pain medications but she does want take them because she is afraid that she may get addicted.  She is to avoid the ibuprofen she is to take her Protonix vitamins and minerals.  She will be scheduled for upper endoscopy.

## 2019-11-07 NOTE — LETTER
November 11, 2019      Og Perez MD  [address]  [place] [address]           [facility]  [place] [facility]  [address], [address]  [place] [address]  Phone: [phone]  Fax: [phone]          Patient: Obdulia Yepez   MR Number: [id number]   YOB: 1950   Date of Visit: 11/7/2019       Dear Dr. Og Perez:    Thank you for referring Obdulia Yepez to me for evaluation. Attached you will find relevant portions of my assessment and plan of care.    If you have questions, please do not hesitate to call me. I look forward to following Obdulia Yepez along with you.    Sincerely,    Shakeel Perez MD    Enclosure  CC:  No Recipients    If you would like to receive this communication electronically, please contact [email or web address] or [phone] to request more information on [facility] Link access.    For providers and/or their staff who would like to refer a patient to [facility], please contact us through our one-stop-shop provider referral line, [facility], at [phone].    If you feel you have received this communication in error or would no longer like to receive these types of communications, please e-mail [email or web address]

## 2019-11-07 NOTE — PROGRESS NOTES
Chief Complaint  Chief Complaint   Patient presents with    Establish Care    Abnormal Ct Scan     HPI:  Obdulia Yepez is a 69 y.o. female with medical diagnoses as listed and reviewed within the medical history and problem list that presents as a referral from Dr. 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 Pt had CT scan done 10/31/19. She received a phone call that she needed to see Urology but is unsure why. I went over finding of CT with her. She does reports a history of chronic UTI. She is unable to get a urine today. She denies pain or burning. No pelvic discomfort. No frequency or urgency. No hematuria. She has been losing weight for unknown reason. She has been anemic unexplained. She also is reporting vaginal bleeding that she has had in the last few weeks. She is postmenopausal and has had a hysterectomy. She has not had vaginal US. Will order today. She does have an appointment with GI this AM as well to follow up. She did have some abnormalities of the bile duct on her CT scan as well. She admits to depression and being sad most of the time. She is walking with a walker due to weakness. She sees Cardiology as well for tachycardia. She is here with her  today.      PAST MEDICAL HISTORY:  Past Medical History:   Diagnosis Date    Allergy     Arthritis     Asthma     Depression     Gout     Hypertension        PAST SURGICAL HISTORY:  Past Surgical History:   Procedure Laterality Date    APPENDECTOMY       SECTION      CHOLECYSTECTOMY      HYSTERECTOMY      SHOULDER SURGERY Right 2019    STOMACH SURGERY      WRIST SURGERY Left        SOCIAL HISTORY:  Social History     Socioeconomic History    Marital status:      Spouse name: Not on file    Number of children: Not on file    Years of education: Not on file    Highest education level: Not on file   Occupational History    Not on file   Social Needs    Financial resource strain: Not on file     Food insecurity:     Worry: Not on file     Inability: Not on file    Transportation needs:     Medical: Not on file     Non-medical: Not on file   Tobacco Use    Smoking status: Never Smoker    Smokeless tobacco: Never Used   Substance and Sexual Activity    Alcohol use: Yes    Drug use: No    Sexual activity: Not on file   Lifestyle    Physical activity:     Days per week: Not on file     Minutes per session: Not on file    Stress: Not on file   Relationships    Social connections:     Talks on phone: Not on file     Gets together: Not on file     Attends Hoahaoism service: Not on file     Active member of club or organization: Not on file     Attends meetings of clubs or organizations: Not on file     Relationship status: Not on file   Other Topics Concern    Not on file   Social History Narrative    Not on file       FAMILY HISTORY:  Family History   Problem Relation Age of Onset    Asthma Mother     Pleurisy Father     Bipolar disorder Sister     Colon cancer Maternal Grandmother     Colon cancer Maternal Grandfather        ALLERGIES AND MEDICATIONS: updated and reviewed.  Review of patient's allergies indicates:   Allergen Reactions    Amoxicillin Diarrhea    Latex     Milk containing products     Opioids - morphine analogues     Peanut     Sodium phosphate     Soy     Sulfa (sulfonamide antibiotics)      Current Outpatient Medications   Medication Sig Dispense Refill    allopurinol (ZYLOPRIM) 300 MG tablet TAKE 1 TABLET BY MOUTH DAILY 30 tablet 14    ARIPiprazole (ABILIFY) 5 MG Tab aripiprazole 5 mg tablet  TAKE 1 TABLET BY MOUTH AT BEDTIMES 30 tablet 2    blood sugar diagnostic (BLOOD GLUCOSE TEST) Strp OneTouch Ultra Test strips   USE AS DIRECTED      blood-glucose meter (ONETOUCH ULTRA2) kit OneTouch Ultra2 kit   USE AS DIRECTED      budesonide-formoterol 160-4.5 mcg (SYMBICORT) 160-4.5 mcg/actuation HFAA Symbicort 160 mcg-4.5 mcg/actuation HFA aerosol inhaler   Inhale 2 puffs  twice a day by inhalation route.      clindamycin (CLEOCIN) 300 MG capsule TAKE ONE CAPSULE 3 TIMES DAILY  0    colestipol (COLESTID) 1 gram Tab Take 1 tablet (1 g total) by mouth 2 (two) times daily. 180 tablet 3    cyclobenzaprine (FLEXERIL) 10 MG tablet Take 1 tablet (10 mg total) by mouth 3 (three) times daily as needed. 90 tablet 0    cyproheptadine (PERIACTIN) 4 mg tablet TAKE 1 TABLET (4 MG TOTAL) BY MOUTH 3 (THREE) TIMES DAILY AS NEEDED. 90 tablet 2    escitalopram oxalate (LEXAPRO) 20 MG tablet TAKE 1 TABLET BY MOUTH EVERY DAY 30 tablet 11    fluticasone (FLONASE) 50 mcg/actuation nasal spray fluticasone 50 mcg/actuation nasal spray,suspension   Inhale 1 spray every day by intranasal route.      folic acid (FOLVITE) 1 MG tablet folic acid 1 mg tablet   TAKE 1 TABLET BY MOUTH EVERY DAY      gabapentin (NEURONTIN) 300 MG capsule Take 3 capsules (900 mg total) by mouth 3 (three) times daily. (Patient taking differently: Take 600 mg by mouth 2 (two) times daily. ) 270 capsule 3    hydrocortisone (CORTEF) 10 MG Tab TAKE 2 TABLETS BY MOUTH IN THE MORNING AND 1 TABLET IN THE EVENING  0    ibuprofen (ADVIL,MOTRIN) 800 MG tablet Take 1 tablet (800 mg total) by mouth 3 (three) times daily. 90 tablet 3    ipratropium (ATROVENT) 0.03 % nasal spray 2 sprays by Nasal route 3 (three) times daily. 30 mL 2    K-PHOS ORIGINAL 500 mg TbSO TAKE ONE TABLET BY MOUTH WITH MEALS AT BEDTIME  0    lancets (ONETOUCH DELICA LANCETS) 33 gauge Misc OneTouch Delica Lancets 33 gauge   USE AS DIRECTED      lidocaine HCl 2% (LIDOCAINE VISCOUS) 2 % Soln Lidocaine Viscous 2 % mucosal solution   TAKE 5 ML EVERY 3 HOURS BY ORAL ROUTE AS NEEDED.      meropenem (MERREM) 1 gram injection   0    metoprolol succinate (TOPROL-XL) 50 MG 24 hr tablet Take 1 tablet (50 mg total) by mouth once daily. 30 tablet 2    mirtazapine (REMERON) 15 MG tablet Take 1 tablet (15 mg total) by mouth every evening. 30 tablet 11    montelukast  (SINGULAIR) 10 mg tablet TAKE 1 TABLET(S) EVERY DAY BY ORAL ROUTE. 30 tablet 11    mupirocin (BACTROBAN) 2 % ointment mupirocin 2 % topical ointment   APPLY A SMALL AMOUNT TO THE AFFECTED AREA BY TOPICAL ROUTE 2 TIMES PER DAY      ondansetron (ZOFRAN-ODT) 4 MG TbDL ondansetron 4 mg disintegrating tablet   DISINTEGRATE 1 TABLET BY MOUTH EVERY 6 HOURS AS NEEDED FOR NAUSEA AND VOMITING      ONETOUCH ULTRASOFT LANCETS MISC OneTouch UltraSoft Lancets   USE AS DIRECTED      oxybutynin (DITROPAN-XL) 5 MG TR24 oxybutynin chloride ER 5 mg tablet,extended release 24 hr   TAKE 1 TABLET BY MOUTH EVERY DAY      oxyCODONE-acetaminophen (PERCOCET)  mg per tablet Take 1 tablet by mouth every 8 (eight) hours as needed for Pain. 90 tablet 0    oxyCODONE-acetaminophen (PERCOCET) 5-325 mg per tablet Take 1 tablet by mouth every 4 (four) hours as needed for Pain.      pantoprazole (PROTONIX) 40 MG tablet TAKE 1 TABLET(S) EVERY DAY BY ORAL ROUTE. 30 tablet 11    triamcinolone acetonide 0.1% (KENALOG) 0.1 % Lotn triamcinolone acetonide 0.1 % lotion   APPLY A THIN LAYER TO THE AFFECTED AREA(S) BY TOPICAL ROUTE 2 TIMES PER DAY      busPIRone (BUSPAR) 5 MG Tab Take 1 tablet (5 mg total) by mouth 2 (two) times daily. (Patient not taking: Reported on 10/30/2019) 60 tablet 11     Current Facility-Administered Medications   Medication Dose Route Frequency Provider Last Rate Last Dose    0.9%  NaCl infusion (for blood administration)   Intravenous Q24H PRN Og Perez MD        acetaminophen tablet 650 mg  650 mg Oral PRN Og Perez MD         Facility-Administered Medications Ordered in Other Visits   Medication Dose Route Frequency Provider Last Rate Last Dose    hylan g-f 20 48 mg/6 mL injection 48 mg  48 mg Intra-articular  Esteban J. Letonoff, DO   48 mg at 05/29/19 1603    hylan g-f 20 48 mg/6 mL injection 48 mg  48 mg Intra-articular  Esteban J. Letonoff, DO   48 mg at 05/29/19 1603    triamcinolone acetonide  injection 80 mg  80 mg Intra-articular  Esteban Remy, DO   80 mg at 05/29/19 1603         ROS  Review of Systems   Constitutional: Negative for appetite change and fever. She does have unexplained weight loss.    HENT: Negative for trouble swallowing.         No jaundice.   Respiratory: Negative for cough, shortness of breath and wheezing. COPD  Cardiovascular: Negative for chest pain.   Gastrointestinal: Negative for abdominal distention, abdominal pain, anal bleeding, blood in stool, constipation, diarrhea, nausea, rectal pain and vomiting.    Musculoskeletal: Positive for arthralgias and gait problem. Negative for back pain and neck pain.        She describes as being very weak.  She can use a rolling walker.  She has been operated on her shoulders and has discomfort.  She has lost a lot of muscle mass and she feels that she can't do the simple things I can't open the refrigerator door.  She can ambulate with a rolling walker but because of the muscle weakness her duration of activity is limited.   Skin: Negative for pallor and rash.   Neurological: Negative for dizziness, seizures, syncope, speech difficulty, weakness and numbness.   Hematological: Negative for adenopathy.   Psychiatric/Behavioral: Negative for confusion.        She is very sad and tearful when she talks about the death of her son an automobile accident in February.  He burned to death .  She has had difficulty sleeping.         PHYSICAL EXAM  Vitals:    11/07/19 0845   BP: (!) 85/62   Pulse: (!) 112   Resp: 16   Temp: 98 °F (36.7 °C)   TempSrc: Oral   Weight: 39.1 kg (86 lb 3.2 oz)   Height: 5' (1.524 m)    Body mass index is 16.83 kg/m².  Weight: 39.1 kg (86 lb 3.2 oz)   Height: 5' (152.4 cm)       Physical Exam   Constitutional: She is oriented to person, place, and time. She appears well-developed. She appears cachectic. She has a sickly appearance.   HENT:   Head: Normocephalic.   Eyes: Pupils are equal, round, and reactive to  light.   Neck: Normal range of motion.   Cardiovascular: S1 normal and S2 normal.   Tachycardic.    Pulmonary/Chest: Effort normal.   Abdominal: Soft. Normal appearance.   Musculoskeletal:   Generalized weakness. Walking with rollator walker.    Neurological: She is alert and oriented to person, place, and time.   Skin: Skin is warm and dry. Capillary refill takes less than 2 seconds.   Vitals reviewed.        Health Maintenance       Date Due Completion Date    TETANUS VACCINE 05/13/1970 ---    Shingles Vaccine (1 of 2) 05/13/2002 ---    Pneumococcal Vaccine (65+ Low/Medium Risk) (2 of 2 - PPSV23) 10/30/2020 10/30/2019    Mammogram 11/29/2020 11/29/2018    Lipid Panel 10/06/2022 10/6/2017    DEXA SCAN 10/31/2022 10/31/2019    Colonoscopy 08/01/2024 8/1/2017               Assessment & Plan    Obdulia was seen today for establish care and abnormal ct scan.    Diagnoses and all orders for this visit:    Postmenopausal vaginal bleeding  -     US Transvaginal Non OB; Future    Bladder wall thickening  -     POCT URINE DIPSTICK WITHOUT MICROSCOPE    - Schedule cystoscopy for 11/13/19 for suspected bladder neoplasia  - Pre-op and vaginal US    Follow-up: Follow up in about 4 weeks (around 12/5/2019).      Risks, benefits, and side effects were discussed with the patient. All questions were answered to the fullest satisfaction of the patient, and pt verbalized understanding and agreement to treatment plan. Pt was to call with any new or worsening symptoms, or present to the ER.

## 2019-11-08 ENCOUNTER — TELEPHONE (OUTPATIENT)
Dept: CARDIOLOGY | Facility: CLINIC | Age: 69
End: 2019-11-08

## 2019-11-08 DIAGNOSIS — N32.89 BLADDER WALL THICKENING: Primary | ICD-10-CM

## 2019-11-08 DIAGNOSIS — R93.41 ABNORMAL CT SCAN, BLADDER: ICD-10-CM

## 2019-11-08 RX ORDER — CIPROFLOXACIN 2 MG/ML
400 INJECTION, SOLUTION INTRAVENOUS
Status: CANCELLED | OUTPATIENT
Start: 2019-11-13

## 2019-11-08 NOTE — TELEPHONE ENCOUNTER
----- Message from Estrella Saab sent at 11/8/2019  4:36 PM CST -----  Contact: pt  Pt calling states that she missed a call,looks like from Nurse Bibiana.....[phone] (home)

## 2019-11-08 NOTE — TELEPHONE ENCOUNTER
Calling in regards to a cariology referral. Noticed she was already scheduled so I attached the order. PT verified understanding of appt date time and location.

## 2019-11-11 ENCOUNTER — OFFICE VISIT (OUTPATIENT)
Dept: FAMILY MEDICINE | Facility: CLINIC | Age: 69
End: 2019-11-11
Payer: MEDICARE

## 2019-11-11 VITALS
DIASTOLIC BLOOD PRESSURE: 86 MMHG | HEART RATE: 137 BPM | RESPIRATION RATE: 20 BRPM | WEIGHT: 89 LBS | BODY MASS INDEX: 17.47 KG/M2 | SYSTOLIC BLOOD PRESSURE: 121 MMHG | OXYGEN SATURATION: 98 % | HEIGHT: 60 IN

## 2019-11-11 DIAGNOSIS — K21.9 GASTROESOPHAGEAL REFLUX DISEASE, ESOPHAGITIS PRESENCE NOT SPECIFIED: Primary | ICD-10-CM

## 2019-11-11 DIAGNOSIS — N32.89 BLADDER WALL THICKENING: ICD-10-CM

## 2019-11-11 PROCEDURE — 99215 OFFICE O/P EST HI 40 MIN: CPT | Mod: PBBFAC,PN | Performed by: FAMILY MEDICINE

## 2019-11-11 PROCEDURE — 99999 PR PBB SHADOW E&M-EST. PATIENT-LVL V: CPT | Mod: PBBFAC,,, | Performed by: FAMILY MEDICINE

## 2019-11-11 PROCEDURE — 99213 OFFICE O/P EST LOW 20 MIN: CPT | Mod: S$PBB,,, | Performed by: FAMILY MEDICINE

## 2019-11-11 PROCEDURE — 99999 PR PBB SHADOW E&M-EST. PATIENT-LVL V: ICD-10-PCS | Mod: PBBFAC,,, | Performed by: FAMILY MEDICINE

## 2019-11-11 PROCEDURE — 99213 PR OFFICE/OUTPT VISIT, EST, LEVL III, 20-29 MIN: ICD-10-PCS | Mod: S$PBB,,, | Performed by: FAMILY MEDICINE

## 2019-11-11 RX ORDER — PANTOPRAZOLE SODIUM 40 MG/1
40 TABLET, DELAYED RELEASE ORAL DAILY
Qty: 30 TABLET | Refills: 11 | Status: SHIPPED | OUTPATIENT
Start: 2019-11-11

## 2019-11-11 NOTE — PROGRESS NOTES
"Subjective:       Patient ID: Obdulia Yepez is a 69 y.o. female.    Chief Complaint: Follow-up    Follow up    Bleeding has stopped  Has cysto on Wednesday  Has follow up with cardiology on Thursday  Otherwise, no major changes, awaiting specialist visit.    Review of Systems   Constitutional: Negative for appetite change, chills, fatigue and fever.   HENT: Negative for congestion, postnasal drip, rhinorrhea and sore throat.    Genitourinary: Negative for dysuria.   Musculoskeletal: Positive for arthralgias, gait problem, joint swelling and myalgias.   Skin: Positive for wound. Negative for color change and rash.   Neurological: Negative for dizziness, weakness and headaches.   Psychiatric/Behavioral: Negative for sleep disturbance. The patient is not nervous/anxious.          Reviewed family, medical, surgical, and social history.    Objective:      /86 (BP Location: Left arm, Patient Position: Sitting, BP Method: Small (Automatic))   Pulse (!) 137   Resp 20   Ht 5' (1.524 m)   Wt 40.4 kg (89 lb)   SpO2 98%   BMI 17.38 kg/m²   Physical Exam   Constitutional: She is oriented to person, place, and time. She appears well-developed and well-nourished.   HENT:   Head: Normocephalic.   Eyes: Pupils are equal, round, and reactive to light.   Neck: Normal range of motion.   Cardiovascular: Normal rate, S1 normal and S2 normal.   Pulmonary/Chest: Effort normal.   Abdominal: Normal appearance.   Musculoskeletal: She exhibits tenderness and deformity.   Walks with walker to maintain gait. Knees "give out" at times.    Neurological: She is alert and oriented to person, place, and time.   Skin: Skin is warm and dry. Capillary refill takes less than 2 seconds.        Psychiatric: Her speech is normal. Cognition and memory are normal.   Vitals reviewed.      Assessment:       1. Gastroesophageal reflux disease, esophagitis presence not specified    2. Bladder wall thickening        Plan:       Gastroesophageal reflux " disease, esophagitis presence not specified  -     pantoprazole (PROTONIX) 40 MG tablet; Take 1 tablet (40 mg total) by mouth once daily.  Dispense: 30 tablet; Refill: 11    Bladder wall thickening            Risks, benefits, and side effects were discussed with the patient. All questions were answered to the fullest satisfaction of the patient, and pt verbalized understanding and agreement to treatment plan. Pt was to call with any new or worsening symptoms, or present to the ER.

## 2019-11-11 NOTE — PROGRESS NOTES
Subjective:       Patient ID: Obdulia Yepez is a 69 y.o. female.    Chief Complaint: Follow-up (will discuss testing)    She has tried eat more.  She states that her weight has stabilized.  She is having constipation. The CT scan shows postsurgical changes and retained fecal material.  She has had bariatric surgery.  She is scheduled for upper endoscopy.  She is ambulating the walker.  She is trying to increase her caloric intake particularly the vitamins.  She generally has 2-3 bowel movements per day.  She denies cardiopulmonary symptoms, fever chills.  She is able to ambulate with a walker.  She has had nonspecific upper abdominal discomfort.  She denies hematemesis hematochezia jaundice dysphagia aspiration or melena.      Allergies:  Review of patient's allergies indicates:   Allergen Reactions    Amoxicillin Diarrhea    Latex     Milk containing products     Opioids - morphine analogues     Peanut     Sodium phosphate     Soy     Sulfa (sulfonamide antibiotics)        Medications:    Current Outpatient Medications:     allopurinol (ZYLOPRIM) 300 MG tablet, TAKE 1 TABLET BY MOUTH DAILY, Disp: 30 tablet, Rfl: 14    ARIPiprazole (ABILIFY) 5 MG Tab, aripiprazole 5 mg tablet  TAKE 1 TABLET BY MOUTH AT BEDTIMES, Disp: 30 tablet, Rfl: 2    blood sugar diagnostic (BLOOD GLUCOSE TEST) Strp, OneTouch Ultra Test strips  USE AS DIRECTED, Disp: , Rfl:     blood-glucose meter (ONETOUCH ULTRA2) kit, OneTouch Ultra2 kit  USE AS DIRECTED, Disp: , Rfl:     budesonide-formoterol 160-4.5 mcg (SYMBICORT) 160-4.5 mcg/actuation HFAA, Symbicort 160 mcg-4.5 mcg/actuation HFA aerosol inhaler  Inhale 2 puffs twice a day by inhalation route., Disp: , Rfl:     clindamycin (CLEOCIN) 300 MG capsule, TAKE ONE CAPSULE 3 TIMES DAILY, Disp: , Rfl: 0    colestipol (COLESTID) 1 gram Tab, Take 1 tablet (1 g total) by mouth 2 (two) times daily., Disp: 180 tablet, Rfl: 3    cyclobenzaprine (FLEXERIL) 10 MG tablet, Take 1 tablet (10 mg  total) by mouth 3 (three) times daily as needed., Disp: 90 tablet, Rfl: 0    cyproheptadine (PERIACTIN) 4 mg tablet, TAKE 1 TABLET (4 MG TOTAL) BY MOUTH 3 (THREE) TIMES DAILY AS NEEDED., Disp: 90 tablet, Rfl: 2    escitalopram oxalate (LEXAPRO) 20 MG tablet, TAKE 1 TABLET BY MOUTH EVERY DAY, Disp: 30 tablet, Rfl: 11    fluticasone (FLONASE) 50 mcg/actuation nasal spray, fluticasone 50 mcg/actuation nasal spray,suspension  Inhale 1 spray every day by intranasal route., Disp: , Rfl:     folic acid (FOLVITE) 1 MG tablet, folic acid 1 mg tablet  TAKE 1 TABLET BY MOUTH EVERY DAY, Disp: , Rfl:     gabapentin (NEURONTIN) 300 MG capsule, Take 3 capsules (900 mg total) by mouth 3 (three) times daily. (Patient taking differently: Take 600 mg by mouth 2 (two) times daily. ), Disp: 270 capsule, Rfl: 3    hydrocortisone (CORTEF) 10 MG Tab, TAKE 2 TABLETS BY MOUTH IN THE MORNING AND 1 TABLET IN THE EVENING, Disp: , Rfl: 0    ibuprofen (ADVIL,MOTRIN) 800 MG tablet, Take 1 tablet (800 mg total) by mouth 3 (three) times daily., Disp: 90 tablet, Rfl: 3    ipratropium (ATROVENT) 0.03 % nasal spray, 2 sprays by Nasal route 3 (three) times daily., Disp: 30 mL, Rfl: 2    K-PHOS ORIGINAL 500 mg TbSO, TAKE ONE TABLET BY MOUTH WITH MEALS AT BEDTIME, Disp: , Rfl: 0    lancets (ONETOUCH DELICA LANCETS) 33 gauge Misc, OneTouch Delica Lancets 33 gauge  USE AS DIRECTED, Disp: , Rfl:     lidocaine HCl 2% (LIDOCAINE VISCOUS) 2 % Soln, Lidocaine Viscous 2 % mucosal solution  TAKE 5 ML EVERY 3 HOURS BY ORAL ROUTE AS NEEDED., Disp: , Rfl:     meropenem (MERREM) 1 gram injection, , Disp: , Rfl: 0    metoprolol succinate (TOPROL-XL) 50 MG 24 hr tablet, Take 1 tablet (50 mg total) by mouth once daily., Disp: 30 tablet, Rfl: 2    mirtazapine (REMERON) 15 MG tablet, Take 1 tablet (15 mg total) by mouth every evening., Disp: 30 tablet, Rfl: 11    montelukast (SINGULAIR) 10 mg tablet, TAKE 1 TABLET(S) EVERY DAY BY ORAL ROUTE., Disp: 30  tablet, Rfl: 11    mupirocin (BACTROBAN) 2 % ointment, mupirocin 2 % topical ointment  APPLY A SMALL AMOUNT TO THE AFFECTED AREA BY TOPICAL ROUTE 2 TIMES PER DAY, Disp: , Rfl:     ondansetron (ZOFRAN-ODT) 4 MG TbDL, ondansetron 4 mg disintegrating tablet  DISINTEGRATE 1 TABLET BY MOUTH EVERY 6 HOURS AS NEEDED FOR NAUSEA AND VOMITING, Disp: , Rfl:     ONETOUCH ULTRASOFT LANCETS MISC, OneTouch UltraSoft Lancets  USE AS DIRECTED, Disp: , Rfl:     oxybutynin (DITROPAN-XL) 5 MG TR24, oxybutynin chloride ER 5 mg tablet,extended release 24 hr  TAKE 1 TABLET BY MOUTH EVERY DAY, Disp: , Rfl:     oxyCODONE-acetaminophen (PERCOCET)  mg per tablet, Take 1 tablet by mouth every 8 (eight) hours as needed for Pain., Disp: 90 tablet, Rfl: 0    oxyCODONE-acetaminophen (PERCOCET) 5-325 mg per tablet, Take 1 tablet by mouth every 4 (four) hours as needed for Pain., Disp: , Rfl:     pantoprazole (PROTONIX) 40 MG tablet, TAKE 1 TABLET(S) EVERY DAY BY ORAL ROUTE., Disp: 30 tablet, Rfl: 11    triamcinolone acetonide 0.1% (KENALOG) 0.1 % Lotn, triamcinolone acetonide 0.1 % lotion  APPLY A THIN LAYER TO THE AFFECTED AREA(S) BY TOPICAL ROUTE 2 TIMES PER DAY, Disp: , Rfl:     busPIRone (BUSPAR) 5 MG Tab, Take 1 tablet (5 mg total) by mouth 2 (two) times daily. (Patient not taking: Reported on 10/30/2019), Disp: 60 tablet, Rfl: 11    Current Facility-Administered Medications:     0.9%  NaCl infusion (for blood administration), , Intravenous, Q24H PRN, Og Perez MD    acetaminophen tablet 650 mg, 650 mg, Oral, PRN, Og Perez MD    Facility-Administered Medications Ordered in Other Visits:     hylan g-f 20 48 mg/6 mL injection 48 mg, 48 mg, Intra-articular, , Esteban Remy, , 48 mg at 05/29/19 1603    hylan g-f 20 48 mg/6 mL injection 48 mg, 48 mg, Intra-articular, , Esteban Remy DO, 48 mg at 05/29/19 1603    triamcinolone acetonide injection 80 mg, 80 mg, Intra-articular, , Esteban Remy DO, 80 mg  "at 19 1603    Past Medical History:   Diagnosis Date    Allergy     Arthritis     Asthma     Depression     Gout     Hypertension        Past Surgical History:   Procedure Laterality Date    APPENDECTOMY       SECTION      CHOLECYSTECTOMY      HYSTERECTOMY      SHOULDER SURGERY Right 2019    STOMACH SURGERY      WRIST SURGERY Left          Review of Systems   Constitutional: Negative for appetite change, fever and unexpected weight change.   HENT: Negative for trouble swallowing.         No jaundice.   Respiratory: Negative for cough, shortness of breath and wheezing.         No Rales, Rhonchi, or Dyspnea.   Cardiovascular: Negative for chest pain.   Gastrointestinal: Positive for abdominal pain and constipation. Negative for anal bleeding, blood in stool, diarrhea and nausea.        She has had occasional nausea without vomiting.  She has a poor appetite "food and taste good and will.  She is trying to increase her oral intake.  She has had bariatric surgery.  She has had semi solid bowel movements with constipation.  She denies associated symptoms with the specific food or activity.   Musculoskeletal: Positive for arthralgias and gait problem. Negative for back pain and neck pain.        She is taking in states because of severe knee pain. She ambulates with a cane.  She does not associate the in states with side effects.  She will try to use her hydrocodone and avoid the anti-inflammatory agents.  She did morning use the hydrocodone because I do not want to get addicted .  I explained the adverse effects of the anti-inflammatory agents.   Skin: Negative for pallor and rash.   Neurological: Negative for dizziness, seizures, syncope, speech difficulty, weakness and numbness.   Hematological: Negative for adenopathy.   Psychiatric/Behavioral: Negative for confusion.       Objective:      Physical Exam   Constitutional: She is oriented to person, place, and time. She appears " well-developed and well-nourished.   Thin elderly nonicteric white female.   HENT:   Head: Normocephalic.   Eyes: Pupils are equal, round, and reactive to light. EOM are normal.   Neck: Normal range of motion. Neck supple. No tracheal deviation present. No thyromegaly present.   Cardiovascular: Normal rate, regular rhythm and normal heart sounds.   Pulmonary/Chest: Effort normal and breath sounds normal.   Abdominal: Soft. Bowel sounds are normal.   Abdomen is soft on plane with healed scars with mild tenderness in the epigastrium.  Organomegaly masses not detected.  Bowel sounds are normal.   Musculoskeletal: Normal range of motion.   She ambulates with a walker in a slow manner.  She uses the walker to go from the sitting to the standing position.   Lymphadenopathy:     She has no cervical adenopathy.   Neurological: She is alert and oriented to person, place, and time. No cranial nerve deficit.   Skin: Skin is warm and dry.   Psychiatric: She has a normal mood and affect. Her behavior is normal.   Vitals reviewed.        Plan:       History of cholecystectomy    B12 deficiency    Constipation, unspecified constipation type    History of Husam-en-Y gastric bypass     She will continue her current diet.  She continues her vitamins and minerals.  She will increase her caloric intake with increased protein intake.  She is scheduled for upper endoscopy.  She has good health letter C.  She understands the procedures of upper endoscopy.  Specifically she realizes there is an extremely small incidence of bleeding perforation or aspiration.

## 2019-11-12 ENCOUNTER — HOSPITAL ENCOUNTER (OUTPATIENT)
Dept: PREADMISSION TESTING | Facility: HOSPITAL | Age: 69
Discharge: HOME | End: 2019-11-12
Attending: UROLOGY
Payer: MEDICARE

## 2019-11-12 ENCOUNTER — HOSPITAL ENCOUNTER (OUTPATIENT)
Dept: RADIOLOGY | Facility: HOSPITAL | Age: 69
Discharge: HOME | End: 2019-11-12
Attending: NURSE PRACTITIONER
Payer: MEDICARE

## 2019-11-12 DIAGNOSIS — K21.9 GERD (GASTROESOPHAGEAL REFLUX DISEASE): ICD-10-CM

## 2019-11-12 DIAGNOSIS — K21.9 GASTROESOPHAGEAL REFLUX DISEASE, ESOPHAGITIS PRESENCE NOT SPECIFIED: Primary | ICD-10-CM

## 2019-11-12 DIAGNOSIS — N95.0 POSTMENOPAUSAL VAGINAL BLEEDING: ICD-10-CM

## 2019-11-12 PROCEDURE — 76856 US PELVIS COMPLETE NON OB: ICD-10-PCS | Mod: 26,,, | Performed by: RADIOLOGY

## 2019-11-12 PROCEDURE — 76856 US EXAM PELVIC COMPLETE: CPT | Mod: TC

## 2019-11-12 PROCEDURE — 76856 US EXAM PELVIC COMPLETE: CPT | Mod: 26,,, | Performed by: RADIOLOGY

## 2019-11-13 ENCOUNTER — ANESTHESIA (OUTPATIENT)
Dept: SURGERY | Facility: HOSPITAL | Age: 69
End: 2019-11-13
Payer: MEDICARE

## 2019-11-13 ENCOUNTER — ANESTHESIA EVENT (OUTPATIENT)
Dept: SURGERY | Facility: HOSPITAL | Age: 69
End: 2019-11-13
Payer: MEDICARE

## 2019-11-13 ENCOUNTER — PATIENT OUTREACH (OUTPATIENT)
Dept: ADMINISTRATIVE | Facility: HOSPITAL | Age: 69
End: 2019-11-13

## 2019-11-13 ENCOUNTER — HOSPITAL ENCOUNTER (OUTPATIENT)
Facility: HOSPITAL | Age: 69
Discharge: HOME | End: 2019-11-13
Attending: UROLOGY | Admitting: UROLOGY
Payer: MEDICARE

## 2019-11-13 VITALS
SYSTOLIC BLOOD PRESSURE: 140 MMHG | RESPIRATION RATE: 11 BRPM | WEIGHT: 89 LBS | HEART RATE: 74 BPM | HEIGHT: 62 IN | TEMPERATURE: 98 F | DIASTOLIC BLOOD PRESSURE: 84 MMHG | OXYGEN SATURATION: 100 % | BODY MASS INDEX: 16.38 KG/M2

## 2019-11-13 DIAGNOSIS — N32.89 BLADDER WALL THICKENING: ICD-10-CM

## 2019-11-13 DIAGNOSIS — Z01.818 PREOP TESTING: ICD-10-CM

## 2019-11-13 DIAGNOSIS — R93.41 ABNORMAL CT SCAN, BLADDER: ICD-10-CM

## 2019-11-13 LAB
BACTERIA #/AREA URNS HPF: ABNORMAL /HPF
BILIRUB UR QL STRIP: NEGATIVE
CLARITY UR: ABNORMAL
COLOR UR: YELLOW
GLUCOSE UR QL STRIP: NEGATIVE
HGB UR QL STRIP: ABNORMAL
HYALINE CASTS #/AREA URNS LPF: ABNORMAL /LPF
KETONES UR QL STRIP: NEGATIVE
LEUKOCYTE ESTERASE UR QL STRIP: ABNORMAL
MICROSCOPIC COMMENT: ABNORMAL
NITRITE UR QL STRIP: NEGATIVE
PH UR STRIP: 6 [PH] (ref 5–8)
PROT UR QL STRIP: ABNORMAL
RBC #/AREA URNS HPF: 10 /HPF (ref 0–4)
SP GR UR STRIP: <=1.005 (ref 1–1.03)
SQUAMOUS #/AREA URNS HPF: 1 /HPF
URN SPEC COLLECT METH UR: ABNORMAL
UROBILINOGEN UR STRIP-ACNC: NEGATIVE EU/DL
WBC #/AREA URNS HPF: 100 /HPF (ref 0–5)

## 2019-11-13 PROCEDURE — 88305 TISSUE EXAM BY PATHOLOGIST: CPT | Mod: 26,,, | Performed by: PATHOLOGY

## 2019-11-13 PROCEDURE — 63600175 PHARM REV CODE 636 W HCPCS: Performed by: UROLOGY

## 2019-11-13 PROCEDURE — C1758 CATHETER, URETERAL: HCPCS | Performed by: UROLOGY

## 2019-11-13 PROCEDURE — 88305 TISSUE EXAM BY PATHOLOGIST: ICD-10-PCS | Mod: 26,,, | Performed by: PATHOLOGY

## 2019-11-13 PROCEDURE — C1769 GUIDE WIRE: HCPCS | Performed by: UROLOGY

## 2019-11-13 PROCEDURE — 71000015 HC POSTOP RECOV 1ST HR: Performed by: UROLOGY

## 2019-11-13 PROCEDURE — 52351 CYSTOURETERO & OR PYELOSCOPE: CPT | Mod: ,,, | Performed by: UROLOGY

## 2019-11-13 PROCEDURE — 63600175 PHARM REV CODE 636 W HCPCS: Performed by: ANESTHESIOLOGY

## 2019-11-13 PROCEDURE — 52351 PR CYSTO/URETERO/PYELOSCOPY, DX: ICD-10-PCS | Mod: ,,, | Performed by: UROLOGY

## 2019-11-13 PROCEDURE — 37000009 HC ANESTHESIA EA ADD 15 MINS: Performed by: UROLOGY

## 2019-11-13 PROCEDURE — 25000003 PHARM REV CODE 250: Performed by: UROLOGY

## 2019-11-13 PROCEDURE — 81000 URINALYSIS NONAUTO W/SCOPE: CPT

## 2019-11-13 PROCEDURE — D9220A PRA ANESTHESIA: ICD-10-PCS | Mod: CRNA,,, | Performed by: NURSE ANESTHETIST, CERTIFIED REGISTERED

## 2019-11-13 PROCEDURE — 52204 CYSTOSCOPY W/BIOPSY(S): CPT | Mod: 59,,, | Performed by: UROLOGY

## 2019-11-13 PROCEDURE — D9220A PRA ANESTHESIA: Mod: CRNA,,, | Performed by: NURSE ANESTHETIST, CERTIFIED REGISTERED

## 2019-11-13 PROCEDURE — 36000707: Performed by: UROLOGY

## 2019-11-13 PROCEDURE — D9220A PRA ANESTHESIA: Mod: ANES,,, | Performed by: ANESTHESIOLOGY

## 2019-11-13 PROCEDURE — 71000033 HC RECOVERY, INTIAL HOUR: Performed by: UROLOGY

## 2019-11-13 PROCEDURE — 37000008 HC ANESTHESIA 1ST 15 MINUTES: Performed by: UROLOGY

## 2019-11-13 PROCEDURE — 52204 PR CYSTOURETHROSCOPY,BIOPSY: ICD-10-PCS | Mod: 59,,, | Performed by: UROLOGY

## 2019-11-13 PROCEDURE — D9220A PRA ANESTHESIA: ICD-10-PCS | Mod: ANES,,, | Performed by: ANESTHESIOLOGY

## 2019-11-13 PROCEDURE — 36000706: Performed by: UROLOGY

## 2019-11-13 PROCEDURE — 88305 TISSUE EXAM BY PATHOLOGIST: CPT | Performed by: PATHOLOGY

## 2019-11-13 PROCEDURE — 25500020 PHARM REV CODE 255: Performed by: UROLOGY

## 2019-11-13 PROCEDURE — 63600175 PHARM REV CODE 636 W HCPCS: Performed by: NURSE ANESTHETIST, CERTIFIED REGISTERED

## 2019-11-13 PROCEDURE — 93005 ELECTROCARDIOGRAM TRACING: CPT | Mod: 59

## 2019-11-13 RX ORDER — LIDOCAINE HYDROCHLORIDE 20 MG/ML
JELLY TOPICAL
Status: DISCONTINUED | OUTPATIENT
Start: 2019-11-13 | End: 2019-11-13 | Stop reason: HOSPADM

## 2019-11-13 RX ORDER — KETOROLAC TROMETHAMINE 30 MG/ML
INJECTION, SOLUTION INTRAMUSCULAR; INTRAVENOUS
Status: DISCONTINUED | OUTPATIENT
Start: 2019-11-13 | End: 2019-11-13

## 2019-11-13 RX ORDER — SODIUM CHLORIDE, SODIUM LACTATE, POTASSIUM CHLORIDE, CALCIUM CHLORIDE 600; 310; 30; 20 MG/100ML; MG/100ML; MG/100ML; MG/100ML
125 INJECTION, SOLUTION INTRAVENOUS CONTINUOUS
Status: DISCONTINUED | OUTPATIENT
Start: 2019-11-13 | End: 2019-11-13 | Stop reason: HOSPADM

## 2019-11-13 RX ORDER — ONDANSETRON 2 MG/ML
INJECTION INTRAMUSCULAR; INTRAVENOUS
Status: DISCONTINUED | OUTPATIENT
Start: 2019-11-13 | End: 2019-11-13

## 2019-11-13 RX ORDER — MIDAZOLAM HYDROCHLORIDE 1 MG/ML
INJECTION, SOLUTION INTRAMUSCULAR; INTRAVENOUS
Status: DISCONTINUED | OUTPATIENT
Start: 2019-11-13 | End: 2019-11-13

## 2019-11-13 RX ORDER — OXYCODONE AND ACETAMINOPHEN 5; 325 MG/1; MG/1
1 TABLET ORAL
Status: DISCONTINUED | OUTPATIENT
Start: 2019-11-13 | End: 2019-11-13 | Stop reason: HOSPADM

## 2019-11-13 RX ORDER — ONDANSETRON 2 MG/ML
4 INJECTION INTRAMUSCULAR; INTRAVENOUS DAILY PRN
Status: DISCONTINUED | OUTPATIENT
Start: 2019-11-13 | End: 2019-11-13 | Stop reason: HOSPADM

## 2019-11-13 RX ORDER — CIPROFLOXACIN 2 MG/ML
INJECTION, SOLUTION INTRAVENOUS
Status: COMPLETED
Start: 2019-11-13 | End: 2019-11-13

## 2019-11-13 RX ORDER — CIPROFLOXACIN 2 MG/ML
400 INJECTION, SOLUTION INTRAVENOUS
Status: COMPLETED | OUTPATIENT
Start: 2019-11-13 | End: 2019-11-13

## 2019-11-13 RX ORDER — SODIUM CHLORIDE, SODIUM LACTATE, POTASSIUM CHLORIDE, CALCIUM CHLORIDE 600; 310; 30; 20 MG/100ML; MG/100ML; MG/100ML; MG/100ML
INJECTION, SOLUTION INTRAVENOUS CONTINUOUS
Status: DISCONTINUED | OUTPATIENT
Start: 2019-11-13 | End: 2019-11-13 | Stop reason: HOSPADM

## 2019-11-13 RX ORDER — NITROFURANTOIN 25; 75 MG/1; MG/1
100 CAPSULE ORAL 2 TIMES DAILY
Qty: 20 CAPSULE | Refills: 0 | Status: SHIPPED | OUTPATIENT
Start: 2019-11-13 | End: 2019-11-23

## 2019-11-13 RX ORDER — PROPOFOL 10 MG/ML
VIAL (ML) INTRAVENOUS
Status: DISCONTINUED | OUTPATIENT
Start: 2019-11-13 | End: 2019-11-13

## 2019-11-13 RX ORDER — LIDOCAINE HYDROCHLORIDE 10 MG/ML
1 INJECTION, SOLUTION EPIDURAL; INFILTRATION; INTRACAUDAL; PERINEURAL ONCE
Status: DISCONTINUED | OUTPATIENT
Start: 2019-11-13 | End: 2019-11-13 | Stop reason: HOSPADM

## 2019-11-13 RX ADMIN — KETOROLAC TROMETHAMINE 30 MG: 30 INJECTION, SOLUTION INTRAMUSCULAR at 10:11

## 2019-11-13 RX ADMIN — CIPROFLOXACIN 400 MG: 2 INJECTION, SOLUTION INTRAVENOUS at 09:11

## 2019-11-13 RX ADMIN — PROPOFOL 200 MG: 10 INJECTION, EMULSION INTRAVENOUS at 09:11

## 2019-11-13 RX ADMIN — ONDANSETRON 4 MG: 2 INJECTION INTRAMUSCULAR; INTRAVENOUS at 10:11

## 2019-11-13 RX ADMIN — SODIUM CHLORIDE, POTASSIUM CHLORIDE, SODIUM LACTATE AND CALCIUM CHLORIDE: 600; 310; 30; 20 INJECTION, SOLUTION INTRAVENOUS at 09:11

## 2019-11-13 RX ADMIN — MIDAZOLAM HYDROCHLORIDE 2 MG: 1 INJECTION, SOLUTION INTRAMUSCULAR; INTRAVENOUS at 09:11

## 2019-11-13 NOTE — PLAN OF CARE
Pt brought to PACU via strecher, pt sleepy but arousable. Monitors applied. VSS. IV site intact. LR to gravity. Will continue to monitor.

## 2019-11-13 NOTE — ANESTHESIA PREPROCEDURE EVALUATION
11/13/2019  Obdulia Yepez is a 69 y.o., female.    Pre-op Assessment    I have reviewed the Patient Summary Reports.    I have reviewed the Nursing Notes.   I have reviewed the Medications.     Review of Systems  Anesthesia Hx:  No problems with previous Anesthesia  Neg history of prior surgery. Denies Family Hx of Anesthesia complications.   Denies Personal Hx of Anesthesia complications.   Social:  Non-Smoker    Hematology/Oncology:  Hematology Normal   Oncology Normal     EENT/Dental:EENT/Dental Normal   Cardiovascular:   Hypertension, well controlled Dysrhythmias History of tachycardia    Pulmonary:   Asthma asymptomatic    Renal/:  Renal/ Normal     Hepatic/GI:  Hepatic/GI Normal    Musculoskeletal:  Musculoskeletal Normal    Neurological:   Neuromuscular Disease,    Endocrine:  Endocrine Normal    Dermatological:  Skin Normal    Psych:   Psychiatric History depression          Physical Exam  General:  Well nourished    Airway/Jaw/Neck:  Airway Findings: Mouth Opening: Normal Tongue: Normal  General Airway Assessment: Adult  Mallampati: I        Eyes/Ears/Nose:  EYES/EARS/NOSE FINDINGS: Normal   Dental:  DENTAL FINDINGS: Normal   Chest/Lungs:  Chest/Lungs Clear    Heart/Vascular:  Heart Findings: Normal Heart murmur: negative Vascular Findings: Normal    Abdomen:  Abdomen Findings: Normal    Musculoskeletal:  Musculoskeletal Findings: Normal   Skin:  Skin Findings: Normal    Mental Status:  Mental Status Findings: Normal        Anesthesia Plan  Type of Anesthesia, risks & benefits discussed:  Anesthesia Type:  general  Patient's Preference:   Intra-op Monitoring Plan: standard ASA monitors  Intra-op Monitoring Plan Comments:   Post Op Pain Control Plan:   Post Op Pain Control Plan Comments:   Induction:   IV  Beta Blocker:  Patient is not currently on a Beta-Blocker (No further documentation  required).       Informed Consent: Patient understands risks and agrees with Anesthesia plan.  Questions answered. Anesthesia consent signed with patient.  ASA Score: 3     Day of Surgery Review of History & Physical:    H&P update referred to the provider.

## 2019-11-13 NOTE — TRANSFER OF CARE
"Anesthesia Transfer of Care Note    Patient: Obdulia Yepez    Procedure(s) Performed: Procedure(s) (LRB):  CYSTOSCOPY, WITH RETROGRADE PYELOGRAM (Bilateral)    Patient location: PACU    Anesthesia Type: general    Transport from OR: Transported from OR on room air with adequate spontaneous ventilation    Post pain: adequate analgesia    Post assessment: no apparent anesthetic complications and tolerated procedure well    Post vital signs: stable    Level of consciousness: awake, alert and oriented    Nausea/Vomiting: no nausea/vomiting    Complications: none    Transfer of care protocol was followed      Last vitals:   Visit Vitals  /72   Pulse 85   Temp 36.8 °C (98.2 °F) (Oral)   Resp 14   Ht 5' 2" (1.575 m)   Wt 40.4 kg (89 lb)   SpO2 100%   Breastfeeding? No   BMI 16.28 kg/m²     "

## 2019-11-13 NOTE — OP NOTE
Operative report    Surgeon Trip Bacon    Date of operation 11/13/2019    Anesthesia general    Preop diagnosis abnormal CT scan of the bladder history of recurrent urinary tract infections postop diagnosis severe cystitis    Operation performed cystoscopy bladder biopsy fulguration bilateral retrograde pyelograms.  Right ureteroscopy.    Procedure    With the patient under satisfactory general anesthesia and placed in lithotomy position the genitalia were prepped and draped in the usual manner.  The urethra was treated with xylocaine gel 2%.  After enough time was given for the anesthetic to work a 22 [place] Storz cystoscope was placed through urethra into the bladder.    The bladder was inspected with the 30 and 70 degree lenses.  The bladder shows signs of acute and chronic inflammation.  Velvety type of urothelium was seen especially on the right bladder wall.  Both ureteral orifices are within the trigone on both shows clear reflux.  There was no stones seen in the bladder.    At this point on continued catheter was placed through the cystoscope into the bladder into the left ureteral on 10 cc of contrast material diluted was injected slowly under fluoroscopy.  The left ureter shows mild hydronephrosis as the upper collecting system with no evidence of any site of obstruction filling defects or stones.    An attempt to place the open-ended ureteral catheter into the right ureter was unsuccessful attempt to place a wire into the right ureter was also unsuccessful.  A semi rigid ureteroscope was placed through the urethra into the bladder. The recent white wire or the cone-tip catheter was not able to be placed appropriately on the right ureter is the patient have a distal a ureteral kink.  With the scope in the distal ureter 10 cc of contrast material was injected through the scope into the right ureter.  The ureter is shows tortuosity but no filling defects and no significant hydronephrosis was identified.  Upon removal of the ureteral scope the right collecting system drains properly.    The cystoscope was replaced into the bladder and a biopsy was taking 1 of the most representative areas of the velvety type of urothelium.  This was on the right side the biopsy was sent to the lab for pathological diagnosis on the biopsy site was fulgurated using an electrocautery.    At the beginning of the procedure I sent a specimen from the bladder for culture and sensitivity.    The bladder was irrigated and clear return was obtained the bladder was empty and the patient was transferred to recovery room in satisfactory condition.    Plan for this patient we are going to await the result of the culture and sensitivity and pathology report.  We will have to find a way to treat her chronic urinary tract infection since all the changes seen in the CT scan at most likely secondary to the chronic cystitis.  She is to have an appointment for follow-up in 2-3 weeks.  In the meantime I would order a microwave 100 mg p.o. b.i.d..      Trip Bacon

## 2019-11-13 NOTE — PLAN OF CARE
Pt tolerating Sprite.  at bedside. Dr. Bacon at bedside. Pt and  verbalize understanding of discharge orders

## 2019-11-13 NOTE — H&P
HPI:  Obdulia Yepez is a 69 y.o. female with medical diagnoses as listed and reviewed within the medical history and problem list that presents as a referral from Dr. 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 Pt had CT scan done 10/31/19. She received a phone call that she needed to see Urology but is unsure why. I went over finding of CT with her. She does reports a history of chronic UTI. She is unable to get a urine today. She denies pain or burning. No pelvic discomfort. No frequency or urgency. No hematuria. She has been losing weight for unknown reason. She has been anemic unexplained. She also is reporting vaginal bleeding that she has had in the last few weeks. She is postmenopausal and has had a hysterectomy. She has not had vaginal US. Will order today. She does have an appointment with GI this AM as well to follow up. She did have some abnormalities of the bile duct on her CT scan as well. She admits to depression and being sad most of the time. She is walking with a walker due to weakness. She sees Cardiology as well for tachycardia. She is here with her  today.       PAST MEDICAL HISTORY:       Past Medical History:   Diagnosis Date    Allergy      Arthritis      Asthma      Depression      Gout      Hypertension           PAST SURGICAL HISTORY:        Past Surgical History:   Procedure Laterality Date    APPENDECTOMY         SECTION        CHOLECYSTECTOMY        HYSTERECTOMY        SHOULDER SURGERY Right 2019    STOMACH SURGERY        WRIST SURGERY Left           SOCIAL HISTORY:  Social History               Socioeconomic History    Marital status:        Spouse name: Not on file    Number of children: Not on file    Years of education: Not on file    Highest education level: Not on file   Occupational History    Not on file   Social Needs    Financial resource strain: Not on file    Food insecurity:       Worry: Not on file        Inability: Not on file    Transportation needs:       Medical: Not on file       Non-medical: Not on file   Tobacco Use    Smoking status: Never Smoker    Smokeless tobacco: Never Used   Substance and Sexual Activity    Alcohol use: Yes    Drug use: No    Sexual activity: Not on file   Lifestyle    Physical activity:       Days per week: Not on file       Minutes per session: Not on file    Stress: Not on file   Relationships    Social connections:       Talks on phone: Not on file       Gets together: Not on file       Attends Orthodox service: Not on file       Active member of club or organization: Not on file       Attends meetings of clubs or organizations: Not on file       Relationship status: Not on file   Other Topics Concern    Not on file   Social History Narrative    Not on file            FAMILY HISTORY:        Family History   Problem Relation Age of Onset    Asthma Mother      Pleurisy Father      Bipolar disorder Sister      Colon cancer Maternal Grandmother      Colon cancer Maternal Grandfather           ALLERGIES AND MEDICATIONS: updated and reviewed.       Review of patient's allergies indicates:   Allergen Reactions    Amoxicillin Diarrhea    Latex      Milk containing products      Opioids - morphine analogues      Peanut      Sodium phosphate      Soy      Sulfa (sulfonamide antibiotics)        Current Medications          Current Outpatient Medications   Medication Sig Dispense Refill    allopurinol (ZYLOPRIM) 300 MG tablet TAKE 1 TABLET BY MOUTH DAILY 30 tablet 14    ARIPiprazole (ABILIFY) 5 MG Tab aripiprazole 5 mg tablet  TAKE 1 TABLET BY MOUTH AT BEDTIMES 30 tablet 2    blood sugar diagnostic (BLOOD GLUCOSE TEST) Strp OneTouch Ultra Test strips   USE AS DIRECTED        blood-glucose meter (ONETOUCH ULTRA2) kit OneTouch Ultra2 kit   USE AS DIRECTED        budesonide-formoterol 160-4.5 mcg (SYMBICORT) 160-4.5 mcg/actuation HFAA Symbicort 160 mcg-4.5  mcg/actuation HFA aerosol inhaler   Inhale 2 puffs twice a day by inhalation route.        clindamycin (CLEOCIN) 300 MG capsule TAKE ONE CAPSULE 3 TIMES DAILY   0    colestipol (COLESTID) 1 gram Tab Take 1 tablet (1 g total) by mouth 2 (two) times daily. 180 tablet 3    cyclobenzaprine (FLEXERIL) 10 MG tablet Take 1 tablet (10 mg total) by mouth 3 (three) times daily as needed. 90 tablet 0    cyproheptadine (PERIACTIN) 4 mg tablet TAKE 1 TABLET (4 MG TOTAL) BY MOUTH 3 (THREE) TIMES DAILY AS NEEDED. 90 tablet 2    escitalopram oxalate (LEXAPRO) 20 MG tablet TAKE 1 TABLET BY MOUTH EVERY DAY 30 tablet 11    fluticasone (FLONASE) 50 mcg/actuation nasal spray fluticasone 50 mcg/actuation nasal spray,suspension   Inhale 1 spray every day by intranasal route.        folic acid (FOLVITE) 1 MG tablet folic acid 1 mg tablet   TAKE 1 TABLET BY MOUTH EVERY DAY        gabapentin (NEURONTIN) 300 MG capsule Take 3 capsules (900 mg total) by mouth 3 (three) times daily. (Patient taking differently: Take 600 mg by mouth 2 (two) times daily. ) 270 capsule 3    hydrocortisone (CORTEF) 10 MG Tab TAKE 2 TABLETS BY MOUTH IN THE MORNING AND 1 TABLET IN THE EVENING   0    ibuprofen (ADVIL,MOTRIN) 800 MG tablet Take 1 tablet (800 mg total) by mouth 3 (three) times daily. 90 tablet 3    ipratropium (ATROVENT) 0.03 % nasal spray 2 sprays by Nasal route 3 (three) times daily. 30 mL 2    K-PHOS ORIGINAL 500 mg TbSO TAKE ONE TABLET BY MOUTH WITH MEALS AT BEDTIME   0    lancets (ONETOUCH DELICA LANCETS) 33 gauge Misc OneTouch Delica Lancets 33 gauge   USE AS DIRECTED        lidocaine HCl 2% (LIDOCAINE VISCOUS) 2 % Soln Lidocaine Viscous 2 % mucosal solution   TAKE 5 ML EVERY 3 HOURS BY ORAL ROUTE AS NEEDED.        meropenem (MERREM) 1 gram injection     0    metoprolol succinate (TOPROL-XL) 50 MG 24 hr tablet Take 1 tablet (50 mg total) by mouth once daily. 30 tablet 2    mirtazapine (REMERON) 15 MG tablet Take 1 tablet (15 mg  total) by mouth every evening. 30 tablet 11    montelukast (SINGULAIR) 10 mg tablet TAKE 1 TABLET(S) EVERY DAY BY ORAL ROUTE. 30 tablet 11    mupirocin (BACTROBAN) 2 % ointment mupirocin 2 % topical ointment   APPLY A SMALL AMOUNT TO THE AFFECTED AREA BY TOPICAL ROUTE 2 TIMES PER DAY        ondansetron (ZOFRAN-ODT) 4 MG TbDL ondansetron 4 mg disintegrating tablet   DISINTEGRATE 1 TABLET BY MOUTH EVERY 6 HOURS AS NEEDED FOR NAUSEA AND VOMITING        ONETOUCH ULTRASOFT LANCETS MISC OneTouch UltraSoft Lancets   USE AS DIRECTED        oxybutynin (DITROPAN-XL) 5 MG TR24 oxybutynin chloride ER 5 mg tablet,extended release 24 hr   TAKE 1 TABLET BY MOUTH EVERY DAY        oxyCODONE-acetaminophen (PERCOCET)  mg per tablet Take 1 tablet by mouth every 8 (eight) hours as needed for Pain. 90 tablet 0    oxyCODONE-acetaminophen (PERCOCET) 5-325 mg per tablet Take 1 tablet by mouth every 4 (four) hours as needed for Pain.        pantoprazole (PROTONIX) 40 MG tablet TAKE 1 TABLET(S) EVERY DAY BY ORAL ROUTE. 30 tablet 11    triamcinolone acetonide 0.1% (KENALOG) 0.1 % Lotn triamcinolone acetonide 0.1 % lotion   APPLY A THIN LAYER TO THE AFFECTED AREA(S) BY TOPICAL ROUTE 2 TIMES PER DAY        busPIRone (BUSPAR) 5 MG Tab Take 1 tablet (5 mg total) by mouth 2 (two) times daily. (Patient not taking: Reported on 10/30/2019) 60 tablet 11      Current Facility-Administered Medications   Medication Dose Route Frequency Provider Last Rate Last Dose    0.9%  NaCl infusion (for blood administration)   Intravenous Q24H PRN Og Perez MD        acetaminophen tablet 650 mg  650 mg Oral PRN Og Perez MD                    Facility-Administered Medications Ordered in Other Visits   Medication Dose Route Frequency Provider Last Rate Last Dose    hylan g-f 20 48 mg/6 mL injection 48 mg  48 mg Intra-articular   Esteban Remy DO   48 mg at 05/29/19 1603    hylan g-f 20 48 mg/6 mL injection 48 mg  48 mg  Intra-articular   Esteban Remy, DO   48 mg at 05/29/19 1603    triamcinolone acetonide injection 80 mg  80 mg Intra-articular   Esteban Remy, DO   80 mg at 05/29/19 1603               ROS  Review of Systems   Constitutional: Negative for appetite change and fever. She does have unexplained weight loss.    HENT: Negative for trouble swallowing.         No jaundice.   Respiratory: Negative for cough, shortness of breath and wheezing. COPD  Cardiovascular: Negative for chest pain.   Gastrointestinal: Negative for abdominal distention, abdominal pain, anal bleeding, blood in stool, constipation, diarrhea, nausea, rectal pain and vomiting.    Musculoskeletal: Positive for arthralgias and gait problem. Negative for back pain and neck pain.        She describes as being very weak.  She can use a rolling walker.  She has been operated on her shoulders and has discomfort.  She has lost a lot of muscle mass and she feels that she can't do the simple things I can't open the refrigerator door.  She can ambulate with a rolling walker but because of the muscle weakness her duration of activity is limited.   Skin: Negative for pallor and rash.   Neurological: Negative for dizziness, seizures, syncope, speech difficulty, weakness and numbness.   Hematological: Negative for adenopathy.   Psychiatric/Behavioral: Negative for confusion.        She is very sad and tearful when she talks about the death of her son an automobile accident in February.  He burned to death .  She has had difficulty sleeping.            PHYSICAL EXAM  Vitals       Vitals:     11/07/19 0845   BP: (!) 85/62   Pulse: (!) 112   Resp: 16   Temp: 98 °F (36.7 °C)   TempSrc: Oral   Weight: 39.1 kg (86 lb 3.2 oz)   Height: 5' (1.524 m)       Body mass index is 16.83 kg/m².  Weight: 39.1 kg (86 lb 3.2 oz)   Height: 5' (152.4 cm)         Physical Exam   Constitutional: She is oriented to person, place, and time. She appears well-developed. She appears  cachectic. She has a sickly appearance.   HENT:   Head: Normocephalic.   Eyes: Pupils are equal, round, and reactive to light.   Neck: Normal range of motion.   Cardiovascular: S1 normal and S2 normal.   Tachycardic.    Pulmonary/Chest: Effort normal.   Abdominal: Soft. Normal appearance.   Musculoskeletal:   Generalized weakness. Walking with rollator walker.    Neurological: She is alert and oriented to person, place, and time.   Skin: Skin is warm and dry. Capillary refill takes less than 2 seconds.   Vitals reviewed.                  Health Maintenance        Date Due Completion Date     TETANUS VACCINE 05/13/1970 ---     Shingles Vaccine (1 of 2) 05/13/2002 ---     Pneumococcal Vaccine (65+ Low/Medium Risk) (2 of 2 - PPSV23) 10/30/2020 10/30/2019     Mammogram 11/29/2020 11/29/2018     Lipid Panel 10/06/2022 10/6/2017     DEXA SCAN 10/31/2022 10/31/2019     Colonoscopy 08/01/2024 8/1/2017                   IMP: Abnormal CT scan of the bladder    Plan: Cysto and marii. retrogrades

## 2019-11-13 NOTE — ANESTHESIA POSTPROCEDURE EVALUATION
Anesthesia Post Evaluation    Patient: Obdulia Yepez    Procedure(s) Performed: Procedure(s) (LRB):  CYSTOSCOPY, WITH RETROGRADE PYELOGRAM (Bilateral)  URETEROSCOPY (Right)    Final Anesthesia Type: general  Patient location during evaluation: PACU  Patient participation: Yes- Able to Participate  Level of consciousness: awake and awake and alert  Post-procedure vital signs: reviewed and stable  Pain management: adequate  Airway patency: patent  PONV status at discharge: No PONV  Anesthetic complications: no      Cardiovascular status: blood pressure returned to baseline  Respiratory status: unassisted and spontaneous ventilation  Hydration status: euvolemic  Follow-up not needed.          Vitals Value Taken Time   /84 11/13/2019 11:07 AM   Temp 36.8 °C (98.2 °F) 11/13/2019  9:14 AM   Pulse 74 11/13/2019 11:09 AM   Resp 7 11/13/2019 11:08 AM   SpO2 100 % 11/13/2019 11:09 AM   Vitals shown include unvalidated device data.      Event Time     Out of Recovery 10:50:00          Pain/Lamberto Score: Lamberto Score: 10 (11/13/2019 11:06 AM)

## 2019-11-13 NOTE — BRIEF OP NOTE
Brief Operative Note     Surgery Date: 11/13/2019    Surgeon:   Trip Bacon MD     Pre-op Diagnosis:  Bladder wall thickening [N32.89]  Abnormal CT scan, bladder [R93.41]    Post-op Diagnosis:  Severe cystitis    Procedure:  Procedure(s) (LRB):  CYSTOSCOPY, WITH RETROGRADE PYELOGRAM (Bilateral)  URETEROSCOPY (Right)    Anesthesia: General    Findings/Key Components:  Severe cystitis    Estimated Blood Loss: Minimal                                                                                                                           Discharge Summary    Admit Date: 11/13/2019     Attending Physician: Trip Bacon MD     Discharge Physician: Trip Bacon MD      Discharge Date: 11/13/2019    Treatments/Procedures: Procedure(s) (LRB):  CYSTOSCOPY, WITH RETROGRADE PYELOGRAM (Bilateral)  URETEROSCOPY (Right)  Final Diagnosis:Severe cystitis  Hospital Course: Cystoscopy, Bladder Bx and fulguration. Karel Retrogrades, Right ureteroscopy.  F.U. Dr. Bacon 2 weeks    Disposition: Home or Self Care     Patient Instructions:     Current Discharge Medication List:         Obdulia Yepez   Home Medication Instructions JAIRO:[id number]    Printed on:11/13/19 1030   Medication Information                      allopurinol (ZYLOPRIM) 300 MG tablet  TAKE 1 TABLET BY MOUTH DAILY             ARIPiprazole (ABILIFY) 5 MG Tab  aripiprazole 5 mg tablet  TAKE 1 TABLET BY MOUTH AT BEDTIMES             blood sugar diagnostic (BLOOD GLUCOSE TEST) Strp  OneTouch Ultra Test strips   USE AS DIRECTED             blood-glucose meter (ONETOUCH ULTRA2) kit  OneTouch Ultra2 kit   USE AS DIRECTED             budesonide-formoterol 160-4.5 mcg (SYMBICORT) 160-4.5 mcg/actuation HFAA  Symbicort 160 mcg-4.5 mcg/actuation HFA aerosol inhaler   Inhale 2 puffs twice a day by inhalation route.             busPIRone (BUSPAR) 5 MG Tab  Take 1 tablet (5 mg total) by mouth 2 (two) times daily.             colestipol (COLESTID) 1 gram  Tab  Take 1 tablet (1 g total) by mouth 2 (two) times daily.             cyclobenzaprine (FLEXERIL) 10 MG tablet  Take 1 tablet (10 mg total) by mouth 3 (three) times daily as needed.             cyproheptadine (PERIACTIN) 4 mg tablet  TAKE 1 TABLET (4 MG TOTAL) BY MOUTH 3 (THREE) TIMES DAILY AS NEEDED.             escitalopram oxalate (LEXAPRO) 20 MG tablet  TAKE 1 TABLET BY MOUTH EVERY DAY             fluticasone (FLONASE) 50 mcg/actuation nasal spray  fluticasone 50 mcg/actuation nasal spray,suspension   Inhale 1 spray every day by intranasal route.             folic acid (FOLVITE) 1 MG tablet  folic acid 1 mg tablet   TAKE 1 TABLET BY MOUTH EVERY DAY             gabapentin (NEURONTIN) 300 MG capsule  Take 3 capsules (900 mg total) by mouth 3 (three) times daily.             hydrocortisone (CORTEF) 10 MG Tab  TAKE 2 TABLETS BY MOUTH IN THE MORNING AND 1 TABLET IN THE EVENING             ibuprofen (ADVIL,MOTRIN) 800 MG tablet  Take 1 tablet (800 mg total) by mouth 3 (three) times daily.             ipratropium (ATROVENT) 0.03 % nasal spray  2 sprays by Nasal route 3 (three) times daily.             K-PHOS ORIGINAL 500 mg TbSO  TAKE ONE TABLET BY MOUTH WITH MEALS AT BEDTIME             lancets (ONETOUCH DELICA LANCETS) 33 gauge Misc  OneTouch Delica Lancets 33 gauge   USE AS DIRECTED             lidocaine HCl 2% (LIDOCAINE VISCOUS) 2 % Soln  Lidocaine Viscous 2 % mucosal solution   TAKE 5 ML EVERY 3 HOURS BY ORAL ROUTE AS NEEDED.             metoprolol succinate (TOPROL-XL) 50 MG 24 hr tablet  Take 1 tablet (50 mg total) by mouth once daily.             mirtazapine (REMERON) 15 MG tablet  Take 1 tablet (15 mg total) by mouth every evening.             montelukast (SINGULAIR) 10 mg tablet  TAKE 1 TABLET(S) EVERY DAY BY ORAL ROUTE.             mupirocin (BACTROBAN) 2 % ointment  mupirocin 2 % topical ointment   APPLY A SMALL AMOUNT TO THE AFFECTED AREA BY TOPICAL ROUTE 2 TIMES PER DAY             nitrofurantoin,  macrocrystal-monohydrate, (MACROBID) 100 MG capsule  Take 1 capsule (100 mg total) by mouth 2 (two) times daily. for 10 days             ondansetron (ZOFRAN-ODT) 4 MG TbDL  ondansetron 4 mg disintegrating tablet   DISINTEGRATE 1 TABLET BY MOUTH EVERY 6 HOURS AS NEEDED FOR NAUSEA AND VOMITING             ONETOUCH ULTRASOFT LANCETS MISC  OneTouch UltraSoft Lancets   USE AS DIRECTED             oxybutynin (DITROPAN-XL) 5 MG TR24  oxybutynin chloride ER 5 mg tablet,extended release 24 hr   TAKE 1 TABLET BY MOUTH EVERY DAY             pantoprazole (PROTONIX) 40 MG tablet  Take 1 tablet (40 mg total) by mouth once daily.             triamcinolone acetonide 0.1% (KENALOG) 0.1 % Lotn  triamcinolone acetonide 0.1 % lotion   APPLY A THIN LAYER TO THE AFFECTED AREA(S) BY TOPICAL ROUTE 2 TIMES PER DAY                     Current Discharge Medication List      START taking these medications    Details   nitrofurantoin, macrocrystal-monohydrate, (MACROBID) 100 MG capsule Take 1 capsule (100 mg total) by mouth 2 (two) times daily. for 10 days  Qty: 20 capsule, Refills: 0    Comments: Instruct the patient to take it with food         CONTINUE these medications which have NOT CHANGED    Details   allopurinol (ZYLOPRIM) 300 MG tablet TAKE 1 TABLET BY MOUTH DAILY  Qty: 30 tablet, Refills: 14      budesonide-formoterol 160-4.5 mcg (SYMBICORT) 160-4.5 mcg/actuation HFAA Symbicort 160 mcg-4.5 mcg/actuation HFA aerosol inhaler   Inhale 2 puffs twice a day by inhalation route.      colestipol (COLESTID) 1 gram Tab Take 1 tablet (1 g total) by mouth 2 (two) times daily.  Qty: 180 tablet, Refills: 3      cyproheptadine (PERIACTIN) 4 mg tablet TAKE 1 TABLET (4 MG TOTAL) BY MOUTH 3 (THREE) TIMES DAILY AS NEEDED.  Qty: 90 tablet, Refills: 2    Associated Diagnoses: Weight loss      escitalopram oxalate (LEXAPRO) 20 MG tablet TAKE 1 TABLET BY MOUTH EVERY DAY  Qty: 30 tablet, Refills: 11      folic acid (FOLVITE) 1 MG tablet folic acid 1 mg  tablet   TAKE 1 TABLET BY MOUTH EVERY DAY      gabapentin (NEURONTIN) 300 MG capsule Take 3 capsules (900 mg total) by mouth 3 (three) times daily.  Qty: 270 capsule, Refills: 3      K-PHOS ORIGINAL 500 mg TbSO TAKE ONE TABLET BY MOUTH WITH MEALS AT BEDTIME  Refills: 0      metoprolol succinate (TOPROL-XL) 50 MG 24 hr tablet Take 1 tablet (50 mg total) by mouth once daily.  Qty: 30 tablet, Refills: 2    Associated Diagnoses: Tachycardia      mirtazapine (REMERON) 15 MG tablet Take 1 tablet (15 mg total) by mouth every evening.  Qty: 30 tablet, Refills: 11    Associated Diagnoses: Depression, unspecified depression type      montelukast (SINGULAIR) 10 mg tablet TAKE 1 TABLET(S) EVERY DAY BY ORAL ROUTE.  Qty: 30 tablet, Refills: 11      ARIPiprazole (ABILIFY) 5 MG Tab aripiprazole 5 mg tablet  TAKE 1 TABLET BY MOUTH AT BEDTIMES  Qty: 30 tablet, Refills: 2      blood sugar diagnostic (BLOOD GLUCOSE TEST) Strp OneTouch Ultra Test strips   USE AS DIRECTED      blood-glucose meter (ONETOUCH ULTRA2) kit OneTouch Ultra2 kit   USE AS DIRECTED      busPIRone (BUSPAR) 5 MG Tab Take 1 tablet (5 mg total) by mouth 2 (two) times daily.  Qty: 60 tablet, Refills: 11    Associated Diagnoses: JOYCE (generalized anxiety disorder)      cyclobenzaprine (FLEXERIL) 10 MG tablet Take 1 tablet (10 mg total) by mouth 3 (three) times daily as needed.  Qty: 90 tablet, Refills: 0    Associated Diagnoses: Traumatic closed displaced fracture of surgical neck of humerus, left, initial encounter; Hyponatremia      fluticasone (FLONASE) 50 mcg/actuation nasal spray fluticasone 50 mcg/actuation nasal spray,suspension   Inhale 1 spray every day by intranasal route.      hydrocortisone (CORTEF) 10 MG Tab TAKE 2 TABLETS BY MOUTH IN THE MORNING AND 1 TABLET IN THE EVENING  Refills: 0      ibuprofen (ADVIL,MOTRIN) 800 MG tablet Take 1 tablet (800 mg total) by mouth 3 (three) times daily.  Qty: 90 tablet, Refills: 3    Associated Diagnoses: Osteoarthritis of  cervical spine with myelopathy      ipratropium (ATROVENT) 0.03 % nasal spray 2 sprays by Nasal route 3 (three) times daily.  Qty: 30 mL, Refills: 2    Associated Diagnoses: Allergic rhinitis due to food      !! lancets (ONETOUCH DELICA LANCETS) 33 gauge Misc OneTouch Delica Lancets 33 gauge   USE AS DIRECTED      lidocaine HCl 2% (LIDOCAINE VISCOUS) 2 % Soln Lidocaine Viscous 2 % mucosal solution   TAKE 5 ML EVERY 3 HOURS BY ORAL ROUTE AS NEEDED.      mupirocin (BACTROBAN) 2 % ointment mupirocin 2 % topical ointment   APPLY A SMALL AMOUNT TO THE AFFECTED AREA BY TOPICAL ROUTE 2 TIMES PER DAY      ondansetron (ZOFRAN-ODT) 4 MG TbDL ondansetron 4 mg disintegrating tablet   DISINTEGRATE 1 TABLET BY MOUTH EVERY 6 HOURS AS NEEDED FOR NAUSEA AND VOMITING      !! ONETOUCH ULTRASOFT LANCETS MISC OneTouch UltraSoft Lancets   USE AS DIRECTED      oxybutynin (DITROPAN-XL) 5 MG TR24 oxybutynin chloride ER 5 mg tablet,extended release 24 hr   TAKE 1 TABLET BY MOUTH EVERY DAY      pantoprazole (PROTONIX) 40 MG tablet Take 1 tablet (40 mg total) by mouth once daily.  Qty: 30 tablet, Refills: 11    Associated Diagnoses: Gastroesophageal reflux disease, esophagitis presence not specified      triamcinolone acetonide 0.1% (KENALOG) 0.1 % Lotn triamcinolone acetonide 0.1 % lotion   APPLY A THIN LAYER TO THE AFFECTED AREA(S) BY TOPICAL ROUTE 2 TIMES PER DAY       !! - Potential duplicate medications found. Please discuss with provider.      STOP taking these medications       oxyCODONE-acetaminophen (PERCOCET)  mg per tablet Comments:   Reason for Stopping:         oxyCODONE-acetaminophen (PERCOCET) 5-325 mg per tablet Comments:   Reason for Stopping:               Discharge Procedure Orders:    Discharge Procedure Orders   Diet Adult Regular     Activity as tolerated

## 2019-11-14 ENCOUNTER — OFFICE VISIT (OUTPATIENT)
Dept: CARDIOLOGY | Facility: CLINIC | Age: 69
End: 2019-11-14
Payer: MEDICARE

## 2019-11-14 VITALS
RESPIRATION RATE: 12 BRPM | HEART RATE: 120 BPM | HEIGHT: 60 IN | WEIGHT: 91 LBS | TEMPERATURE: 101 F | DIASTOLIC BLOOD PRESSURE: 94 MMHG | OXYGEN SATURATION: 95 % | SYSTOLIC BLOOD PRESSURE: 136 MMHG | BODY MASS INDEX: 17.87 KG/M2

## 2019-11-14 DIAGNOSIS — E88.09 HYPOALBUMINEMIA DUE TO PROTEIN-CALORIE MALNUTRITION: ICD-10-CM

## 2019-11-14 DIAGNOSIS — R06.09 DOE (DYSPNEA ON EXERTION): ICD-10-CM

## 2019-11-14 DIAGNOSIS — D63.1 ANEMIA DUE TO STAGE 3 CHRONIC KIDNEY DISEASE: ICD-10-CM

## 2019-11-14 DIAGNOSIS — R54 FRAILTY: ICD-10-CM

## 2019-11-14 DIAGNOSIS — R00.0 TACHYCARDIA: Primary | ICD-10-CM

## 2019-11-14 DIAGNOSIS — I46.9 CARDIOPULMONARY ARREST WITH SUCCESSFUL RESUSCITATION: ICD-10-CM

## 2019-11-14 DIAGNOSIS — E27.40 ADRENAL INSUFFICIENCY: ICD-10-CM

## 2019-11-14 DIAGNOSIS — N18.30 STAGE 3 CHRONIC KIDNEY DISEASE: ICD-10-CM

## 2019-11-14 DIAGNOSIS — E44.0 MODERATE PROTEIN-CALORIE MALNUTRITION: ICD-10-CM

## 2019-11-14 DIAGNOSIS — F32.9 REACTIVE DEPRESSION: ICD-10-CM

## 2019-11-14 DIAGNOSIS — R70.0 ELEVATED SED RATE: ICD-10-CM

## 2019-11-14 DIAGNOSIS — Z98.84 STATUS POST GASTRIC BYPASS FOR OBESITY: ICD-10-CM

## 2019-11-14 DIAGNOSIS — F10.10 EXCESSIVE DRINKING ALCOHOL: ICD-10-CM

## 2019-11-14 DIAGNOSIS — E46 HYPOALBUMINEMIA DUE TO PROTEIN-CALORIE MALNUTRITION: ICD-10-CM

## 2019-11-14 DIAGNOSIS — R07.89 ATYPICAL CHEST PAIN: ICD-10-CM

## 2019-11-14 DIAGNOSIS — R50.9 FEVER, UNSPECIFIED FEVER CAUSE: ICD-10-CM

## 2019-11-14 DIAGNOSIS — N18.30 ANEMIA DUE TO STAGE 3 CHRONIC KIDNEY DISEASE: ICD-10-CM

## 2019-11-14 DIAGNOSIS — Z91.89 SEDENTARY LIFESTYLE: ICD-10-CM

## 2019-11-14 DIAGNOSIS — R63.4 WEIGHT LOSS, NON-INTENTIONAL: ICD-10-CM

## 2019-11-14 DIAGNOSIS — N39.0 RECURRENT UTI: ICD-10-CM

## 2019-11-14 PROCEDURE — 99205 OFFICE O/P NEW HI 60 MIN: CPT | Mod: S$PBB,,, | Performed by: INTERNAL MEDICINE

## 2019-11-14 PROCEDURE — 99999 PR PBB SHADOW E&M-EST. PATIENT-LVL V: ICD-10-PCS | Mod: PBBFAC,,, | Performed by: INTERNAL MEDICINE

## 2019-11-14 PROCEDURE — 99205 PR OFFICE/OUTPT VISIT, NEW, LEVL V, 60-74 MIN: ICD-10-PCS | Mod: S$PBB,,, | Performed by: INTERNAL MEDICINE

## 2019-11-14 PROCEDURE — 99215 OFFICE O/P EST HI 40 MIN: CPT | Mod: PBBFAC,PN | Performed by: INTERNAL MEDICINE

## 2019-11-14 PROCEDURE — 99999 PR PBB SHADOW E&M-EST. PATIENT-LVL V: CPT | Mod: PBBFAC,,, | Performed by: INTERNAL MEDICINE

## 2019-11-14 NOTE — PATIENT INSTRUCTIONS
Recommended Mediterranean dietEating Heart-Healthy Food: Using the DASH Plan  Eating for your heart doesnt have to be hard or boring. You just need to know how to make healthier choices. The DASH eating plan has been developed to help you do just that. DASH stands for Dietary Approaches to Stop Hypertension. It is a plan that has been proven to be healthier for your heart and to lower your risk for high blood pressure. It can also help lower your risk for cancer, heart disease, osteoporosis, and diabetes.  Choosing from Each Food Group  Choose foods from each of the food groups below each day. Try to get the recommended number of servings for each food group. The serving numbers are based on a diet of 2,000 calories a day. Talk to your doctor if youre unsure about your calorie needs.  Grains   Servings: 7-8 a day  A serving is:  · 1 slice bread  · 1 ounce dry cereal  · half a cup cooked rice or pasta  Best choices: Whole grains and any grains high in fiber.  Vegetables   Servings: 4-5 a day  A serving is:  · 1 cup raw leafy vegetable  · Half a cup cooked vegetable  · Three-quarter cup vegetable juice  Best choices: Fresh or frozen vegetable prepared without too much added salt or fat.    Fruits   Servings: 4-5 a day  A serving is:  · Three-quarter cup fruit juice  · 1 medium fruit  · One-quarter cup dried fruit  · One-half cup fresh, frozen, or canned fruit  Best choices: A variety of fresh fruits of different colors. Whole fruits are a much better choice than fruit juices.  Low-fat or Fat Free Dairy   Servings: 2-3 a day  A serving is:  · 8 ounces milk  · 1 cup yogurt  · One and a half ounces cheese  Best choices: Skim or 1% milk, low-fat or fat free yogurt or buttermilk, and low-fat cheeses.       Meat, Poultry, Fish   Servings: 2 or fewer a day  A serving is:  · 3 ounces cooked meat, poultry, or fish  Best choices: Lean meats and fish. Trim away visible fat. Broil, roast, or boil instead of frying. Remove skin  from poultry before eating.  Nuts, Seeds, Beans   Servings: 4-5 a week  A serving is:  · One third cup nuts (or one and a half ounces)  · 2 tablespoons sunflower seeds  · Half a cup cooked beans  Best choices: Dry roasted nuts with no salt added, lentils, kidney beans, garbanzo beans, and whole agustin beans.    Fats and Oils   Servings: 2 a day  A serving is:  · 1 teaspoon vegetable oil  · 1 teaspoon soft margarine  · 1 tablespoon low-fat mayonnaise  · 1 teaspoon regular mayonnaise  · 2 tablespoons light salad dressing  · 1 tablespoon regular salad dressing  Best choices: Monounsaturated and polyunsaturated fats such as olive, canola, or safflower oil.  Sweets   Servings: 5 a week or fewer  A serving is:  · 1 tablespoon sugar, maple syrup, or honey  · 1 tablespoon jam or jelly  · 1 half-ounce jelly beans (about 15)  · 8 ounces lemonade  Best choices: Dried fruit can be a satisfying sweet. Choose low-fat sweets when possible. And watch your serving sizes!       Aerobic Exercise for a Healthy Heart  Exercise is a lot more than an energy booster and a stress reliever. It also strengthens your heart muscle, lowers your blood pressure and blood cholesterol, and burns calories.      Remember, some activity is better than none.     Choose an Aerobic Activity  Choose a nonstop activity that makes your heart and lungs work harder than they do when you rest or walk normally. This aerobic exercise can improve the way your heart and other muscles use oxygen. Make it fun by exercising with a friend and choosing an activity you enjoy. Here are some ideas:  · Walking  · Swimming  · Bicycling  · Stair climbing  · Dancing  · Jogging  Exercise Regularly  If you havent been exercising regularly,  get your doctors okay first. Then start slowly.  Here are some tips:  · Begin exercising 3 times a week for 5-10 minutes at a time.  · When you feel comfortable, add a few minutes each week.  · Slowly build up to exercising 3-4 times each  week for 20-40 minutes. Aim for a total of 150 or more minutes a week.  · Be sure to carry your nitroglycerin with you when you exercise.  · If you get angina when youre exercising, stop what youre doing, take your nitroglycerin, and call your doctor.  © 5502-3384 [employer], [address] [place] [address], [place] [address]. All rights reserved. This information is not intended as a substitute for professional medical care. Always follow your healthcare professional's instructions.

## 2019-11-14 NOTE — PROGRESS NOTES
" Patient ID:  Obdulia Yepez is a 69 y.o. female who presents Establish Care  For recent onset tachycardia with fatigue, reactive depression, history of cardiopulmonary arrest 11/2017, frailty  PCP and referred by Dr. 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    Health literacy: Medium  Activities: ADL's, has assistance from CNA to help with bathing, has OT & PT ([facility]), mostly sedentary, limited by general weakness not able to use arms post bilateral shoulder operations.   Nicotine: Never, have 2nd hand exposure  Alcohol: 3-4 glasses of wine/day  Illicit drugs: Denies  Cardiac symptoms: SOB on exertion & chest pain under right breasts   Home BP: HH Maintains logs, HR noted >100 to 160 at rest  Medication compliance: Yes -  assures  Diet: regular  Caffeine: 1-2 cup coffee/day  Labs: No Troponin, LDL, A1C, BNP:  TSH 1.115; Sodium 140; K+ 4.1; Glucose 147H; Cr. 1.3, GFR 42.6L; WBC 7.67; Hemoglobin 10.4L, normal iron profile, albumin 2.9, ESR 43  Last Echo: 2018  Last stress test: 2018   Cardiovascular angiogram: 2017   ECG: normal rate 96  Fundoscopic exam: 2016, no retinopathy noted    WF with multitude of medical problems, report history of cardiopulmonary arrest during sepsis in 11/2017. [facility] report as "good". Now concern of tachycardia over the past 6 months since the passing of her son at age 35. Have history of adrenal insufficiency on steroid Rx. Noted problems also with occasional low BP, chronic UTI, fever, weakness. Previous on metoprolol which was not helpful. Now noted recurrent chest tightness, no inciting factor, over the past 2 months, comes and goes on a daily basis, last up to 2 hours, max intensity grade 7/10, no other associated tightness. No lipid results available and no history for " "hyperlipidemia. No premature family history for CAD / strokes.      Review of Systems   Constitution: Positive for weight gain (Gain 5 pounds in 3 weeks). Negative for diaphoresis, fever, malaise/fatigue and night sweats.        Neck 11.5; Waist 28; Hip 29   HENT: Positive for ear discharge (Cerumen) and sore throat (Sore throat from bladder biopsy 11/13/2019). Negative for nosebleeds and tinnitus.    Eyes: Negative for visual disturbance.        Wears corrective lens   Cardiovascular: Positive for chest pain (Under right breast, dull), dyspnea on exertion and palpitations (Feels it "racing" and can see it thru clothing). Negative for claudication, cyanosis, irregular heartbeat, leg swelling, near-syncope, orthopnea and paroxysmal nocturnal dyspnea.   Respiratory: Positive for cough (Dry - Nonproductive) and shortness of breath (on exertion). Negative for sleep disturbances due to breathing, snoring and wheezing.         [place] = 6   Endocrine: Negative.  Negative for polydipsia and polyuria.   Hematologic/Lymphatic: Positive for bleeding problem (Vaginal bleeding - Dark clots). Bruises/bleeds easily.   Skin: Positive for color change (Upper extrimities), dry skin, itching and poor wound healing (Lesions slow to heal). Negative for flushing, nail changes and suspicious lesions.   Musculoskeletal: Positive for arthritis (Osteoporosis), back pain (Lopwer back r/t bladder & kidney issues), joint pain (Wrists), joint swelling (Wrists), muscle cramps (Extrimities x4), muscle weakness (Chronic, all muscle groups), neck pain and stiffness. Negative for falls, gout and myalgias.   Gastrointestinal: Positive for bloating (Hiatial hernia in place), abdominal pain (From procedure on 11.13.2019), bowel incontinence (At times), constipation (Chronic - Treated effectively with Miralax), dysphagia (Sporatic) and nausea (Unable to vomit r/t gastric bypass 2006). Negative for heartburn, hematemesis, hematochezia and melena. "   Genitourinary: Positive for bladder incontinence (Wears briefs), hesitancy, incomplete emptying and non-menstrual bleeding (Bitaleral salpingoophorectomy).   Neurological: Positive for difficulty with concentration, disturbances in coordination (Ambulates with walker), dizziness (Upon rising), headaches (Has one at present), loss of balance (Stabilized with walker), numbness (Feet and hands) and weakness. Negative for excessive daytime sleepiness, focal weakness, light-headedness and vertigo.   Psychiatric/Behavioral: Positive for altered mental status (PTSD), depression (R/T son's passing and weight loss), hallucinations (On Zanaflex) and memory loss (Hydrocodone causes forgetfullness). Negative for substance abuse. The patient is nervous/anxious. The patient does not have insomnia.    Allergic/Immunologic: Positive for environmental allergies (Several food allergies) and persistent infections (Frequent UTI's).        Objective:    Physical Exam   Constitutional: She is oriented to person, place, and time. She appears well-developed and well-nourished.   HENT:   Head: Normocephalic.   Eyes: Pupils are equal, round, and reactive to light. Conjunctivae and EOM are normal.   Neck: Normal range of motion. Neck supple. No JVD present. No thyromegaly present.   Cardiovascular: Regular rhythm, normal heart sounds and intact distal pulses. Tachycardia present. Exam reveals no gallop and no friction rub.   No murmur heard.  Pulses:       Carotid pulses are 1+ on the right side, and 1+ on the left side.       Radial pulses are 1+ on the right side, and 1+ on the left side.        Femoral pulses are 1+ on the right side, and 1+ on the left side.       Popliteal pulses are 1+ on the right side, and 1+ on the left side.        Dorsalis pedis pulses are 1+ on the right side, and 1+ on the left side.        Posterior tibial pulses are 1+ on the right side, and 1+ on the left side.   Pulmonary/Chest: Effort normal and breath  "sounds normal. She has no rales. She exhibits no tenderness.   Abdominal: Soft. Bowel sounds are normal. There is no tenderness.   Waist 28"   Musculoskeletal: Normal range of motion. She exhibits no edema.   Lymphadenopathy:     She has no cervical adenopathy.   Neurological: She is alert and oriented to person, place, and time.   Skin: Skin is warm and dry. No rash noted.         Assessment:       1. Tachycardia, onset 5/2019    2. Recurrent UTI    3. Weight loss, non-intentional    4. Moderate protein-calorie malnutrition    5. Cardiopulmonary arrest with successful resuscitation, 11/2017    6. Status post gastric bypass for obesity, 2006    7. BMI less than 19,adult    8. Fever, unspecified fever cause    9. Frailty    10. Reactive depression    11. Sedentary lifestyle    12. Adrenal insufficiency    13. Excessive drinking alcohol    14. RO (dyspnea on exertion), onset 9/2019    15. Atypical chest pain, onset 8/2019    16. Hypoalbuminemia due to protein-calorie malnutrition    17. Stage 3 chronic kidney disease    18. Anemia due to stage 3 chronic kidney disease    19. Elevated sed rate         Plan:         Tachycardia, onset 5/2019  -     Holter monitor - 48 hour; Future    Recurrent UTI    Weight loss, non-intentional  -     Ambulatory consult to Nutrition Services    Moderate protein-calorie malnutrition  -     Ambulatory consult to Nutrition Services    Cardiopulmonary arrest with successful resuscitation, 11/2017  -     Stress Echo; Future    Status post gastric bypass for obesity, 2006  -     Ambulatory consult to Nutrition Services    BMI less than 19,adult    Fever, unspecified fever cause    Frailty    Reactive depression  -     Ambulatory consult to Psychiatry    Sedentary lifestyle    Adrenal insufficiency  -     Ambulatory consult to Nutrition Services    Excessive drinking alcohol  -     Ambulatory consult to Nutrition Services    RO (dyspnea on exertion), onset 9/2019  -     Stress Echo; " Future    Atypical chest pain, onset 8/2019  -     Stress Echo; Future    Hypoalbuminemia due to protein-calorie malnutrition  -     Ambulatory consult to Nutrition Services    Stage 3 chronic kidney disease  -     Ambulatory consult to Nutrition Services    Anemia due to stage 3 chronic kidney disease  -     Ambulatory consult to Nutrition Services    Elevated sed rate    - All medical issues reviewed, continue current Rx.  - CV status stable, all medications reviewed, patient acknowledge good understanding.  - Recommend healthy living: no nicotine avoid 2nd hand, avoid alcohol, healthy diet and regular exercise aiming for fitness, and weight control  - Discussed healthy alcohol daily limit of 0.5 oz of pure alcohol in any 24 hours (roughly one 12-oz beers, 4 oz of wine (8%-12% alcohol), or 1.25 oz (half a shot) of liquor (80 proof)), can not save up.   - Instruction for Mediterranean diet and heart healthy exercise given.  - Check home blood pressure, 2 days weekly, do 2 readings within 5 minutes in AM and PM, keep log for review.  - Highly recommend 30 minutes of exercise / activities daily, can have Sunday off, with 2-3 sessions of muscle strengthening weekly. A  would be very helpful.  - Recommend at least annual cardiovascular evaluation in view of patient's significant risk factors.  - Phone review / encourage use of [facility], no [facility]  - The tech support at [facility] is available 5 days a week, from 9 to 5, at [phone].     Greater than 50% of the time was spent in counseling and coordination of care. The above assessment and plan have been discussed at length. Referring physician's note reviewed. Labs and procedure over the last 6 months reviewed. Problem List updated. Asked to bring in all active medications / pills bottles with next visit.

## 2019-11-14 NOTE — LETTER
November 14, 2019      Og Perez MD  [address]  [place] [address]           [facility] - Cardiology  [address], [address]  [place] [address]  Phone: [phone]  Fax: [phone]          Patient: Obdulia Yepez   MR Number: [id number]   YOB: 1950   Date of Visit: 11/14/2019       Dear Dr. Og Perez:    Thank you for referring Obdulia Yepez to me for evaluation. Attached you will find relevant portions of my assessment and plan of care.    If you have questions, please do not hesitate to call me. I look forward to following Obdulia Yepez along with you.    Sincerely,    Justin Polanco MD    Enclosure  CC:  No Recipients    If you would like to receive this communication electronically, please contact [email or web address] or [phone] to request more information on [employer] Wireless Link access.    For providers and/or their staff who would like to refer a patient to [facility], please contact us through our one-stop-shop provider referral line, [facility], at [phone].    If you feel you have received this communication in error or would no longer like to receive these types of communications, please e-mail [email or web address]

## 2019-11-17 LAB
FINAL PATHOLOGIC DIAGNOSIS: NORMAL
GROSS: NORMAL

## 2019-11-25 ENCOUNTER — HOSPITAL ENCOUNTER (OUTPATIENT)
Dept: CARDIOLOGY | Facility: HOSPITAL | Age: 69
Discharge: HOME | End: 2019-11-25
Attending: INTERNAL MEDICINE
Payer: MEDICARE

## 2019-11-25 VITALS
SYSTOLIC BLOOD PRESSURE: 131 MMHG | HEART RATE: 112 BPM | DIASTOLIC BLOOD PRESSURE: 80 MMHG | WEIGHT: 91 LBS | BODY MASS INDEX: 17.87 KG/M2 | HEIGHT: 60 IN

## 2019-11-25 DIAGNOSIS — R00.0 TACHYCARDIA: ICD-10-CM

## 2019-11-25 DIAGNOSIS — I46.9 CARDIOPULMONARY ARREST WITH SUCCESSFUL RESUSCITATION: ICD-10-CM

## 2019-11-25 DIAGNOSIS — R07.89 ATYPICAL CHEST PAIN: ICD-10-CM

## 2019-11-25 DIAGNOSIS — R06.09 DOE (DYSPNEA ON EXERTION): ICD-10-CM

## 2019-11-25 PROCEDURE — 63600175 PHARM REV CODE 636 W HCPCS: Performed by: INTERNAL MEDICINE

## 2019-11-25 PROCEDURE — 93352 STRESS ECHO (CUPID ONLY): ICD-10-PCS | Mod: ,,, | Performed by: INTERNAL MEDICINE

## 2019-11-25 PROCEDURE — 25500020 PHARM REV CODE 255: Performed by: INTERNAL MEDICINE

## 2019-11-25 PROCEDURE — 93351 STRESS TTE COMPLETE: CPT

## 2019-11-25 PROCEDURE — 93352 ADMIN ECG CONTRAST AGENT: CPT | Mod: ,,, | Performed by: INTERNAL MEDICINE

## 2019-11-25 PROCEDURE — 93227 HOLTER MONITOR - 48 HOUR (CUPID ONLY): ICD-10-PCS | Mod: ,,, | Performed by: INTERNAL MEDICINE

## 2019-11-25 PROCEDURE — 93225 XTRNL ECG REC<48 HRS REC: CPT

## 2019-11-25 PROCEDURE — 93227 XTRNL ECG REC<48 HR R&I: CPT | Mod: ,,, | Performed by: INTERNAL MEDICINE

## 2019-11-25 PROCEDURE — 93351 STRESS ECHO (CUPID ONLY): ICD-10-PCS | Mod: 26,,, | Performed by: INTERNAL MEDICINE

## 2019-11-25 PROCEDURE — 93351 STRESS TTE COMPLETE: CPT | Mod: 26,,, | Performed by: INTERNAL MEDICINE

## 2019-11-25 RX ADMIN — SULFUR HEXAFLUORIDE 5 ML: KIT at 11:11

## 2019-11-25 RX ADMIN — DOBUTAMINE: 12.5 INJECTION, SOLUTION, CONCENTRATE INTRAVENOUS at 11:11

## 2019-11-25 NOTE — NURSING
dobutrex drip increased to 30mcg/kg/min per echo  Protocal. Heart rate 119 st on monitor with frequent pvc's. Pt tolerating well.

## 2019-11-27 ENCOUNTER — OFFICE VISIT (OUTPATIENT)
Dept: FAMILY MEDICINE | Facility: CLINIC | Age: 69
End: 2019-11-27
Payer: MEDICARE

## 2019-11-27 VITALS
HEIGHT: 60 IN | SYSTOLIC BLOOD PRESSURE: 123 MMHG | RESPIRATION RATE: 20 BRPM | DIASTOLIC BLOOD PRESSURE: 79 MMHG | OXYGEN SATURATION: 96 % | WEIGHT: 90.5 LBS | HEART RATE: 120 BPM | BODY MASS INDEX: 17.77 KG/M2

## 2019-11-27 DIAGNOSIS — E87.1 HYPONATREMIA: Primary | ICD-10-CM

## 2019-11-27 DIAGNOSIS — I10 ESSENTIAL HYPERTENSION: ICD-10-CM

## 2019-11-27 DIAGNOSIS — E53.8 B12 DEFICIENCY: ICD-10-CM

## 2019-11-27 LAB
AORTIC VALVE CUSP SEPERATION: 1 CM
BSA FOR ECHO PROCEDURE: 1.32 M2
CV ECHO LV RWT: 0.65 CM
CV STRESS BASE HR: 98 BPM
DIASTOLIC BLOOD PRESSURE: 80 MMHG
DOP CALC LVOT AREA: 2.5 CM2
DOP CALC LVOT DIAMETER: 1.8 CM
ECHO LV POSTERIOR WALL: 1 CM (ref 0.6–1.1)
FRACTIONAL SHORTENING: 55 % (ref 28–44)
INTERVENTRICULAR SEPTUM: 0.8 CM (ref 0.6–1.1)
LA MAJOR: 33.48 CM
LA WIDTH: 23.73 CM
LEFT ATRIUM SIZE: 2.5 CM
LEFT INTERNAL DIMENSION IN SYSTOLE: 1.4 CM (ref 2.1–4)
LEFT VENTRICLE DIASTOLIC VOLUME INDEX: 28.39 ML/M2
LEFT VENTRICLE DIASTOLIC VOLUME: 37.92 ML
LEFT VENTRICLE MASS INDEX: 55 G/M2
LEFT VENTRICLE SYSTOLIC VOLUME INDEX: 9.5 ML/M2
LEFT VENTRICLE SYSTOLIC VOLUME: 12.73 ML
LEFT VENTRICULAR INTERNAL DIMENSION IN DIASTOLE: 3.1 CM (ref 3.5–6)
LEFT VENTRICULAR MASS: 73.7 G
OHS CV CPX 85 PERCENT MAX PREDICTED HEART RATE MALE: 125
OHS CV CPX MAX PREDICTED HEART RATE: 147
OHS CV CPX PATIENT IS FEMALE: 1
OHS CV CPX PATIENT IS MALE: 0
OHS CV CPX PEAK DIASTOLIC BLOOD PRESSURE: 81 MMHG
OHS CV CPX PEAK HEAR RATE: 169 BPM
OHS CV CPX PEAK RATE PRESSURE PRODUCT: NORMAL
OHS CV CPX PEAK SYSTOLIC BLOOD PRESSURE: 131 MMHG
OHS CV CPX PERCENT MAX PREDICTED HEART RATE ACHIEVED: 115
OHS CV CPX RATE PRESSURE PRODUCT PRESENTING: NORMAL
RA MAJOR: 30.07 CM
RA WIDTH: 27.57 CM
RIGHT VENTRICULAR END-DIASTOLIC DIMENSION: 1.7 CM
SYSTOLIC BLOOD PRESSURE: 124 MMHG

## 2019-11-27 PROCEDURE — 1159F MED LIST DOCD IN RCRD: CPT | Mod: ,,, | Performed by: FAMILY MEDICINE

## 2019-11-27 PROCEDURE — 99214 PR OFFICE/OUTPT VISIT, EST, LEVL IV, 30-39 MIN: ICD-10-PCS | Mod: S$PBB,,, | Performed by: FAMILY MEDICINE

## 2019-11-27 PROCEDURE — 99999 PR PBB SHADOW E&M-EST. PATIENT-LVL III: CPT | Mod: PBBFAC,,, | Performed by: FAMILY MEDICINE

## 2019-11-27 PROCEDURE — 99214 OFFICE O/P EST MOD 30 MIN: CPT | Mod: S$PBB,,, | Performed by: FAMILY MEDICINE

## 2019-11-27 PROCEDURE — 1159F PR MEDICATION LIST DOCUMENTED IN MEDICAL RECORD: ICD-10-PCS | Mod: ,,, | Performed by: FAMILY MEDICINE

## 2019-11-27 PROCEDURE — 99213 OFFICE O/P EST LOW 20 MIN: CPT | Mod: PBBFAC,PN | Performed by: FAMILY MEDICINE

## 2019-11-27 PROCEDURE — 99999 PR PBB SHADOW E&M-EST. PATIENT-LVL III: ICD-10-PCS | Mod: PBBFAC,,, | Performed by: FAMILY MEDICINE

## 2019-11-27 RX ORDER — POLYMYXIN B SULFATE AND TRIMETHOPRIM 1; 10000 MG/ML; [USP'U]/ML
1 SOLUTION OPHTHALMIC EVERY 4 HOURS
Qty: 10 ML | Refills: 0 | Status: SHIPPED | OUTPATIENT
Start: 2019-11-27 | End: 2020-04-23 | Stop reason: SDUPTHER

## 2019-11-27 RX ORDER — MULTIVITAMIN
1 TABLET ORAL DAILY
COMMUNITY

## 2019-11-27 RX ORDER — FLUCONAZOLE 150 MG/1
150 TABLET ORAL DAILY
Qty: 1 TABLET | Refills: 0 | Status: SHIPPED | OUTPATIENT
Start: 2019-11-27 | End: 2019-11-28

## 2019-11-27 RX ORDER — PNV NO.95/FERROUS FUM/FOLIC AC 28MG-0.8MG
1000 TABLET ORAL DAILY
COMMUNITY

## 2019-11-27 NOTE — PROGRESS NOTES
Subjective:       Patient ID: Obdulia Yepez is a 69 y.o. female.    Chief Complaint: Follow-up and Medication Refill    Follow up    Bleeding has stopped  Has cysto on Wednesday  Has follow up with cardiology on Thursday  Otherwise, no major changes, awaiting specialist visit.    Since the last visit, testing went well, awaiting some results.  Complaining of eye discharge, without fever or change in vision    Review of Systems   Constitutional: Negative for appetite change, chills, fatigue and fever.   HENT: Negative for congestion, postnasal drip, rhinorrhea and sore throat.    Genitourinary: Negative for dysuria.   Musculoskeletal: Positive for arthralgias, gait problem, joint swelling and myalgias.   Skin: Positive for wound. Negative for color change and rash.   Neurological: Negative for dizziness, weakness and headaches.   Psychiatric/Behavioral: Negative for sleep disturbance. The patient is not nervous/anxious.          Reviewed family, medical, surgical, and social history.    Objective:      /79 (BP Location: Left arm, Patient Position: Sitting, BP Method: Small (Automatic))   Pulse (!) 120   Resp 20   Ht 5' (1.524 m)   Wt 41.1 kg (90 lb 8 oz)   SpO2 96%   BMI 17.67 kg/m²   Physical Exam   Constitutional: She is oriented to person, place, and time. She appears well-developed and well-nourished. No distress.   HENT:   Head: Normocephalic and atraumatic.   Nose: Nose normal.   Mouth/Throat: Oropharynx is clear and moist. No oropharyngeal exudate.   Eyes: Pupils are equal, round, and reactive to light. Conjunctivae and EOM are normal. No scleral icterus.   Neck: Normal range of motion. Neck supple. No thyromegaly present.   Cardiovascular: Regular rhythm, S1 normal, S2 normal and normal heart sounds. Tachycardia present. Exam reveals no gallop and no friction rub.   No murmur heard.  Pulmonary/Chest: Effort normal and breath sounds normal. No respiratory distress. She has no wheezes. She has no  "rales. She exhibits no tenderness.   Abdominal: Soft. Normal appearance and bowel sounds are normal. She exhibits no distension. There is no tenderness. There is no guarding.   Musculoskeletal: Normal range of motion. She exhibits tenderness and deformity. She exhibits no edema.   Walks with walker to maintain gait. Knees "give out" at times.    Lymphadenopathy:     She has no cervical adenopathy.   Neurological: She is alert and oriented to person, place, and time. She displays normal reflexes. No cranial nerve deficit or sensory deficit. She exhibits normal muscle tone.   Skin: Skin is warm and dry. Capillary refill takes less than 2 seconds. No rash noted. She is not diaphoretic. No erythema. No pallor.        Psychiatric: She has a normal mood and affect. Her speech is normal and behavior is normal. Judgment and thought content normal. Cognition and memory are normal.   Nursing note and vitals reviewed.      Assessment:       1. Hyponatremia    2. Essential hypertension    3. B12 deficiency        Plan:       Hyponatremia  -     CBC auto differential; Future; Expected date: 11/27/2019  -     Comprehensive metabolic panel; Future; Expected date: 11/27/2019  -     Vitamin B12; Future; Expected date: 11/27/2019  -     Lipid panel; Future; Expected date: 11/27/2019    Essential hypertension  -     CBC auto differential; Future; Expected date: 11/27/2019  -     Comprehensive metabolic panel; Future; Expected date: 11/27/2019  -     Vitamin B12; Future; Expected date: 11/27/2019  -     Lipid panel; Future; Expected date: 11/27/2019    B12 deficiency  -     CBC auto differential; Future; Expected date: 11/27/2019  -     Comprehensive metabolic panel; Future; Expected date: 11/27/2019  -     Vitamin B12; Future; Expected date: 11/27/2019  -     Lipid panel; Future; Expected date: 11/27/2019    Other orders  -     polymyxin B sulf-trimethoprim (POLYTRIM) 10,000 unit- 1 mg/mL Drop; Place 1 drop into the right eye every 4 " (four) hours.  Dispense: 10 mL; Refill: 0  -     fluconazole (DIFLUCAN) 150 MG Tab; Take 1 tablet (150 mg total) by mouth once daily. for 1 day  Dispense: 1 tablet; Refill: 0            Risks, benefits, and side effects were discussed with the patient. All questions were answered to the fullest satisfaction of the patient, and pt verbalized understanding and agreement to treatment plan. Pt was to call with any new or worsening symptoms, or present to the ER.

## 2019-11-29 LAB
OHS CV EVENT MONITOR DAY: 0
OHS CV HOLTER LENGTH DECIMAL HOURS: 47.98
OHS CV HOLTER LENGTH HOURS: 47
OHS CV HOLTER LENGTH MINUTES: 59

## 2019-12-05 DIAGNOSIS — M25.562 LEFT KNEE PAIN, UNSPECIFIED CHRONICITY: Primary | ICD-10-CM

## 2019-12-10 ENCOUNTER — TELEPHONE (OUTPATIENT)
Dept: FAMILY MEDICINE | Facility: CLINIC | Age: 69
End: 2019-12-10

## 2019-12-10 ENCOUNTER — HOSPITAL ENCOUNTER (OUTPATIENT)
Facility: HOSPITAL | Age: 69
Discharge: HOME | End: 2019-12-10
Attending: INTERNAL MEDICINE | Admitting: INTERNAL MEDICINE
Payer: MEDICARE

## 2019-12-10 ENCOUNTER — ANESTHESIA (OUTPATIENT)
Dept: SURGERY | Facility: HOSPITAL | Age: 69
End: 2019-12-10
Payer: MEDICARE

## 2019-12-10 ENCOUNTER — ANESTHESIA EVENT (OUTPATIENT)
Dept: SURGERY | Facility: HOSPITAL | Age: 69
End: 2019-12-10
Payer: MEDICARE

## 2019-12-10 DIAGNOSIS — K21.9 GASTROESOPHAGEAL REFLUX DISEASE, ESOPHAGITIS PRESENCE NOT SPECIFIED: ICD-10-CM

## 2019-12-10 DIAGNOSIS — K21.9 GERD (GASTROESOPHAGEAL REFLUX DISEASE): ICD-10-CM

## 2019-12-10 PROCEDURE — 37000009 HC ANESTHESIA EA ADD 15 MINS: Performed by: INTERNAL MEDICINE

## 2019-12-10 PROCEDURE — D9220A PRA ANESTHESIA: Mod: ANES,,, | Performed by: ANESTHESIOLOGY

## 2019-12-10 PROCEDURE — D9220A PRA ANESTHESIA: Mod: CRNA,,, | Performed by: NURSE ANESTHETIST, CERTIFIED REGISTERED

## 2019-12-10 PROCEDURE — 37000008 HC ANESTHESIA 1ST 15 MINUTES: Performed by: INTERNAL MEDICINE

## 2019-12-10 PROCEDURE — 63600175 PHARM REV CODE 636 W HCPCS: Performed by: INTERNAL MEDICINE

## 2019-12-10 PROCEDURE — D9220A PRA ANESTHESIA: ICD-10-PCS | Mod: ANES,,, | Performed by: ANESTHESIOLOGY

## 2019-12-10 PROCEDURE — 43235 EGD DIAGNOSTIC BRUSH WASH: CPT | Mod: ,,, | Performed by: INTERNAL MEDICINE

## 2019-12-10 PROCEDURE — 63600175 PHARM REV CODE 636 W HCPCS: Performed by: NURSE ANESTHETIST, CERTIFIED REGISTERED

## 2019-12-10 PROCEDURE — 43235 PR EGD, FLEX, DIAGNOSTIC: ICD-10-PCS | Mod: ,,, | Performed by: INTERNAL MEDICINE

## 2019-12-10 PROCEDURE — D9220A PRA ANESTHESIA: ICD-10-PCS | Mod: CRNA,,, | Performed by: NURSE ANESTHETIST, CERTIFIED REGISTERED

## 2019-12-10 PROCEDURE — 43235 EGD DIAGNOSTIC BRUSH WASH: CPT | Performed by: INTERNAL MEDICINE

## 2019-12-10 RX ORDER — SODIUM CHLORIDE, SODIUM LACTATE, POTASSIUM CHLORIDE, CALCIUM CHLORIDE 600; 310; 30; 20 MG/100ML; MG/100ML; MG/100ML; MG/100ML
INJECTION, SOLUTION INTRAVENOUS CONTINUOUS
Status: DISCONTINUED | OUTPATIENT
Start: 2019-12-10 | End: 2019-12-10 | Stop reason: HOSPADM

## 2019-12-10 RX ORDER — PROPOFOL 10 MG/ML
VIAL (ML) INTRAVENOUS
Status: DISCONTINUED | OUTPATIENT
Start: 2019-12-10 | End: 2019-12-10

## 2019-12-10 RX ORDER — SODIUM CHLORIDE, SODIUM LACTATE, POTASSIUM CHLORIDE, CALCIUM CHLORIDE 600; 310; 30; 20 MG/100ML; MG/100ML; MG/100ML; MG/100ML
INJECTION, SOLUTION INTRAVENOUS CONTINUOUS
Status: CANCELLED | OUTPATIENT
Start: 2019-12-10

## 2019-12-10 RX ORDER — SODIUM CHLORIDE, SODIUM LACTATE, POTASSIUM CHLORIDE, CALCIUM CHLORIDE 600; 310; 30; 20 MG/100ML; MG/100ML; MG/100ML; MG/100ML
125 INJECTION, SOLUTION INTRAVENOUS CONTINUOUS
Status: DISCONTINUED | OUTPATIENT
Start: 2019-12-10 | End: 2019-12-10 | Stop reason: HOSPADM

## 2019-12-10 RX ORDER — FLUCONAZOLE 150 MG/1
150 TABLET ORAL DAILY
Qty: 1 TABLET | Refills: 0 | Status: SHIPPED | OUTPATIENT
Start: 2019-12-10 | End: 2019-12-11

## 2019-12-10 RX ORDER — LIDOCAINE HYDROCHLORIDE 10 MG/ML
1 INJECTION, SOLUTION EPIDURAL; INFILTRATION; INTRACAUDAL; PERINEURAL ONCE
Status: CANCELLED | OUTPATIENT
Start: 2019-12-10 | End: 2019-12-10

## 2019-12-10 RX ORDER — ONDANSETRON 2 MG/ML
4 INJECTION INTRAMUSCULAR; INTRAVENOUS DAILY PRN
Status: DISCONTINUED | OUTPATIENT
Start: 2019-12-10 | End: 2019-12-10 | Stop reason: HOSPADM

## 2019-12-10 RX ORDER — CIPROFLOXACIN 500 MG/1
500 TABLET ORAL 2 TIMES DAILY
Qty: 10 TABLET | Refills: 0 | Status: SHIPPED | OUTPATIENT
Start: 2019-12-10 | End: 2019-12-15

## 2019-12-10 RX ADMIN — PROPOFOL 20 MG: 10 INJECTION, EMULSION INTRAVENOUS at 10:12

## 2019-12-10 RX ADMIN — PROPOFOL 100 MG: 10 INJECTION, EMULSION INTRAVENOUS at 10:12

## 2019-12-10 RX ADMIN — SODIUM CHLORIDE, POTASSIUM CHLORIDE, SODIUM LACTATE AND CALCIUM CHLORIDE: 600; 310; 30; 20 INJECTION, SOLUTION INTRAVENOUS at 10:12

## 2019-12-10 NOTE — [employer] PATIENT INSTRUCTIONS
Discharge Summary/Instructions after an Endoscopic Procedure  Patient Name: Obdulia Yepez  Patient MRN: [id number]  Patient YOB: 1950  Tuesday, December 10, 2019  Shakeel Perez MD  RESTRICTIONS:  During your procedure today, you received medications for sedation.  These   medications may affect your judgment, balance and coordination.  Therefore,   for 24 hours, you have the following restrictions:   - DO NOT drive a car, operate machinery, make legal/financial decisions,   sign important papers or drink alcohol.    ACTIVITY:  Today: no heavy lifting, straining or running due to procedural   sedation/anesthesia.  The following day: return to full activity including work.  DIET:  Eat and drink normally unless instructed otherwise.     TREATMENT FOR COMMON SIDE EFFECTS:  - Mild abdominal pain, nausea, belching, bloating or excessive gas:  rest,   eat lightly and use a heating pad.  - Sore Throat: treat with throat lozenges and/or gargle with warm salt   water.  - Because air was used during the procedure, expelling large amounts of air   from your rectum or belching is normal.  - If a bowel prep was taken, you may not have a bowel movement for 1-3 days.    This is normal.  SYMPTOMS TO WATCH FOR AND REPORT TO YOUR PHYSICIAN:  1. Abdominal pain or bloating, other than gas cramps.  2. Chest pain.  3. Back pain.  4. Signs of infection such as: chills or fever occurring within 24 hours   after the procedure.  5. Rectal bleeding, which would show as bright red, maroon, or black stools.   (A tablespoon of blood from the rectum is not serious, especially if   hemorrhoids are present.)  6. Vomiting.  7. Weakness or dizziness.  GO DIRECTLY TO THE NEAREST EMERGENCY ROOM IF YOU HAVE ANY OF THE FOLLOWING:      Difficulty breathing              Chills and/or fever over 101 F   Persistent vomiting and/or vomiting blood   Severe abdominal pain   Severe chest pain   Black, tarry stools   Bleeding- more than one  tablespoon   Any other symptom or condition that you feel may need urgent attention  Your doctor recommends these additional instructions:  If any biopsies were taken, your doctors clinic will contact you in 1 to 2   weeks with any results.  - Discharge patient to home (ambulatory).   - Resume previous diet.  For questions, problems or results please call your physician - Shakeel Perez MD at Work:  [phone].  [facility] EMERGENCY ROOM PHONE NUMBER: [phone]  IF A COMPLICATION OR EMERGENCY SITUATION ARISES AND YOU ARE UNABLE TO REACH   YOUR PHYSICIAN - GO DIRECTLY TO THE EMERGENCY ROOM.  MD Shakeel Bains MD  12/10/2019 10:33:02 AM  This report has been verified and signed electronically.  [employer]

## 2019-12-10 NOTE — OP NOTE
Procedure. Esophageal gastroduodenoscopy.  Indication history of bariatric she has had a a gastric bypass with a gastrojejunostomy.  She has had abdominal pain occasional diarrhea but also increasing oral intake with with the weight loss she has good health knowledge.  Specifically she realizes extremely small incidence of bleeding perforation or aspiration.  She understands the upper endoscopy.  Anesthesia.  Monitored anesthesia coverage.  With the patient in the procedure room in left lateral supine position.  The Pentax upper endoscope was passed without difficulty. The distal esophagus was dilated.  The gastroesophageal junction was at 36-37 cm with this Q junction at 36-30 cm.  There was a very small gastric pouch.  The anastomosis was widely open.  There was a double-barrel stoma.  The small-bowel mucosa was normal.  Bleeding was not seen.  Bile was not detected.  Patient tolerated the procedure well.  Impression 1.  Esophagitis LA grade A 2.  Dilated distal esophagus 3.  Mild hyperemia of the gastric pouch 4.  History of bariatric surgery.

## 2019-12-10 NOTE — DISCHARGE INSTRUCTIONS
Upper GI Endoscopy     During endoscopy, a long, flexible tube is used to view the inside of your upper GI tract.      Upper GI endoscopy allows your healthcare provider to look directly into the beginning of your gastrointestinal (GI) tract. The esophagus, stomach, and duodenum (the first part of the small intestine) make up the upper GI tract.   Before the exam  Follow these and any other instructions you are given before your endoscopy. If you dont follow the healthcare providers instructions carefully, the test may need to be canceled or done over:  · Don't eat or drink anything after midnight the night before your exam. If your exam is in the afternoon, drink only clear liquids in the morning. Don't eat or drink anything for 8 hours before the exam. In some cases, you may be able to take medicines with sips of water until 2 hours before the procedure. Speak with your healthcare provider about this.   · Bring your X-rays and any other test results you have.  · Because you will be sedated, arrange for an adult to drive you home after the exam.  · Tell your healthcare provider before the exam if you are taking any medicines or have any medical problems.  The procedure  Here is what to expect:  · You will lie on the endoscopy table. Usually patients lie on the left side.  · You will be monitored and given oxygen.  · Your throat may be numbed with a spray or gargle. You are given medicine through an intravenous (IV) line that will help you relax and remain comfortable. You may be awake or asleep during the procedure.  · The healthcare provider will put the endoscope in your mouth and down your esophagus. It is thinner than most pieces of food that you swallow. It will not affect your breathing. The medicine helps keep you from gagging.  · Air is put into your GI tract to expand it. It can make you burp.  · During the procedure, the healthcare provider can take biopsies (tissue samples), remove abnormalities,  such as polyps, or treat abnormalities through a variety of devices placed through the endoscope. You will not feel this.   · The endoscope carries images of your upper GI tract to a video screen. If you are awake, you may be able to look at the images.  · After the procedure is done, you will rest for a time. An adult must drive you home.  When to call your healthcare provider  Contact your healthcare provider if you have:  · Black or tarry stools, or blood in your stool  · Fever  · Pain in your belly that does not go away  · Nausea and vomiting, or vomiting blood   Date Last Reviewed: 7/1/2016  © 7197-1130 [employer]. [address] [place] [address], [place] [address]. All rights reserved. This information is not intended as a substitute for professional medical care. Always follow your healthcare professional's instructions.        Discharge Instructions: After Your Surgery  Youve just had surgery. During surgery, you were given medicine called anesthesia to keep you relaxed and free of pain. After surgery, you may have some pain or nausea. This is common. Here are some tips for feeling better and getting well after surgery.     Stay on schedule with your medicine.   Going home  Your healthcare provider will show you how to take care of yourself when you go home. He or she will also answer your questions. Have an adult family member or friend drive you home. For the first 24 hours after your surgery:  · Do not drive or use heavy equipment.  · Do not make important decisions or sign legal papers.  · Do not drink alcohol.  · Have someone stay with you, if needed. He or she can watch for problems and help keep you safe.  Be sure to go to all follow-up visits with your healthcare provider. And rest after your surgery for as long as your healthcare provider tells you to.  Coping with pain  If you have pain after surgery, pain medicine will help you feel better. Take it as told, before pain becomes severe. Also,  ask your healthcare provider or pharmacist about other ways to control pain. This might be with heat, ice, or relaxation. And follow any other instructions your surgeon or nurse gives you.  Tips for taking pain medicine  To get the best relief possible, remember these points:  · Pain medicines can upset your stomach. Taking them with a little food may help.  · Most pain relievers taken by mouth need at least 20 to 30 minutes to start to work.  · Taking medicine on a schedule can help you remember to take it. Try to time your medicine so that you can take it before starting an activity. This might be before you get dressed, go for a walk, or sit down for dinner.  · Constipation is a common side effect of pain medicines. Call your healthcare provider before taking any medicines such as laxatives or stool softeners to help ease constipation. Also ask if you should skip any foods. Drinking lots of fluids and eating foods such as fruits and vegetables that are high in fiber can also help. Remember, do not take laxatives unless your surgeon has prescribed them.  · Drinking alcohol and taking pain medicine can cause dizziness and slow your breathing. It can even be deadly. Do not drink alcohol while taking pain medicine.  · Pain medicine can make you react more slowly to things. Do not drive or run machinery while taking pain medicine.  Your healthcare provider may tell you to take acetaminophen to help ease your pain. Ask him or her how much you are supposed to take each day. Acetaminophen or other pain relievers may interact with your prescription medicines or other over-the-counter (OTC) medicines. Some prescription medicines have acetaminophen and other ingredients. Using both prescription and OTC acetaminophen for pain can cause you to overdose. Read the labels on your OTC medicines with care. This will help you to clearly know the list of ingredients, how much to take, and any warnings. It may also help you not take  too much acetaminophen. If you have questions or do not understand the information, ask your pharmacist or healthcare provider to explain it to you before you take the OTC medicine.  Managing nausea  Some people have an upset stomach after surgery. This is often because of anesthesia, pain, or pain medicine, or the stress of surgery. These tips will help you handle nausea and eat healthy foods as you get better. If you were on a special food plan before surgery, ask your healthcare provider if you should follow it while you get better. These tips may help:  · Do not push yourself to eat. Your body will tell you when to eat and how much.  · Start off with clear liquids and soup. They are easier to digest.  · Next try semi-solid foods, such as mashed potatoes, applesauce, and gelatin, as you feel ready.  · Slowly move to solid foods. Dont eat fatty, rich, or spicy foods at first.  · Do not force yourself to have 3 large meals a day. Instead eat smaller amounts more often.  · Take pain medicines with a small amount of solid food, such as crackers or toast, to avoid nausea.     Call your surgeon if  · You still have pain an hour after taking medicine. The medicine may not be strong enough.  · You feel too sleepy, dizzy, or groggy. The medicine may be too strong.  · You have side effects like nausea, vomiting, or skin changes, such as rash, itching, or hives.       If you have obstructive sleep apnea  You were given anesthesia medicine during surgery to keep you comfortable and free of pain. After surgery, you may have more apnea spells because of this medicine and other medicines you were given. The spells may last longer than usual.   At home:  · Keep using the continuous positive airway pressure (CPAP) device when you sleep. Unless your healthcare provider tells you not to, use it when you sleep, day or night. CPAP is a common device used to treat obstructive sleep apnea.  · Talk with your provider before taking any  pain medicine, muscle relaxants, or sedatives. Your provider will tell you about the possible dangers of taking these medicines.  Date Last Reviewed: 12/1/2016  © 0984-1160 [employer] [employer]. [address] [place] [address], [place] [address]. All rights reserved. This information is not intended as a substitute for professional medical care. Always follow your healthcare professional's instructions.

## 2019-12-10 NOTE — TRANSFER OF CARE
Anesthesia Transfer of Care Note    Patient: Obdulia Yepez    Procedure(s) Performed: Procedure(s) (LRB):  EGD (ESOPHAGOGASTRODUODENOSCOPY) (N/A)    Patient location: PACU    Anesthesia Type: general    Transport from OR: Transported from OR on room air with adequate spontaneous ventilation    Post pain: adequate analgesia    Post assessment: no apparent anesthetic complications and tolerated procedure well    Post vital signs: stable    Level of consciousness: awake, alert and oriented    Nausea/Vomiting: no nausea/vomiting    Complications: none    Transfer of care protocol was followed      Last vitals:   Visit Vitals  Pulse 97   Temp 36.7 °C (98 °F) (Oral)   Resp 12   Ht 5' (1.524 m)   Wt 40.8 kg (90 lb)   SpO2 99%   Breastfeeding? No   BMI 17.58 kg/m²

## 2019-12-10 NOTE — TELEPHONE ENCOUNTER
Spoke to pt on 12/9 to reschedule appt due to Dr. Perez having a meeting. Rescheduled appt and added pt to wait list, pt states she has a UTI and would also like diflucan sent in. Offered pt appt with another provider and pt refused.

## 2019-12-10 NOTE — H&P
Office Visit     11/7/2019  [facility] - Gastro   Shakeel Perez MD   Gastroenterology   History of cholecystectomy +3 more   Dx   Follow-up   ; Referred by Og Perez MD   Reason for Visit    Additional Documentation     Vitals:    BP 92/68 (BP Location: Right arm, Patient Position: Sitting, BP Method: Small (Automatic))    Pulse 122     Resp 16    Ht 5' (1.524 m)    Wt 39 kg (86 lb)    SpO2 97%    BMI 16.80 kg/m²    BSA 1.28 m²    Pain Sc   3       More Vitals    Flowsheets:    Anthropometrics       SmartForms:     [facility] AMB - FALL RISK       Encounter Info:    Billing Info,    History,    Allergies,    Detailed Report       Instructions         Follow up in about 1 month (around 12/7/2019).   She will continue her current medications vitamins and minerals.  She was started on MiraLax on a daily basis.  The CT scan shows that she was severely constipated.  She has had a gastric bypass.  She has chronic pain with arthritis particularly of the knees.  She has been taking ibuprofen.  She stopped the Protonix and the Singulair because she wants to take  fewer  medications.  She has her pain medications but she does want take them because she is afraid that she may get addicted.  She is to avoid the ibuprofen she is to take her Protonix vitamins and minerals.  She will be scheduled for upper endoscopy.           After Visit Summary (Printed 11/7/2019)   Progress Notes        Subjective:       Patient ID: Obdulia Yepez is a 69 y.o. female.     Chief Complaint: Follow-up (will discuss testing)     She has tried eat more.  She states that her weight has stabilized.  She is having constipation. The CT scan shows postsurgical changes and retained fecal material.  She has had bariatric surgery.  She is scheduled for upper endoscopy.  She is ambulating the walker.  She is trying to increase her caloric intake particularly the vitamins.  She generally has 2-3 bowel movements per day.  She denies  cardiopulmonary symptoms, fever chills.  She is able to ambulate with a walker.  She has had nonspecific upper abdominal discomfort.  She denies hematemesis hematochezia jaundice dysphagia aspiration or melena.        Allergies:       Review of patient's allergies indicates:   Allergen Reactions    Amoxicillin Diarrhea    Latex      Milk containing products      Opioids - morphine analogues      Peanut      Sodium phosphate      Soy      Sulfa (sulfonamide antibiotics)           Medications:     Current Outpatient Medications:     allopurinol (ZYLOPRIM) 300 MG tablet, TAKE 1 TABLET BY MOUTH DAILY, Disp: 30 tablet, Rfl: 14    ARIPiprazole (ABILIFY) 5 MG Tab, aripiprazole 5 mg tablet  TAKE 1 TABLET BY MOUTH AT BEDTIMES, Disp: 30 tablet, Rfl: 2    blood sugar diagnostic (BLOOD GLUCOSE TEST) Strp, OneTouch Ultra Test strips  USE AS DIRECTED, Disp: , Rfl:     blood-glucose meter (ONETOUCH ULTRA2) kit, OneTouch Ultra2 kit  USE AS DIRECTED, Disp: , Rfl:     budesonide-formoterol 160-4.5 mcg (SYMBICORT) 160-4.5 mcg/actuation HFAA, Symbicort 160 mcg-4.5 mcg/actuation HFA aerosol inhaler  Inhale 2 puffs twice a day by inhalation route., Disp: , Rfl:     clindamycin (CLEOCIN) 300 MG capsule, TAKE ONE CAPSULE 3 TIMES DAILY, Disp: , Rfl: 0    colestipol (COLESTID) 1 gram Tab, Take 1 tablet (1 g total) by mouth 2 (two) times daily., Disp: 180 tablet, Rfl: 3    cyclobenzaprine (FLEXERIL) 10 MG tablet, Take 1 tablet (10 mg total) by mouth 3 (three) times daily as needed., Disp: 90 tablet, Rfl: 0    cyproheptadine (PERIACTIN) 4 mg tablet, TAKE 1 TABLET (4 MG TOTAL) BY MOUTH 3 (THREE) TIMES DAILY AS NEEDED., Disp: 90 tablet, Rfl: 2    escitalopram oxalate (LEXAPRO) 20 MG tablet, TAKE 1 TABLET BY MOUTH EVERY DAY, Disp: 30 tablet, Rfl: 11    fluticasone (FLONASE) 50 mcg/actuation nasal spray, fluticasone 50 mcg/actuation nasal spray,suspension  Inhale 1 spray every day by intranasal route., Disp: , Rfl:     folic  acid (FOLVITE) 1 MG tablet, folic acid 1 mg tablet  TAKE 1 TABLET BY MOUTH EVERY DAY, Disp: , Rfl:     gabapentin (NEURONTIN) 300 MG capsule, Take 3 capsules (900 mg total) by mouth 3 (three) times daily. (Patient taking differently: Take 600 mg by mouth 2 (two) times daily. ), Disp: 270 capsule, Rfl: 3    hydrocortisone (CORTEF) 10 MG Tab, TAKE 2 TABLETS BY MOUTH IN THE MORNING AND 1 TABLET IN THE EVENING, Disp: , Rfl: 0    ibuprofen (ADVIL,MOTRIN) 800 MG tablet, Take 1 tablet (800 mg total) by mouth 3 (three) times daily., Disp: 90 tablet, Rfl: 3    ipratropium (ATROVENT) 0.03 % nasal spray, 2 sprays by Nasal route 3 (three) times daily., Disp: 30 mL, Rfl: 2    K-PHOS ORIGINAL 500 mg TbSO, TAKE ONE TABLET BY MOUTH WITH MEALS AT BEDTIME, Disp: , Rfl: 0    lancets (ONETOUCH DELICA LANCETS) 33 gauge Misc, OneTouch Delica Lancets 33 gauge  USE AS DIRECTED, Disp: , Rfl:     lidocaine HCl 2% (LIDOCAINE VISCOUS) 2 % Soln, Lidocaine Viscous 2 % mucosal solution  TAKE 5 ML EVERY 3 HOURS BY ORAL ROUTE AS NEEDED., Disp: , Rfl:     meropenem (MERREM) 1 gram injection, , Disp: , Rfl: 0    metoprolol succinate (TOPROL-XL) 50 MG 24 hr tablet, Take 1 tablet (50 mg total) by mouth once daily., Disp: 30 tablet, Rfl: 2    mirtazapine (REMERON) 15 MG tablet, Take 1 tablet (15 mg total) by mouth every evening., Disp: 30 tablet, Rfl: 11    montelukast (SINGULAIR) 10 mg tablet, TAKE 1 TABLET(S) EVERY DAY BY ORAL ROUTE., Disp: 30 tablet, Rfl: 11    mupirocin (BACTROBAN) 2 % ointment, mupirocin 2 % topical ointment  APPLY A SMALL AMOUNT TO THE AFFECTED AREA BY TOPICAL ROUTE 2 TIMES PER DAY, Disp: , Rfl:     ondansetron (ZOFRAN-ODT) 4 MG TbDL, ondansetron 4 mg disintegrating tablet  DISINTEGRATE 1 TABLET BY MOUTH EVERY 6 HOURS AS NEEDED FOR NAUSEA AND VOMITING, Disp: , Rfl:     ONETOUCH ULTRASOFT LANCETS MISC, OneTouch UltraSoft Lancets  USE AS DIRECTED, Disp: , Rfl:     oxybutynin (DITROPAN-XL) 5 MG TR24, oxybutynin  chloride ER 5 mg tablet,extended release 24 hr  TAKE 1 TABLET BY MOUTH EVERY DAY, Disp: , Rfl:     oxyCODONE-acetaminophen (PERCOCET)  mg per tablet, Take 1 tablet by mouth every 8 (eight) hours as needed for Pain., Disp: 90 tablet, Rfl: 0    oxyCODONE-acetaminophen (PERCOCET) 5-325 mg per tablet, Take 1 tablet by mouth every 4 (four) hours as needed for Pain., Disp: , Rfl:     pantoprazole (PROTONIX) 40 MG tablet, TAKE 1 TABLET(S) EVERY DAY BY ORAL ROUTE., Disp: 30 tablet, Rfl: 11    triamcinolone acetonide 0.1% (KENALOG) 0.1 % Lotn, triamcinolone acetonide 0.1 % lotion  APPLY A THIN LAYER TO THE AFFECTED AREA(S) BY TOPICAL ROUTE 2 TIMES PER DAY, Disp: , Rfl:     busPIRone (BUSPAR) 5 MG Tab, Take 1 tablet (5 mg total) by mouth 2 (two) times daily. (Patient not taking: Reported on 10/30/2019), Disp: 60 tablet, Rfl: 11     Current Facility-Administered Medications:     0.9%  NaCl infusion (for blood administration), , Intravenous, Q24H PRN, Og Perez MD    acetaminophen tablet 650 mg, 650 mg, Oral, PRN, Og Perez MD     Facility-Administered Medications Ordered in Other Visits:     hylan g-f 20 48 mg/6 mL injection 48 mg, 48 mg, Intra-articular, , Esteban Remy DO, 48 mg at 19 1603    hylan g-f 20 48 mg/6 mL injection 48 mg, 48 mg, Intra-articular, , Esteban Remy DO, 48 mg at 19 1603    triamcinolone acetonide injection 80 mg, 80 mg, Intra-articular, , Esteban Remy DO, 80 mg at 19 1603          Past Medical History:   Diagnosis Date    Allergy      Arthritis      Asthma      Depression      Gout      Hypertension                 Past Surgical History:   Procedure Laterality Date    APPENDECTOMY         SECTION        CHOLECYSTECTOMY        HYSTERECTOMY        SHOULDER SURGERY Right 2019    STOMACH SURGERY        WRIST SURGERY Left              Review of Systems   Constitutional: Negative for appetite change, fever and unexpected  "weight change.   HENT: Negative for trouble swallowing.         No jaundice.   Respiratory: Negative for cough, shortness of breath and wheezing.         No Rales, Rhonchi, or Dyspnea.   Cardiovascular: Negative for chest pain.   Gastrointestinal: Positive for abdominal pain and constipation. Negative for anal bleeding, blood in stool, diarrhea and nausea.        She has had occasional nausea without vomiting.  She has a poor appetite "food and taste good and will.  She is trying to increase her oral intake.  She has had bariatric surgery.  She has had semi solid bowel movements with constipation.  She denies associated symptoms with the specific food or activity.   Musculoskeletal: Positive for arthralgias and gait problem. Negative for back pain and neck pain.        She is taking in states because of severe knee pain. She ambulates with a cane.  She does not associate the in states with side effects.  She will try to use her hydrocodone and avoid the anti-inflammatory agents.  She did morning use the hydrocodone because I do not want to get addicted .  I explained the adverse effects of the anti-inflammatory agents.   Skin: Negative for pallor and rash.   Neurological: Negative for dizziness, seizures, syncope, speech difficulty, weakness and numbness.   Hematological: Negative for adenopathy.   Psychiatric/Behavioral: Negative for confusion.       Objective:   Physical Exam   Constitutional: She is oriented to person, place, and time. She appears well-developed and well-nourished.   Thin elderly nonicteric white female.   HENT:   Head: Normocephalic.   Eyes: Pupils are equal, round, and reactive to light. EOM are normal.   Neck: Normal range of motion. Neck supple. No tracheal deviation present. No thyromegaly present.   Cardiovascular: Normal rate, regular rhythm and normal heart sounds.   Pulmonary/Chest: Effort normal and breath sounds normal.   Abdominal: Soft. Bowel sounds are normal.   Abdomen is soft on " plane with healed scars with mild tenderness in the epigastrium.  Organomegaly masses not detected.  Bowel sounds are normal.   Musculoskeletal: Normal range of motion.   She ambulates with a walker in a slow manner.  She uses the walker to go from the sitting to the standing position.   Lymphadenopathy:     She has no cervical adenopathy.   Neurological: She is alert and oriented to person, place, and time. No cranial nerve deficit.   Skin: Skin is warm and dry.   Psychiatric: She has a normal mood and affect. Her behavior is normal.   Vitals reviewed.         Plan:       History of cholecystectomy     B12 deficiency     Constipation, unspecified constipation type     History of Husam-en-Y gastric bypass      She will continue her current diet.  She continues her vitamins and minerals.  She will increase her caloric intake with increased protein intake.  She is scheduled for upper endoscopy.  She has good health letter C.  She understands the procedures of upper endoscopy.  Specifically she realizes there is an extremely small incidence of bleeding perforation or aspiration.          Other Notes      All notes   Communications         Letter sent to Og Perez MD    AMB Visit Summary: Provider Version   Sent 11/11/2019   Not recorded   All Charges for This Encounter     Code Description Service Date Service Provider Modifiers Qty   69391 [facility] OFFICE/OUTPT VISIT,EST,LEVL III 11/7/2019 Shakeel Perez MD S$PBB 1   [id number] [facility] E&M-EST. PATIENT-LVL V 11/7/2019 Shakeel Perez MD PBBFAC, 27, PN 1   [id number] NH PBB SHADOW E&M-EST. PATIENT-LVL V 11/7/2019 Shakeel Perez MD PBBFAC 1   Level of Service     Level of Service   [facility] OFFICE/OUTPT VISIT,EST,LEVL III [27288]   BestPractice Advisories     Click to view BestPractice Advisory history   AVS Reports     Date/Time Report Action User   11/7/2019 10:09 AM After Visit Summary Printed Cinthia Padgett LPN   Encounter-Level Documents - 11/07/2019:     After Visit Summary -  Document on 11/7/2019 10:09 AM by Cinthia Padgett LPN: After Visit Summary   Orders Placed      None   Medication Changes        None      Medication List    Fall Risk     Patient Mobility Status: Ambulatory w/ assistance   Number of falls in the past 12 months?: 0   Fall Risk?: No   Visit Diagnoses         History of cholecystectomy      B12 deficiency      Constipation, unspecified constipation type      History of Husam-en-Y gastric bypass      Problem List    She is here for upper endoscopy.  She has had gastric surgery.  She has had epigastric pain with pyrosis and dyspepsia.  She has good health knowledge and she understands the procedures of upper endoscopy.  Specifically she realizes there is an extremely small incidence of bleeding perforation or aspiration.  Physical examination reveals a a nonicteric white female.  She is normocephalic.  Pupils are normal. Lungs reveal symmetrical respirations without rales or rhonchi.  Heart reveals regular rhythm.  The abdomen is soft there is mild tenderness in the epigastrium.  There surgical scars.  Bowel sounds normal. Neurologic review she is oriented x3 impression 1.  Upper abdominal pain with pyrosis and dyspepsia history of bariatric surgery.

## 2019-12-10 NOTE — DISCHARGE SUMMARY
She was found to have a small gastric pouch and a gastrojejunostomy.  The esophagus was dilated.  She tolerated the procedure well.  She will resume her current medications vitamins and minerals.  She has a followup upon the office.  She will continue her reflux regimen.  She will continue the 6 feedings per day.

## 2019-12-10 NOTE — PLAN OF CARE
Pt brought to PACU via strecher. Monitors applied. VSS. Iv site intact, clean dry. LR to gravity. Pt asleep.

## 2019-12-10 NOTE — ANESTHESIA PREPROCEDURE EVALUATION
12/10/2019  Obdulia Yepez is a 69 y.o., female.    Pre-op Assessment    I have reviewed the Patient Summary Reports.    I have reviewed the Nursing Notes.   I have reviewed the Medications.     Review of Systems  Anesthesia Hx:  No problems with previous Anesthesia  Neg history of prior surgery. Denies Family Hx of Anesthesia complications.   Denies Personal Hx of Anesthesia complications.   Social:  Non-Smoker    Hematology/Oncology:  Hematology Normal   Oncology Normal     EENT/Dental:EENT/Dental Normal   Cardiovascular:   Hypertension, well controlled RO    Pulmonary:   Asthma asymptomatic Shortness of breath    Renal/:   Chronic Renal Disease, CRI    Hepatic/GI:   GERD, well controlled    Musculoskeletal:  Musculoskeletal Normal    Neurological:   Neuromuscular Disease,    Endocrine:  Endocrine Normal    Dermatological:  Skin Normal    Psych:   Psychiatric History          Physical Exam  General:  Well nourished    Airway/Jaw/Neck:  Airway Findings: Mouth Opening: Normal Tongue: Normal  General Airway Assessment: Adult  Mallampati: II        Eyes/Ears/Nose:  EYES/EARS/NOSE FINDINGS: Normal   Dental:  DENTAL FINDINGS: Normal   Chest/Lungs:  Chest/Lungs Clear    Heart/Vascular:  Heart Findings: Normal Heart murmur: negative Vascular Findings: Normal    Abdomen:  Abdomen Findings: Normal    Musculoskeletal:  Musculoskeletal Findings: Normal   Skin:  Skin Findings: Normal    Mental Status:  Mental Status Findings: Normal        Anesthesia Plan  Type of Anesthesia, risks & benefits discussed:  Anesthesia Type:  MAC  Patient's Preference:   Intra-op Monitoring Plan: standard ASA monitors  Intra-op Monitoring Plan Comments:   Post Op Pain Control Plan:   Post Op Pain Control Plan Comments:   Induction:   IV  Beta Blocker:  Patient is not currently on a Beta-Blocker (No further documentation required).        Informed Consent: Patient understands risks and agrees with Anesthesia plan.  Questions answered. Anesthesia consent signed with patient.  ASA Score: 2     Day of Surgery Review of History & Physical:    H&P update referred to the provider.

## 2019-12-11 ENCOUNTER — DOCUMENTATION ONLY (OUTPATIENT)
Dept: ORTHOPEDICS | Facility: CLINIC | Age: 69
End: 2019-12-11

## 2019-12-11 ENCOUNTER — OFFICE VISIT (OUTPATIENT)
Dept: ORTHOPEDICS | Facility: CLINIC | Age: 69
End: 2019-12-11
Payer: MEDICARE

## 2019-12-11 ENCOUNTER — HOSPITAL ENCOUNTER (OUTPATIENT)
Dept: RADIOLOGY | Facility: HOSPITAL | Age: 69
Discharge: HOME | End: 2019-12-11
Attending: ORTHOPAEDIC SURGERY
Payer: MEDICARE

## 2019-12-11 VITALS
DIASTOLIC BLOOD PRESSURE: 83 MMHG | WEIGHT: 92.25 LBS | HEIGHT: 60 IN | SYSTOLIC BLOOD PRESSURE: 117 MMHG | HEART RATE: 104 BPM | BODY MASS INDEX: 18.11 KG/M2

## 2019-12-11 DIAGNOSIS — M17.12 PRIMARY OSTEOARTHRITIS OF LEFT KNEE: ICD-10-CM

## 2019-12-11 DIAGNOSIS — M25.562 LEFT KNEE PAIN, UNSPECIFIED CHRONICITY: ICD-10-CM

## 2019-12-11 DIAGNOSIS — Z98.890 HISTORY OF ARTHROSCOPIC SURGERY OF SHOULDER: ICD-10-CM

## 2019-12-11 DIAGNOSIS — S82.032K CLOSED TRANSVERSE FRACTURE OF PATELLA WITH NONUNION, LEFT: ICD-10-CM

## 2019-12-11 DIAGNOSIS — Z01.818 PRE-OP TESTING: Primary | ICD-10-CM

## 2019-12-11 DIAGNOSIS — Z01.818 PRE-OP EXAM: Primary | ICD-10-CM

## 2019-12-11 DIAGNOSIS — S82.009A PATELLA FRACTURE: ICD-10-CM

## 2019-12-11 DIAGNOSIS — S82.042A CLOSED DISPLACED COMMINUTED FRACTURE OF LEFT PATELLA, INITIAL ENCOUNTER: ICD-10-CM

## 2019-12-11 PROCEDURE — 99214 PR OFFICE/OUTPT VISIT, EST, LEVL IV, 30-39 MIN: ICD-10-PCS | Mod: 57,S$PBB,, | Performed by: ORTHOPAEDIC SURGERY

## 2019-12-11 PROCEDURE — 73562 XR KNEE 3 VIEW LEFT: ICD-10-PCS | Mod: 26,LT,, | Performed by: RADIOLOGY

## 2019-12-11 PROCEDURE — 99214 OFFICE O/P EST MOD 30 MIN: CPT | Mod: 57,S$PBB,, | Performed by: ORTHOPAEDIC SURGERY

## 2019-12-11 PROCEDURE — 99999 PR PBB SHADOW E&M-EST. PATIENT-LVL III: ICD-10-PCS | Mod: PBBFAC,,, | Performed by: ORTHOPAEDIC SURGERY

## 2019-12-11 PROCEDURE — 1159F MED LIST DOCD IN RCRD: CPT | Mod: ,,, | Performed by: ORTHOPAEDIC SURGERY

## 2019-12-11 PROCEDURE — 1159F PR MEDICATION LIST DOCUMENTED IN MEDICAL RECORD: ICD-10-PCS | Mod: ,,, | Performed by: ORTHOPAEDIC SURGERY

## 2019-12-11 PROCEDURE — 73562 X-RAY EXAM OF KNEE 3: CPT | Mod: TC,PN,LT

## 2019-12-11 PROCEDURE — 1125F AMNT PAIN NOTED PAIN PRSNT: CPT | Mod: ,,, | Performed by: ORTHOPAEDIC SURGERY

## 2019-12-11 PROCEDURE — 1125F PR PAIN SEVERITY QUANTIFIED, PAIN PRESENT: ICD-10-PCS | Mod: ,,, | Performed by: ORTHOPAEDIC SURGERY

## 2019-12-11 PROCEDURE — 73562 X-RAY EXAM OF KNEE 3: CPT | Mod: 26,LT,, | Performed by: RADIOLOGY

## 2019-12-11 PROCEDURE — 99213 OFFICE O/P EST LOW 20 MIN: CPT | Mod: PBBFAC,25,PN | Performed by: ORTHOPAEDIC SURGERY

## 2019-12-11 PROCEDURE — 99999 PR PBB SHADOW E&M-EST. PATIENT-LVL III: CPT | Mod: PBBFAC,,, | Performed by: ORTHOPAEDIC SURGERY

## 2019-12-11 RX ORDER — SODIUM CHLORIDE 9 MG/ML
INJECTION, SOLUTION INTRAVENOUS CONTINUOUS
Status: CANCELLED | OUTPATIENT
Start: 2019-12-11

## 2019-12-11 RX ORDER — MUPIROCIN 20 MG/G
OINTMENT TOPICAL
Status: CANCELLED | OUTPATIENT
Start: 2019-12-11

## 2019-12-11 NOTE — PROGRESS NOTES
"While I was informing patient of what to expect before and after surgery while signing her up for with Dr. Remy. After reviewing signs and symptoms of a DVT, patient was giving a copy of the DVT signs and symptoms and was instructed to report to the local ER if she experiences any of the S/S of a DVT. Patient voiced "I will not go to the ER to sit there for 3 hours, I will die with a blood clot waiting that long" I informed patient that she can go to any ER that could assist her quicker. I informed of the importance of reporting to the ER after surgery if she experiences these S/S of a DVT. Patient voiced "she would go to a different ER if needed". Encouraged patient to call office if she has any questions or concerns.  "

## 2019-12-11 NOTE — PROGRESS NOTES
Chief Complaint: Pain of the Right Shoulder     HPI:  Ms. Yepez is a 68-year-old female who returns today with complaints of left knee pain. She injured her left knee approximately 1 years ago after she tripped and fell and banged her knee. She is unsure of the exact date of injury.  She was initially treated conservatively and now has increasing pain in her left knee. She stated that any motion increases her symptoms while nothing seems to improve it. She gets swelling and giving way but denied locking.  She has taken NSAIDs without help.  She has not worn a brace nor had injections.  She has done physical therapy which is helping.  She has fallen several times since.          Past Medical History:   Diagnosis Date    Allergy      Arthritis      Asthma      Depression      Gout      Hypertension              Past Surgical History:   Procedure Laterality Date    APPENDECTOMY         SECTION        CHOLECYSTECTOMY        HYSTERECTOMY        STOMACH SURGERY         * WRIST SURGERY  BILATERAL TOTAL SHOULDER ARTHROPLASTY Left                Review of patient's allergies indicates:   Allergen Reactions    Latex      Milk containing products      Opioids - morphine analogues      Peanut      Sodium phosphate      Soy      Sulfa (sulfonamide antibiotics)           Social History           Occupational History    Retired       Tobacco Use    Smoking status: Never Smoker    Smokeless tobacco: Never Used   Substance and Sexual Activity    Alcohol use: Yes    Drug use: No    Sexual activity: Not on file      Family history:  Father:  .  Mother:  .  Son:  , MVA.     Previous Hospitalizations:  Childbirth, stomach surgery.     ROS:  No new diagnosis/surgery since last office visit on 2019.  Constitution: Negative for chills and fever.   HENT: Negative for hoarse voice and sore throat.    Eyes: Negative for blurred vision and redness.    Cardiovascular: Negative for irregular heartbeat.   Respiratory: Negative for cough and snoring.    Endocrine: Negative for polyphagia.   Hematologic/Lymphatic: Does not bruise/bleed easily.   Skin: Negative for itching and rash.   Musculoskeletal: Positive for joint pain and joint swelling.   Gastrointestinal: Positive for diarrhea. Negative for nausea and vomiting.   Genitourinary: Negative for frequency and urgency.   Neurological: Negative for seizures and sensory change.   Psychiatric/Behavioral: Positive for depression. Negative for substance abuse.      Objective:      Physical Exam:   General: AAOx3.  No acute distress  HEENT: Normocephalic, PEARLA EOMI, poor dentition  Neck: Supple, No JVD  Chest: Symetric, equal excursion on inspiration  Abdomen: Soft NTND  Vascular:  Pulses intact and equal bilaterally.  Capillary refill less than 3 seconds and equal bilaterally  Neurologic:  Pinprick and soft touch intact and equal bilaterally  Integment:   No ecchymosis or erythema.  Extremity:  Knee:  Extension/flexion left knee 3/130 degrees, right knee 0/130 degrees. Able to hold left knee against gravity and resistance left lower extreme. Tender with palpation patella left knee.  Gapping of patella. Positive patellar load/compression left knee. Varus/valgus stressing equal bilaterally with endpoint.  Lachman's/drawer equal bilaterally with endpoint.  No joint line tenderness either knee.  Crepitus with motion both knees.  Minimal effusion left knee. Clarke negative both knees.  [place] negative both knees.  Nontender at the anserine insertion bilaterally.  No swelling at the anserine insertion both knees.  Radiography:  Personally reviewed x-rays of the left knee completed on 12/11/2019 showed a displaced chronic transverse fracture of the patella. Tricompartmental arthritis with near bone-on-bone articulation and osteophytes.     Assessment:       Impression:    1.  Displaced chronic transverse fracture left  patella.  2.  Tricompartmental arthritis, left knee.      Plan:       1.  Discussed physical examination and radiographic findings with the patient. Obdulia understands that she has a chronic displaced transverse fracture of her patella and she could continue with conservative management or consider surgical intervention which would include either ORIF versus partial patellectomy.  She stated that she is having pain in her knee and would prefer to proceed with surgery to repair it.  2.  Possible complications of surgery to include bleeding, infection, scarring, nerve/blood vessel/tendon damage, need for further surgery, failed surgery, failure to improve, possible persistent pain, possible arthrofibrosis, possible malalignment, possible fracture, possible hardware failure, possible recurrence, possible nonunion, and possible hardware breakage were discussed with the patient. The patient was permitted to ask questions and all concerns were addressed to her satisfaction.  3.  Consent for open reduction internal fixation left patella versus partial patellectomy left patella.  4.  Tentatively schedule surgery for 12/19/2019.  5.  All postoperative meds will be prescribed on the date of surgery.  6.  Hold off on physical therapy for the knee for now as she will need it for postoperative recovery approximately 6 weeks after surgery.  7.  Knee brace wear was discussed.  8.  Any current pain can be treated with over-the-counter medications dosed per box instructions.  9.  [facility] portal was discussed with the patient and information was given.  The patient was encouraged to use the portal for future encounters.  10.  Follow up approximately 10-12 days postoperatively.

## 2019-12-11 NOTE — H&P
Chief Complaint: Pain of the Right Shoulder     HPI:  Ms. Yepez is a 68-year-old female who returns today with complaints of left knee pain. She injured her left knee approximately 1 years ago after she tripped and fell and banged her knee. She is unsure of the exact date of injury.  She was initially treated conservatively and now has increasing pain in her left knee. She stated that any motion increases her symptoms while nothing seems to improve it. She gets swelling and giving way but denied locking.  She has taken NSAIDs without help.  She has not worn a brace nor had injections.  She has done physical therapy which is helping.  She has fallen several times since.          Past Medical History:   Diagnosis Date    Allergy      Arthritis      Asthma      Depression      Gout      Hypertension              Past Surgical History:   Procedure Laterality Date    APPENDECTOMY         SECTION        CHOLECYSTECTOMY        HYSTERECTOMY        STOMACH SURGERY         * WRIST SURGERY  BILATERAL TOTAL SHOULDER ARTHROPLASTY Left                Review of patient's allergies indicates:   Allergen Reactions    Latex      Milk containing products      Opioids - morphine analogues      Peanut      Sodium phosphate      Soy      Sulfa (sulfonamide antibiotics)           Social History           Occupational History    Retired       Tobacco Use    Smoking status: Never Smoker    Smokeless tobacco: Never Used   Substance and Sexual Activity    Alcohol use: Yes    Drug use: No    Sexual activity: Not on file      Family history:  Father:  .  Mother:  .  Son:  , MVA.     Previous Hospitalizations:  Childbirth, stomach surgery.     ROS:  No new diagnosis/surgery since last office visit on 2019.  Constitution: Negative for chills and fever.   HENT: Negative for hoarse voice and sore throat.    Eyes: Negative for blurred vision and redness.    Cardiovascular: Negative for irregular heartbeat.   Respiratory: Negative for cough and snoring.    Endocrine: Negative for polyphagia.   Hematologic/Lymphatic: Does not bruise/bleed easily.   Skin: Negative for itching and rash.   Musculoskeletal: Positive for joint pain and joint swelling.   Gastrointestinal: Positive for diarrhea. Negative for nausea and vomiting.   Genitourinary: Negative for frequency and urgency.   Neurological: Negative for seizures and sensory change.   Psychiatric/Behavioral: Positive for depression. Negative for substance abuse.      Objective:      Physical Exam:   General: AAOx3.  No acute distress  HEENT: Normocephalic, PEARLA EOMI, poor dentition  Neck: Supple, No JVD  Chest: Symetric, equal excursion on inspiration  Abdomen: Soft NTND  Vascular:  Pulses intact and equal bilaterally.  Capillary refill less than 3 seconds and equal bilaterally  Neurologic:  Pinprick and soft touch intact and equal bilaterally  Integment:   No ecchymosis or erythema.  Extremity:  Knee:  Extension/flexion left knee 3/130 degrees, right knee 0/130 degrees. Able to hold left knee against gravity and resistance left lower extreme. Tender with palpation patella left knee.  Gapping of patella. Positive patellar load/compression left knee. Varus/valgus stressing equal bilaterally with endpoint.  Lachman's/drawer equal bilaterally with endpoint.  No joint line tenderness either knee.  Crepitus with motion both knees.  Minimal effusion left knee. Clarke negative both knees.  Ballard negative both knees.  Nontender at the anserine insertion bilaterally.  No swelling at the anserine insertion both knees.  Radiography:  Personally reviewed x-rays of the left knee completed on 12/11/2019 showed a displaced chronic transverse fracture of the patella. Tricompartmental arthritis with near bone-on-bone articulation and osteophytes.     Assessment:       Impression:    1.  Displaced chronic transverse fracture left  patella.  2.  Tricompartmental arthritis, left knee.      Plan:       1.  Discussed physical examination and radiographic findings with the patient. Obdulia understands that she has a chronic displaced transverse fracture of her patella and she could continue with conservative management or consider surgical intervention which would include either ORIF versus partial patellectomy.  She stated that she is having pain in her knee and would prefer to proceed with surgery to repair it.  2.  Possible complications of surgery to include bleeding, infection, scarring, nerve/blood vessel/tendon damage, need for further surgery, failed surgery, failure to improve, possible persistent pain, possible arthrofibrosis, possible malalignment, possible fracture, possible hardware failure, possible recurrence, possible nonunion, and possible hardware breakage were discussed with the patient. The patient was permitted to ask questions and all concerns were addressed to her satisfaction.  3.  Consent for open reduction internal fixation left patella versus partial patellectomy left patella.  4.  Tentatively schedule surgery for 12/19/2019.  5.  All postoperative meds will be prescribed on the date of surgery.  6.  Hold off on physical therapy for the knee for now as she will need it for postoperative recovery approximately 6 weeks after surgery.  7.  Knee brace wear was discussed.  8.  Any current pain can be treated with over-the-counter medications dosed per box instructions.  9.  [facility] portal was discussed with the patient and information was given.  The patient was encouraged to use the portal for future encounters.  10.  Follow up approximately 10-12 days postoperatively.

## 2019-12-12 ENCOUNTER — TELEPHONE (OUTPATIENT)
Dept: ORTHOPEDICS | Facility: CLINIC | Age: 69
End: 2019-12-12

## 2019-12-12 VITALS
SYSTOLIC BLOOD PRESSURE: 147 MMHG | BODY MASS INDEX: 17.67 KG/M2 | HEIGHT: 60 IN | DIASTOLIC BLOOD PRESSURE: 91 MMHG | OXYGEN SATURATION: 97 % | WEIGHT: 90 LBS | RESPIRATION RATE: 10 BRPM | TEMPERATURE: 98 F | HEART RATE: 88 BPM

## 2019-12-12 NOTE — TELEPHONE ENCOUNTER
----- Message from Millie Jones sent at 12/12/2019 12:20 PM CST -----  Contact: self  Type: Needs Medical Advice    Who Called:  self  Symptoms (please be specific):    How long has patient had these symptoms:    Pharmacy name and phone #:    Best Call Back Number: [phone] (home)   Additional Information: Patient would like to reschedule the surgery and other appointment surrounding the surgery until after January 1st. Please call patient. Thanks!

## 2019-12-12 NOTE — TELEPHONE ENCOUNTER
Returned call to patient. Patient informed me that she wants to reschedule her surgery with Dr. Remy for January 2020. Surgery rescheduled for January 9, 2020. Pre op, lab, and post op appointment rescheduled. Patient voiced understanding of appointment date, time, and location. OR and Dr. Remy notified.

## 2019-12-23 ENCOUNTER — OFFICE VISIT (OUTPATIENT)
Dept: FAMILY MEDICINE | Facility: CLINIC | Age: 69
End: 2019-12-23
Payer: MEDICARE

## 2019-12-23 VITALS
RESPIRATION RATE: 16 BRPM | OXYGEN SATURATION: 96 % | WEIGHT: 90.5 LBS | DIASTOLIC BLOOD PRESSURE: 82 MMHG | BODY MASS INDEX: 17.77 KG/M2 | SYSTOLIC BLOOD PRESSURE: 133 MMHG | HEIGHT: 60 IN | HEART RATE: 126 BPM

## 2019-12-23 DIAGNOSIS — M47.12 OSTEOARTHRITIS OF CERVICAL SPINE WITH MYELOPATHY: Primary | ICD-10-CM

## 2019-12-23 DIAGNOSIS — R11.0 NAUSEA: ICD-10-CM

## 2019-12-23 PROCEDURE — 99999 PR PBB SHADOW E&M-EST. PATIENT-LVL III: ICD-10-PCS | Mod: PBBFAC,,, | Performed by: FAMILY MEDICINE

## 2019-12-23 PROCEDURE — 99214 PR OFFICE/OUTPT VISIT, EST, LEVL IV, 30-39 MIN: ICD-10-PCS | Mod: S$PBB,,, | Performed by: FAMILY MEDICINE

## 2019-12-23 PROCEDURE — 1159F MED LIST DOCD IN RCRD: CPT | Mod: ,,, | Performed by: FAMILY MEDICINE

## 2019-12-23 PROCEDURE — 99999 PR PBB SHADOW E&M-EST. PATIENT-LVL III: CPT | Mod: PBBFAC,,, | Performed by: FAMILY MEDICINE

## 2019-12-23 PROCEDURE — 99214 OFFICE O/P EST MOD 30 MIN: CPT | Mod: S$PBB,,, | Performed by: FAMILY MEDICINE

## 2019-12-23 PROCEDURE — 1159F PR MEDICATION LIST DOCUMENTED IN MEDICAL RECORD: ICD-10-PCS | Mod: ,,, | Performed by: FAMILY MEDICINE

## 2019-12-23 PROCEDURE — 99213 OFFICE O/P EST LOW 20 MIN: CPT | Mod: PBBFAC,PN | Performed by: FAMILY MEDICINE

## 2019-12-23 RX ORDER — MONTELUKAST SODIUM 10 MG/1
TABLET ORAL
Qty: 30 TABLET | Refills: 11 | Status: SHIPPED | OUTPATIENT
Start: 2019-12-23

## 2019-12-23 RX ORDER — OXYCODONE AND ACETAMINOPHEN 10; 325 MG/1; MG/1
1 TABLET ORAL EVERY 8 HOURS PRN
Qty: 90 TABLET | Refills: 0 | Status: SHIPPED | OUTPATIENT
Start: 2019-12-23 | End: 2020-01-08 | Stop reason: SDUPTHER

## 2019-12-23 RX ORDER — ONDANSETRON 4 MG/1
4 TABLET, FILM COATED ORAL EVERY 8 HOURS PRN
Qty: 90 TABLET | Refills: 5 | Status: SHIPPED | OUTPATIENT
Start: 2019-12-23

## 2019-12-23 NOTE — PROGRESS NOTES
Subjective:       Patient ID: Obdulia Yepez is a 69 y.o. female.    Chief Complaint: Follow-up and Medication Refill    Follow up    Still with nausea with eating  Not taking zofran    Patient reports that their pain is well controlled, and that medication is preserving their quality of life. Patient requests refill today, and denies any change in their pain. No abuse concerns.  reviewed.        Review of Systems   Constitutional: Negative for activity change, appetite change, chills, fatigue and fever.   HENT: Negative for congestion, dental problem, facial swelling, nosebleeds, postnasal drip, sinus pain, sore throat, trouble swallowing and voice change.    Eyes: Negative for pain, discharge and visual disturbance.   Respiratory: Negative for apnea, cough, chest tightness and shortness of breath.    Cardiovascular: Negative for chest pain and palpitations.   Gastrointestinal: Negative for abdominal pain, blood in stool, constipation and nausea.   Endocrine: Negative for cold intolerance, polydipsia and polyuria.   Genitourinary: Negative for difficulty urinating, enuresis and flank pain.   Musculoskeletal: Positive for arthralgias, back pain, gait problem and myalgias.   Skin: Negative for color change.   Allergic/Immunologic: Negative for environmental allergies and immunocompromised state.   Neurological: Negative for dizziness and light-headedness.   Hematological: Negative for adenopathy.   Psychiatric/Behavioral: Negative for agitation, behavioral problems, decreased concentration and dysphoric mood. The patient is not nervous/anxious.    All other systems reviewed and are negative.        Reviewed family, medical, surgical, and social history.    Objective:      /82 (BP Location: Right arm, Patient Position: Sitting, BP Method: Small (Automatic))   Pulse (!) 126   Resp 16   Ht 5' (1.524 m)   Wt 41.1 kg (90 lb 8 oz)   SpO2 96%   BMI 17.67 kg/m²   Physical Exam   Constitutional: She is oriented  to person, place, and time. She appears well-developed and well-nourished. No distress.   HENT:   Head: Normocephalic and atraumatic.   Nose: Nose normal.   Mouth/Throat: Oropharynx is clear and moist. No oropharyngeal exudate.   Eyes: Pupils are equal, round, and reactive to light. Conjunctivae and EOM are normal. No scleral icterus.   Neck: Normal range of motion. Neck supple. No thyromegaly present.   Cardiovascular: Normal rate, regular rhythm and normal heart sounds. Exam reveals no gallop and no friction rub.   No murmur heard.  Pulmonary/Chest: Effort normal and breath sounds normal. No respiratory distress. She has no wheezes. She has no rales. She exhibits no tenderness.   Abdominal: Soft. Bowel sounds are normal. She exhibits no distension. There is no tenderness. There is no guarding.   Musculoskeletal: Normal range of motion. She exhibits tenderness and deformity. She exhibits no edema.   Lymphadenopathy:     She has no cervical adenopathy.   Neurological: She is alert and oriented to person, place, and time. She displays normal reflexes. No cranial nerve deficit or sensory deficit. She exhibits normal muscle tone.   Skin: Skin is warm and dry. No rash noted. She is not diaphoretic. No erythema. No pallor.   Psychiatric: She has a normal mood and affect. Her behavior is normal. Judgment and thought content normal.   Nursing note and vitals reviewed.      Assessment:       1. Osteoarthritis of cervical spine with myelopathy    2. Nausea        Plan:       Osteoarthritis of cervical spine with myelopathy  -     oxyCODONE-acetaminophen (PERCOCET)  mg per tablet; Take 1 tablet by mouth every 8 (eight) hours as needed for Pain.  Dispense: 90 tablet; Refill: 0    Nausea  -     ondansetron (ZOFRAN) 4 MG tablet; Take 1 tablet (4 mg total) by mouth every 8 (eight) hours as needed for Nausea.  Dispense: 90 tablet; Refill: 5     reviewed and consistent  Will refill medicine as shown given that patient  receives greater than 30% benefit from medicine and keeps patient functional and able to complete ADL's.  UDS reviewed  Pain contract reviewed  No abuse concerns          Risks, benefits, and side effects were discussed with the patient. All questions were answered to the fullest satisfaction of the patient, and pt verbalized understanding and agreement to treatment plan. Pt was to call with any new or worsening symptoms, or present to the ER.

## 2020-01-06 ENCOUNTER — HOSPITAL ENCOUNTER (OUTPATIENT)
Dept: PREADMISSION TESTING | Facility: HOSPITAL | Age: 70
Discharge: HOME | End: 2020-01-06
Attending: ORTHOPAEDIC SURGERY
Payer: MEDICARE

## 2020-01-06 PROCEDURE — 99900103 DSU ONLY-NO CHARGE-INITIAL HR (STAT)

## 2020-01-08 ENCOUNTER — OFFICE VISIT (OUTPATIENT)
Dept: FAMILY MEDICINE | Facility: CLINIC | Age: 70
End: 2020-01-08
Payer: MEDICARE

## 2020-01-08 ENCOUNTER — OFFICE VISIT (OUTPATIENT)
Dept: GASTROENTEROLOGY | Facility: CLINIC | Age: 70
End: 2020-01-08
Payer: MEDICARE

## 2020-01-08 ENCOUNTER — LAB VISIT (OUTPATIENT)
Dept: LAB | Facility: HOSPITAL | Age: 70
End: 2020-01-08
Attending: INTERNAL MEDICINE
Payer: MEDICARE

## 2020-01-08 VITALS
HEART RATE: 97 BPM | RESPIRATION RATE: 20 BRPM | HEIGHT: 60 IN | OXYGEN SATURATION: 96 % | DIASTOLIC BLOOD PRESSURE: 69 MMHG | WEIGHT: 92.19 LBS | SYSTOLIC BLOOD PRESSURE: 100 MMHG | BODY MASS INDEX: 18.1 KG/M2

## 2020-01-08 VITALS — BODY MASS INDEX: 18.06 KG/M2 | HEIGHT: 60 IN | WEIGHT: 92 LBS

## 2020-01-08 DIAGNOSIS — E53.8 B12 DEFICIENCY: ICD-10-CM

## 2020-01-08 DIAGNOSIS — R10.84 GENERALIZED ABDOMINAL PAIN: ICD-10-CM

## 2020-01-08 DIAGNOSIS — Z90.49 HISTORY OF CHOLECYSTECTOMY: ICD-10-CM

## 2020-01-08 DIAGNOSIS — K59.00 CONSTIPATION, UNSPECIFIED CONSTIPATION TYPE: Primary | ICD-10-CM

## 2020-01-08 DIAGNOSIS — M47.12 OSTEOARTHRITIS OF CERVICAL SPINE WITH MYELOPATHY: ICD-10-CM

## 2020-01-08 DIAGNOSIS — K21.9 GASTROESOPHAGEAL REFLUX DISEASE, ESOPHAGITIS PRESENCE NOT SPECIFIED: ICD-10-CM

## 2020-01-08 DIAGNOSIS — Z98.84 HISTORY OF ROUX-EN-Y GASTRIC BYPASS: ICD-10-CM

## 2020-01-08 LAB
ALBUMIN SERPL BCP-MCNC: 3 G/DL (ref 3.5–5.2)
ALP SERPL-CCNC: 172 U/L (ref 55–135)
ALT SERPL W/O P-5'-P-CCNC: 60 U/L (ref 10–44)
ANION GAP SERPL CALC-SCNC: 8 MMOL/L (ref 8–16)
AST SERPL-CCNC: 135 U/L (ref 10–40)
BASOPHILS # BLD AUTO: 0.02 K/UL (ref 0–0.2)
BASOPHILS NFR BLD: 0.3 % (ref 0–1.9)
BILIRUB SERPL-MCNC: 0.8 MG/DL (ref 0.1–1)
BUN SERPL-MCNC: 20 MG/DL (ref 8–23)
CALCIUM SERPL-MCNC: 8.8 MG/DL (ref 8.7–10.5)
CHLORIDE SERPL-SCNC: 99 MMOL/L (ref 95–110)
CO2 SERPL-SCNC: 25 MMOL/L (ref 23–29)
CREAT SERPL-MCNC: 1.2 MG/DL (ref 0.5–1.4)
DIFFERENTIAL METHOD: ABNORMAL
EOSINOPHIL # BLD AUTO: 0.1 K/UL (ref 0–0.5)
EOSINOPHIL NFR BLD: 2.3 % (ref 0–8)
ERYTHROCYTE [DISTWIDTH] IN BLOOD BY AUTOMATED COUNT: 12.1 % (ref 11.5–14.5)
EST. GFR  (AFRICAN AMERICAN): 54 ML/MIN/1.73 M^2
EST. GFR  (NON AFRICAN AMERICAN): 46.9 ML/MIN/1.73 M^2
GLUCOSE SERPL-MCNC: 89 MG/DL (ref 70–110)
HCT VFR BLD AUTO: 31.1 % (ref 37–48.5)
HGB BLD-MCNC: 10.3 G/DL (ref 12–16)
IMM GRANULOCYTES # BLD AUTO: 0.03 K/UL (ref 0–0.04)
IMM GRANULOCYTES NFR BLD AUTO: 0.5 % (ref 0–0.5)
LYMPHOCYTES # BLD AUTO: 1 K/UL (ref 1–4.8)
LYMPHOCYTES NFR BLD: 15.4 % (ref 18–48)
MCH RBC QN AUTO: 32.6 PG (ref 27–31)
MCHC RBC AUTO-ENTMCNC: 33.1 G/DL (ref 32–36)
MCV RBC AUTO: 98 FL (ref 82–98)
MONOCYTES # BLD AUTO: 0.5 K/UL (ref 0.3–1)
MONOCYTES NFR BLD: 8.1 % (ref 4–15)
NEUTROPHILS # BLD AUTO: 4.5 K/UL (ref 1.8–7.7)
NEUTROPHILS NFR BLD: 73.4 % (ref 38–73)
NRBC BLD-RTO: 0 /100 WBC
PLATELET # BLD AUTO: 225 K/UL (ref 150–350)
PMV BLD AUTO: 9.6 FL (ref 9.2–12.9)
POTASSIUM SERPL-SCNC: 4 MMOL/L (ref 3.5–5.1)
PROT SERPL-MCNC: 6.3 G/DL (ref 6–8.4)
RBC # BLD AUTO: 3.16 M/UL (ref 4–5.4)
SODIUM SERPL-SCNC: 132 MMOL/L (ref 136–145)
WBC # BLD AUTO: 6.15 K/UL (ref 3.9–12.7)

## 2020-01-08 PROCEDURE — 36415 COLL VENOUS BLD VENIPUNCTURE: CPT

## 2020-01-08 PROCEDURE — 1159F PR MEDICATION LIST DOCUMENTED IN MEDICAL RECORD: ICD-10-PCS | Mod: ,,, | Performed by: INTERNAL MEDICINE

## 2020-01-08 PROCEDURE — 99999 PR PBB SHADOW E&M-EST. PATIENT-LVL IV: ICD-10-PCS | Mod: PBBFAC,,, | Performed by: FAMILY MEDICINE

## 2020-01-08 PROCEDURE — 99999 PR PBB SHADOW E&M-EST. PATIENT-LVL IV: CPT | Mod: PBBFAC,,, | Performed by: FAMILY MEDICINE

## 2020-01-08 PROCEDURE — 99999 PR PBB SHADOW E&M-EST. PATIENT-LVL III: ICD-10-PCS | Mod: PBBFAC,,, | Performed by: INTERNAL MEDICINE

## 2020-01-08 PROCEDURE — 99213 OFFICE O/P EST LOW 20 MIN: CPT | Mod: S$PBB,,, | Performed by: FAMILY MEDICINE

## 2020-01-08 PROCEDURE — 99213 PR OFFICE/OUTPT VISIT, EST, LEVL III, 20-29 MIN: ICD-10-PCS | Mod: S$PBB,,, | Performed by: INTERNAL MEDICINE

## 2020-01-08 PROCEDURE — 1159F MED LIST DOCD IN RCRD: CPT | Mod: ,,, | Performed by: INTERNAL MEDICINE

## 2020-01-08 PROCEDURE — 99214 OFFICE O/P EST MOD 30 MIN: CPT | Mod: PBBFAC,PN | Performed by: FAMILY MEDICINE

## 2020-01-08 PROCEDURE — 85025 COMPLETE CBC W/AUTO DIFF WBC: CPT

## 2020-01-08 PROCEDURE — 80053 COMPREHEN METABOLIC PANEL: CPT

## 2020-01-08 PROCEDURE — 99213 PR OFFICE/OUTPT VISIT, EST, LEVL III, 20-29 MIN: ICD-10-PCS | Mod: S$PBB,,, | Performed by: FAMILY MEDICINE

## 2020-01-08 PROCEDURE — 99999 PR PBB SHADOW E&M-EST. PATIENT-LVL III: CPT | Mod: PBBFAC,,, | Performed by: INTERNAL MEDICINE

## 2020-01-08 PROCEDURE — 99213 OFFICE O/P EST LOW 20 MIN: CPT | Mod: S$PBB,,, | Performed by: INTERNAL MEDICINE

## 2020-01-08 PROCEDURE — 1159F PR MEDICATION LIST DOCUMENTED IN MEDICAL RECORD: ICD-10-PCS | Mod: ,,, | Performed by: FAMILY MEDICINE

## 2020-01-08 PROCEDURE — 99213 OFFICE O/P EST LOW 20 MIN: CPT | Mod: PBBFAC,27,PN | Performed by: INTERNAL MEDICINE

## 2020-01-08 PROCEDURE — 1159F MED LIST DOCD IN RCRD: CPT | Mod: ,,, | Performed by: FAMILY MEDICINE

## 2020-01-08 RX ORDER — OXYCODONE AND ACETAMINOPHEN 10; 325 MG/1; MG/1
1 TABLET ORAL EVERY 6 HOURS PRN
Qty: 28 TABLET | Refills: 0 | Status: SHIPPED | OUTPATIENT
Start: 2020-01-08 | End: 2020-01-15

## 2020-01-08 RX ORDER — ESCITALOPRAM OXALATE 20 MG/1
20 TABLET ORAL DAILY
COMMUNITY
Start: 2019-12-21

## 2020-01-08 NOTE — PATIENT INSTRUCTIONS
She will continue the MiraLax and she started on the Amitiza 24 b.i.d..  CT scan labs will be obtained to evaluate the abdominal distension pain and constipation. She continues her other medications.  She will reschedule her surgery

## 2020-01-08 NOTE — LETTER
January 9, 2020      Og Perez MD  [address]  [place] [address]           [facility]  [place] [facility]  [address], [address]  [place] [address]  Phone: [phone]  Fax: [phone]          Patient: Obdulia Yepez   MR Number: [id number]   YOB: 1950   Date of Visit: 1/8/2020       Dear Dr. Og Perez:    Thank you for referring Obdulia Yepez to me for evaluation. Attached you will find relevant portions of my assessment and plan of care.    If you have questions, please do not hesitate to call me. I look forward to following Obdulia Yepez along with you.    Sincerely,    Shakeel Perez MD    Enclosure  CC:  No Recipients    If you would like to receive this communication electronically, please contact [email or web address] or [phone] to request more information on [facility] access.    For providers and/or their staff who would like to refer a patient to [facility], please contact us through our one-stop-shop provider referral line, [facility], at [phone].    If you feel you have received this communication in error or would no longer like to receive these types of communications, please e-mail [email or web address]

## 2020-01-08 NOTE — PROGRESS NOTES
Subjective:       Patient ID: Obdulia Yepez is a 69 y.o. female.    Chief Complaint: Follow-up; Constipation; and Medication Refill    Constipation  Last few weeks  Worsening  Taking stool softeners without relief    Patient reports that their pain is well controlled, and that medication is preserving their quality of life. Patient requests refill today, and denies any change in their pain. No abuse concerns.  reviewed.      Review of Systems   Constitutional: Negative for activity change, appetite change, chills, fatigue and fever.   HENT: Negative for congestion, dental problem, facial swelling, nosebleeds, postnasal drip, sinus pain, sore throat, trouble swallowing and voice change.    Eyes: Negative for pain, discharge and visual disturbance.   Respiratory: Negative for apnea, cough, chest tightness and shortness of breath.    Cardiovascular: Negative for chest pain and palpitations.   Gastrointestinal: Negative for abdominal pain, blood in stool, constipation and nausea.   Endocrine: Negative for cold intolerance, polydipsia and polyuria.   Genitourinary: Negative for difficulty urinating, enuresis and flank pain.   Musculoskeletal: Positive for arthralgias, back pain, gait problem and myalgias.   Skin: Negative for color change.   Allergic/Immunologic: Negative for environmental allergies and immunocompromised state.   Neurological: Negative for dizziness and light-headedness.   Hematological: Negative for adenopathy.   Psychiatric/Behavioral: Negative for agitation, behavioral problems, decreased concentration and dysphoric mood. The patient is not nervous/anxious.    All other systems reviewed and are negative.        Reviewed family, medical, surgical, and social history.    Objective:      /69 (BP Location: Left arm, Patient Position: Sitting, BP Method: Small (Automatic))   Pulse 97   Resp 20   Ht 5' (1.524 m)   Wt 41.8 kg (92 lb 3.2 oz)   SpO2 96%   BMI 18.01 kg/m²   Physical Exam    Constitutional: She is oriented to person, place, and time. She appears well-developed and well-nourished. No distress.   HENT:   Head: Normocephalic and atraumatic.   Nose: Nose normal.   Mouth/Throat: Oropharynx is clear and moist. No oropharyngeal exudate.   Eyes: Pupils are equal, round, and reactive to light. Conjunctivae and EOM are normal. No scleral icterus.   Neck: Normal range of motion. Neck supple. No thyromegaly present.   Cardiovascular: Normal rate, regular rhythm and normal heart sounds. Exam reveals no gallop and no friction rub.   No murmur heard.  Pulmonary/Chest: Effort normal and breath sounds normal. No respiratory distress. She has no wheezes. She has no rales. She exhibits no tenderness.   Abdominal: Soft. Bowel sounds are normal. She exhibits no distension. There is no tenderness. There is no guarding.   Musculoskeletal: Normal range of motion. She exhibits tenderness and deformity. She exhibits no edema.   Lymphadenopathy:     She has no cervical adenopathy.   Neurological: She is alert and oriented to person, place, and time. She displays normal reflexes. No cranial nerve deficit or sensory deficit. She exhibits normal muscle tone.   Skin: Skin is warm and dry. No rash noted. She is not diaphoretic. No erythema. No pallor.   Psychiatric: She has a normal mood and affect. Her behavior is normal. Judgment and thought content normal.   Nursing note and vitals reviewed.      Assessment:       1. Constipation, unspecified constipation type    2. Osteoarthritis of cervical spine with myelopathy        Plan:       Constipation, unspecified constipation type    Osteoarthritis of cervical spine with myelopathy  -     oxyCODONE-acetaminophen (PERCOCET)  mg per tablet; Take 1 tablet by mouth every 6 (six) hours as needed for Pain.  Dispense: 28 tablet; Refill: 0     reviewed and consistent  Will refill medicine as shown given that patient receives greater than 30% benefit from medicine and  keeps patient functional and able to complete ADL's.  UDS reviewed  Pain contract reviewed  No abuse concerns          Risks, benefits, and side effects were discussed with the patient. All questions were answered to the fullest satisfaction of the patient, and pt verbalized understanding and agreement to treatment plan. Pt was to call with any new or worsening symptoms, or present to the ER.

## 2020-01-09 PROBLEM — R10.84 GENERALIZED ABDOMINAL PAIN: Status: ACTIVE | Noted: 2020-01-09

## 2020-01-09 NOTE — PROGRESS NOTES
Subjective:       Patient ID: Obdulia Yepez is a 69 y.o. female.    Chief Complaint: No chief complaint on file.    She is scheduled for the orthopedic surgery but does not believe that she can tolerate the surgery.  She has had 2 weeks of constipation.  She has tried every know OTC medications  without results.  She has developed abdominal distension and complains of periumbilical pain.  She has had nausea without vomiting. She denies cardiopulmonary symptoms.  Recent upper endoscopy revealed she had gastroesophageal reflux.  She has had a Husam-en-Y in bariatric surgery.  She had a small gastric pouch.  During the endoscopy her vital signs were normal.  She has lost a lot a weight.  She has had a poor appetite for months.  She has had bowel irregularity with episodes of diarrhea although his symptoms or change.  She cannot pinpoint a precipitating factor.  She denies fever chills. She denies dysphagia aspiration significant pyrosis.  She denies vomiting.  She describes abdominal distension with pressure or discomfort in the abdomen without specific pain. She denies fever chills.  She has been able to ambulate with the walker.  She has tolerated liquids.      Allergies:  Review of patient's allergies indicates:   Allergen Reactions    Amoxicillin Diarrhea    Latex     Milk containing products     Opioids - morphine analogues     Peanut     Sodium phosphate     Soy     Sulfa (sulfonamide antibiotics)        Medications:    Current Outpatient Medications:     allopurinol (ZYLOPRIM) 300 MG tablet, TAKE 1 TABLET BY MOUTH DAILY, Disp: 30 tablet, Rfl: 14    budesonide-formoterol 160-4.5 mcg (SYMBICORT) 160-4.5 mcg/actuation HFAA, Symbicort 160 mcg-4.5 mcg/actuation HFA aerosol inhaler  Inhale 2 puffs twice a day by inhalation route., Disp: , Rfl:     busPIRone (BUSPAR) 5 MG Tab, Take 1 tablet (5 mg total) by mouth 2 (two) times daily., Disp: 60 tablet, Rfl: 11    colestipol (COLESTID) 1 gram Tab, Take 1 tablet  (1 g total) by mouth 2 (two) times daily., Disp: 180 tablet, Rfl: 3    cyclobenzaprine (FLEXERIL) 10 MG tablet, Take 1 tablet (10 mg total) by mouth 3 (three) times daily as needed., Disp: 90 tablet, Rfl: 0    cyproheptadine (PERIACTIN) 4 mg tablet, TAKE 1 TABLET (4 MG TOTAL) BY MOUTH 3 (THREE) TIMES DAILY AS NEEDED., Disp: 90 tablet, Rfl: 2    escitalopram oxalate (LEXAPRO) 20 MG tablet, Take 20 mg by mouth once daily. , Disp: , Rfl:     fluticasone (FLONASE) 50 mcg/actuation nasal spray, fluticasone 50 mcg/actuation nasal spray,suspension  Inhale 1 spray every day by intranasal route., Disp: , Rfl:     gabapentin (NEURONTIN) 300 MG capsule, Take 3 capsules (900 mg total) by mouth 3 (three) times daily. (Patient taking differently: Take 600 mg by mouth 2 (two) times daily. ), Disp: 270 capsule, Rfl: 3    hydrocortisone (CORTEF) 10 MG Tab, TAKE 2 TABLETS BY MOUTH IN THE MORNING AND 1 TABLET IN THE EVENING, Disp: , Rfl: 0    ipratropium (ATROVENT) 0.03 % nasal spray, 2 sprays by Nasal route 3 (three) times daily., Disp: 30 mL, Rfl: 2    montelukast (SINGULAIR) 10 mg tablet, TAKE 1 TABLET(S) EVERY DAY BY ORAL ROUTE., Disp: 30 tablet, Rfl: 11    multivitamin (THERAGRAN) per tablet, Take 1 tablet by mouth once daily., Disp: , Rfl:     mupirocin (BACTROBAN) 2 % ointment, mupirocin 2 % topical ointment  APPLY A SMALL AMOUNT TO THE AFFECTED AREA BY TOPICAL ROUTE 2 TIMES PER DAY, Disp: , Rfl:     ondansetron (ZOFRAN) 4 MG tablet, Take 1 tablet (4 mg total) by mouth every 8 (eight) hours as needed for Nausea., Disp: 90 tablet, Rfl: 5    oxybutynin (DITROPAN-XL) 5 MG TR24, oxybutynin chloride ER 5 mg tablet,extended release 24 hr  TAKE 1 TABLET BY MOUTH EVERY DAY, Disp: , Rfl:     oxyCODONE-acetaminophen (PERCOCET)  mg per tablet, Take 1 tablet by mouth every 6 (six) hours as needed for Pain., Disp: 28 tablet, Rfl: 0    pantoprazole (PROTONIX) 40 MG tablet, Take 1 tablet (40 mg total) by mouth once daily.,  Disp: 30 tablet, Rfl: 11    polymyxin B sulf-trimethoprim (POLYTRIM) 10,000 unit- 1 mg/mL Drop, Place 1 drop into the right eye every 4 (four) hours., Disp: 10 mL, Rfl: 0    vitamin E 1000 UNIT capsule, Take 1,000 Units by mouth once daily., Disp: , Rfl:     Current Facility-Administered Medications:     0.9%  NaCl infusion (for blood administration), , Intravenous, Q24H PRN, Og Perez MD    acetaminophen tablet 650 mg, 650 mg, Oral, PRN, Og Perez MD    Facility-Administered Medications Ordered in Other Visits:     hylan g-f 20 48 mg/6 mL injection 48 mg, 48 mg, Intra-articular, , Esteban AMANDA Letonoff, DO, 48 mg at 19 1603    hylan g-f 20 48 mg/6 mL injection 48 mg, 48 mg, Intra-articular, , Esteban AMANDA Letonoff, DO, 48 mg at 19 1603    triamcinolone acetonide injection 80 mg, 80 mg, Intra-articular, , Esteban AMANDA Letonoff, DO, 80 mg at 19 1603    Past Medical History:   Diagnosis Date    Allergy     Arthritis     Asthma     Depression     Gout        Past Surgical History:   Procedure Laterality Date    APPENDECTOMY       SECTION      CHOLECYSTECTOMY      CYSTOSCOPY W/ RETROGRADES Bilateral 2019    Procedure: CYSTOSCOPY, WITH RETROGRADE PYELOGRAM;  Surgeon: Trip Bacon MD;  Location: [facility] OR;  Service: Urology;  Laterality: Bilateral;    CYSTOSCOPY WITH BIOPSY OF BLADDER  2019    Procedure: CYSTOSCOPY, WITH BLADDER BIOPSY, WITH FULGURATION IF INDICATED;  Surgeon: Trip Bacon MD;  Location: [facility] OR;  Service: Urology;;    ESOPHAGOGASTRODUODENOSCOPY N/A 12/10/2019    Procedure: EGD (ESOPHAGOGASTRODUODENOSCOPY);  Surgeon: Shakeel Perez MD;  Location: [facility];  Service: Endoscopy;  Laterality: N/A;    HYSTERECTOMY      SHOULDER SURGERY Right 2019    STOMACH SURGERY      URETEROSCOPY Right 2019    Procedure: URETEROSCOPY;  Surgeon: Trip Bacon MD;  Location: [facility] OR;  Service: Urology;  Laterality: Right;    WRIST SURGERY  Left          Review of Systems   Constitutional: Negative for appetite change, fever and unexpected weight change.   HENT: Negative for trouble swallowing.         No jaundice.   Respiratory: Negative for cough, shortness of breath and wheezing.         No Rales, Rhonchi, or Dyspnea.   Cardiovascular: Negative for chest pain.   Gastrointestinal: Positive for abdominal distention, abdominal pain, constipation and nausea. Negative for anal bleeding, blood in stool, diarrhea, rectal pain and vomiting.   Genitourinary:        She states her  symptoms were resolved with her current medications until the last 2 weeks.  With the constipation she has noted urinary incontinence but she denies dysuria or hematuria.   Musculoskeletal: Positive for arthralgias, back pain, neck pain and neck stiffness.   Skin: Negative for pallor and rash.   Neurological: Negative for dizziness, seizures, syncope, speech difficulty, weakness and numbness.   Hematological: Negative for adenopathy.   Psychiatric/Behavioral: Negative for confusion.       Objective:      Physical Exam   Constitutional: She is oriented to person, place, and time. She appears well-nourished.   Thin elderly appearing nonicteric white female.  She can converse normally.  She can give a normal history without difficulty. She is sitting comfortably in the chair.   HENT:   Head: Normocephalic.   Eyes: Pupils are equal, round, and reactive to light. EOM are normal.   Neck: Normal range of motion. Neck supple. No tracheal deviation present. No thyromegaly present.   Cardiovascular: Normal rate, regular rhythm and normal heart sounds.   Pulmonary/Chest: Effort normal and breath sounds normal.   Abdominal: Soft. Bowel sounds are normal. She exhibits distension. She exhibits no mass. There is tenderness. There is guarding. There is no rebound. No hernia.   The abdomen reveals surgical scars.  It is distended.  There is mild tenderness periumbilically.  Definite guarding or  rebound are absent.  She normal bowel sounds.   Musculoskeletal:   She ambulates slowly with a walker.  She uses the walker to go from the sitting to the standing position. She does complain of discomfort or back knees and hips with assuming the standing position from the sitting position.   Lymphadenopathy:     She has no cervical adenopathy.   Neurological: She is alert and oriented to person, place, and time. No cranial nerve deficit.   Skin: Skin is warm and dry.   Psychiatric: She has a normal mood and affect. Her behavior is normal.   Vitals reviewed.        Plan:       Constipation, unspecified constipation type    Generalized abdominal pain  -     CT Abdomen Pelvis W Wo Contrast; Future; Expected date: 01/15/2020  -     CBC auto differential; Future; Expected date: 01/08/2020  -     Comprehensive metabolic panel; Future; Expected date: 01/08/2020    Gastroesophageal reflux disease, esophagitis presence not specified    History of Husam-en-Y gastric bypass    History of cholecystectomy    B12 deficiency     She will continue current medications.  She will resume the MiraLax and she started on the Amitiza h a she will continue her current medications vitamins and minerals.  she will resume her usual activities.    e a.  She is scheduled for labs and also for a CT scan.  She will continue her liquid diet.

## 2020-01-10 ENCOUNTER — TELEPHONE (OUTPATIENT)
Dept: GASTROENTEROLOGY | Facility: CLINIC | Age: 70
End: 2020-01-10

## 2020-01-10 ENCOUNTER — TELEPHONE (OUTPATIENT)
Dept: FAMILY MEDICINE | Facility: CLINIC | Age: 70
End: 2020-01-10

## 2020-01-10 NOTE — TELEPHONE ENCOUNTER
Spoke to Dr. Og Perez about the issue the patient is having. He stated she should go to the ER. Informed patients  and he verbalized understanding.

## 2020-01-10 NOTE — TELEPHONE ENCOUNTER
----- Message from Yasir Simmons sent at 1/10/2020 12:17 PM CST -----  Contact: Christiana with [facility]  Type: Needs Medical Advice    Who Called:  Christiana    Best Call Back Number: [phone] (Christiana) and [phone] (patient) [phone] (patient's  Xander)  Additional Information: Pt's has not had BM in 10 days. Pt also having a hard time keeping food and liquids down. Please call to advise.

## 2020-01-13 ENCOUNTER — HOSPITAL ENCOUNTER (EMERGENCY)
Facility: HOSPITAL | Age: 70
Discharge: HOME | End: 2020-01-13
Attending: EMERGENCY MEDICINE
Payer: MEDICARE

## 2020-01-13 ENCOUNTER — HOSPITAL ENCOUNTER (OUTPATIENT)
Dept: RADIOLOGY | Facility: HOSPITAL | Age: 70
Discharge: HOME | End: 2020-01-13
Attending: INTERNAL MEDICINE
Payer: MEDICARE

## 2020-01-13 VITALS
SYSTOLIC BLOOD PRESSURE: 119 MMHG | RESPIRATION RATE: 20 BRPM | HEART RATE: 99 BPM | BODY MASS INDEX: 17.67 KG/M2 | WEIGHT: 90 LBS | TEMPERATURE: 98 F | HEIGHT: 60 IN | OXYGEN SATURATION: 100 % | DIASTOLIC BLOOD PRESSURE: 88 MMHG

## 2020-01-13 DIAGNOSIS — S82.891A FRACTURE, ANKLE, RIGHT, CLOSED, INITIAL ENCOUNTER: Primary | ICD-10-CM

## 2020-01-13 DIAGNOSIS — R52 PAIN: ICD-10-CM

## 2020-01-13 DIAGNOSIS — R10.84 GENERALIZED ABDOMINAL PAIN: ICD-10-CM

## 2020-01-13 PROCEDURE — 74177 CT ABDOMEN PELVIS WITH CONTRAST: ICD-10-PCS | Mod: 26,,, | Performed by: RADIOLOGY

## 2020-01-13 PROCEDURE — 73610 X-RAY EXAM OF ANKLE: CPT | Mod: 26,RT,, | Performed by: RADIOLOGY

## 2020-01-13 PROCEDURE — 74177 CT ABD & PELVIS W/CONTRAST: CPT | Mod: TC

## 2020-01-13 PROCEDURE — 73630 X-RAY EXAM OF FOOT: CPT | Mod: TC,FY,RT

## 2020-01-13 PROCEDURE — 25500020 PHARM REV CODE 255: Performed by: INTERNAL MEDICINE

## 2020-01-13 PROCEDURE — 74177 CT ABD & PELVIS W/CONTRAST: CPT | Mod: 26,,, | Performed by: RADIOLOGY

## 2020-01-13 PROCEDURE — 29515 APPLICATION SHORT LEG SPLINT: CPT | Mod: RT

## 2020-01-13 PROCEDURE — 99283 EMERGENCY DEPT VISIT LOW MDM: CPT | Mod: 25

## 2020-01-13 PROCEDURE — 73630 XR FOOT COMPLETE 3 VIEW RIGHT: ICD-10-PCS | Mod: 26,RT,, | Performed by: RADIOLOGY

## 2020-01-13 PROCEDURE — 73610 XR ANKLE COMPLETE 3 VIEW RIGHT: ICD-10-PCS | Mod: 26,RT,, | Performed by: RADIOLOGY

## 2020-01-13 PROCEDURE — 73630 X-RAY EXAM OF FOOT: CPT | Mod: 26,RT,, | Performed by: RADIOLOGY

## 2020-01-13 PROCEDURE — 73610 X-RAY EXAM OF ANKLE: CPT | Mod: TC,FY,RT

## 2020-01-13 RX ORDER — OXYCODONE AND ACETAMINOPHEN 5; 325 MG/1; MG/1
1 TABLET ORAL EVERY 6 HOURS PRN
Qty: 10 TABLET | Refills: 0 | Status: SHIPPED | OUTPATIENT
Start: 2020-01-13 | End: 2020-01-16

## 2020-01-13 RX ADMIN — IOHEXOL 15 ML: 300 INJECTION, SOLUTION INTRAVENOUS at 10:01

## 2020-01-13 RX ADMIN — IOHEXOL 15 ML: 300 INJECTION, SOLUTION INTRAVENOUS at 09:01

## 2020-01-13 RX ADMIN — IOHEXOL 75 ML: 350 INJECTION, SOLUTION INTRAVENOUS at 11:01

## 2020-01-13 NOTE — DISCHARGE INSTRUCTIONS
OCL splint until Ortho follow-up    Someone form the hospital will be contacting you in 1-2 business days to make arrangement for Ortho    Percocet for pain    Non-wt bearing     Ice     Return to ED if symptoms worsen

## 2020-01-13 NOTE — ED NOTES
ocl splint placed to right lower leg. Patient tolerated well, with adequate perfusion verified by capillary refill, comfort, pulses.

## 2020-01-13 NOTE — ED PROVIDER NOTES
Encounter Date: 2020       History     Chief Complaint   Patient presents with    Fall     Patient fell out of bed , complaining of right ankle pain.     Obdulia Yepez is a 59 y.o female with PMHx including asthma, depression and gout. She presents to ED with injuries sustained from fall     Patient reports that she rolled out of bed yesterday and injured her right foot and ankle    She denies head injury or LOC    Right ankle with obvious swelling and discoloration to lateral and medial aspect of malleolus. She has decreased ROM    She has swelling to dorsum of foot and pain to lateral aspect of arch.     Right lower extremity is neurovascular intact     She denies any other injury    Patient take Percocet for chronic pain        Review of patient's allergies indicates:   Allergen Reactions    Amoxicillin Diarrhea    Latex     Milk containing products     Opioids - morphine analogues     Peanut     Sodium phosphate     Soy     Sulfa (sulfonamide antibiotics)      Past Medical History:   Diagnosis Date    Allergy     Arthritis     Asthma     Depression     Gout      Past Surgical History:   Procedure Laterality Date    APPENDECTOMY       SECTION      CHOLECYSTECTOMY      CYSTOSCOPY W/ RETROGRADES Bilateral 2019    Procedure: CYSTOSCOPY, WITH RETROGRADE PYELOGRAM;  Surgeon: Trip Bacon MD;  Location: [facility] OR;  Service: Urology;  Laterality: Bilateral;    CYSTOSCOPY WITH BIOPSY OF BLADDER  2019    Procedure: CYSTOSCOPY, WITH BLADDER BIOPSY, WITH FULGURATION IF INDICATED;  Surgeon: Trip Bacon MD;  Location: [facility] OR;  Service: Urology;;    ESOPHAGOGASTRODUODENOSCOPY N/A 12/10/2019    Procedure: EGD (ESOPHAGOGASTRODUODENOSCOPY);  Surgeon: Shakeel Perez MD;  Location: [facility] ENDO;  Service: Endoscopy;  Laterality: N/A;    HYSTERECTOMY      SHOULDER SURGERY Right 2019    STOMACH SURGERY      URETEROSCOPY Right 2019    Procedure: URETEROSCOPY;   Surgeon: Trip Bacon MD;  Location: [facility] OR;  Service: Urology;  Laterality: Right;    WRIST SURGERY Left      Family History   Problem Relation Age of Onset    Asthma Mother     Pleurisy Father     Bipolar disorder Sister     Colon cancer Maternal Grandmother     Colon cancer Maternal Grandfather      Social History     Tobacco Use    Smoking status: Never Smoker    Smokeless tobacco: Never Used   Substance Use Topics    Alcohol use: Yes    Drug use: No     Review of Systems   Constitutional: Negative.  Negative for fever.   HENT: Negative.  Negative for sore throat.    Eyes: Negative.    Respiratory: Negative.  Negative for shortness of breath.    Cardiovascular: Negative.  Negative for chest pain.   Gastrointestinal: Negative.  Negative for nausea.   Endocrine: Negative.    Genitourinary: Negative.  Negative for dysuria.   Musculoskeletal: Positive for arthralgias (right ankle/foot) and joint swelling. Negative for back pain.   Skin: Negative for rash.   Allergic/Immunologic: Negative.    Neurological: Negative.  Negative for weakness.   Hematological: Negative.  Does not bruise/bleed easily.   Psychiatric/Behavioral: Negative.    All other systems reviewed and are negative.      Physical Exam     Initial Vitals [01/13/20 1122]   BP Pulse Resp Temp SpO2   119/88 99 20 98.3 °F (36.8 °C) 100 %      MAP       --         Physical Exam    Nursing note and vitals reviewed.  Constitutional: She appears well-developed and well-nourished.   HENT:   Head: Normocephalic.   Eyes: Conjunctivae are normal.   Neck: Normal range of motion. Neck supple.   Cardiovascular: Normal rate.   Pulmonary/Chest: Breath sounds normal.   Musculoskeletal: She exhibits tenderness.        Right ankle: She exhibits swelling and ecchymosis. She exhibits normal range of motion, no deformity, no laceration and normal pulse. Tenderness. Lateral malleolus and medial malleolus tenderness found. Achilles tendon normal.        Right  "foot: There is decreased range of motion, tenderness, bony tenderness and swelling. There is normal capillary refill, no crepitus, no deformity and no laceration.        Feet:    Neurological: She is alert and oriented to person, place, and time. GCS score is 15. GCS eye subscore is 4. GCS verbal subscore is 5. GCS motor subscore is 6.   Skin: Skin is warm. Capillary refill takes less than 2 seconds.   Psychiatric: She has a normal mood and affect. Her behavior is normal. Judgment and thought content normal.         ED Course   Splint Application  Date/Time: 1/13/2020 1:22 PM  Performed by: Amy No NP  Authorized by: Miguelina Santos MD   Location details: right ankle  Splint type: short leg  Supplies used: cotton padding,  elastic bandage,  Ortho-Glass and plaster  Post-procedure: The splinted body part was neurovascularly unchanged following the procedure.  Patient tolerance: Patient tolerated the procedure well with no immediate complications        Labs Reviewed - No data to display       Imaging Results    None          Medical Decision Making:   Initial Assessment:   Patient to ED with injuries sustained from fall     Patient reports that she rolled out of bed yesterday and injured her right foot and ankle    She denies head injury or LOC    Right ankle with obvious swelling and discoloration to lateral and medial aspect of malleolus. She has decreased ROM    She has swelling to dorsum of foot and pain to lateral aspect of arch.     Right lower extremity is neurovascular intact     She denies any other injury    Patient take Percocet for chronic pain    Differential Diagnosis:   Ankle or foot bone injury, dislocation  ED Management:  Patient refused gown and bed for full body examination. She states "Im not hurt anywhere else"    Patient took own Percocet     XR right foot and ankle with fracture to lateral and medial aspect of malleolus.    OCL splint. NVI pre/post splint application    Discussed " physical exam findings with patient  No acute emergent medical condition identified at this time to warrant further testing/diagnostics  At this time, I believe the patient is clinically stable for discharge.   Patient to follow up with Ortho in 1-2 days.  The patient acknowledges that close follow up with a MD is required after all ER visits  Pt given instructions; take all medications prescribed in the ER as directed.   Patient agrees to comply with all instruction and direction given in the ER  Pt agrees to return to ER if any symptoms reoccur                                              Clinical Impression:       ICD-10-CM ICD-9-CM   1. Fracture, ankle, right, closed, initial encounter S82.891A 824.8   2. Pain R52 780.96                             Amy No NP  01/13/20 1323

## 2020-01-14 ENCOUNTER — TELEPHONE (OUTPATIENT)
Dept: UROLOGY | Facility: CLINIC | Age: 70
End: 2020-01-14

## 2020-01-14 ENCOUNTER — TELEPHONE (OUTPATIENT)
Dept: GASTROENTEROLOGY | Facility: CLINIC | Age: 70
End: 2020-01-14

## 2020-01-14 DIAGNOSIS — K83.9 BILE DUCT ABNORMALITY: ICD-10-CM

## 2020-01-14 DIAGNOSIS — M25.571 RIGHT ANKLE PAIN, UNSPECIFIED CHRONICITY: Primary | ICD-10-CM

## 2020-01-14 DIAGNOSIS — N28.9 KIDNEY DISORDER: Primary | ICD-10-CM

## 2020-01-14 DIAGNOSIS — K83.8 DILATED BILE DUCT: ICD-10-CM

## 2020-01-14 DIAGNOSIS — R93.2 ABNORMAL CT SCAN, GALLBLADDER: ICD-10-CM

## 2020-01-14 NOTE — TELEPHONE ENCOUNTER
GOT A REFERRAL FROM DR. BRIELLE BRICE -- PATIENT ALREADY ESTABLISHED WITH ANNE- STATED SHE WILL CALL BACK TO SCHEDULE SHE IS BUSY THIS WEEK. GAVE HER CALLBACK NUMBER

## 2020-01-14 NOTE — TELEPHONE ENCOUNTER
----- Message -----  From: Shakeel Perez MD  Sent: 1/13/2020  12:13 PM CST  To: Chris Beckford Staff    Tell her the CT scan shows possible blockage of the kidneys and refer her to Dr. Bacon.  Additionally she is found to be constipated.  She has stool in her rectum and she needs to be given a Fleet's enema.  Additionally the bile duct is dilated in she needs an MRCP.

## 2020-01-14 NOTE — TELEPHONE ENCOUNTER
Pt will discuss CT results with MD during office visit tomorrow. Will schedule appt with pt in person.

## 2020-01-15 NOTE — TELEPHONE ENCOUNTER
Pt missed appointment this AM. Spoke with pt on telephone. She stated she missed her appt because she was unable to get in the car. She stated she might have broken her ankle yesterday.    Pt given CT results. Pt verbalized understanding. Schedule appt for Dr. Bacon, MRI/MRCP, and rescheduled pt f/u.

## 2020-01-16 ENCOUNTER — TELEPHONE (OUTPATIENT)
Dept: GASTROENTEROLOGY | Facility: CLINIC | Age: 70
End: 2020-01-16

## 2020-01-16 ENCOUNTER — HOSPITAL ENCOUNTER (OUTPATIENT)
Dept: RADIOLOGY | Facility: HOSPITAL | Age: 70
Discharge: HOME | End: 2020-01-16
Attending: INTERNAL MEDICINE
Payer: MEDICARE

## 2020-01-16 DIAGNOSIS — K83.9 BILE DUCT ABNORMALITY: ICD-10-CM

## 2020-01-16 DIAGNOSIS — R93.2 ABNORMAL CT SCAN, GALLBLADDER: ICD-10-CM

## 2020-01-16 DIAGNOSIS — K83.8 DILATED BILE DUCT: ICD-10-CM

## 2020-01-16 PROCEDURE — 74181 MRI ABDOMEN W/O CONTRAST: CPT | Mod: TC

## 2020-01-16 PROCEDURE — 76376 MRI ABDOMEN WITHOUT CONTRAST MRCP: ICD-10-PCS | Mod: 26,,, | Performed by: RADIOLOGY

## 2020-01-16 PROCEDURE — 74181 MRI ABDOMEN WITHOUT CONTRAST MRCP: ICD-10-PCS | Mod: 26,,, | Performed by: RADIOLOGY

## 2020-01-16 PROCEDURE — 76376 3D RENDER W/INTRP POSTPROCES: CPT | Mod: 26,,, | Performed by: RADIOLOGY

## 2020-01-16 PROCEDURE — 76376 3D RENDER W/INTRP POSTPROCES: CPT | Mod: TC

## 2020-01-16 PROCEDURE — 74181 MRI ABDOMEN W/O CONTRAST: CPT | Mod: 26,,, | Performed by: RADIOLOGY

## 2020-01-16 NOTE — TELEPHONE ENCOUNTER
----- Message from Shakeel Perez MD sent at 1/16/2020  3:09 PM CST -----  Tell her the MRCP shows that she has had a cholecystectomy and dilation of her bile duct.  I will discuss this in detail when I see her additionally find out if the constipation has resolved.  
Pt is aware she has a dilated bile duct. MD will further discuss results in office.  
(0) independent

## 2020-01-17 ENCOUNTER — OFFICE VISIT (OUTPATIENT)
Dept: ORTHOPEDICS | Facility: CLINIC | Age: 70
End: 2020-01-17
Payer: MEDICARE

## 2020-01-17 ENCOUNTER — HOSPITAL ENCOUNTER (OUTPATIENT)
Dept: RADIOLOGY | Facility: HOSPITAL | Age: 70
Discharge: HOME | End: 2020-01-17
Attending: ORTHOPAEDIC SURGERY
Payer: MEDICARE

## 2020-01-17 VITALS
DIASTOLIC BLOOD PRESSURE: 79 MMHG | HEIGHT: 60 IN | SYSTOLIC BLOOD PRESSURE: 118 MMHG | BODY MASS INDEX: 17.66 KG/M2 | HEART RATE: 104 BPM | WEIGHT: 89.94 LBS

## 2020-01-17 DIAGNOSIS — S82.51XA AVULSION FRACTURE OF MEDIAL MALLEOLUS, RIGHT, CLOSED, INITIAL ENCOUNTER: Primary | ICD-10-CM

## 2020-01-17 DIAGNOSIS — S82.032K CLOSED DISPLACED TRANSVERSE FRACTURE OF LEFT PATELLA WITH NONUNION, SUBSEQUENT ENCOUNTER: ICD-10-CM

## 2020-01-17 DIAGNOSIS — M25.571 RIGHT ANKLE PAIN, UNSPECIFIED CHRONICITY: ICD-10-CM

## 2020-01-17 PROCEDURE — 1125F AMNT PAIN NOTED PAIN PRSNT: CPT | Mod: ,,, | Performed by: ORTHOPAEDIC SURGERY

## 2020-01-17 PROCEDURE — 99213 OFFICE O/P EST LOW 20 MIN: CPT | Mod: PBBFAC,25,PN | Performed by: ORTHOPAEDIC SURGERY

## 2020-01-17 PROCEDURE — 1159F MED LIST DOCD IN RCRD: CPT | Mod: ,,, | Performed by: ORTHOPAEDIC SURGERY

## 2020-01-17 PROCEDURE — 99999 PR PBB SHADOW E&M-EST. PATIENT-LVL III: ICD-10-PCS | Mod: PBBFAC,,, | Performed by: ORTHOPAEDIC SURGERY

## 2020-01-17 PROCEDURE — 1159F PR MEDICATION LIST DOCUMENTED IN MEDICAL RECORD: ICD-10-PCS | Mod: ,,, | Performed by: ORTHOPAEDIC SURGERY

## 2020-01-17 PROCEDURE — 99999 PR PBB SHADOW E&M-EST. PATIENT-LVL III: CPT | Mod: PBBFAC,,, | Performed by: ORTHOPAEDIC SURGERY

## 2020-01-17 PROCEDURE — 99214 OFFICE O/P EST MOD 30 MIN: CPT | Mod: S$PBB,,, | Performed by: ORTHOPAEDIC SURGERY

## 2020-01-17 PROCEDURE — 73610 X-RAY EXAM OF ANKLE: CPT | Mod: 26,RT,, | Performed by: RADIOLOGY

## 2020-01-17 PROCEDURE — 73610 XR ANKLE COMPLETE 3 VIEW RIGHT: ICD-10-PCS | Mod: 26,RT,, | Performed by: RADIOLOGY

## 2020-01-17 PROCEDURE — 1125F PR PAIN SEVERITY QUANTIFIED, PAIN PRESENT: ICD-10-PCS | Mod: ,,, | Performed by: ORTHOPAEDIC SURGERY

## 2020-01-17 PROCEDURE — 73610 X-RAY EXAM OF ANKLE: CPT | Mod: TC,PN,RT

## 2020-01-17 PROCEDURE — 99214 PR OFFICE/OUTPT VISIT, EST, LEVL IV, 30-39 MIN: ICD-10-PCS | Mod: S$PBB,,, | Performed by: ORTHOPAEDIC SURGERY

## 2020-01-17 NOTE — PROGRESS NOTES
Subjective:      Patient ID: Obdulia Yepez is a 69 y.o. female.    Chief Complaint: Injury of the Right Ankle      HPI: Ms. Yepez returns today with new complaints of approximately 6 days of right ankle pain which began on 01/11/2020 after she fell out of her bed. She was seen in the emergency room 2 days after injury and after x-rays showed a medial malleolus fracture she was placed in a splint.  She denied numbness and tingling in her foot. She also was scheduled for surgery on her left knee but canceled due to chronic constipation.  She stated that she still has constipation will not proceed with surgery until after her constipation has resolved.    ROS:  New diagnosis/surgery/prescriptions since last office visit on 12/11/2019:  Medial malleolus fracture right ankle  Constitution: Negative for chills and fever.   HENT: Negative for hoarse voice and sore throat.    Eyes: Negative for blurred vision and redness.   Cardiovascular: Negative for irregular heartbeat.   Respiratory: Negative for cough and snoring.    Endocrine: Negative for polyphagia.   Hematologic/Lymphatic: Does not bruise/bleed easily.   Skin: Negative for itching and rash.   Musculoskeletal: Positive for joint pain and joint swelling.   Gastrointestinal: Positive for diarrhea. Negative for nausea and vomiting.   Genitourinary: Negative for frequency and urgency.   Neurological: Negative for seizures and sensory change.   Psychiatric/Behavioral: Positive for depression. Negative for substance abuse.      Objective:      Physical Exam:   General: AAOx3.  No acute distress  Vascular:  Pulses intact and equal bilaterally.  Capillary refill less than 3 seconds and equal bilaterally  Neurologic:  Pinprick and soft touch intact and equal bilaterally  Integment:  Resolving medial ecchymosis right ankle  Extremity:  Ankle:  Dorsiflexion/plantar flexion right ankle 10°/10°, mild swelling right ankle.  Tender with palpation medial malleolus right ankle.   Nontender with palpation lateral malleolus right ankle. Drawer equal bilaterally with endpoint.                      Knee:  Extension/flexion left knee 3/130 degrees, right knee 0/130 degrees. Able to hold left knee against gravity and resistance left lower extreme. Tender with palpation patella left knee.  Gapping of patella. Positive patellar load/compression left knee. Varus/valgus stressing equal bilaterally with endpoint.  Lachman's/drawer equal bilaterally with endpoint.  No joint line tenderness either knee.  Crepitus with motion both knees.  Minimal effusion left knee. Clarke negative both knees.  Enrrique negative both knees.  Nontender at the anserine insertion bilaterally.  No swelling at the anserine insertion both knees.  Nontender at the fibular head.  Radiography:  Personally reviewed x-rays of the right ankle completed on 01/17/2020 showed a nondisplaced avulsion fracture of the tip of the medial malleolus in anatomic alignment.  Osteoporosis of the tibiotalar bones and the foot foot.  There is also osteoarthritic changes of the foot and ankle.      Assessment:       Impression:   1.  Medial malleolus avulsion fracture, right ankle.  2.  Chronic displaced patella fracture, left knee      Plan:       1.  Discussed physical examination and radiographic findings with the patient. Obdulia understands that she has a small avulsion fracture off the medial malleolus of her right ankle which can be treated conservatively.  2.  Discontinue splint in place in a Cam walker.  One was fitted to the patient.  3.  May ambulate with weight-bearing at tolerance right lower extremity.  4.  Elevate and ice right ankle.  5.  Any pain can be controlled with over-the-counter medications dosed per box instructions.  6.  [facility] portal was discussed with the patient and information was given.  The patient was encouraged to use the portal for future encounters.  7.  Follow up in 1 month with an x-ray of the right ankle

## 2020-01-17 NOTE — LETTER
January 17, 2020      Amy No NP  [address] [facility] [address]           [facility]  [address], [address]  [place] [address]  Phone: [phone]  Fax: [phone]          Patient: Obdulia Yepez   MR Number: [id number]   YOB: 1950   Date of Visit: 1/17/2020       Dear Amy No:    Thank you for referring Obdulia Yepez to me for evaluation. Attached you will find relevant portions of my assessment and plan of care.    If you have questions, please do not hesitate to call me. I look forward to following Obdulia Yepez along with you.    Sincerely,    Esteban Remy, DO    Enclosure  CC:  No Recipients    If you would like to receive this communication electronically, please contact [email or web address] or [phone] to request more information on [facility] access.    For providers and/or their staff who would like to refer a patient to [facility], please contact us through our one-stop-shop provider referral line, [facility], at [phone].    If you feel you have received this communication in error or would no longer like to receive these types of communications, please e-mail [email or web address]          Nancy,mother requesting a call back to discuss 's recommendation for [facility].  [place] suggested [facility] schedule an appointment with an orthopedic provider to discuss jumpers knee.  Please call Nancy to discuss at [phone]

## 2020-01-20 ENCOUNTER — OFFICE VISIT (OUTPATIENT)
Dept: GASTROENTEROLOGY | Facility: CLINIC | Age: 70
End: 2020-01-20
Payer: MEDICARE

## 2020-01-20 VITALS
SYSTOLIC BLOOD PRESSURE: 103 MMHG | BODY MASS INDEX: 17.87 KG/M2 | WEIGHT: 91 LBS | HEIGHT: 60 IN | OXYGEN SATURATION: 91 % | DIASTOLIC BLOOD PRESSURE: 73 MMHG | RESPIRATION RATE: 18 BRPM | HEART RATE: 109 BPM

## 2020-01-20 DIAGNOSIS — E53.8 B12 DEFICIENCY: Primary | ICD-10-CM

## 2020-01-20 DIAGNOSIS — K83.9 BILE DUCT ABNORMALITY: ICD-10-CM

## 2020-01-20 DIAGNOSIS — Z90.49 HISTORY OF CHOLECYSTECTOMY: ICD-10-CM

## 2020-01-20 DIAGNOSIS — Z98.84 HISTORY OF ROUX-EN-Y GASTRIC BYPASS: ICD-10-CM

## 2020-01-20 DIAGNOSIS — K59.00 CONSTIPATION, UNSPECIFIED CONSTIPATION TYPE: ICD-10-CM

## 2020-01-20 DIAGNOSIS — K21.9 GASTROESOPHAGEAL REFLUX DISEASE, ESOPHAGITIS PRESENCE NOT SPECIFIED: ICD-10-CM

## 2020-01-20 PROCEDURE — 1159F PR MEDICATION LIST DOCUMENTED IN MEDICAL RECORD: ICD-10-PCS | Mod: ,,, | Performed by: INTERNAL MEDICINE

## 2020-01-20 PROCEDURE — 99213 OFFICE O/P EST LOW 20 MIN: CPT | Mod: S$PBB,,, | Performed by: INTERNAL MEDICINE

## 2020-01-20 PROCEDURE — 99999 PR PBB SHADOW E&M-EST. PATIENT-LVL V: CPT | Mod: PBBFAC,,, | Performed by: INTERNAL MEDICINE

## 2020-01-20 PROCEDURE — 96372 THER/PROPH/DIAG INJ SC/IM: CPT | Mod: PBBFAC,PN

## 2020-01-20 PROCEDURE — 99999 PR PBB SHADOW E&M-EST. PATIENT-LVL V: ICD-10-PCS | Mod: PBBFAC,,, | Performed by: INTERNAL MEDICINE

## 2020-01-20 PROCEDURE — 99213 PR OFFICE/OUTPT VISIT, EST, LEVL III, 20-29 MIN: ICD-10-PCS | Mod: S$PBB,,, | Performed by: INTERNAL MEDICINE

## 2020-01-20 PROCEDURE — 1159F MED LIST DOCD IN RCRD: CPT | Mod: ,,, | Performed by: INTERNAL MEDICINE

## 2020-01-20 PROCEDURE — 99215 OFFICE O/P EST HI 40 MIN: CPT | Mod: PBBFAC,PN | Performed by: INTERNAL MEDICINE

## 2020-01-20 RX ORDER — CYANOCOBALAMIN 1000 UG/ML
1000 INJECTION, SOLUTION INTRAMUSCULAR; SUBCUTANEOUS
Status: COMPLETED | OUTPATIENT
Start: 2020-01-20 | End: 2020-01-20

## 2020-01-20 RX ADMIN — CYANOCOBALAMIN 1000 MCG: 1000 INJECTION INTRAMUSCULAR; SUBCUTANEOUS at 10:01

## 2020-01-20 NOTE — PATIENT INSTRUCTIONS
She will continue current medications.  The constipation resolved.  She is having urinary frequency and also the CT scan shows hydronephrosis.  She is scheduled to see the urologist this week.  She then will need to be referred for evaluation the biliary stricture.  She was found have a small gastric pouch.  She will try to eat 6-8 small meals per day.  She she has maintained her weight.  She follows up with orthopedist.  She is given the vitamin B12 today.  She continues her other medications vitamins and minerals.

## 2020-01-20 NOTE — PROGRESS NOTES
Subjective:       Patient ID: Obdulia Yepez is a 69 y.o. female.    Chief Complaint: Follow-up    The CT scan shows postsurgical changes. She has hydronephrosis.  Additionally she has narrowing of the common bile duct.  She has had a cholecystectomy.  She has had a gastric bypass.  She is now having daily bowel movements and sometimes diarrhea.  The constipation has resolved.  She is eating multiple small meals daily.  She has has a very small gastric pouch.  She has had nausea without vomiting. She denies hematemesis hematochezia jaundice or bleeding.  She has a cast on the right leg and because she fell getting out of bed.  She has urinary frequency and occasional dysuria.  She has a scheduled appointment to see the urologist this week.  She denies hematemesis hematochezia jaundice or bleeding.  She has had nausea without vomiting.      Allergies:  Review of patient's allergies indicates:   Allergen Reactions    Amoxicillin Diarrhea    Latex     Milk containing products     Opioids - morphine analogues     Peanut     Sodium phosphate     Soy     Sulfa (sulfonamide antibiotics)        Medications:    Current Outpatient Medications:     allopurinol (ZYLOPRIM) 300 MG tablet, TAKE 1 TABLET BY MOUTH DAILY, Disp: 30 tablet, Rfl: 14    budesonide-formoterol 160-4.5 mcg (SYMBICORT) 160-4.5 mcg/actuation HFAA, Symbicort 160 mcg-4.5 mcg/actuation HFA aerosol inhaler  Inhale 2 puffs twice a day by inhalation route., Disp: , Rfl:     colestipol (COLESTID) 1 gram Tab, Take 1 tablet (1 g total) by mouth 2 (two) times daily., Disp: 180 tablet, Rfl: 3    cyclobenzaprine (FLEXERIL) 10 MG tablet, Take 1 tablet (10 mg total) by mouth 3 (three) times daily as needed., Disp: 90 tablet, Rfl: 0    cyproheptadine (PERIACTIN) 4 mg tablet, TAKE 1 TABLET (4 MG TOTAL) BY MOUTH 3 (THREE) TIMES DAILY AS NEEDED., Disp: 90 tablet, Rfl: 2    escitalopram oxalate (LEXAPRO) 20 MG tablet, Take 20 mg by mouth once daily. , Disp: , Rfl:      fluticasone (FLONASE) 50 mcg/actuation nasal spray, fluticasone 50 mcg/actuation nasal spray,suspension  Inhale 1 spray every day by intranasal route., Disp: , Rfl:     gabapentin (NEURONTIN) 300 MG capsule, Take 3 capsules (900 mg total) by mouth 3 (three) times daily. (Patient taking differently: Take 600 mg by mouth 2 (two) times daily. ), Disp: 270 capsule, Rfl: 3    hydrocortisone (CORTEF) 10 MG Tab, TAKE 2 TABLETS BY MOUTH IN THE MORNING AND 1 TABLET IN THE EVENING, Disp: , Rfl: 0    ipratropium (ATROVENT) 0.03 % nasal spray, 2 sprays by Nasal route 3 (three) times daily., Disp: 30 mL, Rfl: 2    montelukast (SINGULAIR) 10 mg tablet, TAKE 1 TABLET(S) EVERY DAY BY ORAL ROUTE., Disp: 30 tablet, Rfl: 11    multivitamin (THERAGRAN) per tablet, Take 1 tablet by mouth once daily., Disp: , Rfl:     mupirocin (BACTROBAN) 2 % ointment, mupirocin 2 % topical ointment  APPLY A SMALL AMOUNT TO THE AFFECTED AREA BY TOPICAL ROUTE 2 TIMES PER DAY, Disp: , Rfl:     ondansetron (ZOFRAN) 4 MG tablet, Take 1 tablet (4 mg total) by mouth every 8 (eight) hours as needed for Nausea., Disp: 90 tablet, Rfl: 5    oxybutynin (DITROPAN-XL) 5 MG TR24, oxybutynin chloride ER 5 mg tablet,extended release 24 hr  TAKE 1 TABLET BY MOUTH EVERY DAY, Disp: , Rfl:     pantoprazole (PROTONIX) 40 MG tablet, Take 1 tablet (40 mg total) by mouth once daily., Disp: 30 tablet, Rfl: 11    polymyxin B sulf-trimethoprim (POLYTRIM) 10,000 unit- 1 mg/mL Drop, Place 1 drop into the right eye every 4 (four) hours., Disp: 10 mL, Rfl: 0    vitamin E 1000 UNIT capsule, Take 1,000 Units by mouth once daily., Disp: , Rfl:     busPIRone (BUSPAR) 5 MG Tab, Take 1 tablet (5 mg total) by mouth 2 (two) times daily., Disp: 60 tablet, Rfl: 11    Current Facility-Administered Medications:     0.9%  NaCl infusion (for blood administration), , Intravenous, Q24H PRN, Og Perez MD    acetaminophen tablet 650 mg, 650 mg, Oral, PRN, Og Perez,  MD    Facility-Administered Medications Ordered in Other Visits:     hylan g-f 20 48 mg/6 mL injection 48 mg, 48 mg, Intra-articular, , Esteban Weaveronoff, DO, 48 mg at 19 1603    hylan g-f 20 48 mg/6 mL injection 48 mg, 48 mg, Intra-articular, , Esteban AMANDA Letonoff, DO, 48 mg at 19 1603    triamcinolone acetonide injection 80 mg, 80 mg, Intra-articular, , Esteban AMANDA Letonoff, DO, 80 mg at 19 1603    Past Medical History:   Diagnosis Date    Allergy     Arthritis     Asthma     Depression     Gout        Past Surgical History:   Procedure Laterality Date    APPENDECTOMY       SECTION      CHOLECYSTECTOMY      CYSTOSCOPY W/ RETROGRADES Bilateral 2019    Procedure: CYSTOSCOPY, WITH RETROGRADE PYELOGRAM;  Surgeon: Trip Bacon MD;  Location: [facility];  Service: Urology;  Laterality: Bilateral;    CYSTOSCOPY WITH BIOPSY OF BLADDER  2019    Procedure: CYSTOSCOPY, WITH BLADDER BIOPSY, WITH FULGURATION IF INDICATED;  Surgeon: Trip Bacon MD;  Location: [facility] OR;  Service: Urology;;    ESOPHAGOGASTRODUODENOSCOPY N/A 12/10/2019    Procedure: EGD (ESOPHAGOGASTRODUODENOSCOPY);  Surgeon: Shakeel Perez MD;  Location[facility];  Service: Endoscopy;  Laterality: N/A;    HYSTERECTOMY      SHOULDER SURGERY Right 2019    STOMACH SURGERY      URETEROSCOPY Right 2019    Procedure: URETEROSCOPY;  Surgeon: Trip Bacon MD;  Location: [facility] OR;  Service: Urology;  Laterality: Right;    WRIST SURGERY Left          Review of Systems   Constitutional: Negative for appetite change, fever and unexpected weight change.   HENT: Negative for trouble swallowing.         No jaundice.   Respiratory: Negative for cough, shortness of breath and wheezing.         She has asthma.  She has never used tobacco.  She she denies dysphagia aspiration hemoptysis.  She has occasional coughing but she significant coughing or significant sputum production.   Cardiovascular: Negative  for chest pain.   Gastrointestinal: Positive for constipation and nausea. Negative for abdominal distention, abdominal pain, anal bleeding, blood in stool, diarrhea and rectal pain.        She has had a vomiting. The abdominal distension has resolved now that she is having bowel movements.  Occasionally she will have diarrhea.  She is trying to eat frequent small feedings.   Genitourinary:        She has had extensive urologic evaluation in November.  She is scheduled to see the urologist this week.   Musculoskeletal: Positive for arthralgias, back pain and gait problem. Negative for neck pain.   Skin: Negative for pallor and rash.   Neurological: Negative for dizziness, seizures, syncope, speech difficulty, weakness and numbness.   Hematological: Negative for adenopathy.   Psychiatric/Behavioral: Negative for confusion.       Objective:      Physical Exam   Constitutional: She is oriented to person, place, and time. She appears well-developed and well-nourished.   Chronically ill thin elderly nonicteric white female.   HENT:   Head: Normocephalic.   Eyes: Pupils are equal, round, and reactive to light. EOM are normal.   Neck: Normal range of motion. Neck supple. No tracheal deviation present. No thyromegaly present.   Cardiovascular: Normal rate, regular rhythm and normal heart sounds.   Pulmonary/Chest: Effort normal and breath sounds normal.   Abdominal: Soft. Bowel sounds are normal. She exhibits no distension and no mass. There is no tenderness. There is no rebound and no guarding. No hernia.   Abdomen is soft with healed scars without masses organomegaly.  Bowel sounds are normal.  Tenderness is absent.   Musculoskeletal: Normal range of motion.   She is in the wheelchair because she has the brace on the right leg.   Lymphadenopathy:     She has no cervical adenopathy.   Neurological: She is alert and oriented to person, place, and time. No cranial nerve deficit.   Skin: Skin is warm and dry.   Psychiatric:  She has a normal mood and affect. Her behavior is normal.   Vitals reviewed.        Plan:       B12 deficiency  -     cyanocobalamin injection 1,000 mcg    History of cholecystectomy    Constipation, unspecified constipation type    Bile duct abnormality    Gastroesophageal reflux disease, esophagitis presence not specified    History of Husam-en-Y gastric bypass     She will continue current medications.  She will add more vitamins including vitamin-D and calcium orally.  She is given the vitamin B12.  She will try to ingest 6-8 small feedings per day.  She is referred to the urologist to evaluate the hydronephrosis.  Then she will need to be evaluated for the bile duct stricture.  She follows up with her orthopedist.

## 2020-01-20 NOTE — LETTER
January 20, 2020      Og Perez MD  [address]  [place] [address]           [facility]  [place] [facility]  [address], [address]  [place] [address]  Phone: [phone]  Fax: [phone]          Patient: Obdulia Yepez   MR Number: [id number]   YOB: 1950   Date of Visit: 1/20/2020       Dear Dr. Og Perez:    Thank you for referring Obdulia Yepez to me for evaluation. Attached you will find relevant portions of my assessment and plan of care.    If you have questions, please do not hesitate to call me. I look forward to following Obdulia Yepez along with you.    Sincerely,    Shakeel Perez MD    Enclosure  CC:  No Recipients    If you would like to receive this communication electronically, please contact [email or web address] or [phone] to request more information on [facility] access.    For providers and/or their staff who would like to refer a patient to [facility], please contact us through our one-stop-shop provider referral line, [facility], at [phone].    If you feel you have received this communication in error or would no longer like to receive these types of communications, please e-mail [email or web address]

## 2020-01-21 DIAGNOSIS — M25.562 LEFT KNEE PAIN, UNSPECIFIED CHRONICITY: Primary | ICD-10-CM

## 2020-01-22 ENCOUNTER — OFFICE VISIT (OUTPATIENT)
Dept: FAMILY MEDICINE | Facility: CLINIC | Age: 70
End: 2020-01-22
Payer: MEDICARE

## 2020-01-22 VITALS
RESPIRATION RATE: 20 BRPM | BODY MASS INDEX: 17.61 KG/M2 | SYSTOLIC BLOOD PRESSURE: 121 MMHG | HEIGHT: 60 IN | DIASTOLIC BLOOD PRESSURE: 82 MMHG | HEART RATE: 121 BPM | WEIGHT: 89.69 LBS

## 2020-01-22 DIAGNOSIS — R30.0 DYSURIA: Primary | ICD-10-CM

## 2020-01-22 DIAGNOSIS — E44.0 MODERATE PROTEIN-CALORIE MALNUTRITION: ICD-10-CM

## 2020-01-22 LAB
BACTERIA #/AREA URNS HPF: ABNORMAL /HPF
BILIRUB UR QL STRIP: NEGATIVE
CLARITY UR: ABNORMAL
COLOR UR: YELLOW
GLUCOSE UR QL STRIP: NEGATIVE
HGB UR QL STRIP: ABNORMAL
HYALINE CASTS #/AREA URNS LPF: 0 /LPF
KETONES UR QL STRIP: NEGATIVE
LEUKOCYTE ESTERASE UR QL STRIP: ABNORMAL
MICROSCOPIC COMMENT: ABNORMAL
NITRITE UR QL STRIP: NEGATIVE
OTHER ELEMENTS URNS MICRO: ABNORMAL
PH UR STRIP: >8 [PH] (ref 5–8)
PROT UR QL STRIP: ABNORMAL
RBC #/AREA URNS HPF: 30 /HPF (ref 0–4)
SP GR UR STRIP: 1 (ref 1–1.03)
URN SPEC COLLECT METH UR: ABNORMAL
UROBILINOGEN UR STRIP-ACNC: NEGATIVE EU/DL
WBC #/AREA URNS HPF: >100 /HPF (ref 0–5)

## 2020-01-22 PROCEDURE — 99999 PR PBB SHADOW E&M-EST. PATIENT-LVL III: CPT | Mod: PBBFAC,,, | Performed by: FAMILY MEDICINE

## 2020-01-22 PROCEDURE — 99213 PR OFFICE/OUTPT VISIT, EST, LEVL III, 20-29 MIN: ICD-10-PCS | Mod: S$PBB,,, | Performed by: FAMILY MEDICINE

## 2020-01-22 PROCEDURE — 99999 PR PBB SHADOW E&M-EST. PATIENT-LVL III: ICD-10-PCS | Mod: PBBFAC,,, | Performed by: FAMILY MEDICINE

## 2020-01-22 PROCEDURE — 99213 OFFICE O/P EST LOW 20 MIN: CPT | Mod: S$PBB,,, | Performed by: FAMILY MEDICINE

## 2020-01-22 PROCEDURE — 1159F MED LIST DOCD IN RCRD: CPT | Mod: ,,, | Performed by: FAMILY MEDICINE

## 2020-01-22 PROCEDURE — 99213 OFFICE O/P EST LOW 20 MIN: CPT | Mod: PBBFAC,PN | Performed by: FAMILY MEDICINE

## 2020-01-22 PROCEDURE — 87086 URINE CULTURE/COLONY COUNT: CPT

## 2020-01-22 PROCEDURE — 1159F PR MEDICATION LIST DOCUMENTED IN MEDICAL RECORD: ICD-10-PCS | Mod: ,,, | Performed by: FAMILY MEDICINE

## 2020-01-22 PROCEDURE — 81000 URINALYSIS NONAUTO W/SCOPE: CPT

## 2020-01-22 NOTE — PROGRESS NOTES
Subjective:       Patient ID: Obdulia Yepez is a 69 y.o. female.    Chief Complaint: Follow-up    Constipation  Last few weeks  Worsening  Taking stool softeners without relief    Patient reports that their pain is well controlled, and that medication is preserving their quality of life. Patient requests refill today, and denies any change in their pain. No abuse concerns.  reviewed.      Review of Systems   Constitutional: Negative for activity change, appetite change, chills, fatigue and fever.   HENT: Negative for congestion, dental problem, facial swelling, nosebleeds, postnasal drip, sinus pain, sore throat, trouble swallowing and voice change.    Eyes: Negative for pain, discharge and visual disturbance.   Respiratory: Negative for apnea, cough, chest tightness and shortness of breath.    Cardiovascular: Negative for chest pain and palpitations.   Gastrointestinal: Negative for abdominal pain, blood in stool, constipation and nausea.   Endocrine: Negative for cold intolerance, polydipsia and polyuria.   Genitourinary: Negative for difficulty urinating, enuresis and flank pain.   Musculoskeletal: Positive for arthralgias, back pain, gait problem and myalgias.   Skin: Negative for color change.   Allergic/Immunologic: Negative for environmental allergies and immunocompromised state.   Neurological: Negative for dizziness and light-headedness.   Hematological: Negative for adenopathy.   Psychiatric/Behavioral: Negative for agitation, behavioral problems, decreased concentration and dysphoric mood. The patient is not nervous/anxious.    All other systems reviewed and are negative.        Reviewed family, medical, surgical, and social history.    Objective:      /82 (BP Location: Left arm, Patient Position: Sitting, BP Method: Small (Automatic))   Pulse (!) 121   Resp 20   Ht 5' (1.524 m)   Wt 40.7 kg (89 lb 11.2 oz)   BMI 17.52 kg/m²   Physical Exam   Constitutional: She is oriented to person, place,  and time. She appears well-developed and well-nourished. No distress.   HENT:   Head: Normocephalic and atraumatic.   Nose: Nose normal.   Mouth/Throat: Oropharynx is clear and moist. No oropharyngeal exudate.   Eyes: Pupils are equal, round, and reactive to light. Conjunctivae and EOM are normal. No scleral icterus.   Neck: Normal range of motion. Neck supple. No thyromegaly present.   Cardiovascular: Normal rate, regular rhythm and normal heart sounds. Exam reveals no gallop and no friction rub.   No murmur heard.  Pulmonary/Chest: Effort normal and breath sounds normal. No respiratory distress. She has no wheezes. She has no rales. She exhibits no tenderness.   Abdominal: Soft. Bowel sounds are normal. She exhibits no distension. There is no tenderness. There is no guarding.   Musculoskeletal: Normal range of motion. She exhibits tenderness and deformity. She exhibits no edema.   Lymphadenopathy:     She has no cervical adenopathy.   Neurological: She is alert and oriented to person, place, and time. She displays normal reflexes. No cranial nerve deficit or sensory deficit. She exhibits normal muscle tone.   Skin: Skin is warm and dry. No rash noted. She is not diaphoretic. No erythema. No pallor.   Psychiatric: She has a normal mood and affect. Her behavior is normal. Judgment and thought content normal.   Nursing note and vitals reviewed.      Assessment:       1. Dysuria    2. Moderate protein-calorie malnutrition        Plan:       Dysuria  -     Urinalysis  -     Urine culture    Moderate protein-calorie malnutrition            Risks, benefits, and side effects were discussed with the patient. All questions were answered to the fullest satisfaction of the patient, and pt verbalized understanding and agreement to treatment plan. Pt was to call with any new or worsening symptoms, or present to the ER.

## 2020-01-23 LAB
BACTERIA UR CULT: NORMAL
BACTERIA UR CULT: NORMAL

## 2020-01-26 ENCOUNTER — HOSPITAL ENCOUNTER (INPATIENT)
Facility: HOSPITAL | Age: 70
LOS: 3 days | Discharge: NURSING, REHAB OR LONG-TERM CARE | DRG: 871 | End: 2020-01-29
Attending: INTERNAL MEDICINE | Admitting: INTERNAL MEDICINE
Payer: MEDICARE

## 2020-01-26 DIAGNOSIS — A41.51 SEPSIS DUE TO ESCHERICHIA COLI (E. COLI): ICD-10-CM

## 2020-01-26 DIAGNOSIS — D64.9 ANEMIA, UNSPECIFIED TYPE: ICD-10-CM

## 2020-01-26 PROBLEM — J18.9 PNEUMONIA DUE TO INFECTIOUS ORGANISM: Status: ACTIVE | Noted: 2020-01-26

## 2020-01-26 LAB
ALBUMIN SERPL BCP-MCNC: 2.1 G/DL (ref 3.5–5.2)
ALP SERPL-CCNC: 100 U/L (ref 55–135)
ALT SERPL W/O P-5'-P-CCNC: 14 U/L (ref 10–44)
ANION GAP SERPL CALC-SCNC: 14 MMOL/L (ref 8–16)
APTT BLDCRRT: 33 SEC (ref 21–32)
AST SERPL-CCNC: 25 U/L (ref 10–40)
BACTERIA #/AREA URNS HPF: ABNORMAL /HPF
BASOPHILS # BLD AUTO: 0.03 K/UL (ref 0–0.2)
BASOPHILS NFR BLD: 0.1 % (ref 0–1.9)
BILIRUB SERPL-MCNC: 1.1 MG/DL (ref 0.1–1)
BILIRUB UR QL STRIP: NEGATIVE
BNP SERPL-MCNC: 240 PG/ML (ref 0–99)
BUN SERPL-MCNC: 66 MG/DL (ref 8–23)
CALCIUM SERPL-MCNC: 7.7 MG/DL (ref 8.7–10.5)
CHLORIDE SERPL-SCNC: 99 MMOL/L (ref 95–110)
CLARITY UR: ABNORMAL
CO2 SERPL-SCNC: 13 MMOL/L (ref 23–29)
COLOR UR: YELLOW
CREAT SERPL-MCNC: 4.3 MG/DL (ref 0.5–1.4)
DIFFERENTIAL METHOD: ABNORMAL
EOSINOPHIL # BLD AUTO: 0 K/UL (ref 0–0.5)
EOSINOPHIL NFR BLD: 0 % (ref 0–8)
ERYTHROCYTE [DISTWIDTH] IN BLOOD BY AUTOMATED COUNT: 13.2 % (ref 11.5–14.5)
EST. GFR  (AFRICAN AMERICAN): 11.5 ML/MIN/1.73 M^2
EST. GFR  (NON AFRICAN AMERICAN): 10 ML/MIN/1.73 M^2
GLUCOSE SERPL-MCNC: 77 MG/DL (ref 70–110)
GLUCOSE UR QL STRIP: NEGATIVE
HCT VFR BLD AUTO: 27.2 % (ref 37–48.5)
HGB BLD-MCNC: 8.7 G/DL (ref 12–16)
HGB UR QL STRIP: ABNORMAL
HYALINE CASTS #/AREA URNS LPF: 0 /LPF
IMM GRANULOCYTES # BLD AUTO: 0.32 K/UL (ref 0–0.04)
IMM GRANULOCYTES NFR BLD AUTO: 1.5 % (ref 0–0.5)
INR PPP: 1.2 (ref 0.8–1.2)
KETONES UR QL STRIP: ABNORMAL
LACTATE SERPL-SCNC: 0.6 MMOL/L (ref 0.5–2.2)
LACTATE SERPL-SCNC: 1.3 MMOL/L (ref 0.5–2.2)
LEUKOCYTE ESTERASE UR QL STRIP: ABNORMAL
LYMPHOCYTES # BLD AUTO: 0.5 K/UL (ref 1–4.8)
LYMPHOCYTES NFR BLD: 2 % (ref 18–48)
MAGNESIUM SERPL-MCNC: 1.8 MG/DL (ref 1.6–2.6)
MCH RBC QN AUTO: 32 PG (ref 27–31)
MCHC RBC AUTO-ENTMCNC: 32 G/DL (ref 32–36)
MCV RBC AUTO: 100 FL (ref 82–98)
MICROSCOPIC COMMENT: ABNORMAL
MONOCYTES # BLD AUTO: 0.3 K/UL (ref 0.3–1)
MONOCYTES NFR BLD: 1.3 % (ref 4–15)
NEUTROPHILS # BLD AUTO: 21 K/UL (ref 1.8–7.7)
NEUTROPHILS NFR BLD: 95.1 % (ref 38–73)
NITRITE UR QL STRIP: NEGATIVE
NRBC BLD-RTO: 0 /100 WBC
PH UR STRIP: 6 [PH] (ref 5–8)
PHOSPHATE SERPL-MCNC: 5.7 MG/DL (ref 2.7–4.5)
PLATELET # BLD AUTO: 292 K/UL (ref 150–350)
PMV BLD AUTO: 9.8 FL (ref 9.2–12.9)
POTASSIUM SERPL-SCNC: 4.4 MMOL/L (ref 3.5–5.1)
PROT SERPL-MCNC: 5.2 G/DL (ref 6–8.4)
PROT UR QL STRIP: ABNORMAL
PROTHROMBIN TIME: 13.3 SEC (ref 9–12.5)
RBC # BLD AUTO: 2.72 M/UL (ref 4–5.4)
RBC #/AREA URNS HPF: 10 /HPF (ref 0–4)
SODIUM SERPL-SCNC: 126 MMOL/L (ref 136–145)
SP GR UR STRIP: 1.02 (ref 1–1.03)
URN SPEC COLLECT METH UR: ABNORMAL
UROBILINOGEN UR STRIP-ACNC: NEGATIVE EU/DL
WBC # BLD AUTO: 22.05 K/UL (ref 3.9–12.7)
WBC #/AREA URNS HPF: >100 /HPF (ref 0–5)

## 2020-01-26 PROCEDURE — 25000003 PHARM REV CODE 250

## 2020-01-26 PROCEDURE — 87086 URINE CULTURE/COLONY COUNT: CPT

## 2020-01-26 PROCEDURE — 94761 N-INVAS EAR/PLS OXIMETRY MLT: CPT

## 2020-01-26 PROCEDURE — 85730 THROMBOPLASTIN TIME PARTIAL: CPT

## 2020-01-26 PROCEDURE — 80053 COMPREHEN METABOLIC PANEL: CPT

## 2020-01-26 PROCEDURE — 87040 BLOOD CULTURE FOR BACTERIA: CPT | Mod: 59

## 2020-01-26 PROCEDURE — 71045 XR CHEST AP PORTABLE: ICD-10-PCS | Mod: 26,,, | Performed by: RADIOLOGY

## 2020-01-26 PROCEDURE — 25000242 PHARM REV CODE 250 ALT 637 W/ HCPCS: Performed by: INTERNAL MEDICINE

## 2020-01-26 PROCEDURE — 96374 THER/PROPH/DIAG INJ IV PUSH: CPT

## 2020-01-26 PROCEDURE — 87077 CULTURE AEROBIC IDENTIFY: CPT

## 2020-01-26 PROCEDURE — C9113 INJ PANTOPRAZOLE SODIUM, VIA: HCPCS | Performed by: INTERNAL MEDICINE

## 2020-01-26 PROCEDURE — 20000000 HC ICU ROOM

## 2020-01-26 PROCEDURE — 51702 INSERT TEMP BLADDER CATH: CPT

## 2020-01-26 PROCEDURE — 83735 ASSAY OF MAGNESIUM: CPT

## 2020-01-26 PROCEDURE — 99223 PR INITIAL HOSPITAL CARE,LEVL III: ICD-10-PCS | Mod: ,,, | Performed by: INTERNAL MEDICINE

## 2020-01-26 PROCEDURE — 87088 URINE BACTERIA CULTURE: CPT

## 2020-01-26 PROCEDURE — 94640 AIRWAY INHALATION TREATMENT: CPT

## 2020-01-26 PROCEDURE — 63600175 PHARM REV CODE 636 W HCPCS: Performed by: INTERNAL MEDICINE

## 2020-01-26 PROCEDURE — 84100 ASSAY OF PHOSPHORUS: CPT

## 2020-01-26 PROCEDURE — 83880 ASSAY OF NATRIURETIC PEPTIDE: CPT

## 2020-01-26 PROCEDURE — 71045 X-RAY EXAM CHEST 1 VIEW: CPT | Mod: 26,,, | Performed by: RADIOLOGY

## 2020-01-26 PROCEDURE — 85025 COMPLETE CBC W/AUTO DIFF WBC: CPT

## 2020-01-26 PROCEDURE — 85610 PROTHROMBIN TIME: CPT

## 2020-01-26 PROCEDURE — 87186 SC STD MICRODIL/AGAR DIL: CPT

## 2020-01-26 PROCEDURE — 99285 EMERGENCY DEPT VISIT HI MDM: CPT | Mod: 25

## 2020-01-26 PROCEDURE — 81000 URINALYSIS NONAUTO W/SCOPE: CPT

## 2020-01-26 PROCEDURE — 36415 COLL VENOUS BLD VENIPUNCTURE: CPT

## 2020-01-26 PROCEDURE — 71045 X-RAY EXAM CHEST 1 VIEW: CPT | Mod: TC,FY

## 2020-01-26 PROCEDURE — 83605 ASSAY OF LACTIC ACID: CPT

## 2020-01-26 PROCEDURE — 99223 1ST HOSP IP/OBS HIGH 75: CPT | Mod: ,,, | Performed by: INTERNAL MEDICINE

## 2020-01-26 PROCEDURE — 83605 ASSAY OF LACTIC ACID: CPT | Mod: 91

## 2020-01-26 PROCEDURE — 93005 ELECTROCARDIOGRAM TRACING: CPT

## 2020-01-26 RX ORDER — HYDROCODONE BITARTRATE AND ACETAMINOPHEN 500; 5 MG/1; MG/1
TABLET ORAL
Status: DISCONTINUED | OUTPATIENT
Start: 2020-01-26 | End: 2020-01-29 | Stop reason: HOSPADM

## 2020-01-26 RX ORDER — OXYCODONE AND ACETAMINOPHEN 5; 325 MG/1; MG/1
1 TABLET ORAL EVERY 6 HOURS PRN
Status: DISCONTINUED | OUTPATIENT
Start: 2020-01-26 | End: 2020-01-29 | Stop reason: HOSPADM

## 2020-01-26 RX ORDER — ACETAMINOPHEN 325 MG/1
650 TABLET ORAL
Status: COMPLETED | OUTPATIENT
Start: 2020-01-26 | End: 2020-01-27

## 2020-01-26 RX ORDER — CYPROHEPTADINE HYDROCHLORIDE 4 MG/1
4 TABLET ORAL 3 TIMES DAILY PRN
Status: DISCONTINUED | OUTPATIENT
Start: 2020-01-26 | End: 2020-01-29 | Stop reason: HOSPADM

## 2020-01-26 RX ORDER — ACETAMINOPHEN 325 MG/1
650 TABLET ORAL EVERY 6 HOURS PRN
Status: DISCONTINUED | OUTPATIENT
Start: 2020-01-26 | End: 2020-01-29 | Stop reason: HOSPADM

## 2020-01-26 RX ORDER — ESCITALOPRAM OXALATE 10 MG/1
20 TABLET ORAL DAILY
Status: DISCONTINUED | OUTPATIENT
Start: 2020-01-26 | End: 2020-01-29 | Stop reason: HOSPADM

## 2020-01-26 RX ORDER — PANTOPRAZOLE SODIUM 40 MG/10ML
40 INJECTION, POWDER, LYOPHILIZED, FOR SOLUTION INTRAVENOUS DAILY
Status: DISCONTINUED | OUTPATIENT
Start: 2020-01-26 | End: 2020-01-29 | Stop reason: HOSPADM

## 2020-01-26 RX ORDER — OXYCODONE AND ACETAMINOPHEN 10; 325 MG/1; MG/1
TABLET ORAL
Status: COMPLETED
Start: 2020-01-26 | End: 2020-01-26

## 2020-01-26 RX ORDER — SODIUM CHLORIDE 9 MG/ML
INJECTION, SOLUTION INTRAVENOUS CONTINUOUS
Status: DISCONTINUED | OUTPATIENT
Start: 2020-01-26 | End: 2020-01-29 | Stop reason: HOSPADM

## 2020-01-26 RX ORDER — OXYBUTYNIN CHLORIDE 5 MG/1
5 TABLET ORAL 2 TIMES DAILY
Status: DISCONTINUED | OUTPATIENT
Start: 2020-01-26 | End: 2020-01-29 | Stop reason: HOSPADM

## 2020-01-26 RX ORDER — POLYMYXIN B SULFATE AND TRIMETHOPRIM 1; 10000 MG/ML; [USP'U]/ML
1 SOLUTION OPHTHALMIC EVERY 4 HOURS
Status: DISCONTINUED | OUTPATIENT
Start: 2020-01-26 | End: 2020-01-27

## 2020-01-26 RX ORDER — ONDANSETRON 2 MG/ML
4 INJECTION INTRAMUSCULAR; INTRAVENOUS EVERY 4 HOURS PRN
Status: DISCONTINUED | OUTPATIENT
Start: 2020-01-26 | End: 2020-01-29 | Stop reason: HOSPADM

## 2020-01-26 RX ORDER — SODIUM CHLORIDE 0.9 % (FLUSH) 0.9 %
10 SYRINGE (ML) INJECTION
Status: DISCONTINUED | OUTPATIENT
Start: 2020-01-26 | End: 2020-01-29 | Stop reason: HOSPADM

## 2020-01-26 RX ORDER — SODIUM CHLORIDE AND POTASSIUM CHLORIDE 150; 900 MG/100ML; MG/100ML
INJECTION, SOLUTION INTRAVENOUS CONTINUOUS
Status: DISCONTINUED | OUTPATIENT
Start: 2020-01-26 | End: 2020-01-26

## 2020-01-26 RX ORDER — LUBIPROSTONE 24 UG/1
24 CAPSULE ORAL 2 TIMES DAILY WITH MEALS
COMMUNITY

## 2020-01-26 RX ORDER — FLUTICASONE FUROATE AND VILANTEROL 100; 25 UG/1; UG/1
1 POWDER RESPIRATORY (INHALATION) DAILY
Status: DISCONTINUED | OUTPATIENT
Start: 2020-01-26 | End: 2020-01-29 | Stop reason: HOSPADM

## 2020-01-26 RX ORDER — OXYCODONE AND ACETAMINOPHEN 10; 325 MG/1; MG/1
1 TABLET ORAL EVERY 4 HOURS PRN
COMMUNITY
End: 2020-03-18 | Stop reason: SDUPTHER

## 2020-01-26 RX ORDER — IPRATROPIUM BROMIDE AND ALBUTEROL SULFATE 2.5; .5 MG/3ML; MG/3ML
3 SOLUTION RESPIRATORY (INHALATION) EVERY 4 HOURS
Status: DISCONTINUED | OUTPATIENT
Start: 2020-01-26 | End: 2020-01-29 | Stop reason: HOSPADM

## 2020-01-26 RX ORDER — OXYCODONE AND ACETAMINOPHEN 10; 325 MG/1; MG/1
1 TABLET ORAL EVERY 6 HOURS PRN
Status: DISCONTINUED | OUTPATIENT
Start: 2020-01-26 | End: 2020-01-26

## 2020-01-26 RX ADMIN — HYDROCORTISONE SODIUM SUCCINATE 125 MG: 250 INJECTION, POWDER, FOR SOLUTION INTRAMUSCULAR; INTRAVENOUS at 04:01

## 2020-01-26 RX ADMIN — SODIUM CHLORIDE 500 ML: 0.9 INJECTION, SOLUTION INTRAVENOUS at 02:01

## 2020-01-26 RX ADMIN — PIPERACILLIN AND TAZOBACTAM 3.38 G: 3; .375 INJECTION, POWDER, LYOPHILIZED, FOR SOLUTION INTRAVENOUS; PARENTERAL at 03:01

## 2020-01-26 RX ADMIN — SODIUM CHLORIDE: 0.9 INJECTION, SOLUTION INTRAVENOUS at 02:01

## 2020-01-26 RX ADMIN — CEFTRIAXONE 2 G: 2 INJECTION, SOLUTION INTRAVENOUS at 12:01

## 2020-01-26 RX ADMIN — PANTOPRAZOLE SODIUM 40 MG: 40 INJECTION, POWDER, LYOPHILIZED, FOR SOLUTION INTRAVENOUS at 03:01

## 2020-01-26 RX ADMIN — OXYCODONE HYDROCHLORIDE AND ACETAMINOPHEN 0.5 TABLET: 10; 325 TABLET ORAL at 04:01

## 2020-01-26 RX ADMIN — SODIUM CHLORIDE 1000 ML: 0.9 INJECTION, SOLUTION INTRAVENOUS at 08:01

## 2020-01-26 RX ADMIN — SODIUM CHLORIDE 1089 ML: 0.9 INJECTION, SOLUTION INTRAVENOUS at 11:01

## 2020-01-26 RX ADMIN — SODIUM CHLORIDE 500 ML: 0.9 INJECTION, SOLUTION INTRAVENOUS at 06:01

## 2020-01-26 RX ADMIN — IPRATROPIUM BROMIDE AND ALBUTEROL SULFATE 3 ML: .5; 3 SOLUTION RESPIRATORY (INHALATION) at 07:01

## 2020-01-26 RX ADMIN — OXYCODONE AND ACETAMINOPHEN 0.5 TABLET: 10; 325 TABLET ORAL at 04:01

## 2020-01-26 RX ADMIN — ONDANSETRON 4 MG: 2 INJECTION INTRAMUSCULAR; INTRAVENOUS at 04:01

## 2020-01-26 RX ADMIN — IPRATROPIUM BROMIDE AND ALBUTEROL SULFATE 3 ML: .5; 3 SOLUTION RESPIRATORY (INHALATION) at 04:01

## 2020-01-26 RX ADMIN — VANCOMYCIN HYDROCHLORIDE 500 MG: 500 INJECTION, POWDER, LYOPHILIZED, FOR SOLUTION INTRAVENOUS at 04:01

## 2020-01-26 NOTE — ASSESSMENT & PLAN NOTE
01/26/2020:  · Begin Zosyn 3.375 g q.12 hours  · Begin vancomycin per pharmacy protocol  · Rehydrate aggressively.  · Repack check chest x-ray in the a.m.  · Will begin nebulization treatments for pulmonary toilet.

## 2020-01-26 NOTE — H&P
[facility] - ICU Hospital Medicine  History & Physical    Patient Name: Obdulia Yepez  MRN: [id number]  Admission Date: 2020  Attending Physician: Jimenez Patel MD  Primary Care Provider: Og Perez MD         Patient information was obtained from patient and ER records.     Subjective:     Principal Problem:Sepsis due to Escherichia coli (e. coli)    Chief Complaint:   Chief Complaint   Patient presents with    Weakness     Per EMS family and patient reports that patient has had weakness and AMS since awaking this morning. Decrease oral intake for the past two days    Altered Mental Status        HPI: 2020:  Patient is a 69-year-old female who presented to the emergency department complaining of weakness and decrease p.o. intake over the last couple of weeks.  But report from EMS stated that family reported the patient had had weakness over the last couple of days that worsened this morning when she awoke.  Patient has a past medical history significant for asthma, depression, gout, and hypotension.  There is no previous history noted of adrenal insufficiency.  Of the patient does take hydrocortisone on a daily basis.  Patient is unable to give a coherent history although she still seems to lose her train of thought very frequently.  Family also reports the patient has had altered mental status over the past few days.    Past Medical History:   Diagnosis Date    Allergy     Arthritis     Asthma     Depression     Gout     Hypotension     Sepsis        Past Surgical History:   Procedure Laterality Date    APPENDECTOMY       SECTION      CHOLECYSTECTOMY      CYSTOSCOPY W/ RETROGRADES Bilateral 2019    Procedure: CYSTOSCOPY, WITH RETROGRADE PYELOGRAM;  Surgeon: Trip Bacon MD;  Location: [facility];  Service: Urology;  Laterality: Bilateral;    CYSTOSCOPY WITH BIOPSY OF BLADDER  2019    Procedure: CYSTOSCOPY, WITH BLADDER BIOPSY, WITH  FULGURATION IF INDICATED;  Surgeon: Trip Bacon MD;  Location: [facility] OR;  Service: Urology;;    ESOPHAGOGASTRODUODENOSCOPY N/A 12/10/2019    Procedure: EGD (ESOPHAGOGASTRODUODENOSCOPY);  Surgeon: Shakeel Perez MD;  Location: [facility] ENDO;  Service: Endoscopy;  Laterality: N/A;    HYSTERECTOMY      SHOULDER SURGERY Right 04/16/2019    STOMACH SURGERY      URETEROSCOPY Right 11/13/2019    Procedure: URETEROSCOPY;  Surgeon: Trip Bacon MD;  Location: [facility] OR;  Service: Urology;  Laterality: Right;    WRIST SURGERY Left        Review of patient's allergies indicates:   Allergen Reactions    Amoxicillin Diarrhea    Latex     Milk containing products     Opioids - morphine analogues     Peanut     Sodium phosphate     Soy     Sulfa (sulfonamide antibiotics)        Current Facility-Administered Medications on File Prior to Encounter   Medication    hylan g-f 20 48 mg/6 mL injection 48 mg    hylan g-f 20 48 mg/6 mL injection 48 mg    triamcinolone acetonide injection 80 mg     Current Outpatient Medications on File Prior to Encounter   Medication Sig    allopurinol (ZYLOPRIM) 300 MG tablet TAKE 1 TABLET BY MOUTH DAILY    escitalopram oxalate (LEXAPRO) 20 MG tablet Take 20 mg by mouth once daily.     gabapentin (NEURONTIN) 300 MG capsule Take 3 capsules (900 mg total) by mouth 3 (three) times daily. (Patient taking differently: Take 600 mg by mouth 2 (two) times daily. )    lubiprostone (AMITIZA) 24 MCG Cap Take 24 mcg by mouth 2 (two) times daily with meals.    montelukast (SINGULAIR) 10 mg tablet TAKE 1 TABLET(S) EVERY DAY BY ORAL ROUTE.    multivitamin (THERAGRAN) per tablet Take 1 tablet by mouth once daily.    ondansetron (ZOFRAN) 4 MG tablet Take 1 tablet (4 mg total) by mouth every 8 (eight) hours as needed for Nausea.    oxyCODONE-acetaminophen (PERCOCET)  mg per tablet Take 1 tablet by mouth every 4 (four) hours as needed for Pain.    pantoprazole (PROTONIX) 40 MG tablet  Take 1 tablet (40 mg total) by mouth once daily.    budesonide-formoterol 160-4.5 mcg (SYMBICORT) 160-4.5 mcg/actuation HFAA Symbicort 160 mcg-4.5 mcg/actuation HFA aerosol inhaler   Inhale 2 puffs twice a day by inhalation route.    busPIRone (BUSPAR) 5 MG Tab Take 1 tablet (5 mg total) by mouth 2 (two) times daily.    colestipol (COLESTID) 1 gram Tab Take 1 tablet (1 g total) by mouth 2 (two) times daily.    cyclobenzaprine (FLEXERIL) 10 MG tablet Take 1 tablet (10 mg total) by mouth 3 (three) times daily as needed.    cyproheptadine (PERIACTIN) 4 mg tablet TAKE 1 TABLET (4 MG TOTAL) BY MOUTH 3 (THREE) TIMES DAILY AS NEEDED.    fluticasone (FLONASE) 50 mcg/actuation nasal spray fluticasone 50 mcg/actuation nasal spray,suspension   Inhale 1 spray every day by intranasal route.    hydrocortisone (CORTEF) 10 MG Tab TAKE 2 TABLETS BY MOUTH IN THE MORNING AND 1 TABLET IN THE EVENING    ipratropium (ATROVENT) 0.03 % nasal spray 2 sprays by Nasal route 3 (three) times daily.    mupirocin (BACTROBAN) 2 % ointment mupirocin 2 % topical ointment   APPLY A SMALL AMOUNT TO THE AFFECTED AREA BY TOPICAL ROUTE 2 TIMES PER DAY    oxybutynin (DITROPAN-XL) 5 MG TR24 oxybutynin chloride ER 5 mg tablet,extended release 24 hr   TAKE 1 TABLET BY MOUTH EVERY DAY    polymyxin B sulf-trimethoprim (POLYTRIM) 10,000 unit- 1 mg/mL Drop Place 1 drop into the right eye every 4 (four) hours.    vitamin E 1000 UNIT capsule Take 1,000 Units by mouth once daily.     Family History     Problem Relation (Age of Onset)    Asthma Mother    Bipolar disorder Sister    Colon cancer Maternal Grandmother, Maternal Grandfather    Pleurisy Father        Tobacco Use    Smoking status: Never Smoker    Smokeless tobacco: Never Used   Substance and Sexual Activity    Alcohol use: Yes     Comment: everyday drinker. has not had any in the past 2 days    Drug use: No    Sexual activity: Yes     Review of Systems   Unable to perform ROS: Mental  status change     Objective:     Vital Signs (Most Recent):  Temp: 98 °F (36.7 °C) (01/26/20 1108)  Pulse: 98 (01/26/20 1302)  Resp: 14 (01/26/20 1108)  BP: (!) 88/43 (01/26/20 1302)  SpO2: 100 % (01/26/20 1302) Vital Signs (24h Range):  Temp:  [98 °F (36.7 °C)] 98 °F (36.7 °C)  Pulse:  [] 98  Resp:  [14] 14  SpO2:  [96 %-100 %] 100 %  BP: (82-96)/(43-68) 88/43     Weight: 36.3 kg (80 lb)  Body mass index is 15.62 kg/m².    Physical Exam   Constitutional: She appears well-developed and well-nourished.   HENT:   Head: Normocephalic and atraumatic.   Eyes: Pupils are equal, round, and reactive to light. EOM are normal.   Neck: Normal range of motion. Neck supple. No tracheal deviation present. No thyromegaly present.   Cardiovascular: Normal rate, regular rhythm, normal heart sounds and intact distal pulses.   Pulmonary/Chest: She is in respiratory distress. She has rales.   Abdominal: Soft. Bowel sounds are normal. She exhibits distension. There is tenderness. There is no rebound and no guarding.   Musculoskeletal: Normal range of motion.   Lymphadenopathy:     She has no cervical adenopathy.   Neurological: She is alert.   Skin: Skin is warm and dry. Capillary refill takes less than 2 seconds.         CRANIAL NERVES     CN III, IV, VI   Pupils are equal, round, and reactive to light.  Extraocular motions are normal.        Significant Labs:   Recent Lab Results       01/26/20  1240   01/26/20  1213        Albumin   2.1     Alkaline Phosphatase   100     ALT   14     Anion Gap   14     Appearance, UA Cloudy       aPTT   33.0     AST   25     Bacteria, UA Moderate       Baso #   0.03     Basophil%   0.1     Bilirubin (UA) Negative       BILIRUBIN TOTAL   1.1  Comment:  For infants and newborns, interpretation of results should be based  on gestational age, weight and in agreement with clinical  observations.  Premature Infant recommended reference ranges:  Up to 24 hours.............<8.0 mg/dL  Up to 48  hours............<12.0 mg/dL  3-5 days..................<15.0 mg/dL  6-29 days.................<15.0 mg/dL       BNP   240  Comment:  Values of less than 100 pg/ml are consistent with non-CHF populations.     BUN, Bld   66     Calcium   7.7     Chloride   99     CO2   13     Color, UA Yellow       Creatinine   4.3     Differential Method   Automated     eGFR if African American   11.5     eGFR if non    10.0  Comment:  Calculation used to obtain the estimated glomerular filtration  rate (eGFR) is the CKD-EPI equation.        Eos #   0.0     Eosinophil%   0.0     Glucose   77     Glucose, UA Negative       Gran # (ANC)   21.0     Gran%   95.1     Hematocrit   27.2     Hemoglobin   8.7     Hyaline Casts, UA 0       Immature Grans (Abs)   0.32  Comment:  Mild elevation in immature granulocytes is non specific and   can be seen in a variety of conditions including stress response,   acute inflammation, trauma and pregnancy. Correlation with other   laboratory and clinical findings is essential.       Immature Granulocytes   1.5     INR   1.2  Comment:  Coumadin Therapy:  2.0 - 3.0 for INR for all indicators except mechanical heart valves  and antiphospholipid syndromes which should use 2.5 - 3.5.       Ketones, UA Trace       Lactate, Brown   1.3  Comment:  Falsely low lactic acid results can be found in samples   containing >=13.0 mg/dL total bilirubin and/or >=3.5 mg/dL   direct bilirubin.       Leukocytes, UA 3+       Lymph #   0.5     Lymph%   2.0     Magnesium   1.8     MCH   32.0     MCHC   32.0     MCV   100     Microscopic Comment SEE COMMENT  Comment:  Other formed elements not mentioned in the report are not   present in the microscopic examination.          Mono #   0.3     Mono%   1.3     MPV   9.8     NITRITE UA Negative       nRBC   0     Occult Blood UA 3+       pH, UA 6.0       Phosphorus   5.7     Platelets   292     Potassium   4.4     PROTEIN TOTAL   5.2     Protein, UA  2+  Comment:  Recommend a 24 hour urine protein or a urine   protein/creatinine ratio if globulin induced proteinuria is  clinically suspected.         Protime   13.3     RBC   2.72     RBC, UA 10       RDW   13.2     Sodium   126     Specific Gravity, UA 1.020       Specimen UA Urine, Clean Catch       UROBILINOGEN UA Negative       WBC, UA >100       WBC   22.05         All pertinent labs within the past 24 hours have been reviewed.    Significant Imaging: I have reviewed and interpreted all pertinent imaging results/findings within the past 24 hours.    Assessment/Plan:     * Sepsis due to Escherichia coli (e. coli)  01/26/2020:  · Sepsis most likely due to urinary tract infection although there is pneumonia by chest x-ray.  · Will begin Zosyn 3.375 g every 12 hr adjust per creatinine clearance.  · Will continue checking lactic acid per protocol  · Patient will be kept on nebulization treatments due to her history of asthma  · Monitor blood pressure closely due to history of possible renal insufficiency.  · Follow labs in the a.m. with CBC, CMP.      Pneumonia due to infectious organism  01/26/2020:  · Begin Zosyn 3.375 g q.12 hours  · Begin vancomycin per pharmacy protocol  · Rehydrate aggressively.  · Repack check chest x-ray in the a.m.  · Will begin nebulization treatments for pulmonary toilet.        VTE Risk Mitigation (From admission, onward)         Ordered     IP VTE LOW RISK PATIENT  Once      01/26/20 1407     Place MORIS hose  Until discontinued      01/26/20 1407                   Jimenez Patel MD  Department of Hospital Medicine   [facility] - [facility]

## 2020-01-26 NOTE — ASSESSMENT & PLAN NOTE
01/26/2020:  · Sepsis most likely due to urinary tract infection although there is pneumonia by chest x-ray.  · Will begin Zosyn 3.375 g every 12 hr adjust per creatinine clearance.  · Will continue checking lactic acid per protocol  · Patient will be kept on nebulization treatments due to her history of asthma  · Monitor blood pressure closely due to history of possible renal insufficiency.  · Follow labs in the a.m. with CBC, CMP.

## 2020-01-26 NOTE — SUBJECTIVE & OBJECTIVE
Past Medical History:   Diagnosis Date    Allergy     Arthritis     Asthma     Depression     Gout     Hypotension     Sepsis        Past Surgical History:   Procedure Laterality Date    APPENDECTOMY       SECTION      CHOLECYSTECTOMY      CYSTOSCOPY W/ RETROGRADES Bilateral 2019    Procedure: CYSTOSCOPY, WITH RETROGRADE PYELOGRAM;  Surgeon: Trip Bacon MD;  Location: [facility] OR;  Service: Urology;  Laterality: Bilateral;    CYSTOSCOPY WITH BIOPSY OF BLADDER  2019    Procedure: CYSTOSCOPY, WITH BLADDER BIOPSY, WITH FULGURATION IF INDICATED;  Surgeon: Trip Bacon MD;  Location: [facility] OR;  Service: Urology;;    ESOPHAGOGASTRODUODENOSCOPY N/A 12/10/2019    Procedure: EGD (ESOPHAGOGASTRODUODENOSCOPY);  Surgeon: Shakeel Perez MD;  Location: [facility];  Service: Endoscopy;  Laterality: N/A;    HYSTERECTOMY      SHOULDER SURGERY Right 2019    STOMACH SURGERY      URETEROSCOPY Right 2019    Procedure: URETEROSCOPY;  Surgeon: Trip Bacon MD;  Location: [facility] OR;  Service: Urology;  Laterality: Right;    WRIST SURGERY Left        Review of patient's allergies indicates:   Allergen Reactions    Amoxicillin Diarrhea    Latex     Milk containing products     Opioids - morphine analogues     Peanut     Sodium phosphate     Soy     Sulfa (sulfonamide antibiotics)        Current Facility-Administered Medications on File Prior to Encounter   Medication    hylan g-f 20 48 mg/6 mL injection 48 mg    hylan g-f 20 48 mg/6 mL injection 48 mg    triamcinolone acetonide injection 80 mg     Current Outpatient Medications on File Prior to Encounter   Medication Sig    allopurinol (ZYLOPRIM) 300 MG tablet TAKE 1 TABLET BY MOUTH DAILY    escitalopram oxalate (LEXAPRO) 20 MG tablet Take 20 mg by mouth once daily.     gabapentin (NEURONTIN) 300 MG capsule Take 3 capsules (900 mg total) by mouth 3 (three) times daily. (Patient taking differently: Take 600 mg by mouth  2 (two) times daily. )    lubiprostone (AMITIZA) 24 MCG Cap Take 24 mcg by mouth 2 (two) times daily with meals.    montelukast (SINGULAIR) 10 mg tablet TAKE 1 TABLET(S) EVERY DAY BY ORAL ROUTE.    multivitamin (THERAGRAN) per tablet Take 1 tablet by mouth once daily.    ondansetron (ZOFRAN) 4 MG tablet Take 1 tablet (4 mg total) by mouth every 8 (eight) hours as needed for Nausea.    oxyCODONE-acetaminophen (PERCOCET)  mg per tablet Take 1 tablet by mouth every 4 (four) hours as needed for Pain.    pantoprazole (PROTONIX) 40 MG tablet Take 1 tablet (40 mg total) by mouth once daily.    budesonide-formoterol 160-4.5 mcg (SYMBICORT) 160-4.5 mcg/actuation HFAA Symbicort 160 mcg-4.5 mcg/actuation HFA aerosol inhaler   Inhale 2 puffs twice a day by inhalation route.    busPIRone (BUSPAR) 5 MG Tab Take 1 tablet (5 mg total) by mouth 2 (two) times daily.    colestipol (COLESTID) 1 gram Tab Take 1 tablet (1 g total) by mouth 2 (two) times daily.    cyclobenzaprine (FLEXERIL) 10 MG tablet Take 1 tablet (10 mg total) by mouth 3 (three) times daily as needed.    cyproheptadine (PERIACTIN) 4 mg tablet TAKE 1 TABLET (4 MG TOTAL) BY MOUTH 3 (THREE) TIMES DAILY AS NEEDED.    fluticasone (FLONASE) 50 mcg/actuation nasal spray fluticasone 50 mcg/actuation nasal spray,suspension   Inhale 1 spray every day by intranasal route.    hydrocortisone (CORTEF) 10 MG Tab TAKE 2 TABLETS BY MOUTH IN THE MORNING AND 1 TABLET IN THE EVENING    ipratropium (ATROVENT) 0.03 % nasal spray 2 sprays by Nasal route 3 (three) times daily.    mupirocin (BACTROBAN) 2 % ointment mupirocin 2 % topical ointment   APPLY A SMALL AMOUNT TO THE AFFECTED AREA BY TOPICAL ROUTE 2 TIMES PER DAY    oxybutynin (DITROPAN-XL) 5 MG TR24 oxybutynin chloride ER 5 mg tablet,extended release 24 hr   TAKE 1 TABLET BY MOUTH EVERY DAY    polymyxin B sulf-trimethoprim (POLYTRIM) 10,000 unit- 1 mg/mL Drop Place 1 drop into the right eye every 4 (four)  hours.    vitamin E 1000 UNIT capsule Take 1,000 Units by mouth once daily.     Family History     Problem Relation (Age of Onset)    Asthma Mother    Bipolar disorder Sister    Colon cancer Maternal Grandmother, Maternal Grandfather    Pleurisy Father        Tobacco Use    Smoking status: Never Smoker    Smokeless tobacco: Never Used   Substance and Sexual Activity    Alcohol use: Yes     Comment: everyday drinker. has not had any in the past 2 days    Drug use: No    Sexual activity: Yes     Review of Systems   Unable to perform ROS: Mental status change     Objective:     Vital Signs (Most Recent):  Temp: 98 °F (36.7 °C) (01/26/20 1108)  Pulse: 98 (01/26/20 1302)  Resp: 14 (01/26/20 1108)  BP: (!) 88/43 (01/26/20 1302)  SpO2: 100 % (01/26/20 1302) Vital Signs (24h Range):  Temp:  [98 °F (36.7 °C)] 98 °F (36.7 °C)  Pulse:  [] 98  Resp:  [14] 14  SpO2:  [96 %-100 %] 100 %  BP: (82-96)/(43-68) 88/43     Weight: 36.3 kg (80 lb)  Body mass index is 15.62 kg/m².    Physical Exam   Constitutional: She appears well-developed and well-nourished.   HENT:   Head: Normocephalic and atraumatic.   Eyes: Pupils are equal, round, and reactive to light. EOM are normal.   Neck: Normal range of motion. Neck supple. No tracheal deviation present. No thyromegaly present.   Cardiovascular: Normal rate, regular rhythm, normal heart sounds and intact distal pulses.   Pulmonary/Chest: She is in respiratory distress. She has rales.   Abdominal: Soft. Bowel sounds are normal. She exhibits distension. There is tenderness. There is no rebound and no guarding.   Musculoskeletal: Normal range of motion.   Lymphadenopathy:     She has no cervical adenopathy.   Neurological: She is alert.   Skin: Skin is warm and dry. Capillary refill takes less than 2 seconds.         CRANIAL NERVES     CN III, IV, VI   Pupils are equal, round, and reactive to light.  Extraocular motions are normal.        Significant Labs:   Recent Lab Results        01/26/20  1240   01/26/20  1213        Albumin   2.1     Alkaline Phosphatase   100     ALT   14     Anion Gap   14     Appearance, UA Cloudy       aPTT   33.0     AST   25     Bacteria, UA Moderate       Baso #   0.03     Basophil%   0.1     Bilirubin (UA) Negative       BILIRUBIN TOTAL   1.1  Comment:  For infants and newborns, interpretation of results should be based  on gestational age, weight and in agreement with clinical  observations.  Premature Infant recommended reference ranges:  Up to 24 hours.............<8.0 mg/dL  Up to 48 hours............<12.0 mg/dL  3-5 days..................<15.0 mg/dL  6-29 days.................<15.0 mg/dL       BNP   240  Comment:  Values of less than 100 pg/ml are consistent with non-CHF populations.     BUN, Bld   66     Calcium   7.7     Chloride   99     CO2   13     Color, UA Yellow       Creatinine   4.3     Differential Method   Automated     eGFR if African American   11.5     eGFR if non    10.0  Comment:  Calculation used to obtain the estimated glomerular filtration  rate (eGFR) is the CKD-EPI equation.        Eos #   0.0     Eosinophil%   0.0     Glucose   77     Glucose, UA Negative       Gran # (ANC)   21.0     Gran%   95.1     Hematocrit   27.2     Hemoglobin   8.7     Hyaline Casts, UA 0       Immature Grans (Abs)   0.32  Comment:  Mild elevation in immature granulocytes is non specific and   can be seen in a variety of conditions including stress response,   acute inflammation, trauma and pregnancy. Correlation with other   laboratory and clinical findings is essential.       Immature Granulocytes   1.5     INR   1.2  Comment:  Coumadin Therapy:  2.0 - 3.0 for INR for all indicators except mechanical heart valves  and antiphospholipid syndromes which should use 2.5 - 3.5.       Ketones, UA Trace       Lactate, Brown   1.3  Comment:  Falsely low lactic acid results can be found in samples   containing >=13.0 mg/dL total bilirubin and/or >=3.5  mg/dL   direct bilirubin.       Leukocytes, UA 3+       Lymph #   0.5     Lymph%   2.0     Magnesium   1.8     MCH   32.0     MCHC   32.0     MCV   100     Microscopic Comment SEE COMMENT  Comment:  Other formed elements not mentioned in the report are not   present in the microscopic examination.          Mono #   0.3     Mono%   1.3     MPV   9.8     NITRITE UA Negative       nRBC   0     Occult Blood UA 3+       pH, UA 6.0       Phosphorus   5.7     Platelets   292     Potassium   4.4     PROTEIN TOTAL   5.2     Protein, UA 2+  Comment:  Recommend a 24 hour urine protein or a urine   protein/creatinine ratio if globulin induced proteinuria is  clinically suspected.         Protime   13.3     RBC   2.72     RBC, UA 10       RDW   13.2     Sodium   126     Specific Gravity, UA 1.020       Specimen UA Urine, Clean Catch       UROBILINOGEN UA Negative       WBC, UA >100       WBC   22.05         All pertinent labs within the past 24 hours have been reviewed.    Significant Imaging: I have reviewed and interpreted all pertinent imaging results/findings within the past 24 hours.

## 2020-01-26 NOTE — NURSING
"Received pt from ER via stretcher.  Lethargic, but easily awakens.  Slow to respond, but is oriented to person and place.  Resp are even and unlabored.  VSS.  Adult diaper placed per pt request.  States "I wear these at home".  Small stage 1 to upper right buttocks noted. Picture obtained, per ER nurse.  S/p R ankle fracture back in December of last year.   to bring boot from home later today.  Dr. Patel also at bedside.  See flowsheet for further assessment.   "

## 2020-01-26 NOTE — NURSING
Latex free, 16 fr spence cath placed per MD order.  Sterile technique used.  Immediate return of thick, milky, yellow urine noted upon insertion.  Tolerated well.

## 2020-01-26 NOTE — HPI
01/26/2020:  Patient is a 69-year-old female who presented to the emergency department complaining of weakness and decrease p.o. intake over the last couple of weeks.  But report from EMS stated that family reported the patient had had weakness over the last couple of days that worsened this morning when she awoke.  Patient has a past medical history significant for asthma, depression, gout, and hypotension.  There is no previous history noted of adrenal insufficiency.  Of the patient does take hydrocortisone on a daily basis.  Patient is unable to give a coherent history although she still seems to lose her train of thought very frequently.  Family also reports the patient has had altered mental status over the past few days.

## 2020-01-26 NOTE — ED PROVIDER NOTES
Encounter Date: 2020       History     Chief Complaint   Patient presents with    Weakness     Per EMS family and patient reports that patient has had weakness and AMS since awaking this morning. Decrease oral intake for the past two days    Altered Mental Status     Patient is brought in for severe weakness.  The patient has been noted to be getting progressively weak at home and slightly disoriented.  She is normally very mentally sharp but is now stool slow to respond.  She has had a marked decrease in oral intake for the past 2 days.  She has a history of Husam-en-Y bypass surgery and has been cachectic from this in the past but is a change in worse.    She has a known large fecal impaction is not been removed.  She states it is uncomfortable.  Oral approaches have not worked.    She has had recent workup for bilateral hydronephrosis without renal failure.  She had of the retrograde pyelogram done in .    Most recent studies of of lab shows elevated liver function studies.  Previous CTs of noticed progressive enlargement of the bile duct.  This is not been worked up further.      She had a urinalysis performed on  to with large number of white cells.  Culture is multiple organisms.  It was not a catheterized urine specimen.  The patient had a low pressure at her home and was given fluids by the [facility].  She is also and comes here hypotensive his well. She will be treated with sepsis protocol.        Review of patient's allergies indicates:   Allergen Reactions    Amoxicillin Diarrhea    Latex     Milk containing products     Opioids - morphine analogues     Peanut     Sodium phosphate     Soy     Sulfa (sulfonamide antibiotics)      Past Medical History:   Diagnosis Date    Allergy     Arthritis     Asthma     Depression     Gout     Hypotension     Sepsis      Past Surgical History:   Procedure Laterality Date    APPENDECTOMY       SECTION      CHOLECYSTECTOMY       CYSTOSCOPY W/ RETROGRADES Bilateral 11/13/2019    Procedure: CYSTOSCOPY, WITH RETROGRADE PYELOGRAM;  Surgeon: Trip Bacon MD;  Location: [facility] OR;  Service: Urology;  Laterality: Bilateral;    CYSTOSCOPY WITH BIOPSY OF BLADDER  11/13/2019    Procedure: CYSTOSCOPY, WITH BLADDER BIOPSY, WITH FULGURATION IF INDICATED;  Surgeon: Trip Bacon MD;  Location: [facility] OR;  Service: Urology;;    ESOPHAGOGASTRODUODENOSCOPY N/A 12/10/2019    Procedure: EGD (ESOPHAGOGASTRODUODENOSCOPY);  Surgeon: Shakeel Perez MD;  Location: [facility] ENDO;  Service: Endoscopy;  Laterality: N/A;    HYSTERECTOMY      SHOULDER SURGERY Right 04/16/2019    STOMACH SURGERY      URETEROSCOPY Right 11/13/2019    Procedure: URETEROSCOPY;  Surgeon: Trip Bacon MD;  Location: [facility] OR;  Service: Urology;  Laterality: Right;    WRIST SURGERY Left      Family History   Problem Relation Age of Onset    Asthma Mother     Pleurisy Father     Bipolar disorder Sister     Colon cancer Maternal Grandmother     Colon cancer Maternal Grandfather      Social History     Tobacco Use    Smoking status: Never Smoker    Smokeless tobacco: Never Used   Substance Use Topics    Alcohol use: Yes     Comment: everyday drinker. has not had any in the past 2 days    Drug use: No     Review of Systems   Constitutional: Positive for activity change, appetite change and unexpected weight change. Negative for fever.        Patient is alert and answers questions appropriately although she drifts off is very somnolent.   HENT: Negative for sore throat.    Respiratory: Negative for shortness of breath.    Cardiovascular: Negative for chest pain.   Gastrointestinal: Positive for anal bleeding and constipation. Negative for nausea.   Genitourinary: Positive for difficulty urinating. Negative for dysuria.   Musculoskeletal: Negative for back pain.   Skin: Negative for rash.   Neurological: Negative for weakness.   Hematological: Does not bruise/bleed easily.    All other systems reviewed and are negative.      Physical Exam     Initial Vitals [01/26/20 1108]   BP Pulse Resp Temp SpO2   (!) 82/59 106 14 98 °F (36.7 °C) 98 %      MAP       --         Physical Exam    Nursing note and vitals reviewed.  Constitutional: Vital signs are normal. She is active and cooperative.   Patient is very cachectic with obvious malnutrition.  Eyes are sunken.  She is very pale.  She is alert to person place and time but drifts off very easily.   HENT:   Head: Normocephalic and atraumatic.   Right Ear: External ear normal.   Left Ear: External ear normal.   Mouth/Throat: Oropharynx is clear and moist.   Membranes very dry   Eyes: Conjunctivae, EOM and lids are normal. Pupils are equal, round, and reactive to light. Lids are everted and swept, no foreign bodies found.   Neck: Trachea normal, normal range of motion and full passive range of motion without pain. Neck supple.   Cardiovascular: Normal rate, regular rhythm, S1 normal, S2 normal, normal heart sounds, intact distal pulses and normal pulses.  No extrasystoles are present.    Pulmonary/Chest: Breath sounds normal.   Abdominal: Soft. Normal appearance and bowel sounds are normal.   Genitourinary:   Genitourinary Comments: Patient has skin breakdown on the coccyx.  She is for very soiled bottom.  Purulent material appears to be draining from the vagina.  Rectal reveals a very large but soft impaction.   Musculoskeletal: Normal range of motion.   Neurological: She is alert. She has normal reflexes. GCS eye subscore is 4. GCS verbal subscore is 5. GCS motor subscore is 6.   Skin: Skin is warm, dry and intact. Capillary refill takes less than 2 seconds.   Psychiatric: She has a normal mood and affect. Her speech is normal and behavior is normal. Cognition and memory are normal.         ED Course   Procedures  Labs Reviewed   CULTURE, BLOOD   CULTURE, BLOOD   CBC W/ AUTO DIFFERENTIAL   COMPREHENSIVE METABOLIC PANEL   LACTIC ACID, PLASMA    LACTIC ACID, PLASMA   URINALYSIS, REFLEX TO URINE CULTURE   MAGNESIUM   PHOSPHORUS   APTT   PROTIME-INR   B-TYPE NATRIURETIC PEPTIDE     EKG Readings: (Independently Interpreted)   Initial Reading: No STEMI. Rhythm: Normal Sinus Rhythm. Ectopy: No Ectopy. Conduction: Normal. ST Segments: Non-Specific ST Segment Depression. T Waves: Normal. Axis: Normal. Clinical Impression: Normal Sinus Rhythm       Imaging Results           X-Ray Chest AP Portable (Final result)  Result time 01/26/20 11:49:16    Final result by Ian Fontenot MD (01/26/20 11:49:16)                 Impression:      1. Prominent interstitial markings in the right medial lower lung which could represent focal interstitial edema or infiltrate.  This report was flagged in Epic as abnormal.      Electronically signed by: Ian Fontenot MD  Date:    01/26/2020  Time:    11:49             Narrative:    EXAMINATION:  XR CHEST AP PORTABLE    CLINICAL HISTORY:  Sepsis;    TECHNIQUE:  Single frontal portable view of the chest was performed.    COMPARISON:  None    FINDINGS:  There is mild prominence of the interstitial markings in the medial right lower lung which could represent interstitial edema or infiltrate.  There is no mass.  There is no pleural effusion or pneumothorax.  There are old right rib deformities in bilateral shoulder arthroplasties as well as anterior and posterior cervical fusion.  Heart size, mediastinal contour are normal.                              X-Rays:   Independently Interpreted Readings:   Chest X-Ray: Normal heart size. There is a lung mass present in the RML and RLL.     Medical Decision Making:   Clinical Tests:   Lab Tests: Ordered and Reviewed  The following lab test(s) were unremarkable: CBC and CMP       <> Summary of Lab: Patient had 22,000 white count, patient has developed marked azotemia since her last CMP on 01/08/2019 strongly suggesting this is prerenal azotemia and compatible with her history of little  oral intake.  Radiological Study: Ordered and Reviewed  Medical Tests: Ordered and Reviewed  ED Management:  Patient had a large impaction removed manually by myself.  It was filling up the entire rectum.  It was mostly removed.  It was very soft.  No pain or bleeding.    Patient had a cath urine which showed a completely cloudy specimen.  It was were rechecked and firm that this was from the bladder indeed.  It looked feculent.    Patient is severely azotemic which all looks pre renal.  Patient was started on antibiotics, fluid resuscitation,    Patient is severely malnourished dehydrated.  Prognosis is very guarded    Additional MDM:   Sepsis - SIRS Criteria: Temperature: Temp <36 C. Heart Rate: HR > 90. Tachypnea: RR > 20. White Count: WBC > 12,000. .  Sepsis: Airway: The patient's airway was good (normal gag reflex and breathing). The lactic acid was: normal (<3.0). Antibiotic selection is designed to cover the patient at risk for MRSA and pseudomonas. The patient was treated with Cefepime 2 g IVPB. The patient was hypotensive and required fluid resuscitation. The patient was treated with the following IV Fluids to maximize the BP and the patient's CVP: NS bolus (30 mL/kg). stable                               Clinical Impression:       ICD-10-CM ICD-9-CM   1. Sepsis due to Escherichia coli (e. coli) A41.51 038.42     995.91         Disposition:   Disposition: Admitted  Condition: Serious                     Mohit Marie MD  01/26/20 7391

## 2020-01-26 NOTE — HOSPITAL COURSE
Id evaluation emergency department revealed at a urinalysis which showed moderate bacteria white blood cell count greater than 100 and occult blood 3+ protein 2+ and trace ketones with 3+ leukocytes.  BNP was 240 and sodium 126 potassium 4.4 and bicarb 13 glucose 77 BUN 66 creatinine 4.3 calcium 7.7 albumin is 2.1 and GFR 11.5.  Magnesium 1.8 phosphorus 5.7 [place] [address] hemoglobin 8.7 hematocrit 27.2 and differential showed 95 granulocytes and 2 lymphocytes P lactic acid was 1.3 and INR was 1.2    01/27/2020:  · Patient remains confused and not able to keep a train of thought.  Patient also appears weak.  Objectively patient is mildly improved with a white blood cell count down to 16,000. However patient is anemic at hemoglobin 6.7 hematocrit 20 M CV is 97 and differential shows 85 granulocytes 1 lymphocyte.  Cultures are negative so far.  Patient denies pain. Will obtain CT scan of the head today without contrast.  Due to mental status changes.  Repeat labs in the a.m. to include CBC CMP magnesium and phosphate.  Patient will be bolused 1 L normal saline due to azotemia and low blood pressure.  Will otherwise follow as clinically indicated.    01/28/2020:  · Patient is more oriented this morning however she still feels like the year is 1969.  However she did know the president is [facility] and she did no the month is January.  She states that she remembers 2 days ago before she came in to the hospital.  Therefore she did know she was here for 2 days.  Labs show magnesium 1.4 potassium 3.2 BUN creatinine were 42 and 2.1 and white blood cell count is down to 14,000. Sodium 136 potassium 3.2  · CBC shows white blood cell count 14.9 hemoglobin 10.2 hematocrit 29.7 platelets 255 and differential showed 92 granulocytes and 3.8 lymphocytes 2.5 monocytes    01/29/2020:  Patient remains somnolent and unable to give a coherent history.  She seemed better yesterday although today is more somnolent and  tachycardia.  It has been 3 days since this patient drink alcohol therefore I feel this may be related to alcohol withdrawal symptoms.  Vital signs this afternoon showed a pulse 0 115 blood pressure 107/33 O2 sat % and patient has been afebrile.  Blood cultures have shown no growth to date after 4 days.  Labs show sodium 140 potassium 3.3 chloride 121 bicarb 11 BUN 33 creatinine 1.6 improved and total protein 4.3 albumin 1.6.  White blood cell count 9.4 and hemoglobin 10.4 hematocrit 30.2 platelets 209 differential showed 88 granulocytes 6 lymphocytes.  Urine culture has grown out Pseudomonas aeruginosa greater than 100,000 coliform units.  Magnesium 1.4 yesterday and which was replaced

## 2020-01-27 LAB
ABO + RH BLD: NORMAL
ALBUMIN SERPL BCP-MCNC: 1.7 G/DL (ref 3.5–5.2)
ALP SERPL-CCNC: 71 U/L (ref 55–135)
ALT SERPL W/O P-5'-P-CCNC: 9 U/L (ref 10–44)
ANION GAP SERPL CALC-SCNC: 10 MMOL/L (ref 8–16)
AST SERPL-CCNC: 17 U/L (ref 10–40)
BASOPHILS NFR BLD: 0 % (ref 0–1.9)
BILIRUB SERPL-MCNC: 0.5 MG/DL (ref 0.1–1)
BLD GP AB SCN CELLS X3 SERPL QL: NORMAL
BLD PROD TYP BPU: NORMAL
BLD PROD TYP BPU: NORMAL
BLOOD UNIT EXPIRATION DATE: NORMAL
BLOOD UNIT EXPIRATION DATE: NORMAL
BLOOD UNIT TYPE CODE: 5100
BLOOD UNIT TYPE CODE: 5100
BLOOD UNIT TYPE: NORMAL
BLOOD UNIT TYPE: NORMAL
BUN SERPL-MCNC: 55 MG/DL (ref 8–23)
CALCIUM SERPL-MCNC: 7.1 MG/DL (ref 8.7–10.5)
CHLORIDE SERPL-SCNC: 108 MMOL/L (ref 95–110)
CO2 SERPL-SCNC: 11 MMOL/L (ref 23–29)
CODING SYSTEM: NORMAL
CODING SYSTEM: NORMAL
CORTIS SERPL-MCNC: 99.7 UG/DL
CREAT SERPL-MCNC: 3.1 MG/DL (ref 0.5–1.4)
DIFFERENTIAL METHOD: ABNORMAL
DISPENSE STATUS: NORMAL
DISPENSE STATUS: NORMAL
EOSINOPHIL NFR BLD: 0 % (ref 0–8)
ERYTHROCYTE [DISTWIDTH] IN BLOOD BY AUTOMATED COUNT: 13.2 % (ref 11.5–14.5)
EST. GFR  (AFRICAN AMERICAN): 17.2 ML/MIN/1.73 M^2
EST. GFR  (NON AFRICAN AMERICAN): 14.9 ML/MIN/1.73 M^2
GLUCOSE SERPL-MCNC: 115 MG/DL (ref 70–110)
HCT VFR BLD AUTO: 20.5 % (ref 37–48.5)
HGB BLD-MCNC: 6.7 G/DL (ref 12–16)
HYPOCHROMIA BLD QL SMEAR: ABNORMAL
IMM GRANULOCYTES # BLD AUTO: ABNORMAL K/UL (ref 0–0.04)
IMM GRANULOCYTES NFR BLD AUTO: ABNORMAL % (ref 0–0.5)
LYMPHOCYTES NFR BLD: 1 % (ref 18–48)
MCH RBC QN AUTO: 31.8 PG (ref 27–31)
MCHC RBC AUTO-ENTMCNC: 32.7 G/DL (ref 32–36)
MCV RBC AUTO: 97 FL (ref 82–98)
MONOCYTES NFR BLD: 1 % (ref 4–15)
NEUTROPHILS NFR BLD: 85 % (ref 38–73)
NEUTS BAND NFR BLD MANUAL: 13 %
NRBC BLD-RTO: 0 /100 WBC
NUM UNITS TRANS PACKED RBC: NORMAL
NUM UNITS TRANS PACKED RBC: NORMAL
PLATELET # BLD AUTO: 259 K/UL (ref 150–350)
PMV BLD AUTO: 9.7 FL (ref 9.2–12.9)
POTASSIUM SERPL-SCNC: 3.3 MMOL/L (ref 3.5–5.1)
PROT SERPL-MCNC: 4.4 G/DL (ref 6–8.4)
RBC # BLD AUTO: 2.11 M/UL (ref 4–5.4)
SODIUM SERPL-SCNC: 129 MMOL/L (ref 136–145)
VANCOMYCIN SERPL-MCNC: 4.9 UG/ML
WBC # BLD AUTO: 16.9 K/UL (ref 3.9–12.7)

## 2020-01-27 PROCEDURE — 63600175 PHARM REV CODE 636 W HCPCS: Performed by: INTERNAL MEDICINE

## 2020-01-27 PROCEDURE — 25000003 PHARM REV CODE 250: Performed by: HOSPITALIST

## 2020-01-27 PROCEDURE — C9113 INJ PANTOPRAZOLE SODIUM, VIA: HCPCS | Performed by: INTERNAL MEDICINE

## 2020-01-27 PROCEDURE — 25000242 PHARM REV CODE 250 ALT 637 W/ HCPCS: Performed by: INTERNAL MEDICINE

## 2020-01-27 PROCEDURE — P9016 RBC LEUKOCYTES REDUCED: HCPCS

## 2020-01-27 PROCEDURE — 80202 ASSAY OF VANCOMYCIN: CPT

## 2020-01-27 PROCEDURE — 86901 BLOOD TYPING SEROLOGIC RH(D): CPT

## 2020-01-27 PROCEDURE — 97802 MEDICAL NUTRITION INDIV IN: CPT

## 2020-01-27 PROCEDURE — 99233 SBSQ HOSP IP/OBS HIGH 50: CPT | Mod: ,,, | Performed by: INTERNAL MEDICINE

## 2020-01-27 PROCEDURE — 63600175 PHARM REV CODE 636 W HCPCS: Performed by: HOSPITALIST

## 2020-01-27 PROCEDURE — 36430 TRANSFUSION BLD/BLD COMPNT: CPT

## 2020-01-27 PROCEDURE — 94640 AIRWAY INHALATION TREATMENT: CPT

## 2020-01-27 PROCEDURE — 20000000 HC ICU ROOM

## 2020-01-27 PROCEDURE — 80053 COMPREHEN METABOLIC PANEL: CPT

## 2020-01-27 PROCEDURE — 85025 COMPLETE CBC W/AUTO DIFF WBC: CPT

## 2020-01-27 PROCEDURE — 86920 COMPATIBILITY TEST SPIN: CPT

## 2020-01-27 PROCEDURE — 99233 PR SUBSEQUENT HOSPITAL CARE,LEVL III: ICD-10-PCS | Mod: ,,, | Performed by: INTERNAL MEDICINE

## 2020-01-27 PROCEDURE — 25000003 PHARM REV CODE 250: Performed by: INTERNAL MEDICINE

## 2020-01-27 PROCEDURE — 94761 N-INVAS EAR/PLS OXIMETRY MLT: CPT

## 2020-01-27 PROCEDURE — 82533 TOTAL CORTISOL: CPT

## 2020-01-27 PROCEDURE — 36415 COLL VENOUS BLD VENIPUNCTURE: CPT

## 2020-01-27 RX ORDER — HYDROCODONE BITARTRATE AND ACETAMINOPHEN 500; 5 MG/1; MG/1
TABLET ORAL
Status: DISCONTINUED | OUTPATIENT
Start: 2020-01-27 | End: 2020-01-29 | Stop reason: HOSPADM

## 2020-01-27 RX ORDER — DIPHENHYDRAMINE HCL 25 MG
25 CAPSULE ORAL EVERY 6 HOURS PRN
Status: DISCONTINUED | OUTPATIENT
Start: 2020-01-27 | End: 2020-01-29 | Stop reason: HOSPADM

## 2020-01-27 RX ORDER — POTASSIUM CHLORIDE 20 MEQ/1
20 TABLET, EXTENDED RELEASE ORAL 2 TIMES DAILY
Status: DISCONTINUED | OUTPATIENT
Start: 2020-01-27 | End: 2020-01-28

## 2020-01-27 RX ORDER — MIDODRINE HYDROCHLORIDE 2.5 MG/1
2.5 TABLET ORAL ONCE
Status: COMPLETED | OUTPATIENT
Start: 2020-01-27 | End: 2020-01-27

## 2020-01-27 RX ORDER — POTASSIUM CHLORIDE 7.45 MG/ML
10 INJECTION INTRAVENOUS ONCE
Status: COMPLETED | OUTPATIENT
Start: 2020-01-27 | End: 2020-01-27

## 2020-01-27 RX ADMIN — IPRATROPIUM BROMIDE AND ALBUTEROL SULFATE 3 ML: .5; 3 SOLUTION RESPIRATORY (INHALATION) at 11:01

## 2020-01-27 RX ADMIN — CEFTRIAXONE 2 G: 2 INJECTION, SOLUTION INTRAVENOUS at 12:01

## 2020-01-27 RX ADMIN — POTASSIUM CHLORIDE 20 MEQ: 1500 TABLET, EXTENDED RELEASE ORAL at 10:01

## 2020-01-27 RX ADMIN — ESCITALOPRAM OXALATE 20 MG: 10 TABLET, FILM COATED ORAL at 09:01

## 2020-01-27 RX ADMIN — POTASSIUM CHLORIDE 20 MEQ: 1500 TABLET, EXTENDED RELEASE ORAL at 09:01

## 2020-01-27 RX ADMIN — SODIUM CHLORIDE 1000 ML: 0.9 INJECTION, SOLUTION INTRAVENOUS at 05:01

## 2020-01-27 RX ADMIN — PIPERACILLIN AND TAZOBACTAM 3.38 G: 3; .375 INJECTION, POWDER, LYOPHILIZED, FOR SOLUTION INTRAVENOUS; PARENTERAL at 03:01

## 2020-01-27 RX ADMIN — IPRATROPIUM BROMIDE AND ALBUTEROL SULFATE 3 ML: .5; 3 SOLUTION RESPIRATORY (INHALATION) at 03:01

## 2020-01-27 RX ADMIN — ACETAMINOPHEN 650 MG: 325 TABLET ORAL at 11:01

## 2020-01-27 RX ADMIN — IPRATROPIUM BROMIDE AND ALBUTEROL SULFATE 3 ML: .5; 3 SOLUTION RESPIRATORY (INHALATION) at 07:01

## 2020-01-27 RX ADMIN — SODIUM CHLORIDE 1000 ML: 0.9 INJECTION, SOLUTION INTRAVENOUS at 12:01

## 2020-01-27 RX ADMIN — SODIUM CHLORIDE: 0.9 INJECTION, SOLUTION INTRAVENOUS at 03:01

## 2020-01-27 RX ADMIN — PIPERACILLIN AND TAZOBACTAM 3.38 G: 3; .375 INJECTION, POWDER, LYOPHILIZED, FOR SOLUTION INTRAVENOUS; PARENTERAL at 02:01

## 2020-01-27 RX ADMIN — SODIUM CHLORIDE 1000 ML: 0.9 INJECTION, SOLUTION INTRAVENOUS at 11:01

## 2020-01-27 RX ADMIN — OXYBUTYNIN CHLORIDE 5 MG: 5 TABLET ORAL at 09:01

## 2020-01-27 RX ADMIN — DIPHENHYDRAMINE HYDROCHLORIDE 25 MG: 25 CAPSULE ORAL at 11:01

## 2020-01-27 RX ADMIN — MIDODRINE HYDROCHLORIDE 2.5 MG: 2.5 TABLET ORAL at 05:01

## 2020-01-27 RX ADMIN — DIPHENHYDRAMINE HYDROCHLORIDE 25 MG: 25 CAPSULE ORAL at 07:01

## 2020-01-27 RX ADMIN — FLUTICASONE FUROATE AND VILANTEROL TRIFENATATE 1 PUFF: 100; 25 POWDER RESPIRATORY (INHALATION) at 07:01

## 2020-01-27 RX ADMIN — ACETAMINOPHEN 650 MG: 325 TABLET ORAL at 07:01

## 2020-01-27 RX ADMIN — POTASSIUM CHLORIDE 10 MEQ: 10 INJECTION, SOLUTION INTRAVENOUS at 10:01

## 2020-01-27 RX ADMIN — PANTOPRAZOLE SODIUM 40 MG: 40 INJECTION, POWDER, LYOPHILIZED, FOR SOLUTION INTRAVENOUS at 09:01

## 2020-01-27 NOTE — CLINICAL REVIEW
[facility] - ICU  Adult Nutrition  Consult Note    SUMMARY     Recommendations    Recommendation/Intervention: 1. Pt will consume and tolerate >50% of plate 2. Pt will consume and tolerate >50% of Boost Breeze  Goals: 1. Pt will consume and tolerate >50% of plate 2. Pt will consume and tolerate >50% of Boost Breeze  Nutrition Goal Status: new    Reason for Assessment    Reason For Assessment: identified at risk by screening criteria  Diagnosis: infection/sepsis  Inadequate oral intake related to PMH as evidence by BMI 18.51, Albumin 4.  Relevant Medical History: Depression, Gout, Hypotension  Interdisciplinary Rounds: attended  General Information Comments: Per caregiver, pt has been in and out of hospital for two years. Caregiver provides sufficient meals for pt and educates himself on proper nutrition for pt. RD provided handout on tips for pt to gain weight, information on Ensure Clear, and information on coconut oil. RD will f/o with pt in three days.   Nutrition Discharge Plannin. Pt will eat >50% of 6 small meals per day. 2. Caregiver will incorporate more cheese and butter into diet    Nutrition Risk Screen    Nutrition Risk Screen: (emaciated, no appetite)    Nutrition/Diet History    Patient Reported Diet/Restrictions/Preferences: general  Food Preferences: Good with lactose free products as well as cheese  Spiritual, Cultural Beliefs, Jainism Practices, Values that Affect Care: no  Supplemental Drinks or Food Habits: Ensure Clear(RD provided information on where to get Ensure Clear online)  Food Allergies: milk, peanut, soy(Milk is really lactose)  Factors Affecting Nutritional Intake: decreased appetite    Anthropometrics    Temp: 97.6 °F (36.4 °C)  Height Method: Stated  Height: 5' (152.4 cm)  Height (inches): 60 in  Weight Method: Bed Scale  Weight: 43 kg (94 lb 12.8 oz)  Weight (lb): 94.8 lb  Ideal Body Weight (IBW), Female: 100 lb  % Ideal Body Weight, Female (lb): 94.8  %  BMI (Calculated): 18.5  Usual Body Weight (UBW), k kg(Was 113 lb in 2018)  % Usual Body Weight: 84.49       Lab/Procedures/Meds    Pertinent Labs Reviewed: reviewed  Pertinent Labs Comments: BUN 55, Na 129, Potassium 3.3, Ca 7.1, Albumin 4.4  Pertinent Medications Reviewed: reviewed  Pertinent Medications Comments: Pantoprazole, Electrolytes - Na & Potassium    Physical Findings/Assessment         Estimated/Assessed Needs    Weight Used For Calorie Calculations: 43 kg (94 lb 12.8 oz)  Energy Calorie Requirements (kcal): 35-40 kcal/kg or 4933-5467.99 kcal per day(High kcal due to low BMI & sepsis)  Energy Need Method: Kcal/kg  Protein Requirements: 1.4-1.6 g/kg or 60-68.94 grams per day(High protein due to low BMI & Sepsis)  Weight Used For Protein Calculations: 43 kg (94 lb 12.8 oz)  Fluid Requirements (mL): 1505 ml  Estimated Fluid Requirement Method: RDA Method  RDA Method (mL): 35         Nutrition Prescription Ordered    Current Diet Order: Regular   Nutrition Order Comments: Allergic to milk, soy, protein     Evaluation of Received Nutrient/Fluid Intake       % Intake of Estimated Energy Needs: 0 - 25 %  % Meal Intake: 25 - 50 %    Nutrition Risk    Level of Risk/Frequency of Follow-up: moderate     Assessment and Plan    No new Assessment & Plan notes have been filed under this hospital service since the last note was generated.  Service: Nutrition       Monitor and Evaluation    Food and Nutrient Intake: energy intake, food and beverage intake  Food and Nutrient Adminstration: diet order  Anthropometric Measurements: weight  Biochemical Data, Medical Tests and Procedures: electrolyte and renal panel, gastrointestinal profile, glucose/endocrine profile, inflammatory profile, lipid profile  Nutrition-Focused Physical Findings: head and eyes, skin, overall appearance     Nutrition Follow-Up    RD Follow-up?: Yes

## 2020-01-27 NOTE — NURSING
"Pt remains hypotensive, B/P trending up. Spoke with Dr SHAN Simmons last pm. Pt received 1liter bolus, increased ns to 150 ml/hr. Pt received Midodrine 5mg x1 dose with positive out come. UO adequate avg @ 50ml/hr. Afebrile with avg temp 97.7. Pt alert and oriented to person/place. Pt did state this morning, " I am feeling better". Denies any needs at this time.  "

## 2020-01-27 NOTE — PLAN OF CARE
Problem: Oral Intake Inadequate Inadequate oral intake related to PMH as evidence by BMI 18.51, Albumin 4.  Goal: Improved Oral Intake  Outcome: Ongoing, Progressing  Intervention: Promote and Optimize Oral Intake  Flowsheets (Taken 1/27/2020 1144)  Oral Nutrition Promotion: calorie dense liquids provided; nutritional therapy counseling provided (RD talked to caregiver & ordered Boost Breeze)

## 2020-01-27 NOTE — ASSESSMENT & PLAN NOTE
01/26/2020:  · Sepsis most likely due to urinary tract infection although there is pneumonia by chest x-ray.  · Will begin Zosyn 3.375 g every 12 hr adjust per creatinine clearance.  · Will continue checking lactic acid per protocol  · Patient will be kept on nebulization treatments due to her history of asthma  · Monitor blood pressure closely due to history of possible renal insufficiency.  · Follow labs in the a.m. with CBC, CMP.    01/27/2020:  · See above plan.  · Follow cultures closely  · Replete potassium  · Transfuse 2 units packed red blood cells.  · Premedicate prior to transfusion

## 2020-01-27 NOTE — PLAN OF CARE
Plan of care discussed with pt; pt mildly confused but cooperative this morning; will need reinforcement. No family present at this time.

## 2020-01-27 NOTE — PLAN OF CARE
01/27/20 1655   Medicare Message   Important Message from Medicare regarding Discharge Appeal Rights Given to patient/caregiver;Explained to patient/caregiver;Signed/date by patient/caregiver   Date IMM was signed 01/27/20   Time IMM was signed 1200

## 2020-01-27 NOTE — ASSESSMENT & PLAN NOTE
01/26/2020:  · Begin Zosyn 3.375 g q.12 hours  · Begin vancomycin per pharmacy protocol  · Rehydrate aggressively.  · Repack check chest x-ray in the a.m.  · Will begin nebulization treatments for pulmonary toilet.    01/27/2020:  · Continue current antibiotics.  · Continue nebulization treatments with pulmonary toilet  · Chest x-ray this morning still confirms right lower lobe infiltrate.  · Repeat labs in the a.m. to include CBC CMP

## 2020-01-27 NOTE — PROGRESS NOTES
Pharmacokinetic Assessment Follow Up: IV Vancomycin    Vancomycin serum concentration assessment(s):    Random today @1500    Vancomycin Regimen Plan:    Vancomycin 500mg IV Q48H    Drug levels (last 3 results):  No results for input(s): VANCOMYCINRA, VANCOMYCINPE, VANCOMYCINTR, VANCOTROUGH in the last 72 hours.    Pharmacy will continue to follow and monitor vancomycin.    Please contact pharmacy at extension [phone] for questions regarding this assessment.    Thank you for the consult,   Olena Iniguez       Patient brief summary:  Obdulia Yepez is a 69 y.o. female initiated on antimicrobial therapy with IV Vancomycin for treatment of UTI    The patient's current regimen is Vancomycin 500mg IV x 1 (random levels pending), however today CrCl was improved and per protocol pt qualifies to be in a Q48H regimen.    Drug Allergies:   Review of patient's allergies indicates:   Allergen Reactions    Amoxicillin Diarrhea    Latex     Milk containing products     Opioids - morphine analogues     Peanut     Sodium phosphate     Soy     Sulfa (sulfonamide antibiotics)        Actual Body Weight:   43Kg    Renal Function:   Estimated Creatinine Clearance: 12 mL/min (A) (based on SCr of 3.1 mg/dL (H)).,     Dialysis Method (if applicable):  N/a      CBC (last 72 hours):  Recent Labs   Lab Result Units 01/26/20  1213 01/27/20  0540   WBC K/uL 22.05* 16.90*   Hemoglobin g/dL 8.7* 6.7*   Hematocrit % 27.2* 20.5*   Platelets K/uL 292 259   Gran% % 95.1* 97.5*   Lymph% % 2.0* 1.0*   Mono% % 1.3* 0.5*   Eosinophil% % 0.0 0.0   Basophil% % 0.1 0.1   Differential Method  Automated Automated       Metabolic Panel (last 72 hours):  Recent Labs   Lab Result Units 01/26/20  1213 01/26/20  1240 01/27/20  0540   Sodium mmol/L 126*  --  129*   Potassium mmol/L 4.4  --  3.3*   Chloride mmol/L 99  --  108   CO2 mmol/L 13*  --  11*   Glucose mg/dL 77  --  115*   Glucose, UA   --  Negative  --    BUN, Bld mg/dL 66*  --  55*   Creatinine mg/dL 4.3*   --  3.1*   Albumin g/dL 2.1*  --  1.7*   Total Bilirubin mg/dL 1.1*  --  0.5   Alkaline Phosphatase U/L 100  --  71   AST U/L 25  --  17   ALT U/L 14  --  9*   Magnesium mg/dL 1.8  --   --    Phosphorus mg/dL 5.7*  --   --        Vancomycin Administrations:  vancomycin given in the last 96 hours                     vancomycin 500 mg in  mL IVPB (ready to mix) (mg) 500 mg New Bag 01/26/20 1629                      Microbiologic Results:  Microbiology Results (last 7 days)       Procedure Component Value Units Date/Time    Blood culture x two cultures. Draw prior to antibiotics. [[id number]] Collected:  01/26/20 1212    Order Status:  Completed Specimen:  Blood Updated:  01/27/20 0715     Blood Culture, Routine No Growth to date    Narrative:       Aerobic and anaerobic    Blood culture x two cultures. Draw prior to antibiotics. [[id number]] Collected:  01/26/20 1220    Order Status:  Completed Specimen:  Blood Updated:  01/27/20 0715     Blood Culture, Routine No Growth to date    Narrative:       Aerobic and anaerobic    Urine culture [[id number]] Collected:  01/26/20 1240    Order Status:  No result Specimen:  Urine Updated:  01/26/20 1317

## 2020-01-27 NOTE — PROGRESS NOTES
[facility] - ICU Hospital Medicine  Progress Note    Patient Name: Obdulia Yepez  MRN: [id number]  Patient Class: IP- Inpatient   Admission Date: 1/26/2020  Length of Stay: 1 days  Attending Physician: Jimenez Patel MD  Primary Care Provider: Og Perez MD        Subjective:     Principal Problem:Sepsis due to Escherichia coli (e. coli)        HPI:  01/26/2020:  Patient is a 69-year-old female who presented to the emergency department complaining of weakness and decrease p.o. intake over the last couple of weeks.  But report from EMS stated that family reported the patient had had weakness over the last couple of days that worsened this morning when she awoke.  Patient has a past medical history significant for asthma, depression, gout, and hypotension.  There is no previous history noted of adrenal insufficiency.  Of the patient does take hydrocortisone on a daily basis.  Patient is unable to give a coherent history although she still seems to lose her train of thought very frequently.  Family also reports the patient has had altered mental status over the past few days.    Overview/Hospital Course:  Id evaluation emergency department revealed at a urinalysis which showed moderate bacteria white blood cell count greater than 100 and occult blood 3+ protein 2+ and trace ketones with 3+ leukocytes.  BNP was 240 and sodium 126 potassium 4.4 and bicarb 13 glucose 77 BUN 66 creatinine 4.3 calcium 7.7 albumin is 2.1 and GFR 11.5.  Magnesium 1.8 phosphorus 5.7 [place] [address] hemoglobin 8.7 hematocrit 27.2 and differential showed 95 granulocytes and 2 lymphocytes P lactic acid was 1.3 and INR was 1.2    01/27/2020:  Patient remains confused and not able to keep a train of thought.  Patient also appears weak.  Objectively patient is mildly improved with a white blood cell count down to 16,000. However patient is anemic at hemoglobin 6.7 hematocrit 20 M CV is 97 and differential  shows 85 granulocytes 1 lymphocyte.  Cultures are negative so far.  Patient denies pain. Will obtain CT scan of the head today without contrast.  Due to mental status changes.  Repeat labs in the a.m. to include CBC CMP magnesium and phosphate.  Patient will be bolused 1 L normal saline due to azotemia and low blood pressure.  Will otherwise follow as clinically indicated.    Interval History:     Review of Systems   Constitutional: Positive for activity change, appetite change and fatigue. Negative for fever.   HENT: Negative for congestion, ear discharge, mouth sores, nosebleeds, rhinorrhea, sinus pressure, sinus pain and tinnitus.    Eyes: Negative.  Negative for pain, redness and itching.   Respiratory: Positive for wheezing. Negative for apnea, cough, choking, shortness of breath and stridor.    Cardiovascular: Negative for chest pain, palpitations and leg swelling.   Gastrointestinal: Negative for abdominal distention, abdominal pain, anal bleeding, blood in stool, constipation and diarrhea.   Endocrine: Negative.    Genitourinary: Negative for difficulty urinating, flank pain, frequency and urgency.   Musculoskeletal: Positive for arthralgias and myalgias. Negative for back pain and gait problem.   Skin: Negative for color change and pallor.   Allergic/Immunologic: Negative.    Neurological: Positive for weakness and light-headedness. Negative for dizziness, facial asymmetry and headaches.   Hematological: Negative for adenopathy. Does not bruise/bleed easily.   Psychiatric/Behavioral: Positive for confusion. The patient is nervous/anxious.      Objective:     Vital Signs (Most Recent):  Temp: 97.6 °F (36.4 °C) (01/27/20 0800)  Pulse: 110 (01/27/20 1055)  Resp: 14 (01/27/20 1055)  BP: 99/68 (01/27/20 1047)  SpO2: 100 % (01/27/20 1055) Vital Signs (24h Range):  Temp:  [97.5 °F (36.4 °C)-98 °F (36.7 °C)] 97.6 °F (36.4 °C)  Pulse:  [] 110  Resp:  [6-16] 14  SpO2:  [96 %-100 %] 100 %  BP: ()/(42-68)  99/68     Weight: 43 kg (94 lb 12.8 oz)  Body mass index is 18.51 kg/m².    Intake/Output Summary (Last 24 hours) at 1/27/2020 1100  Last data filed at 1/27/2020 0800  Gross per 24 hour   Intake 4439.17 ml   Output 900 ml   Net 3539.17 ml      Physical Exam   Constitutional: She is oriented to person, place, and time. She appears well-developed and well-nourished.   HENT:   Head: Normocephalic and atraumatic.   Eyes: Pupils are equal, round, and reactive to light. EOM are normal.   Neck: Normal range of motion. Neck supple. No tracheal deviation present. No thyromegaly present.   Cardiovascular: Normal rate, regular rhythm, normal heart sounds and intact distal pulses.   Pulmonary/Chest: Effort normal. No stridor. No respiratory distress. She has wheezes. She has rales.   Abdominal: Soft. Bowel sounds are normal. She exhibits no distension. There is no tenderness. There is no rebound and no guarding.   Musculoskeletal: Normal range of motion.   Lymphadenopathy:     She has no cervical adenopathy.   Neurological: She is alert and oriented to person, place, and time. A sensory deficit is present.   Skin: Skin is warm and dry. Capillary refill takes less than 2 seconds.   Psychiatric: She has a normal mood and affect. Her behavior is normal. Judgment and thought content normal.   Nursing note and vitals reviewed.      Significant Labs:   Recent Lab Results       01/27/20  0850   01/27/20  0540   01/26/20  1506   01/26/20  1240   01/26/20  1220        Albumin   1.7           Alkaline Phosphatase   71           ALT   9           Anion Gap   10           Appearance, UA       Cloudy       aPTT               AST   17           Bacteria, UA       Moderate       BANDS   13.0           Baso #               Basophil%   0.0  Comment:  Corrected result; previously reported as 0.1 on 01/27/2020 at 06:22.[C]           Bilirubin (UA)       Negative       BILIRUBIN TOTAL   0.5  Comment:  For infants and newborns, interpretation of  results should be based  on gestational age, weight and in agreement with clinical  observations.  Premature Infant recommended reference ranges:  Up to 24 hours.............<8.0 mg/dL  Up to 48 hours............<12.0 mg/dL  3-5 days..................<15.0 mg/dL  6-29 days.................<15.0 mg/dL             Blood Culture, Routine         No Growth to date[P]     BNP               BUN, Bld   55           Calcium   7.1           Chloride   108           CO2   11           Color, UA       Yellow       Creatinine   3.1           Differential Method   Automated           eGFR if    17.2           eGFR if non    14.9  Comment:  Calculation used to obtain the estimated glomerular filtration  rate (eGFR) is the CKD-EPI equation.              Eos #               Eosinophil%   0.0           Glucose   115           Glucose, UA       Negative       Gran # (ANC)               Gran%   85.0  Comment:  Corrected result; previously reported as 97.5 on 01/27/2020 at 06:22.[C]           Group & Rh O POS             Hematocrit   20.5           Hemoglobin   6.7           Hyaline Casts, UA       0       Hypo   Moderate           Immature Grans (Abs)   Test Not Performed  Comment:  Mild elevation in immature granulocytes is non specific and   can be seen in a variety of conditions including stress response,   acute inflammation, trauma and pregnancy. Correlation with other   laboratory and clinical findings is essential.  Corrected result; previously reported as 0.15 on 01/27/2020 at 06:22.  [C]           Immature Granulocytes   Test Not Performed  Comment:  Corrected result; previously reported as 0.9 on 01/27/2020 at 06:22.[C]           Indirect Moi GEL NEG             INR               Ketones, UA       Trace       Lactate, Brown     0.6  Comment:  Falsely low lactic acid results can be found in samples   containing >=13.0 mg/dL total bilirubin and/or >=3.5 mg/dL   direct bilirubin.            Leukocytes, UA       3+       Lymph #               Lymph%   1.0           Magnesium               MCH   31.8           MCHC   32.7           MCV   97           Microscopic Comment       SEE COMMENT  Comment:  Other formed elements not mentioned in the report are not   present in the microscopic examination.          Mono #               Mono%   1.0  Comment:  Corrected result; previously reported as 0.5 on 01/27/2020 at 06:22.[C]           MPV   9.7           NITRITE UA       Negative       nRBC   0           Occult Blood UA       3+       pH, UA       6.0       Phosphorus               Platelets   259           Potassium   3.3           PROTEIN TOTAL   4.4           Protein, UA       2+  Comment:  Recommend a 24 hour urine protein or a urine   protein/creatinine ratio if globulin induced proteinuria is  clinically suspected.         Protime               RBC   2.11           RBC, UA       10       RDW   13.2           Sodium   129           Specific Gravity, UA       1.020       Specimen UA       Urine, Clean Catch       UROBILINOGEN UA       Negative       WBC, UA       >100       WBC   16.90                            01/26/20  1213   01/26/20  1212        Albumin 2.1       Alkaline Phosphatase 100       ALT 14       Anion Gap 14       Appearance, UA         aPTT 33.0       AST 25       Bacteria, UA         BANDS         Baso # 0.03       Basophil% 0.1       Bilirubin (UA)         BILIRUBIN TOTAL 1.1  Comment:  For infants and newborns, interpretation of results should be based  on gestational age, weight and in agreement with clinical  observations.  Premature Infant recommended reference ranges:  Up to 24 hours.............<8.0 mg/dL  Up to 48 hours............<12.0 mg/dL  3-5 days..................<15.0 mg/dL  6-29 days.................<15.0 mg/dL         Blood Culture, Routine   No Growth to date[P]       Comment:  Values of less than 100 pg/ml are consistent with non-CHF populations.       BUN, Bld  66       Calcium 7.7       Chloride 99       CO2 13       Color, UA         Creatinine 4.3       Differential Method Automated       eGFR if African American 11.5       eGFR if non  10.0  Comment:  Calculation used to obtain the estimated glomerular filtration  rate (eGFR) is the CKD-EPI equation.          Eos # 0.0       Eosinophil% 0.0       Glucose 77       Glucose, UA         Gran # (ANC) 21.0       Gran% 95.1       Group & Rh         Hematocrit 27.2       Hemoglobin 8.7       Hyaline Casts, UA         Hypo         Immature Grans (Abs) 0.32  Comment:  Mild elevation in immature granulocytes is non specific and   can be seen in a variety of conditions including stress response,   acute inflammation, trauma and pregnancy. Correlation with other   laboratory and clinical findings is essential.         Immature Granulocytes 1.5       Indirect Moi GEL         INR 1.2  Comment:  Coumadin Therapy:  2.0 - 3.0 for INR for all indicators except mechanical heart valves  and antiphospholipid syndromes which should use 2.5 - 3.5.         Ketones, UA         Lactate, Brown 1.3  Comment:  Falsely low lactic acid results can be found in samples   containing >=13.0 mg/dL total bilirubin and/or >=3.5 mg/dL   direct bilirubin.         Leukocytes, UA         Lymph # 0.5       Lymph% 2.0       Magnesium 1.8       MCH 32.0       MCHC 32.0              Microscopic Comment         Mono # 0.3       Mono% 1.3       MPV 9.8       NITRITE UA         nRBC 0       Occult Blood UA         pH, UA         Phosphorus 5.7       Platelets 292       Potassium 4.4       PROTEIN TOTAL 5.2       Protein, UA         Protime 13.3       RBC 2.72       RBC, UA         RDW 13.2       Sodium 126       Specific Gravity, UA         Specimen UA         UROBILINOGEN UA         WBC, UA         WBC 22.05           All pertinent labs within the past 24 hours have been reviewed.    Significant Imaging: I have reviewed and interpreted all  pertinent imaging results/findings within the past 24 hours.      Assessment/Plan:      * Sepsis due to Escherichia coli (e. coli)  01/26/2020:  · Sepsis most likely due to urinary tract infection although there is pneumonia by chest x-ray.  · Will begin Zosyn 3.375 g every 12 hr adjust per creatinine clearance.  · Will continue checking lactic acid per protocol  · Patient will be kept on nebulization treatments due to her history of asthma  · Monitor blood pressure closely due to history of possible renal insufficiency.  · Follow labs in the a.m. with CBC, CMP.    01/27/2020:  · See above plan.  · Follow cultures closely  · Replete potassium  · Transfuse 2 units packed red blood cells.  · Premedicate prior to transfusion      Pneumonia due to infectious organism  01/26/2020:  · Begin Zosyn 3.375 g q.12 hours  · Begin vancomycin per pharmacy protocol  · Rehydrate aggressively.  · Repack check chest x-ray in the a.m.  · Will begin nebulization treatments for pulmonary toilet.    01/27/2020:  · Continue current antibiotics.  · Continue nebulization treatments with pulmonary toilet  · Chest x-ray this morning still confirms right lower lobe infiltrate.  · Repeat labs in the a.m. to include CBC CMP        VTE Risk Mitigation (From admission, onward)         Ordered     IP VTE LOW RISK PATIENT  Once      01/26/20 1407     Place MORIS hose  Until discontinued      01/26/20 1407                      Jimenez Patel MD  Department of Hospital Medicine   [facility]

## 2020-01-28 LAB
ALBUMIN SERPL BCP-MCNC: 1.8 G/DL (ref 3.5–5.2)
ALP SERPL-CCNC: 72 U/L (ref 55–135)
ALT SERPL W/O P-5'-P-CCNC: 12 U/L (ref 10–44)
ANION GAP SERPL CALC-SCNC: 6 MMOL/L (ref 8–16)
AST SERPL-CCNC: 18 U/L (ref 10–40)
BASOPHILS # BLD AUTO: 0.01 K/UL (ref 0–0.2)
BASOPHILS NFR BLD: 0.1 % (ref 0–1.9)
BILIRUB SERPL-MCNC: 1.1 MG/DL (ref 0.1–1)
BUN SERPL-MCNC: 42 MG/DL (ref 8–23)
CALCIUM SERPL-MCNC: 8.2 MG/DL (ref 8.7–10.5)
CHLORIDE SERPL-SCNC: 117 MMOL/L (ref 95–110)
CO2 SERPL-SCNC: 13 MMOL/L (ref 23–29)
CREAT SERPL-MCNC: 2.1 MG/DL (ref 0.5–1.4)
DIFFERENTIAL METHOD: ABNORMAL
EOSINOPHIL # BLD AUTO: 0 K/UL (ref 0–0.5)
EOSINOPHIL NFR BLD: 0.1 % (ref 0–8)
ERYTHROCYTE [DISTWIDTH] IN BLOOD BY AUTOMATED COUNT: 15 % (ref 11.5–14.5)
EST. GFR  (AFRICAN AMERICAN): 27.5 ML/MIN/1.73 M^2
EST. GFR  (NON AFRICAN AMERICAN): 23.8 ML/MIN/1.73 M^2
GLUCOSE SERPL-MCNC: 102 MG/DL (ref 70–110)
HCT VFR BLD AUTO: 29.7 % (ref 37–48.5)
HGB BLD-MCNC: 10.2 G/DL (ref 12–16)
IMM GRANULOCYTES # BLD AUTO: 0.14 K/UL (ref 0–0.04)
IMM GRANULOCYTES NFR BLD AUTO: 0.9 % (ref 0–0.5)
LYMPHOCYTES # BLD AUTO: 0.6 K/UL (ref 1–4.8)
LYMPHOCYTES NFR BLD: 3.8 % (ref 18–48)
MAGNESIUM SERPL-MCNC: 1.4 MG/DL (ref 1.6–2.6)
MCH RBC QN AUTO: 30.9 PG (ref 27–31)
MCHC RBC AUTO-ENTMCNC: 34.3 G/DL (ref 32–36)
MCV RBC AUTO: 90 FL (ref 82–98)
MONOCYTES # BLD AUTO: 0.4 K/UL (ref 0.3–1)
MONOCYTES NFR BLD: 2.5 % (ref 4–15)
NEUTROPHILS # BLD AUTO: 13.8 K/UL (ref 1.8–7.7)
NEUTROPHILS NFR BLD: 92.6 % (ref 38–73)
NRBC BLD-RTO: 0 /100 WBC
PHOSPHATE SERPL-MCNC: 3.5 MG/DL (ref 2.7–4.5)
PLATELET # BLD AUTO: 255 K/UL (ref 150–350)
PMV BLD AUTO: 9.5 FL (ref 9.2–12.9)
POTASSIUM SERPL-SCNC: 3.2 MMOL/L (ref 3.5–5.1)
PROT SERPL-MCNC: 4.6 G/DL (ref 6–8.4)
RBC # BLD AUTO: 3.3 M/UL (ref 4–5.4)
SODIUM SERPL-SCNC: 136 MMOL/L (ref 136–145)
WBC # BLD AUTO: 14.94 K/UL (ref 3.9–12.7)

## 2020-01-28 PROCEDURE — 80053 COMPREHEN METABOLIC PANEL: CPT

## 2020-01-28 PROCEDURE — 63600175 PHARM REV CODE 636 W HCPCS: Performed by: INTERNAL MEDICINE

## 2020-01-28 PROCEDURE — 94761 N-INVAS EAR/PLS OXIMETRY MLT: CPT

## 2020-01-28 PROCEDURE — 25000003 PHARM REV CODE 250: Performed by: HOSPITALIST

## 2020-01-28 PROCEDURE — 84100 ASSAY OF PHOSPHORUS: CPT

## 2020-01-28 PROCEDURE — C9113 INJ PANTOPRAZOLE SODIUM, VIA: HCPCS | Performed by: INTERNAL MEDICINE

## 2020-01-28 PROCEDURE — 25000242 PHARM REV CODE 250 ALT 637 W/ HCPCS: Performed by: INTERNAL MEDICINE

## 2020-01-28 PROCEDURE — 99232 SBSQ HOSP IP/OBS MODERATE 35: CPT | Mod: ,,, | Performed by: INTERNAL MEDICINE

## 2020-01-28 PROCEDURE — 83735 ASSAY OF MAGNESIUM: CPT

## 2020-01-28 PROCEDURE — 94640 AIRWAY INHALATION TREATMENT: CPT

## 2020-01-28 PROCEDURE — 63600175 PHARM REV CODE 636 W HCPCS: Performed by: HOSPITALIST

## 2020-01-28 PROCEDURE — 97530 THERAPEUTIC ACTIVITIES: CPT

## 2020-01-28 PROCEDURE — 86580 TB INTRADERMAL TEST: CPT | Performed by: INTERNAL MEDICINE

## 2020-01-28 PROCEDURE — 30200315 PPD INTRADERMAL TEST REV CODE 302: Performed by: INTERNAL MEDICINE

## 2020-01-28 PROCEDURE — 99232 PR SUBSEQUENT HOSPITAL CARE,LEVL II: ICD-10-PCS | Mod: ,,, | Performed by: INTERNAL MEDICINE

## 2020-01-28 PROCEDURE — 97166 OT EVAL MOD COMPLEX 45 MIN: CPT

## 2020-01-28 PROCEDURE — 25000003 PHARM REV CODE 250: Performed by: INTERNAL MEDICINE

## 2020-01-28 PROCEDURE — 20000000 HC ICU ROOM

## 2020-01-28 PROCEDURE — 97161 PT EVAL LOW COMPLEX 20 MIN: CPT

## 2020-01-28 PROCEDURE — 85025 COMPLETE CBC W/AUTO DIFF WBC: CPT

## 2020-01-28 RX ORDER — POTASSIUM CHLORIDE 20 MEQ/1
20 TABLET, EXTENDED RELEASE ORAL 2 TIMES DAILY
Status: DISCONTINUED | OUTPATIENT
Start: 2020-01-28 | End: 2020-01-29 | Stop reason: HOSPADM

## 2020-01-28 RX ORDER — MAGNESIUM SULFATE HEPTAHYDRATE 40 MG/ML
2 INJECTION, SOLUTION INTRAVENOUS ONCE
Status: COMPLETED | OUTPATIENT
Start: 2020-01-28 | End: 2020-01-28

## 2020-01-28 RX ORDER — POTASSIUM CHLORIDE 20 MEQ/1
40 TABLET, EXTENDED RELEASE ORAL ONCE
Status: COMPLETED | OUTPATIENT
Start: 2020-01-28 | End: 2020-01-28

## 2020-01-28 RX ORDER — POTASSIUM CHLORIDE 7.45 MG/ML
10 INJECTION INTRAVENOUS ONCE
Status: DISCONTINUED | OUTPATIENT
Start: 2020-01-28 | End: 2020-01-29 | Stop reason: HOSPADM

## 2020-01-28 RX ORDER — MAGNESIUM SULFATE HEPTAHYDRATE 40 MG/ML
2 INJECTION, SOLUTION INTRAVENOUS ONCE
Status: DISCONTINUED | OUTPATIENT
Start: 2020-01-28 | End: 2020-01-29 | Stop reason: HOSPADM

## 2020-01-28 RX ADMIN — PANTOPRAZOLE SODIUM 40 MG: 40 INJECTION, POWDER, LYOPHILIZED, FOR SOLUTION INTRAVENOUS at 08:01

## 2020-01-28 RX ADMIN — MAGNESIUM SULFATE IN WATER 2 G: 40 INJECTION, SOLUTION INTRAVENOUS at 07:01

## 2020-01-28 RX ADMIN — IPRATROPIUM BROMIDE AND ALBUTEROL SULFATE 3 ML: .5; 3 SOLUTION RESPIRATORY (INHALATION) at 11:01

## 2020-01-28 RX ADMIN — CEFTRIAXONE 2 G: 2 INJECTION, SOLUTION INTRAVENOUS at 12:01

## 2020-01-28 RX ADMIN — SODIUM CHLORIDE: 0.9 INJECTION, SOLUTION INTRAVENOUS at 12:01

## 2020-01-28 RX ADMIN — POTASSIUM CHLORIDE 40 MEQ: 1500 TABLET, EXTENDED RELEASE ORAL at 07:01

## 2020-01-28 RX ADMIN — SODIUM CHLORIDE 1000 ML: 0.9 INJECTION, SOLUTION INTRAVENOUS at 07:01

## 2020-01-28 RX ADMIN — OXYBUTYNIN CHLORIDE 5 MG: 5 TABLET ORAL at 08:01

## 2020-01-28 RX ADMIN — IPRATROPIUM BROMIDE AND ALBUTEROL SULFATE 3 ML: .5; 3 SOLUTION RESPIRATORY (INHALATION) at 07:01

## 2020-01-28 RX ADMIN — ESCITALOPRAM OXALATE 20 MG: 10 TABLET, FILM COATED ORAL at 08:01

## 2020-01-28 RX ADMIN — POTASSIUM CHLORIDE 20 MEQ: 1500 TABLET, EXTENDED RELEASE ORAL at 09:01

## 2020-01-28 RX ADMIN — PIPERACILLIN AND TAZOBACTAM 3.38 G: 3; .375 INJECTION, POWDER, LYOPHILIZED, FOR SOLUTION INTRAVENOUS; PARENTERAL at 03:01

## 2020-01-28 RX ADMIN — VANCOMYCIN HYDROCHLORIDE 500 MG: 500 INJECTION, POWDER, LYOPHILIZED, FOR SOLUTION INTRAVENOUS at 05:01

## 2020-01-28 RX ADMIN — IPRATROPIUM BROMIDE AND ALBUTEROL SULFATE 3 ML: .5; 3 SOLUTION RESPIRATORY (INHALATION) at 03:01

## 2020-01-28 RX ADMIN — FLUTICASONE FUROATE AND VILANTEROL TRIFENATATE 1 PUFF: 100; 25 POWDER RESPIRATORY (INHALATION) at 07:01

## 2020-01-28 RX ADMIN — SODIUM CHLORIDE 1000 ML: 0.9 INJECTION, SOLUTION INTRAVENOUS at 06:01

## 2020-01-28 RX ADMIN — TUBERCULIN PURIFIED PROTEIN DERIVATIVE 5 UNITS: 5 INJECTION, SOLUTION INTRADERMAL at 01:01

## 2020-01-28 RX ADMIN — SODIUM CHLORIDE 1000 ML: 0.9 INJECTION, SOLUTION INTRAVENOUS at 12:01

## 2020-01-28 RX ADMIN — OXYBUTYNIN CHLORIDE 5 MG: 5 TABLET ORAL at 09:01

## 2020-01-28 NOTE — PLAN OF CARE
"Pt received 2nd unit PRBC during pm shift. Tolerated well, no adverse effects noted. VSS during pm shift. Remains disoriented to time, pleasantly confused at times. Tolerating po fluids, enjoys the "Boost Breeze" supplement drinks. Discussed with pt and spouse to supplement boost drinks with meals. Verbalized understanding.   "

## 2020-01-28 NOTE — ASSESSMENT & PLAN NOTE
01/26/2020:  · Sepsis most likely due to urinary tract infection although there is pneumonia by chest x-ray.  · Will begin Zosyn 3.375 g every 12 hr adjust per creatinine clearance.  · Will continue checking lactic acid per protocol  · Patient will be kept on nebulization treatments due to her history of asthma  · Monitor blood pressure closely due to history of possible renal insufficiency.  · Follow labs in the a.m. with CBC, CMP.    01/27/2020:  · See above plan.  · Follow cultures closely  · Replete potassium  · Transfuse 2 units packed red blood cells.  · Premedicate prior to transfusion    01/28/2020:  · Continue hydration,  · Continue IV antibiotics.  · Patient seems to have a failure to thrive and may need rehab however I am going to put in a consult for LTAC approval and recovery  · Repeat labs in the morning.  · Repeat urinalysis  · Consult PT, OT, case management, and place PPD

## 2020-01-28 NOTE — PROGRESS NOTES
[facility] - ICU Hospital Medicine  Progress Note    Patient Name: Obdulia Yepez  MRN: [id number]  Patient Class: IP- Inpatient   Admission Date: 1/26/2020  Length of Stay: 2 days  Attending Physician: Jimenez Patel MD  Primary Care Provider: Og Perez MD        Subjective:     Principal Problem:Sepsis due to Escherichia coli (e. coli)        HPI:  01/26/2020:  Patient is a 69-year-old female who presented to the emergency department complaining of weakness and decrease p.o. intake over the last couple of weeks.  But report from EMS stated that family reported the patient had had weakness over the last couple of days that worsened this morning when she awoke.  Patient has a past medical history significant for asthma, depression, gout, and hypotension.  There is no previous history noted of adrenal insufficiency.  Of the patient does take hydrocortisone on a daily basis.  Patient is unable to give a coherent history although she still seems to lose her train of thought very frequently.  Family also reports the patient has had altered mental status over the past few days.    Overview/Hospital Course:  Id evaluation emergency department revealed at a urinalysis which showed moderate bacteria white blood cell count greater than 100 and occult blood 3+ protein 2+ and trace ketones with 3+ leukocytes.  BNP was 240 and sodium 126 potassium 4.4 and bicarb 13 glucose 77 BUN 66 creatinine 4.3 calcium 7.7 albumin is 2.1 and GFR 11.5.  Magnesium 1.8 phosphorus 5.7 white blood cell count is 11825 hemoglobin 8.7 hematocrit 27.2 and differential showed 95 granulocytes and 2 lymphocytes P lactic acid was 1.3 and INR was 1.2    01/27/2020:  · Patient remains confused and not able to keep a train of thought.  Patient also appears weak.  Objectively patient is mildly improved with a white blood cell count down to 16,000. However patient is anemic at hemoglobin 6.7 hematocrit 20 M CV is 97 and differential  shows 85 granulocytes 1 lymphocyte.  Cultures are negative so far.  Patient denies pain. Will obtain CT scan of the head today without contrast.  Due to mental status changes.  Repeat labs in the a.m. to include CBC CMP magnesium and phosphate.  Patient will be bolused 1 L normal saline due to azotemia and low blood pressure.  Will otherwise follow as clinically indicated.    01/28/2020:  · Patient is more oriented this morning however she still feels like the year is 1967.  However she did know the president is [facility] and she did no the month is January.  She states that she remembers 2 days ago before she came in to the hospital.  Therefore she did know she was here for 2 days.  Labs show magnesium 1.4 potassium 3.2 BUN creatinine were 42 and 2.1 and white blood cell count is down to 14,000. Sodium 136 potassium 3.2  CBC shows white blood cell count 14.9 hemoglobin 10.2 hematocrit 29.7 platelets 255 and differential showed 92 granulocytes and 3.8 lymphocytes 2.5 monocytes    Interval History:     Review of Systems   Constitutional: Positive for activity change and appetite change. Negative for fatigue and fever.   HENT: Negative for congestion, ear discharge, mouth sores, nosebleeds, rhinorrhea, sinus pressure, sinus pain and tinnitus.    Eyes: Negative.  Negative for pain, redness and itching.   Respiratory: Negative for apnea, cough, choking, chest tightness, shortness of breath, wheezing and stridor.    Cardiovascular: Negative for chest pain, palpitations and leg swelling.   Gastrointestinal: Positive for abdominal pain. Negative for abdominal distention, anal bleeding, blood in stool, constipation and diarrhea.   Endocrine: Negative.    Genitourinary: Negative for difficulty urinating, flank pain, frequency and urgency.   Musculoskeletal: Positive for arthralgias. Negative for back pain, gait problem and myalgias.   Skin: Negative for color change and pallor.   Allergic/Immunologic: Negative.    Neurological:  Positive for dizziness and light-headedness. Negative for facial asymmetry, weakness and headaches.   Hematological: Negative for adenopathy. Does not bruise/bleed easily.   Psychiatric/Behavioral: Positive for confusion. The patient is nervous/anxious.      Objective:     Vital Signs (Most Recent):  Temp: 98.1 °F (36.7 °C) (01/28/20 0800)  Pulse: 96 (01/28/20 1113)  Resp: 14 (01/28/20 1113)  BP: 112/87 (01/28/20 1000)  SpO2: 98 % (01/28/20 1113) Vital Signs (24h Range):  Temp:  [97.1 °F (36.2 °C)-98.1 °F (36.7 °C)] 98.1 °F (36.7 °C)  Pulse:  [] 96  Resp:  [10-16] 14  SpO2:  [97 %-99 %] 98 %  BP: ()/(57-87) 112/87     Weight: 47.3 kg (104 lb 4.4 oz)  Body mass index is 20.37 kg/m².    Intake/Output Summary (Last 24 hours) at 1/28/2020 1120  Last data filed at 1/28/2020 0910  Gross per 24 hour   Intake 5202.67 ml   Output 1300 ml   Net 3902.67 ml      Physical Exam   Constitutional: She is oriented to person, place, and time. She appears well-developed and well-nourished.   HENT:   Head: Normocephalic and atraumatic.   Eyes: Pupils are equal, round, and reactive to light. EOM are normal.   Neck: Normal range of motion. Neck supple. No tracheal deviation present. No thyromegaly present.   Cardiovascular: Normal rate, regular rhythm and normal heart sounds.   Pulmonary/Chest: Effort normal. She has wheezes. She has rales.   Abdominal: Soft. Bowel sounds are normal. She exhibits no distension. There is tenderness. There is no rebound and no guarding.   Musculoskeletal: Normal range of motion.   Lymphadenopathy:     She has no cervical adenopathy.   Neurological: She is alert and oriented to person, place, and time.   Skin: Skin is warm and dry. Capillary refill takes less than 2 seconds.   Nursing note and vitals reviewed.      Significant Labs:   Recent Lab Results       01/28/20  0425   01/27/20  1602        Albumin 1.8       Alkaline Phosphatase 72       ALT 12       Anion Gap 6       AST 18       Baso #  0.01       Basophil% 0.1       BILIRUBIN TOTAL 1.1  Comment:  For infants and newborns, interpretation of results should be based  on gestational age, weight and in agreement with clinical  observations.  Premature Infant recommended reference ranges:  Up to 24 hours.............<8.0 mg/dL  Up to 48 hours............<12.0 mg/dL  3-5 days..................<15.0 mg/dL  6-29 days.................<15.0 mg/dL         BUN, Bld 42       Calcium 8.2       Chloride 117       CO2 13       Creatinine 2.1       Differential Method Automated       eGFR if African American 27.5       eGFR if non  23.8  Comment:  Calculation used to obtain the estimated glomerular filtration  rate (eGFR) is the CKD-EPI equation.          Eos # 0.0       Eosinophil% 0.1       Glucose 102       Gran # (ANC) 13.8       Gran% 92.6       Hematocrit 29.7       Hemoglobin 10.2       Immature Grans (Abs) 0.14  Comment:  Mild elevation in immature granulocytes is non specific and   can be seen in a variety of conditions including stress response,   acute inflammation, trauma and pregnancy. Correlation with other   laboratory and clinical findings is essential.         Immature Granulocytes 0.9       Lymph # 0.6       Lymph% 3.8       Magnesium 1.4       MCH 30.9       MCHC 34.3       MCV 90       Mono # 0.4       Mono% 2.5       MPV 9.5       nRBC 0       Phosphorus 3.5       Platelets 255       Potassium 3.2       PROTEIN TOTAL 4.6       RBC 3.30       RDW 15.0       Sodium 136       Vancomycin, Random   4.9     WBC 14.94           All pertinent labs within the past 24 hours have been reviewed.    Significant Imaging: I have reviewed and interpreted all pertinent imaging results/findings within the past 24 hours.      Assessment/Plan:      * Sepsis due to Escherichia coli (e. coli)  01/26/2020:  · Sepsis most likely due to urinary tract infection although there is pneumonia by chest x-ray.  · Will begin Zosyn 3.375 g every 12 hr adjust per  creatinine clearance.  · Will continue checking lactic acid per protocol  · Patient will be kept on nebulization treatments due to her history of asthma  · Monitor blood pressure closely due to history of possible renal insufficiency.  · Follow labs in the a.m. with CBC, CMP.    01/27/2020:  · See above plan.  · Follow cultures closely  · Replete potassium  · Transfuse 2 units packed red blood cells.  · Premedicate prior to transfusion    01/28/2020:  · Continue hydration,  · Continue IV antibiotics.  · Patient seems to have a failure to thrive and may need rehab however I am going to put in a consult for LTAC approval and recovery  · Repeat labs in the morning.  · Repeat urinalysis  · Consult PT, OT, case management, and place PPD      Pneumonia due to infectious organism  01/26/2020:  · Begin Zosyn 3.375 g q.12 hours  · Begin vancomycin per pharmacy protocol  · Rehydrate aggressively.  · Repack check chest x-ray in the a.m.  · Will begin nebulization treatments for pulmonary toilet.    01/27/2020:  · Continue current antibiotics.  · Continue nebulization treatments with pulmonary toilet  · Chest x-ray this morning still confirms right lower lobe infiltrate.  · Repeat labs in the a.m. to include CBC CMP        VTE Risk Mitigation (From admission, onward)         Ordered     IP VTE LOW RISK PATIENT  Once      01/26/20 1407     Place MORIS lentze  Until discontinued      01/26/20 1407                      Jimenez Patel MD  Department of Hospital Medicine   [facility]

## 2020-01-28 NOTE — PT/OT/SLP EVAL
Occupational Therapy   Evaluation    Name: Obdulia Yepez  MRN: [id number]  Admitting Diagnosis:  Sepsis due to Escherichia coli (e. coli)      Recommendations:     Discharge Recommendations:  ECF vs SNF pending patient medical status   Discharge Equipment Recommendations:   patient has equipment  Barriers to discharge:   ability for  to provide care at the level required     Assessment:     Obdulia Yepez is a 69 y.o. female with a medical diagnosis of Sepsis due to Escherichia coli (e. coli).  She presents with weakness and altered mental status. Performance deficits affecting function:  .muscle weakness, cognition, impaired balance, impaired mobility.      Rehab Prognosis: Fair; patient would benefit from acute skilled OT services to address these deficits and reach maximum level of function.       Plan:     Patient to be seen   once daily M-F to address the above listed problems via  OT services  · Plan of Care Expires:  02/04/2020  · Plan of Care Reviewed with:  patient    Subjective     Chief Complaint: weakness and altered mental status  Patient/Family Comments/goals: patient is confused and having random conversations    Occupational Profile:  Living Environment: lives in [place] with   Previous level of function: patient was limited in mobility due to multiple falls and fractures occurring over the last 6 months or so. She used her RW for short distances inside the home.  Roles and Routines: patient with decline in function over recent months with fractures/injuries from falls.  Equipment Used at Home:   RW, wheelchair, BSCC  Assistance upon Discharge: significant assistance required     Pain/Comfort:  ·  back discomfort initially in transfer, but then she reported no complaints upon sitting upright with support EOB    Patients cultural, spiritual, [employer] conflicts given the current situation:      Objective:     Communicated with:   Patient found HOB elevated with   upon OT entry to  room.    General Precautions: Standard,   Fall  Orthopedic Precautions:  history of multiple orthopedic trauma; BUE's and RLE  Braces:   NA    Occupational Performance:    Bed Mobility:    · Patient completed Rolling/Turning to Left with  maximal assistance  · Patient completed Sit to Supine with maximal assistance    Functional Mobility/Transfers:  · Activity not performed     Activities of Daily Living:  · Feeding-MIN to MOD Assist-patient observed continuing to chew for a long period of time   · Grooming-MAX Assist (UE limitations)  · UB dressing-MAX Assist  · LB dressing MAX Assist  · Toileting-Total (catheter)  · Bathing-MAX Assist    Cognitive/Visual Perceptual:  Cognitive/Psychosocial Skills:     -       Oriented to: Person and Place but patient is clearly demonstrating confusion in speech and conversations. Patient thinks her dog is stepping on her mouth. Patient thinks she is holding something in her hand and asks her  to hold it for her. She is impaired in folllowing commands.     Physical Exam:  Upper Extremity Range of Motion:     -       Right Upper Extremity: Deficits: impaired overhead mobility  -       Left Upper Extremity: Deficits: impaired overhead mobility    AMPAC 6 Click ADL:  AMPAC Total Score:      Treatment & Education:  Patient required MAX assist to transfer to EOB from HOB. Patient required MOD Assist to maintain static sitting edge of bed. Patient maintained static sitting ~5 mins and then requested return to bed. Patient requires MAX  Assist EOB to supine. Total Assist x 2 to scoot patient up in bed.   Education:    Patient left HOB elevated with all lines intact, bed alarm on and  present    GOALS:   Patient will tolerate sitting up edge of bed unsupported up to 1 minute.   Patient will perform BUE AROM to improve mobility and strength for transfers.   Patient will perform rolling in bed R and L MIN Assist.     History:     Past Medical History:   Diagnosis Date     Allergy     Arthritis     Asthma     Depression     Gout     Hypotension     Sepsis        Past Surgical History:   Procedure Laterality Date    APPENDECTOMY       SECTION      CHOLECYSTECTOMY      CYSTOSCOPY W/ RETROGRADES Bilateral 2019    Procedure: CYSTOSCOPY, WITH RETROGRADE PYELOGRAM;  Surgeon: Trip Bacon MD;  Location: [facility] OR;  Service: Urology;  Laterality: Bilateral;    CYSTOSCOPY WITH BIOPSY OF BLADDER  2019    Procedure: CYSTOSCOPY, WITH BLADDER BIOPSY, WITH FULGURATION IF INDICATED;  Surgeon: Trip Bacon MD;  Location: [facility] OR;  Service: Urology;;    ESOPHAGOGASTRODUODENOSCOPY N/A 12/10/2019    Procedure: EGD (ESOPHAGOGASTRODUODENOSCOPY);  Surgeon: Shakeel Perez MD;  Location: [facility];  Service: Endoscopy;  Laterality: N/A;    HYSTERECTOMY      SHOULDER SURGERY Right 2019    STOMACH SURGERY      URETEROSCOPY Right 2019    Procedure: URETEROSCOPY;  Surgeon: Trip Bacon MD;  Location: [facility] OR;  Service: Urology;  Laterality: Right;    WRIST SURGERY Left        Time Tracking:     OT Date of Treatment:  2020  OT Start Time:  115  OT Stop Time:  140  OT Total Time (min):   25    Billable Minutes:Evaluation 15  Therapeutic Activity 10    Heather Caro OT  2020

## 2020-01-28 NOTE — NURSING
Pt premedicated as ordered prior to start of second blood transfusion.   Rev iewed again adverse effects to report. Verbalized understanding.

## 2020-01-28 NOTE — PT/OT/SLP EVAL
Physical Therapy Evaluation    Patient Name:  Obdulia Yepez   MRN:  [id number]    Recommendations:     Discharge Recommendations:  (SNF vs LTC)   Discharge Equipment Recommendations:     Barriers to discharge: patient requires 24/7 care    Assessment:     Obdulia Yepez is a 69 y.o. female admitted with a medical diagnosis of Sepsis due to Escherichia coli (e. coli).  She presents with the following impairments/functional limitations:  weakness, impaired endurance, impaired self care skills, impaired functional mobilty, gait instability, impaired balance, impaired cognition, pain, decreased safety awareness, decreased ROM, impaired muscle length, impaired joint extensibility, orthopedic precautions, history of frequent falls. Patient fractured her right ankle in a fall recently which is being treated conservatively with CAM boot. Patient presents with impaired cognition and significant debility and disuse atrophy. Rehab potential dependent on clinical course.    Rehab Prognosis: Fair; patient would benefit from acute skilled PT services to address these deficits and reach maximum level of function.    Recent Surgery: * No surgery found *      Plan:     During this hospitalization, patient to be seen (QD M-F) to address the identified rehab impairments via therapeutic activities, gait training, therapeutic exercises and progress toward the following goals:    · Plan of Care Expires:  02/04/20    Subjective     Chief Complaint: weakness  Patient/Family Comments/goals: no goals verbalized by patient or spouse who was there at bedside during time of PT/OT eval  Pain/Comfort:  · Pain Rating 1: (no c/o pain verbalized by patient)    Patients cultural, spiritual, Zoroastrianism conflicts given the current situation: no    Living Environment:  Patient lives with her  in a single story home with 2 steps to enter front, no steps to enter house from garage   Prior to admission, patients level of function was patient required  assistance for ADL's and functional mobility.  Equipment used at home: wheelchair, walker, rolling, bedside commode.  DME owned (not currently used): none.  Upon discharge, patient will have assistance from spouse.    Objective:     Communicated with RN prior to session.  Patient found HOB elevated with bed alarm, blood pressure cuff, spence catheter, peripheral IV, pulse ox (continuous), telemetry, spouse at bedside, upon PT entry to room.    General Precautions: Standard, fall   Orthopedic Precautions:(RLE WBAT in CAM boot secondary ankle fracture)   Braces: (CAM boot)     Exams:  · Cognitive Exam:  Patient is oriented to Person only  · Gross Motor Coordination:  impaired  · RLE ROM: Deficits: decreased throughout secondary to weakness and disuse atrophy  · RLE Strength: Deficits: decreased throughout secondary to weakness and disuse  · LLE ROM: Deficits: decreased throughout secondary to weakness and disuse  · LLE Strength: Deficits: decreased throughout secondary to weakness and disuse    Functional Mobility:  · Bed Mobility:  Rolling Left:  maximal assistance  · Scooting: maximal assistance  · Bridging: unable to perform  · Supine to Sit: maximal assistance  · Sit to Supine: maximal assistance  · Transfers:  Unable to assess secondary to weakness/disuse atrophy, patient requires max assist to maintain unsupported sitting on side of bed      Therapeutic Activities and Exercises:  Patient sat up on side of bed with max assist to maintain for 3 min.    AM-PAC 6 CLICK MOBILITY  Total Score:      Patient with HOB elevated with all lines intact, call button in reach, bed alarm on, RN notified, spouse present and heels floated on pillow.    GOALS:   Multidisciplinary Problems     Physical Therapy Goals        Problem: Physical Therapy Goal    Goal Priority Disciplines Outcome Goal Variances Interventions   Physical Therapy Goal     PT, PT/OT      Description:  Goals to be met by: discharge     Patient will increase  functional independence with mobility by performin. Supine to sit with Moderate Assistance  2. Sit to supine with Moderate Assistance  3. Bed to chair transfer with Moderate Assistance using Rolling Walker                      History:     Past Medical History:   Diagnosis Date    Allergy     Arthritis     Asthma     Depression     Gout     Hypotension     Sepsis        Past Surgical History:   Procedure Laterality Date    APPENDECTOMY       SECTION      CHOLECYSTECTOMY      CYSTOSCOPY W/ RETROGRADES Bilateral 2019    Procedure: CYSTOSCOPY, WITH RETROGRADE PYELOGRAM;  Surgeon: Trip Bacon MD;  Location: [facility] OR;  Service: Urology;  Laterality: Bilateral;    CYSTOSCOPY WITH BIOPSY OF BLADDER  2019    Procedure: CYSTOSCOPY, WITH BLADDER BIOPSY, WITH FULGURATION IF INDICATED;  Surgeon: Trip Bacon MD;  Location: [facility] OR;  Service: Urology;;    ESOPHAGOGASTRODUODENOSCOPY N/A 12/10/2019    Procedure: EGD (ESOPHAGOGASTRODUODENOSCOPY);  Surgeon: Shakeel Perez MD;  Location[facility];  Service: Endoscopy;  Laterality: N/A;    HYSTERECTOMY      SHOULDER SURGERY Right 2019    STOMACH SURGERY      URETEROSCOPY Right 2019    Procedure: URETEROSCOPY;  Surgeon: Trip Bacon MD;  Location: [facility] OR;  Service: Urology;  Laterality: Right;    WRIST SURGERY Left        Time Tracking:     PT Received On: 20  PT Start Time: 1317     PT Stop Time: 1338  PT Total Time (min): 21 min     Billable Minutes: Evaluation 21      Dave Coles, PT  2020

## 2020-01-28 NOTE — PLAN OF CARE
LORRI ARRANGED FOR PT TO BE EVALUATED BY [facility] THIS AM. PT MAY BE APPROPRIATE FOR ADMISSION THERE AS EARLY AS TOMORROW. LORRI DISCUSSED WITH  WHO HAD NO CONCERNS OTHER THAN THE DRIVE TO [place] BECAUSE HE HAS DOGS TO CARE FOR. LORRI WILL CONTINUE TO FOLLOW  AND ASSIST WITH DISCHARGE PLANS.

## 2020-01-28 NOTE — SUBJECTIVE & OBJECTIVE
Interval History:     Review of Systems   Constitutional: Positive for activity change and appetite change. Negative for fatigue and fever.   HENT: Negative for congestion, ear discharge, mouth sores, nosebleeds, rhinorrhea, sinus pressure, sinus pain and tinnitus.    Eyes: Negative.  Negative for pain, redness and itching.   Respiratory: Negative for apnea, cough, choking, chest tightness, shortness of breath, wheezing and stridor.    Cardiovascular: Negative for chest pain, palpitations and leg swelling.   Gastrointestinal: Positive for abdominal pain. Negative for abdominal distention, anal bleeding, blood in stool, constipation and diarrhea.   Endocrine: Negative.    Genitourinary: Negative for difficulty urinating, flank pain, frequency and urgency.   Musculoskeletal: Positive for arthralgias. Negative for back pain, gait problem and myalgias.   Skin: Negative for color change and pallor.   Allergic/Immunologic: Negative.    Neurological: Positive for dizziness and light-headedness. Negative for facial asymmetry, weakness and headaches.   Hematological: Negative for adenopathy. Does not bruise/bleed easily.   Psychiatric/Behavioral: Positive for confusion. The patient is nervous/anxious.      Objective:     Vital Signs (Most Recent):  Temp: 98.1 °F (36.7 °C) (01/28/20 0800)  Pulse: 96 (01/28/20 1113)  Resp: 14 (01/28/20 1113)  BP: 112/87 (01/28/20 1000)  SpO2: 98 % (01/28/20 1113) Vital Signs (24h Range):  Temp:  [97.1 °F (36.2 °C)-98.1 °F (36.7 °C)] 98.1 °F (36.7 °C)  Pulse:  [] 96  Resp:  [10-16] 14  SpO2:  [97 %-99 %] 98 %  BP: ()/(57-87) 112/87     Weight: 47.3 kg (104 lb 4.4 oz)  Body mass index is 20.37 kg/m².    Intake/Output Summary (Last 24 hours) at 1/28/2020 1120  Last data filed at 1/28/2020 0910  Gross per 24 hour   Intake 5202.67 ml   Output 1300 ml   Net 3902.67 ml      Physical Exam   Constitutional: She is oriented to person, place, and time. She appears well-developed and  well-nourished.   HENT:   Head: Normocephalic and atraumatic.   Eyes: Pupils are equal, round, and reactive to light. EOM are normal.   Neck: Normal range of motion. Neck supple. No tracheal deviation present. No thyromegaly present.   Cardiovascular: Normal rate, regular rhythm and normal heart sounds.   Pulmonary/Chest: Effort normal. She has wheezes. She has rales.   Abdominal: Soft. Bowel sounds are normal. She exhibits no distension. There is tenderness. There is no rebound and no guarding.   Musculoskeletal: Normal range of motion.   Lymphadenopathy:     She has no cervical adenopathy.   Neurological: She is alert and oriented to person, place, and time.   Skin: Skin is warm and dry. Capillary refill takes less than 2 seconds.   Nursing note and vitals reviewed.      Significant Labs:   Recent Lab Results       01/28/20  0425   01/27/20  1602        Albumin 1.8       Alkaline Phosphatase 72       ALT 12       Anion Gap 6       AST 18       Baso # 0.01       Basophil% 0.1       BILIRUBIN TOTAL 1.1  Comment:  For infants and newborns, interpretation of results should be based  on gestational age, weight and in agreement with clinical  observations.  Premature Infant recommended reference ranges:  Up to 24 hours.............<8.0 mg/dL  Up to 48 hours............<12.0 mg/dL  3-5 days..................<15.0 mg/dL  6-29 days.................<15.0 mg/dL         BUN, Bld 42       Calcium 8.2       Chloride 117       CO2 13       Creatinine 2.1       Differential Method Automated       eGFR if African American 27.5       eGFR if non  23.8  Comment:  Calculation used to obtain the estimated glomerular filtration  rate (eGFR) is the CKD-EPI equation.          Eos # 0.0       Eosinophil% 0.1       Glucose 102       Gran # (ANC) 13.8       Gran% 92.6       Hematocrit 29.7       Hemoglobin 10.2       Immature Grans (Abs) 0.14  Comment:  Mild elevation in immature granulocytes is non specific and   can be  seen in a variety of conditions including stress response,   acute inflammation, trauma and pregnancy. Correlation with other   laboratory and clinical findings is essential.         Immature Granulocytes 0.9       Lymph # 0.6       Lymph% 3.8       Magnesium 1.4       MCH 30.9       MCHC 34.3       MCV 90       Mono # 0.4       Mono% 2.5       MPV 9.5       nRBC 0       Phosphorus 3.5       Platelets 255       Potassium 3.2       PROTEIN TOTAL 4.6       RBC 3.30       RDW 15.0       Sodium 136       Vancomycin, Random   4.9     WBC 14.94           All pertinent labs within the past 24 hours have been reviewed.    Significant Imaging: I have reviewed and interpreted all pertinent imaging results/findings within the past 24 hours.

## 2020-01-28 NOTE — NURSING
Reviewed with pt and spouse poc and blood transfusion. Reviewed advers effects to report. Encouraged to ask questions. Verbalized understanding.

## 2020-01-28 NOTE — NURSING
Dr. Patel ordered potassium and magnesium replacement, notified that coverage ordered per pm on call MD and already administered, order received to hold ordered addition potassium and magnesium

## 2020-01-29 VITALS
OXYGEN SATURATION: 95 % | TEMPERATURE: 98 F | HEIGHT: 60 IN | DIASTOLIC BLOOD PRESSURE: 79 MMHG | BODY MASS INDEX: 21.42 KG/M2 | SYSTOLIC BLOOD PRESSURE: 122 MMHG | RESPIRATION RATE: 17 BRPM | WEIGHT: 109.13 LBS | HEART RATE: 105 BPM

## 2020-01-29 PROBLEM — F10.10 ALCOHOL ABUSE, DAILY USE: Status: ACTIVE | Noted: 2020-01-29

## 2020-01-29 LAB
ALBUMIN SERPL BCP-MCNC: 1.6 G/DL (ref 3.5–5.2)
ALP SERPL-CCNC: 69 U/L (ref 55–135)
ALT SERPL W/O P-5'-P-CCNC: 11 U/L (ref 10–44)
ANION GAP SERPL CALC-SCNC: 8 MMOL/L (ref 8–16)
AST SERPL-CCNC: 18 U/L (ref 10–40)
BACTERIA UR CULT: ABNORMAL
BASOPHILS # BLD AUTO: 0.01 K/UL (ref 0–0.2)
BASOPHILS NFR BLD: 0.1 % (ref 0–1.9)
BILIRUB SERPL-MCNC: 0.4 MG/DL (ref 0.1–1)
BUN SERPL-MCNC: 33 MG/DL (ref 8–23)
CALCIUM SERPL-MCNC: 8.8 MG/DL (ref 8.7–10.5)
CHLORIDE SERPL-SCNC: 121 MMOL/L (ref 95–110)
CO2 SERPL-SCNC: 11 MMOL/L (ref 23–29)
CREAT SERPL-MCNC: 1.6 MG/DL (ref 0.5–1.4)
DIFFERENTIAL METHOD: ABNORMAL
EOSINOPHIL # BLD AUTO: 0.1 K/UL (ref 0–0.5)
EOSINOPHIL NFR BLD: 0.7 % (ref 0–8)
ERYTHROCYTE [DISTWIDTH] IN BLOOD BY AUTOMATED COUNT: 15.6 % (ref 11.5–14.5)
EST. GFR  (AFRICAN AMERICAN): 38.2 ML/MIN/1.73 M^2
EST. GFR  (NON AFRICAN AMERICAN): 33.1 ML/MIN/1.73 M^2
GLUCOSE SERPL-MCNC: 88 MG/DL (ref 70–110)
HCT VFR BLD AUTO: 30.2 % (ref 37–48.5)
HGB BLD-MCNC: 10.4 G/DL (ref 12–16)
IMM GRANULOCYTES # BLD AUTO: 0.12 K/UL (ref 0–0.04)
IMM GRANULOCYTES NFR BLD AUTO: 1.3 % (ref 0–0.5)
LYMPHOCYTES # BLD AUTO: 0.6 K/UL (ref 1–4.8)
LYMPHOCYTES NFR BLD: 5.9 % (ref 18–48)
MCH RBC QN AUTO: 30.7 PG (ref 27–31)
MCHC RBC AUTO-ENTMCNC: 34.4 G/DL (ref 32–36)
MCV RBC AUTO: 89 FL (ref 82–98)
MONOCYTES # BLD AUTO: 0.3 K/UL (ref 0.3–1)
MONOCYTES NFR BLD: 3.1 % (ref 4–15)
NEUTROPHILS # BLD AUTO: 8.4 K/UL (ref 1.8–7.7)
NEUTROPHILS NFR BLD: 88.9 % (ref 38–73)
NRBC BLD-RTO: 0 /100 WBC
PLATELET # BLD AUTO: 209 K/UL (ref 150–350)
PMV BLD AUTO: 8.6 FL (ref 9.2–12.9)
POTASSIUM SERPL-SCNC: 3.3 MMOL/L (ref 3.5–5.1)
PROT SERPL-MCNC: 4.3 G/DL (ref 6–8.4)
RBC # BLD AUTO: 3.39 M/UL (ref 4–5.4)
SODIUM SERPL-SCNC: 140 MMOL/L (ref 136–145)
WBC # BLD AUTO: 9.44 K/UL (ref 3.9–12.7)

## 2020-01-29 PROCEDURE — 25000003 PHARM REV CODE 250: Performed by: INTERNAL MEDICINE

## 2020-01-29 PROCEDURE — 63600175 PHARM REV CODE 636 W HCPCS: Performed by: HOSPITALIST

## 2020-01-29 PROCEDURE — 94640 AIRWAY INHALATION TREATMENT: CPT

## 2020-01-29 PROCEDURE — 63600175 PHARM REV CODE 636 W HCPCS: Performed by: INTERNAL MEDICINE

## 2020-01-29 PROCEDURE — 99239 PR HOSPITAL DISCHARGE DAY,>30 MIN: ICD-10-PCS | Mod: ,,, | Performed by: INTERNAL MEDICINE

## 2020-01-29 PROCEDURE — 25000242 PHARM REV CODE 250 ALT 637 W/ HCPCS: Performed by: INTERNAL MEDICINE

## 2020-01-29 PROCEDURE — 99239 HOSP IP/OBS DSCHRG MGMT >30: CPT | Mod: ,,, | Performed by: INTERNAL MEDICINE

## 2020-01-29 PROCEDURE — 94761 N-INVAS EAR/PLS OXIMETRY MLT: CPT

## 2020-01-29 PROCEDURE — 80053 COMPREHEN METABOLIC PANEL: CPT

## 2020-01-29 PROCEDURE — 85025 COMPLETE CBC W/AUTO DIFF WBC: CPT

## 2020-01-29 PROCEDURE — C9113 INJ PANTOPRAZOLE SODIUM, VIA: HCPCS | Performed by: INTERNAL MEDICINE

## 2020-01-29 RX ORDER — OXYCODONE AND ACETAMINOPHEN 5; 325 MG/1; MG/1
1 TABLET ORAL EVERY 6 HOURS PRN
Status: CANCELLED | OUTPATIENT
Start: 2020-01-29

## 2020-01-29 RX ORDER — LORAZEPAM 2 MG/ML
1 INJECTION INTRAMUSCULAR
Status: DISCONTINUED | OUTPATIENT
Start: 2020-01-29 | End: 2020-01-29 | Stop reason: HOSPADM

## 2020-01-29 RX ORDER — SODIUM CHLORIDE 9 MG/ML
INJECTION, SOLUTION INTRAVENOUS CONTINUOUS
Status: CANCELLED | OUTPATIENT
Start: 2020-01-29

## 2020-01-29 RX ORDER — LORAZEPAM 2 MG/ML
1 INJECTION INTRAMUSCULAR
Status: CANCELLED | OUTPATIENT
Start: 2020-01-29

## 2020-01-29 RX ORDER — THIAMINE HYDROCHLORIDE 100 MG/ML
100 INJECTION, SOLUTION INTRAMUSCULAR; INTRAVENOUS DAILY
Status: DISCONTINUED | OUTPATIENT
Start: 2020-01-29 | End: 2020-01-29 | Stop reason: HOSPADM

## 2020-01-29 RX ORDER — POTASSIUM CHLORIDE 20 MEQ/1
20 TABLET, EXTENDED RELEASE ORAL 2 TIMES DAILY
Status: CANCELLED | OUTPATIENT
Start: 2020-01-29

## 2020-01-29 RX ORDER — CYPROHEPTADINE HYDROCHLORIDE 4 MG/1
4 TABLET ORAL 3 TIMES DAILY PRN
Status: CANCELLED | OUTPATIENT
Start: 2020-01-29

## 2020-01-29 RX ORDER — DIPHENHYDRAMINE HCL 25 MG
25 CAPSULE ORAL EVERY 6 HOURS PRN
Status: CANCELLED | OUTPATIENT
Start: 2020-01-29

## 2020-01-29 RX ORDER — ACETAMINOPHEN 325 MG/1
650 TABLET ORAL EVERY 6 HOURS PRN
Status: CANCELLED | OUTPATIENT
Start: 2020-01-29

## 2020-01-29 RX ORDER — HYDROCODONE BITARTRATE AND ACETAMINOPHEN 500; 5 MG/1; MG/1
TABLET ORAL
Status: CANCELLED | OUTPATIENT
Start: 2020-01-29

## 2020-01-29 RX ORDER — ESCITALOPRAM OXALATE 10 MG/1
20 TABLET ORAL DAILY
Status: CANCELLED | OUTPATIENT
Start: 2020-01-30

## 2020-01-29 RX ORDER — SODIUM CHLORIDE 0.9 % (FLUSH) 0.9 %
10 SYRINGE (ML) INJECTION
Status: CANCELLED | OUTPATIENT
Start: 2020-01-29

## 2020-01-29 RX ORDER — PANTOPRAZOLE SODIUM 40 MG/10ML
40 INJECTION, POWDER, LYOPHILIZED, FOR SOLUTION INTRAVENOUS DAILY
Status: CANCELLED | OUTPATIENT
Start: 2020-01-30

## 2020-01-29 RX ORDER — ONDANSETRON 2 MG/ML
4 INJECTION INTRAMUSCULAR; INTRAVENOUS EVERY 4 HOURS PRN
Status: CANCELLED | OUTPATIENT
Start: 2020-01-29

## 2020-01-29 RX ORDER — OXYBUTYNIN CHLORIDE 5 MG/1
5 TABLET ORAL 2 TIMES DAILY
Status: CANCELLED | OUTPATIENT
Start: 2020-01-29

## 2020-01-29 RX ORDER — POTASSIUM CHLORIDE 7.45 MG/ML
10 INJECTION INTRAVENOUS ONCE
Status: COMPLETED | OUTPATIENT
Start: 2020-01-29 | End: 2020-01-29

## 2020-01-29 RX ORDER — IPRATROPIUM BROMIDE AND ALBUTEROL SULFATE 2.5; .5 MG/3ML; MG/3ML
3 SOLUTION RESPIRATORY (INHALATION) EVERY 4 HOURS
Status: CANCELLED | OUTPATIENT
Start: 2020-01-29

## 2020-01-29 RX ORDER — FLUTICASONE FUROATE AND VILANTEROL 100; 25 UG/1; UG/1
1 POWDER RESPIRATORY (INHALATION) DAILY
Status: CANCELLED | OUTPATIENT
Start: 2020-01-30

## 2020-01-29 RX ADMIN — FOLIC ACID 1 MG: 5 INJECTION, SOLUTION INTRAMUSCULAR; INTRAVENOUS; SUBCUTANEOUS at 04:01

## 2020-01-29 RX ADMIN — ESCITALOPRAM OXALATE 20 MG: 10 TABLET, FILM COATED ORAL at 09:01

## 2020-01-29 RX ADMIN — THIAMINE HYDROCHLORIDE 100 MG: 100 INJECTION, SOLUTION INTRAMUSCULAR; INTRAVENOUS at 04:01

## 2020-01-29 RX ADMIN — OXYBUTYNIN CHLORIDE 5 MG: 5 TABLET ORAL at 09:01

## 2020-01-29 RX ADMIN — CEFTRIAXONE 2 G: 2 INJECTION, SOLUTION INTRAVENOUS at 02:01

## 2020-01-29 RX ADMIN — IPRATROPIUM BROMIDE AND ALBUTEROL SULFATE 3 ML: .5; 3 SOLUTION RESPIRATORY (INHALATION) at 07:01

## 2020-01-29 RX ADMIN — VANCOMYCIN HYDROCHLORIDE 750 MG: 750 INJECTION, POWDER, LYOPHILIZED, FOR SOLUTION INTRAVENOUS at 05:01

## 2020-01-29 RX ADMIN — PANTOPRAZOLE SODIUM 40 MG: 40 INJECTION, POWDER, LYOPHILIZED, FOR SOLUTION INTRAVENOUS at 09:01

## 2020-01-29 RX ADMIN — POTASSIUM CHLORIDE 20 MEQ: 1500 TABLET, EXTENDED RELEASE ORAL at 09:01

## 2020-01-29 RX ADMIN — LORAZEPAM 1 MG: 2 INJECTION INTRAMUSCULAR; INTRAVENOUS at 10:01

## 2020-01-29 RX ADMIN — SODIUM CHLORIDE: 0.9 INJECTION, SOLUTION INTRAVENOUS at 04:01

## 2020-01-29 RX ADMIN — IPRATROPIUM BROMIDE AND ALBUTEROL SULFATE 3 ML: .5; 3 SOLUTION RESPIRATORY (INHALATION) at 03:01

## 2020-01-29 RX ADMIN — PIPERACILLIN AND TAZOBACTAM 3.38 G: 3; .375 INJECTION, POWDER, LYOPHILIZED, FOR SOLUTION INTRAVENOUS; PARENTERAL at 03:01

## 2020-01-29 RX ADMIN — IPRATROPIUM BROMIDE AND ALBUTEROL SULFATE 3 ML: .5; 3 SOLUTION RESPIRATORY (INHALATION) at 08:01

## 2020-01-29 RX ADMIN — SODIUM CHLORIDE: 0.9 INJECTION, SOLUTION INTRAVENOUS at 08:01

## 2020-01-29 RX ADMIN — IPRATROPIUM BROMIDE AND ALBUTEROL SULFATE 3 ML: .5; 3 SOLUTION RESPIRATORY (INHALATION) at 12:01

## 2020-01-29 RX ADMIN — CEFTRIAXONE 2 G: 2 INJECTION, SOLUTION INTRAVENOUS at 12:01

## 2020-01-29 RX ADMIN — POTASSIUM CHLORIDE 10 MEQ: 10 INJECTION, SOLUTION INTRAVENOUS at 10:01

## 2020-01-29 RX ADMIN — SODIUM CHLORIDE 1000 ML: 0.9 INJECTION, SOLUTION INTRAVENOUS at 01:01

## 2020-01-29 NOTE — PROGRESS NOTES
Patient asleep upon PT/OT arrival. RNoRbby, stating patient will be transferring to LTAC at 6:00 this evening. She has also been given Ativan due to increased heart rate and possible ETOH withdrawal. No PT/OT treatment indicated at this time per nursing and spouse.

## 2020-01-29 NOTE — PLAN OF CARE
01/29/20 1331   Final Note   Assessment Type Final Discharge Note   Anticipated Discharge Disposition LTAC   What phone number can be called within the next 1-3 days to see how you are doing after discharge? [phone]   Hospital Follow Up  Appt(s) scheduled?   (PT TRANSFERRING TO AN LTAC WHERE SHE WILL BE FOLLOWED BY LEONCIO CHUNG MD)   Discharge plans and expectations educations in teach back method with documentation complete? Yes   PT HAS BEEN ACCEPTED FOR ADMISSION TO [facility] IN [place]. SHE WILL BE TRANSPORTED THERE BY AMBULANCE THIS EVENING. NO OTHER DISCHARGE NEEDS IDENTIFIED AT THIS TIME.

## 2020-01-29 NOTE — ASSESSMENT & PLAN NOTE
01/29/2020:  · Continue current support and sedation.  · Will begin Ativan 1 mg every 3 4 hr as needed  · Will begin thiamin and folic acid  · Continue supportive care otherwise.  · Transfer to LTAC when bed available

## 2020-01-29 NOTE — PHYSICIAN QUERY
PT Name: Obdulia Yepez  MR #: [id number]    Physician Query Form - Nutrition Clarification     CDS: Ramya Yancey RN, CCDS         Contact information :[phone]  [email or web address]       This form is a permanent document in the medical record.     Query Date: January 29, 2020    By submitting this query,x we are merely seeking further clarification of documentation.. Please utilize your independent clinical judgment when addressing the question(s) below.    The Medical record contains the following:   Indicators  Supporting Clinical Findings Location in Medical Record   x % of Estimated Energy Intake over a time frame from p.o., TF, or TPN % Intake of Estimated Energy Needs: 0 - 25 %  % Meal Intake: 25 - 50 % RD consult 1/27/20    Weight Status over a time frame     x Subcutaneous Fat and/or Muscle Loss (emaciated, no appetite) RD consult 1/27/20    Fluid Accumulation or Edema      Reduced  Strength     x Wt / BMI / Usual Body Weight BMI (Calculated): 18.5 RD Consult 1/27/19    Delayed Wound Healing / Failure to Thrive     x Acute or Chronic Illness Sepsis due to Escherichia coli (e. Coli)  decrease p.o. intake over the last couple of weeks H&P 1/26/20   x Medication cyproheptadine (PERIACTIN) 4 mg tablet Home Meds per H&P 1/26/20    Treatment     x Other Oral Intake Inadequate Inadequate oral intake related to PMH as evidence by BMI 18.51, Albumin 4.    Patient is severely malnourished dehydrated   RD Plan of care Note 1/27/20        ED provider note 1/26/20     AND / [facility] Clinical Characteristics (October 2011)  A minimum of two characteristics is recommended for diagnosing either moderate or severe malnutrition   Mild Malnutrition Moderate Malnutrition Severe Malnutrition   Energy Intake from p.o., TF or TPN. < 75% intake of estimated energy needs for less than 7 days < 75% intake of estimated energy needs for greater than 7 days < 50% intake of estimated energy needs for > 5 days   Weight Loss  1-2% in 1 month  5% in 3 months  7.5% in 6 months  10% in 1 year 1-2 % in 1 week  5% in 1 month  7.5% in 3 months  10% in 6 months  20% in 1 year > 2% in 1 week  > 5% in 1 month  > 7.5% in 3 months  > 10% in 6 months  > 20% in 1 year   Physical Findings     None *Mild subcutaneous fat and/or muscle loss  *Mild fluid accumulation  *Stage II decubitus  *Surgical wound or non-healing wound *Mod/severe subcutaneous fat and/or muscle loss  *Mod/severe fluid accumulation  *Stage III or IV decubitus  *Non-healing surgical wound     Provider, please specify diagnosis or diagnoses associated with above clinical findings.    [    ] Mild Protein-Calorie Malnutrition   [  x  ] Moderate Protein-Calorie Malnutrition   [    ] Severe Protein-Calorie Malnutrition   [    ] Other Nutritional Diagnosis (please specify):    [    ] Other:    [  ] Clinically Undetermined       Please document in your progress notes daily for the duration of treatment until resolved and include in your discharge summary.

## 2020-01-29 NOTE — PLAN OF CARE
Problem: Fall Injury Risk  Goal: Absence of Fall and Fall-Related Injury  Outcome: Ongoing, Progressing     Problem: Adult Inpatient Plan of Care  Goal: Plan of Care Review  Outcome: Ongoing, Progressing  Goal: Patient-Specific Goal (Individualization)  Outcome: Ongoing, Progressing  Goal: Absence of Hospital-Acquired Illness or Injury  Outcome: Ongoing, Progressing  Goal: Optimal Comfort and Wellbeing  Outcome: Ongoing, Progressing  Goal: Readiness for Transition of Care  Outcome: Ongoing, Progressing  Goal: Rounds/Family Conference  Outcome: Ongoing, Progressing     Problem: Skin Injury Risk Increased  Goal: Skin Health and Integrity  Outcome: Ongoing, Progressing     Problem: Wound  Goal: Optimal Wound Healing  Outcome: Ongoing, Progressing     Problem: Infection  Goal: Infection Symptom Resolution  Outcome: Ongoing, Progressing     Problem: Oral Intake Inadequate  Goal: Improved Oral Intake  Outcome: Ongoing, Progressing

## 2020-01-29 NOTE — PLAN OF CARE
[facility]    FACILITY TRANSFER ORDERS      Patient Name: Obdulia Yepez  YOB: 1950    PCP: Og Perez MD   PCP Address: [address] [place] [address] [place] [address]  PCP Phone Number: [phone]  PCP Fax: [phone]    Encounter Date: 01/29/2020    Admit to: [facility]    Vital Signs:  Routine    Diagnoses:   Active Hospital Problems    Diagnosis  POA    *Sepsis due to Escherichia coli (e. coli) [A41.51]  Yes    Alcohol abuse, daily use [F10.10]  Unknown    Pneumonia due to infectious organism [J18.9]  Yes      Resolved Hospital Problems   No resolved problems to display.       Allergies:  Review of patient's allergies indicates:   Allergen Reactions    Amoxicillin Diarrhea    Latex     Milk containing products     Opioids - morphine analogues     Peanut     Sodium phosphate     Soy     Sulfa (sulfonamide antibiotics)        Diet: regular diet    Activities: Activity as tolerated    Nursing: routine     Labs: CBC and CMP Daily for 3 days     CONSULTS:    Physical Therapy to evaluate and treat.     MISCELLANEOUS CARE:  Routine Skin for Bedridden Patients: Apply moisture barrier cream to all skin folds and wet areas in perineal area daily and after baths and all bowel movements.    WOUND CARE ORDERS  Yes: Pressure Ulcer(s) Stage II :   Location: Sacrum  Apply Miconzazole:  Barrier ointment                       Frequency:  Daily                             If incontinent of stool or urine, apply thin layer Barrier cream                   twice daily and PRN to wound         Pressure relief measure:  for pressure redistribution             Medications: Review discharge medications with patient and family and provide education.      Current Discharge Medication List      CONTINUE these medications which have NOT CHANGED    Details   allopurinol (ZYLOPRIM) 300 MG tablet TAKE 1 TABLET BY MOUTH DAILY  Qty: 30 tablet, Refills: 14      escitalopram oxalate  (LEXAPRO) 20 MG tablet Take 20 mg by mouth once daily.       gabapentin (NEURONTIN) 300 MG capsule Take 3 capsules (900 mg total) by mouth 3 (three) times daily.  Qty: 270 capsule, Refills: 3      lubiprostone (AMITIZA) 24 MCG Cap Take 24 mcg by mouth 2 (two) times daily with meals.      montelukast (SINGULAIR) 10 mg tablet TAKE 1 TABLET(S) EVERY DAY BY ORAL ROUTE.  Qty: 30 tablet, Refills: 11      multivitamin (THERAGRAN) per tablet Take 1 tablet by mouth once daily.      ondansetron (ZOFRAN) 4 MG tablet Take 1 tablet (4 mg total) by mouth every 8 (eight) hours as needed for Nausea.  Qty: 90 tablet, Refills: 5    Associated Diagnoses: Nausea      oxyCODONE-acetaminophen (PERCOCET)  mg per tablet Take 1 tablet by mouth every 4 (four) hours as needed for Pain.    Comments: n/a       pantoprazole (PROTONIX) 40 MG tablet Take 1 tablet (40 mg total) by mouth once daily.  Qty: 30 tablet, Refills: 11    Associated Diagnoses: Gastroesophageal reflux disease, esophagitis presence not specified      budesonide-formoterol 160-4.5 mcg (SYMBICORT) 160-4.5 mcg/actuation HFAA Symbicort 160 mcg-4.5 mcg/actuation HFA aerosol inhaler   Inhale 2 puffs twice a day by inhalation route.      busPIRone (BUSPAR) 5 MG Tab Take 1 tablet (5 mg total) by mouth 2 (two) times daily.  Qty: 60 tablet, Refills: 11    Associated Diagnoses: JOYCE (generalized anxiety disorder)      colestipol (COLESTID) 1 gram Tab Take 1 tablet (1 g total) by mouth 2 (two) times daily.  Qty: 180 tablet, Refills: 3      cyclobenzaprine (FLEXERIL) 10 MG tablet Take 1 tablet (10 mg total) by mouth 3 (three) times daily as needed.  Qty: 90 tablet, Refills: 0    Associated Diagnoses: Traumatic closed displaced fracture of surgical neck of humerus, left, initial encounter; Hyponatremia      cyproheptadine (PERIACTIN) 4 mg tablet TAKE 1 TABLET (4 MG TOTAL) BY MOUTH 3 (THREE) TIMES DAILY AS NEEDED.  Qty: 90 tablet, Refills: 2    Associated Diagnoses: Weight loss       fluticasone (FLONASE) 50 mcg/actuation nasal spray fluticasone 50 mcg/actuation nasal spray,suspension   Inhale 1 spray every day by intranasal route.      hydrocortisone (CORTEF) 10 MG Tab TAKE 2 TABLETS BY MOUTH IN THE MORNING AND 1 TABLET IN THE EVENING  Refills: 0      ipratropium (ATROVENT) 0.03 % nasal spray 2 sprays by Nasal route 3 (three) times daily.  Qty: 30 mL, Refills: 2    Associated Diagnoses: Allergic rhinitis due to food      mupirocin (BACTROBAN) 2 % ointment mupirocin 2 % topical ointment   APPLY A SMALL AMOUNT TO THE AFFECTED AREA BY TOPICAL ROUTE 2 TIMES PER DAY      oxybutynin (DITROPAN-XL) 5 MG TR24 oxybutynin chloride ER 5 mg tablet,extended release 24 hr   TAKE 1 TABLET BY MOUTH EVERY DAY      polymyxin B sulf-trimethoprim (POLYTRIM) 10,000 unit- 1 mg/mL Drop Place 1 drop into the right eye every 4 (four) hours.  Qty: 10 mL, Refills: 0      vitamin E 1000 UNIT capsule Take 1,000 Units by mouth once daily.                  _________________________________  Jimenez Patel MD  01/29/2020

## 2020-01-29 NOTE — PHYSICIAN QUERY
PT Name: Obdulia Yepez  MR #: [id number]     PHYSICIAN QUERY - INTEGUMENTARY CLARIFICATION     CDS: Ramya Yancey RN, CCDS         Contact information :ext [phone]  [email or web address]                                                                                                                                       This form is a permanent document in the medical record.     Query Date: January 29, 2020    By submitting this query, we are merely seeking further clarification of documentation.  Please utilize your independent clinical judgment when addressing the question(s) below.    The Medical Record contains the following:   Indicators   Supporting Clinical Findings Location in Medical Record    Redness     x Decubitus, Pressure, Ulcer, etc. Pressure Injury 01/26/20 1445 Right upper Coccyx Stage 2    Pressure Ulcer(s) Stage II :   Location: Sacrum    Pressure Injury 01/26/20 1445 Right upper Coccyx Stage 2 RN Flow sheet 1/26/20      [facility] Medicine Plan Of Care Note 1/29/20    Lines/Drains/[place] per Discharge summary 1/29/20    Deep Tissue Injury      Wound care consult      Medication:     x Treatment: Apply Miconzazole:  Barrier ointment                       Frequency:  Daily                             If incontinent of stool or urine, apply thin layer Barrier cream                   twice daily and PRN to wound         Pressure relief measure:  for pressure redistribution [facility] Medicine Plan Of Care Note 1/29/20   x Other:        Patient has skin breakdown on the coccyx  Wound  Wound to buttock Scanned photo 1/26/20                        ED provider note 1/26/20  RN note 1/29.20       National Pressure Ulcer Advisory Panel (2007) Pressure Ulcer Definitions & Stages:  Stage I:                     Intact skin with non-blanchable redness of a localized area usually over a bony prominence.   Stage II:                    Partial thickness loss of dermis presenting as a shallow open ulcer  with a red pink wound bed, without slough.   Stage III:                   Full thickness tissue loss. Subcutaneous fat may be visible but bone, tendon or muscle is not exposed. Slough may be                                      present but does not obscure the depth of tissue loss. May include undermining and tunneling.  Stage IV:                  Full thickness tissue loss with exposed bone, tendon or muscle. Slough or eschar may be present on some parts of the                                      wound bed. Often include undermining and tunneling.  Unstageable:           Full thickness tissue loss but base of ulcer is covered by slough and/or eschar in the wound bed. Until enough                                        slough and/or eschar is removed to expose wound base, its true depth, and therefore stage, cannot be determined  Deep Tissue Injury: Purple or maroon localized area of discolored intact skin or blood-filled blister due to damage of underlying soft tissue   from pressure and/or shear.  Suspected deep tissue injuries may develop into a stageable ulcer or turn out to be another diagnosis (e.g. an ecchymosis)     Provider, please clarify/ provide the diagnosis related to the documentation:  Please clarify documentation of integumentary condition.    [ x  ] Pressure Injury Right upper Coccyx Stage 2, present on admission  [   ] Pressure Injury Sacrum, Stage 2 , present on admission  [   ] Other clarification :______________  [   ] Diagnosis Clinically Undetermined      Please document in your progress notes daily for the duration of treatment until resolved and include in your discharge summary.

## 2020-01-29 NOTE — PHYSICIAN QUERY
PT Name: Obdulia Yepez  MR #: [id number]  Physician Query Form - Renal Condition Clarification     CDS: Ramya Yancey RN, CCDS         Contact information :ext [phone])  [email or web address]       This form is a permanent document in the medical record.     QueryDate: January 29, 2020    By submitting this query, we are merely seeking further clarification of documentation. Please utilize your independent clinical judgment when addressing the question(s) below.    The Medical record contains the following:   Indicator Supporting Clinical Findings Location in Medical Record    Kidney (Renal) Insufficiency      Kidney (Renal) Failure / Injury      Nephrotoxic Agents     x BUN/Creatinine GFR BUN 66, creatinine 4.3, GFR 10.0  BUN 55, creatinine 3.1, GFR 14.9  BUN 42, creatinine 2.1, GFR 23.8  BUN 33, creatinine 1.6, GFR 33.1 Lab 1/26/20  Lab 1/27/20  Lab 1/28/20  Lab 1/29/20    Urine: Casts         Eosinophils      Dehydration      Nausea/Vomiting      Dialysis/CRRT     x Treatment: sodium chloride 0.9% bolus 1,089 mL   Patient will be bolused 1 L normal saline due to azotemia and low blood pressure.    MAR 1/26/20    Hospital medicine PN 1/27/20   x Other:  azotemia since her last CMP on 01/08/2019 strongly suggesting this is prerenal azotemia and compatible with her history of little oral intake    SBP 82-96  DBP 48-68 ED Provider note 1/26/20            ED VS 1/26/20   Acute Kidney Injury / Acute Renal Failure has different defining criteria. A generally accepted guideline  is:   A greater than 100% (2X) rise in serum creatinine from baseline* occurring during the course of a single hospital stay.   *Baseline as determined by the providers judgment and consideration of previous lab values and other documentation, if available.  A diagnosis of Acute Kidney Injury/ Acute Renal Failure should incorporate abnormal labs and clinical findings that are clinically significant    References: 1. Rafael et al. Acute  renal failure-definition, outcome measures, animal models, fluid therapy and information technology needs: the Second International Consensus Conference of the Acute Dialysis Quality Initiative (ADQI) Group. Crit Care 2004; 8:B204; 2. Maite et al. Acute Kidney Injury Network: report of an initiative to improve outcomes in acute kidney injury. Crit Care 2007; 11:R31; 3. Kidney Disease: Improving Global Outcomes (KDIGO). Acute Kidney Injury Work Group. KDIGO clinical practice guidelines for acute kidney injury. Kidney Int Suppl 2012; 2:1.  The clinical guidelines noted below is only a system guideline, it does not replace the providers clinical judgment.      Provider, please specify the diagnosis or diagnoses associated with above clinical findings.    [ x  ] Acute Kidney Failure/Injury with Tubular Necrosis  Damage to the tubule cells of the kidney. Common triggers: shock, hypotension, IV contrast, rhabdomyolysis, medications   [   ] Other Acute Kidney Failure/Injury (please specify): ____________     [   ] Unspecified Acute Kidney Failure/Injury      [   ] Other (please specify): _________________________________   [   ]  Clinically Undetermined       Please document in your progress notes daily for the duration of treatment until resolved and include in your discharge summary.

## 2020-01-29 NOTE — DISCHARGE SUMMARY
[facility] - ICU Hospital Medicine  Discharge Summary      Patient Name: Obdulia Yepez  MRN: [id number]  Admission Date: 1/26/2020  Hospital Length of Stay: 3 days  Discharge Date and Time:  01/29/2020 3:40 PM  Attending Physician: Jimenez Patel MD   Discharging Provider: Jimenez Patel MD  Primary Care Provider: Og Perez MD      HPI:   01/26/2020:  Patient is a 69-year-old female who presented to the emergency department complaining of weakness and decrease p.o. intake over the last couple of weeks.  But report from EMS stated that family reported the patient had had weakness over the last couple of days that worsened this morning when she awoke.  Patient has a past medical history significant for asthma, depression, gout, and hypotension.  There is no previous history noted of adrenal insufficiency.  Of the patient does take hydrocortisone on a daily basis.  Patient is unable to give a coherent history although she still seems to lose her train of thought very frequently.  Family also reports the patient has had altered mental status over the past few days.    * No surgery found *      Hospital Course:   Id evaluation emergency department revealed at a urinalysis which showed moderate bacteria white blood cell count greater than 100 and occult blood 3+ protein 2+ and trace ketones with 3+ leukocytes.  BNP was 240 and sodium 126 potassium 4.4 and bicarb 13 glucose 77 BUN 66 creatinine 4.3 calcium 7.7 albumin is 2.1 and GFR 11.5.  Magnesium 1.8 phosphorus 5.7 [place] [address] hemoglobin 8.7 hematocrit 27.2 and differential showed 95 granulocytes and 2 lymphocytes P lactic acid was 1.3 and INR was 1.2    01/27/2020:  · Patient remains confused and not able to keep a train of thought.  Patient also appears weak.  Objectively patient is mildly improved with a white blood cell count down to 16,000. However patient is anemic at hemoglobin 6.7 hematocrit 20 M CV is 97 and  differential shows 85 granulocytes 1 lymphocyte.  Cultures are negative so far.  Patient denies pain. Will obtain CT scan of the head today without contrast.  Due to mental status changes.  Repeat labs in the a.m. to include CBC CMP magnesium and phosphate.  Patient will be bolused 1 L normal saline due to azotemia and low blood pressure.  Will otherwise follow as clinically indicated.    01/28/2020:  · Patient is more oriented this morning however she still feels like the year is 1969.  However she did know the president is [facility] and she did no the month is January.  She states that she remembers 2 days ago before she came in to the hospital.  Therefore she did know she was here for 2 days.  Labs show magnesium 1.4 potassium 3.2 BUN creatinine were 42 and 2.1 and white blood cell count is down to 14,000. Sodium 136 potassium 3.2  · CBC shows white blood cell count 14.9 hemoglobin 10.2 hematocrit 29.7 platelets 255 and differential showed 92 granulocytes and 3.8 lymphocytes 2.5 monocytes    01/29/2020:  Patient remains somnolent and unable to give a coherent history.  She seemed better yesterday although today is more somnolent and tachycardia.  It has been 3 days since this patient drink alcohol therefore I feel this may be related to alcohol withdrawal symptoms.  Vital signs this afternoon showed a pulse 0 115 blood pressure 107/33 O2 sat % and patient has been afebrile.  Blood cultures have shown no growth to date after 4 days.  Labs show sodium 140 potassium 3.3 chloride 121 bicarb 11 BUN 33 creatinine 1.6 improved and total protein 4.3 albumin 1.6.  White blood cell count 9.4 and hemoglobin 10.4 hematocrit 30.2 platelets 209 differential showed 88 granulocytes 6 lymphocytes.  Urine culture has grown out Pseudomonas aeruginosa greater than 100,000 coliform units.  Magnesium 1.4 yesterday and which was replaced     Consults:   Consults (From admission, onward)        Status Ordering Provider     Inpatient  consult to Case Management  Once     Provider:  (Not yet assigned)    Acknowledged MARCIA THORNE     IP consult to case management  Once     Provider:  (Not yet assigned)    Acknowledged MARCIA THORNE     Pharmacy to dose Vancomycin consult  Once     Provider:  (Not yet assigned)    Acknowledged MARCIA THORNE          * Sepsis due to Escherichia coli (e. coli)  01/26/2020:  · Sepsis most likely due to urinary tract infection although there is pneumonia by chest x-ray.  · Will begin Zosyn 3.375 g every 12 hr adjust per creatinine clearance.  · Will continue checking lactic acid per protocol  · Patient will be kept on nebulization treatments due to her history of asthma  · Monitor blood pressure closely due to history of possible renal insufficiency.  · Follow labs in the a.m. with CBC, CMP.    01/27/2020:  · See above plan.  · Follow cultures closely  · Replete potassium  · Transfuse 2 units packed red blood cells.  · Premedicate prior to transfusion    01/28/2020:  · Continue hydration,  · Continue IV antibiotics.  · Patient seems to have a failure to thrive and may need rehab however I am going to put in a consult for LTAC approval and recovery  · Repeat labs in the morning.  · Repeat urinalysis  · Consult PT, OT, case management, and place PPD    01/29/2020:  · Begin sedation to protect against alcohol withdrawal.  · Continue current antibiotics for urinary tract infection.  Empirically it is effective against Pseudomonas aeruginosa.  · Patient has been accepted to [facility] and will be transferred today when bed available.  · Will continue current treatment until transfer.  · Patient does have a previous history of alcohol withdrawal and was hospitalized approximately 2 years ago in [facility]      Alcohol abuse, daily use  01/29/2020:  · Continue current support and sedation.  · Will begin Ativan 1 mg every 3 4 hr as needed  · Will begin thiamin and folic acid  · Continue  supportive care otherwise.  · Transfer to LTAC when bed available        Final Active Diagnoses:    Diagnosis Date Noted POA    PRINCIPAL PROBLEM:  Sepsis due to Escherichia coli (e. coli) [A41.51] 01/26/2020 Yes    Alcohol abuse, daily use [F10.10] 01/29/2020 Unknown    Pneumonia due to infectious organism [J18.9] 01/26/2020 Yes      Problems Resolved During this Admission:       Discharged Condition: stable    Disposition: Another Health Care Inst*    Follow Up:    Patient Instructions:   No discharge procedures on file.    Significant Diagnostic Studies: Labs: All labs within the past 24 hours have been reviewed    Pending Diagnostic Studies:     None         Medications:  Reconciled Home Medications:      Medication List      ASK your doctor about these medications    allopurinol 300 MG tablet  Commonly known as:  ZYLOPRIM  TAKE 1 TABLET BY MOUTH DAILY     budesonide-formoterol 160-4.5 mcg 160-4.5 mcg/actuation Hfaa  Commonly known as:  SYMBICORT  Symbicort 160 mcg-4.5 mcg/actuation HFA aerosol inhaler   Inhale 2 puffs twice a day by inhalation route.     busPIRone 5 MG Tab  Commonly known as:  BUSPAR  Take 1 tablet (5 mg total) by mouth 2 (two) times daily.     colestipol 1 gram Tab  Commonly known as:  COLESTID  Take 1 tablet (1 g total) by mouth 2 (two) times daily.     cyclobenzaprine 10 MG tablet  Commonly known as:  FLEXERIL  Take 1 tablet (10 mg total) by mouth 3 (three) times daily as needed.     cyproheptadine 4 mg tablet  Commonly known as:  PERIACTIN  TAKE 1 TABLET (4 MG TOTAL) BY MOUTH 3 (THREE) TIMES DAILY AS NEEDED.     escitalopram oxalate 20 MG tablet  Commonly known as:  LEXAPRO  Take 20 mg by mouth once daily.     fluticasone propionate 50 mcg/actuation nasal spray  Commonly known as:  FLONASE  fluticasone 50 mcg/actuation nasal spray,suspension   Inhale 1 spray every day by intranasal route.     gabapentin 300 MG capsule  Commonly known as:  NEURONTIN  Take 3 capsules (900 mg total) by  mouth 3 (three) times daily.     hydrocortisone 10 MG Tab  Commonly known as:  CORTEF  TAKE 2 TABLETS BY MOUTH IN THE MORNING AND 1 TABLET IN THE EVENING     ipratropium 0.03 % nasal spray  Commonly known as:  ATROVENT  2 sprays by Nasal route 3 (three) times daily.     lubiprostone 24 MCG Cap  Commonly known as:  AMITIZA  Take 24 mcg by mouth 2 (two) times daily with meals.     montelukast 10 mg tablet  Commonly known as:  SINGULAIR  TAKE 1 TABLET(S) EVERY DAY BY ORAL ROUTE.     multivitamin per tablet  Commonly known as:  THERAGRAN  Take 1 tablet by mouth once daily.     mupirocin 2 % ointment  Commonly known as:  BACTROBAN  mupirocin 2 % topical ointment   APPLY A SMALL AMOUNT TO THE AFFECTED AREA BY TOPICAL ROUTE 2 TIMES PER DAY     ondansetron 4 MG tablet  Commonly known as:  ZOFRAN  Take 1 tablet (4 mg total) by mouth every 8 (eight) hours as needed for Nausea.     oxybutynin 5 MG Tr24  Commonly known as:  DITROPAN-XL  oxybutynin chloride ER 5 mg tablet,extended release 24 hr   TAKE 1 TABLET BY MOUTH EVERY DAY     oxyCODONE-acetaminophen  mg per tablet  Commonly known as:  PERCOCET  Take 1 tablet by mouth every 4 (four) hours as needed for Pain.     pantoprazole 40 MG tablet  Commonly known as:  PROTONIX  Take 1 tablet (40 mg total) by mouth once daily.     polymyxin B sulf-trimethoprim 10,000 unit- 1 mg/mL Drop  Commonly known as:  POLYTRIM  Place 1 drop into the right eye every 4 (four) hours.     vitamin E 1000 UNIT capsule  Take 1,000 Units by mouth once daily.            Indwelling Lines/Drains at time of discharge:   Lines/Drains/Airways     Drain                 Urethral Catheter 01/26/20 1644 Non-latex 16 Fr. 2 days          Pressure Ulcer                 Pressure Injury 01/26/20 1445 Right upper Coccyx Stage 2 3 days                Time spent on the discharge of patient: 45 minutes  Patient was seen and examined on the date of discharge and determined to be suitable for discharge.         Jimenez  MD Jorge  Department of Hospital Medicine  [facility]

## 2020-01-29 NOTE — PHYSICIAN QUERY
PT Name: Obdulia Yepez  MR #: [id number]     CDS: Ramya Yancey RN, CCDS         Contact information :[phone]  [email or web address]     This form is a permanent document in the medical record.     Query Date: January 29, 2020    Physician Query - Neurological Condition Clarification    By submitting this query, we are merely seeking further clarification of documentation to reflect the severity of illness of your patient. Please utilize your independent clinical judgment when addressing the question(s) below.    The Medical record reflects the following:     Indicators   Supporting Clinical Findings Location in Medical Record   x AMS, Confusion,  LOC, etc.   Altered Mental Status   Per EMS family and patient reports that patient has had weakness and AMS since awaking this morning.   H&P 1/26/20   x Acute / Chronic Illness Sepsis due to Escherichia coli (e. Coli)  Pneumonia due to infectious organism H&P 1/26/20   x Radiology Findings There is no acute hemorrhage or infarction.  There is cortical atrophy.  There are periventricular deep white matter changes consistent with chronic small vessel ischemic disease CT Head 1/27/20   x Electrolyte Imbalance Sodium 126  Phosphorus 5.7  Calcium 7.7 Lab 1/26/20   x Medication Begin Zosyn 3.375 g q.12 hours  Begin vancomycin per pharmacy protocol  Rehydrate aggressively H&P 1/26/20   x Treatment         Will obtain CT scan of the head today without contrast. Due to mental status changes. Repeat labs in the a.m. to include CBC CMP magnesium and phosphate. Patient will be bolused 1 L normal saline due to azotemia and low blood pressure. Will otherwise follow as clinically indicated.   Hospital med PN 1/27/20   x Other Patient remains confused and not able to keep a train of thought Hospital med PN 1/27/20     Encephalopathy- is a general term for any diffuse disease of the brain that alters brain function or structure. Treatment of the cognitive dysfunction varies  but is ultimately dependent on the treatment of the underlying condition.  Major Symptoms of Encephalopathy - Decreased level of consciousness, fluctuating alertness/concentration, confusion, agitation, lethargy, somnolence, drowsiness, obtundation, stupor, or coma.         References: National Institutes of Healths (NIH) National [place] of Neurological Disorders and Strokes;  HCPro 2016; Advisory Board   Clinical Guidelines:   These guidelines will set system standards to assist providers in managing, documentation, and coding of encephalopathy. The intent of this document is to serve as a system guideline, not replace the providers clinical judgment:    Provider, please specify the diagnosis or diagnoses associated with above clinical findings.    [   ] Metabolic Encephalopathy - Related to Sepsis   [ x  ] Metabolic Encephalopathy- Unrelated to Sepsis   [   ] Other Encephalopathy - please specify, _______   [   ] Unspecified Encephalopathy      [   ] Other Neurological Condition-(please specify condition): _________   [   ]  Clinically Undetermined     Please document in your progress notes daily for the duration of treatment until resolved, and include in your discharge summary.

## 2020-01-29 NOTE — PROGRESS NOTES
OT and PT went to ICU to provide treatment. Patient's  reports patient is sleeping/resting. Spoke with RN, Adore, who reports patient appears to be in DT's and recently received Ativan. She is unable to participate in treatment at this time. In addition, patient is reportedly being transferred later today to [facility].

## 2020-01-29 NOTE — ASSESSMENT & PLAN NOTE
01/26/2020:  · Sepsis most likely due to urinary tract infection although there is pneumonia by chest x-ray.  · Will begin Zosyn 3.375 g every 12 hr adjust per creatinine clearance.  · Will continue checking lactic acid per protocol  · Patient will be kept on nebulization treatments due to her history of asthma  · Monitor blood pressure closely due to history of possible renal insufficiency.  · Follow labs in the a.m. with CBC, CMP.    01/27/2020:  · See above plan.  · Follow cultures closely  · Replete potassium  · Transfuse 2 units packed red blood cells.  · Premedicate prior to transfusion    01/28/2020:  · Continue hydration,  · Continue IV antibiotics.  · Patient seems to have a failure to thrive and may need rehab however I am going to put in a consult for LTAC approval and recovery  · Repeat labs in the morning.  · Repeat urinalysis  · Consult PT, OT, case management, and place PPD    01/29/2020:  · Begin sedation to protect against alcohol withdrawal.  · Continue current antibiotics for urinary tract infection.  Empirically it is effective against Pseudomonas aeruginosa.  · Patient has been accepted to [facility] and will be transferred today when bed available.  · Will continue current treatment until transfer.  · Patient does have a previous history of alcohol withdrawal and was hospitalized approximately 2 years ago in [facility]

## 2020-02-01 LAB
BACTERIA BLD CULT: NORMAL
BACTERIA BLD CULT: NORMAL

## 2020-02-03 ENCOUNTER — HOSPITAL ENCOUNTER (INPATIENT)
Facility: HOSPITAL | Age: 70
LOS: 4 days | Discharge: NURSING, REHAB OR LONG-TERM CARE | DRG: 699 | End: 2020-02-07
Attending: INTERNAL MEDICINE | Admitting: INTERNAL MEDICINE
Payer: MEDICARE

## 2020-02-03 DIAGNOSIS — K66.8 PNEUMOPERITONEUM: ICD-10-CM

## 2020-02-03 DIAGNOSIS — J98.2 PNEUMOMEDIASTINUM: ICD-10-CM

## 2020-02-03 DIAGNOSIS — D64.9 ANEMIA, UNSPECIFIED TYPE: ICD-10-CM

## 2020-02-03 LAB
ALBUMIN SERPL BCP-MCNC: 1.9 G/DL (ref 3.5–5.2)
ALP SERPL-CCNC: 71 U/L (ref 55–135)
ALT SERPL W/O P-5'-P-CCNC: 10 U/L (ref 10–44)
AMYLASE SERPL-CCNC: 55 U/L (ref 20–110)
ANION GAP SERPL CALC-SCNC: 6 MMOL/L (ref 8–16)
APTT BLDCRRT: 35.1 SEC (ref 21–32)
AST SERPL-CCNC: 17 U/L (ref 10–40)
BASOPHILS # BLD AUTO: 0.03 K/UL (ref 0–0.2)
BASOPHILS NFR BLD: 0.2 % (ref 0–1.9)
BILIRUB SERPL-MCNC: 0.8 MG/DL (ref 0.1–1)
BUN SERPL-MCNC: 17 MG/DL (ref 8–23)
CALCIUM SERPL-MCNC: 8.9 MG/DL (ref 8.7–10.5)
CHLORIDE SERPL-SCNC: 110 MMOL/L (ref 95–110)
CO2 SERPL-SCNC: 19 MMOL/L (ref 23–29)
CREAT SERPL-MCNC: 0.7 MG/DL (ref 0.5–1.4)
CREAT SERPL-MCNC: 0.7 MG/DL (ref 0.5–1.4)
DIFFERENTIAL METHOD: ABNORMAL
EOSINOPHIL # BLD AUTO: 0.2 K/UL (ref 0–0.5)
EOSINOPHIL NFR BLD: 1.7 % (ref 0–8)
ERYTHROCYTE [DISTWIDTH] IN BLOOD BY AUTOMATED COUNT: 15.7 % (ref 11.5–14.5)
EST. GFR  (AFRICAN AMERICAN): >60 ML/MIN/1.73 M^2
EST. GFR  (AFRICAN AMERICAN): >60 ML/MIN/1.73 M^2
EST. GFR  (NON AFRICAN AMERICAN): >60 ML/MIN/1.73 M^2
EST. GFR  (NON AFRICAN AMERICAN): >60 ML/MIN/1.73 M^2
GLUCOSE SERPL-MCNC: 78 MG/DL (ref 70–110)
HCT VFR BLD AUTO: 31.8 % (ref 37–48.5)
HGB BLD-MCNC: 11.2 G/DL (ref 12–16)
IMM GRANULOCYTES # BLD AUTO: 0.11 K/UL (ref 0–0.04)
IMM GRANULOCYTES NFR BLD AUTO: 0.9 % (ref 0–0.5)
INR PPP: 1.1 (ref 0.8–1.2)
LIPASE SERPL-CCNC: 50 U/L (ref 4–60)
LYMPHOCYTES # BLD AUTO: 0.8 K/UL (ref 1–4.8)
LYMPHOCYTES NFR BLD: 6.6 % (ref 18–48)
MCH RBC QN AUTO: 31.4 PG (ref 27–31)
MCHC RBC AUTO-ENTMCNC: 35.2 G/DL (ref 32–36)
MCV RBC AUTO: 89 FL (ref 82–98)
MONOCYTES # BLD AUTO: 0.4 K/UL (ref 0.3–1)
MONOCYTES NFR BLD: 3.6 % (ref 4–15)
NEUTROPHILS # BLD AUTO: 10.7 K/UL (ref 1.8–7.7)
NEUTROPHILS NFR BLD: 87 % (ref 38–73)
NRBC BLD-RTO: 0 /100 WBC
PLATELET # BLD AUTO: 188 K/UL (ref 150–350)
PMV BLD AUTO: 11.2 FL (ref 9.2–12.9)
POTASSIUM SERPL-SCNC: 2.1 MMOL/L (ref 3.5–5.1)
PROT SERPL-MCNC: 4.8 G/DL (ref 6–8.4)
PROTHROMBIN TIME: 12.3 SEC (ref 9–12.5)
RBC # BLD AUTO: 3.57 M/UL (ref 4–5.4)
SODIUM SERPL-SCNC: 135 MMOL/L (ref 136–145)
WBC # BLD AUTO: 12.26 K/UL (ref 3.9–12.7)

## 2020-02-03 PROCEDURE — 85025 COMPLETE CBC W/AUTO DIFF WBC: CPT

## 2020-02-03 PROCEDURE — 85610 PROTHROMBIN TIME: CPT

## 2020-02-03 PROCEDURE — 94761 N-INVAS EAR/PLS OXIMETRY MLT: CPT

## 2020-02-03 PROCEDURE — 83690 ASSAY OF LIPASE: CPT

## 2020-02-03 PROCEDURE — 99291 PR CRITICAL CARE, E/M 30-74 MINUTES: ICD-10-PCS | Mod: ,,, | Performed by: INTERNAL MEDICINE

## 2020-02-03 PROCEDURE — 87427 SHIGA-LIKE TOXIN AG IA: CPT

## 2020-02-03 PROCEDURE — 87045 FECES CULTURE AEROBIC BACT: CPT

## 2020-02-03 PROCEDURE — 99291 CRITICAL CARE FIRST HOUR: CPT | Mod: ,,, | Performed by: INTERNAL MEDICINE

## 2020-02-03 PROCEDURE — 74177 CT ABDOMEN PELVIS WITH CONTRAST: ICD-10-PCS | Mod: 26,,, | Performed by: RADIOLOGY

## 2020-02-03 PROCEDURE — 25000242 PHARM REV CODE 250 ALT 637 W/ HCPCS: Performed by: INTERNAL MEDICINE

## 2020-02-03 PROCEDURE — 85730 THROMBOPLASTIN TIME PARTIAL: CPT

## 2020-02-03 PROCEDURE — 25000003 PHARM REV CODE 250: Performed by: INTERNAL MEDICINE

## 2020-02-03 PROCEDURE — 87046 STOOL CULTR AEROBIC BACT EA: CPT | Mod: 59

## 2020-02-03 PROCEDURE — 74177 CT ABD & PELVIS W/CONTRAST: CPT | Mod: 26,,, | Performed by: RADIOLOGY

## 2020-02-03 PROCEDURE — 80053 COMPREHEN METABOLIC PANEL: CPT

## 2020-02-03 PROCEDURE — 82150 ASSAY OF AMYLASE: CPT

## 2020-02-03 PROCEDURE — 63600175 PHARM REV CODE 636 W HCPCS: Performed by: INTERNAL MEDICINE

## 2020-02-03 PROCEDURE — 20000000 HC ICU ROOM

## 2020-02-03 PROCEDURE — 74177 CT ABD & PELVIS W/CONTRAST: CPT | Mod: TC

## 2020-02-03 PROCEDURE — 36415 COLL VENOUS BLD VENIPUNCTURE: CPT

## 2020-02-03 PROCEDURE — 94640 AIRWAY INHALATION TREATMENT: CPT

## 2020-02-03 RX ORDER — PANTOPRAZOLE SODIUM 40 MG/10ML
40 INJECTION, POWDER, LYOPHILIZED, FOR SOLUTION INTRAVENOUS DAILY
Status: DISCONTINUED | OUTPATIENT
Start: 2020-02-04 | End: 2020-02-03 | Stop reason: SDUPTHER

## 2020-02-03 RX ORDER — OXYBUTYNIN CHLORIDE 5 MG/1
5 TABLET ORAL 2 TIMES DAILY
Status: DISCONTINUED | OUTPATIENT
Start: 2020-02-03 | End: 2020-02-07 | Stop reason: HOSPADM

## 2020-02-03 RX ORDER — SODIUM CHLORIDE 0.9 % (FLUSH) 0.9 %
10 SYRINGE (ML) INJECTION
Status: DISCONTINUED | OUTPATIENT
Start: 2020-02-03 | End: 2020-02-07 | Stop reason: HOSPADM

## 2020-02-03 RX ORDER — IPRATROPIUM BROMIDE AND ALBUTEROL SULFATE 2.5; .5 MG/3ML; MG/3ML
3 SOLUTION RESPIRATORY (INHALATION) EVERY 4 HOURS
Status: DISCONTINUED | OUTPATIENT
Start: 2020-02-03 | End: 2020-02-05

## 2020-02-03 RX ORDER — LORAZEPAM 2 MG/ML
1 INJECTION INTRAMUSCULAR
Status: DISCONTINUED | OUTPATIENT
Start: 2020-02-03 | End: 2020-02-07 | Stop reason: HOSPADM

## 2020-02-03 RX ORDER — POTASSIUM CHLORIDE 20 MEQ/1
20 TABLET, EXTENDED RELEASE ORAL 2 TIMES DAILY
Status: DISCONTINUED | OUTPATIENT
Start: 2020-02-03 | End: 2020-02-07 | Stop reason: HOSPADM

## 2020-02-03 RX ORDER — VANCOMYCIN HCL IN 5 % DEXTROSE 1G/250ML
1000 PLASTIC BAG, INJECTION (ML) INTRAVENOUS
Status: DISCONTINUED | OUTPATIENT
Start: 2020-02-03 | End: 2020-02-04

## 2020-02-03 RX ORDER — ACETAMINOPHEN 325 MG/1
650 TABLET ORAL EVERY 6 HOURS PRN
Status: DISCONTINUED | OUTPATIENT
Start: 2020-02-03 | End: 2020-02-03 | Stop reason: SDUPTHER

## 2020-02-03 RX ORDER — ESCITALOPRAM OXALATE 10 MG/1
20 TABLET ORAL DAILY
Status: DISCONTINUED | OUTPATIENT
Start: 2020-02-04 | End: 2020-02-03 | Stop reason: SDUPTHER

## 2020-02-03 RX ORDER — POTASSIUM CHLORIDE 7.45 MG/ML
10 INJECTION INTRAVENOUS
Status: COMPLETED | OUTPATIENT
Start: 2020-02-03 | End: 2020-02-04

## 2020-02-03 RX ORDER — HYDROCODONE BITARTRATE AND ACETAMINOPHEN 500; 5 MG/1; MG/1
TABLET ORAL
Status: DISCONTINUED | OUTPATIENT
Start: 2020-02-03 | End: 2020-02-07 | Stop reason: HOSPADM

## 2020-02-03 RX ORDER — ONDANSETRON 2 MG/ML
4 INJECTION INTRAMUSCULAR; INTRAVENOUS EVERY 4 HOURS PRN
Status: DISCONTINUED | OUTPATIENT
Start: 2020-02-03 | End: 2020-02-03 | Stop reason: SDUPTHER

## 2020-02-03 RX ORDER — MONTELUKAST SODIUM 10 MG/1
10 TABLET ORAL DAILY
Status: DISCONTINUED | OUTPATIENT
Start: 2020-02-04 | End: 2020-02-07 | Stop reason: HOSPADM

## 2020-02-03 RX ORDER — OXYCODONE AND ACETAMINOPHEN 5; 325 MG/1; MG/1
1 TABLET ORAL EVERY 6 HOURS PRN
Status: DISCONTINUED | OUTPATIENT
Start: 2020-02-03 | End: 2020-02-07 | Stop reason: HOSPADM

## 2020-02-03 RX ORDER — GABAPENTIN 300 MG/1
600 CAPSULE ORAL 2 TIMES DAILY
Status: DISCONTINUED | OUTPATIENT
Start: 2020-02-03 | End: 2020-02-07 | Stop reason: HOSPADM

## 2020-02-03 RX ORDER — ONDANSETRON 2 MG/ML
4 INJECTION INTRAMUSCULAR; INTRAVENOUS EVERY 6 HOURS PRN
Status: DISCONTINUED | OUTPATIENT
Start: 2020-02-03 | End: 2020-02-07 | Stop reason: HOSPADM

## 2020-02-03 RX ORDER — HYDROCODONE BITARTRATE AND ACETAMINOPHEN 500; 5 MG/1; MG/1
TABLET ORAL
Status: DISCONTINUED | OUTPATIENT
Start: 2020-02-03 | End: 2020-02-05

## 2020-02-03 RX ORDER — DIPHENHYDRAMINE HCL 25 MG
25 CAPSULE ORAL EVERY 6 HOURS PRN
Status: DISCONTINUED | OUTPATIENT
Start: 2020-02-03 | End: 2020-02-07 | Stop reason: HOSPADM

## 2020-02-03 RX ORDER — POTASSIUM CHLORIDE 20 MEQ/1
40 TABLET, EXTENDED RELEASE ORAL ONCE
Status: COMPLETED | OUTPATIENT
Start: 2020-02-03 | End: 2020-02-03

## 2020-02-03 RX ORDER — PANTOPRAZOLE SODIUM 40 MG/10ML
40 INJECTION, POWDER, LYOPHILIZED, FOR SOLUTION INTRAVENOUS DAILY
Status: DISCONTINUED | OUTPATIENT
Start: 2020-02-04 | End: 2020-02-07 | Stop reason: HOSPADM

## 2020-02-03 RX ORDER — ESCITALOPRAM OXALATE 10 MG/1
20 TABLET ORAL DAILY
Status: DISCONTINUED | OUTPATIENT
Start: 2020-02-04 | End: 2020-02-07 | Stop reason: HOSPADM

## 2020-02-03 RX ORDER — CYPROHEPTADINE HYDROCHLORIDE 4 MG/1
4 TABLET ORAL 3 TIMES DAILY PRN
Status: DISCONTINUED | OUTPATIENT
Start: 2020-02-03 | End: 2020-02-07 | Stop reason: HOSPADM

## 2020-02-03 RX ORDER — SODIUM CHLORIDE 9 MG/ML
INJECTION, SOLUTION INTRAVENOUS CONTINUOUS
Status: DISCONTINUED | OUTPATIENT
Start: 2020-02-03 | End: 2020-02-07 | Stop reason: HOSPADM

## 2020-02-03 RX ORDER — OXYCODONE AND ACETAMINOPHEN 10; 325 MG/1; MG/1
1 TABLET ORAL EVERY 4 HOURS PRN
Status: DISCONTINUED | OUTPATIENT
Start: 2020-02-03 | End: 2020-02-07 | Stop reason: HOSPADM

## 2020-02-03 RX ORDER — MORPHINE SULFATE 4 MG/ML
2 INJECTION, SOLUTION INTRAMUSCULAR; INTRAVENOUS EVERY 4 HOURS PRN
Status: DISCONTINUED | OUTPATIENT
Start: 2020-02-03 | End: 2020-02-07 | Stop reason: HOSPADM

## 2020-02-03 RX ORDER — BUSPIRONE HYDROCHLORIDE 5 MG/1
5 TABLET ORAL 2 TIMES DAILY
Status: DISCONTINUED | OUTPATIENT
Start: 2020-02-03 | End: 2020-02-07 | Stop reason: HOSPADM

## 2020-02-03 RX ORDER — ACETAMINOPHEN 325 MG/1
650 TABLET ORAL EVERY 4 HOURS PRN
Status: DISCONTINUED | OUTPATIENT
Start: 2020-02-03 | End: 2020-02-07 | Stop reason: HOSPADM

## 2020-02-03 RX ORDER — IPRATROPIUM BROMIDE AND ALBUTEROL SULFATE 2.5; .5 MG/3ML; MG/3ML
3 SOLUTION RESPIRATORY (INHALATION) EVERY 4 HOURS
Status: DISCONTINUED | OUTPATIENT
Start: 2020-02-03 | End: 2020-02-03 | Stop reason: SDUPTHER

## 2020-02-03 RX ADMIN — OXYBUTYNIN CHLORIDE 5 MG: 5 TABLET ORAL at 08:02

## 2020-02-03 RX ADMIN — OXYCODONE AND ACETAMINOPHEN 1 TABLET: 5; 325 TABLET ORAL at 08:02

## 2020-02-03 RX ADMIN — POTASSIUM CHLORIDE 10 MEQ: 10 INJECTION, SOLUTION INTRAVENOUS at 08:02

## 2020-02-03 RX ADMIN — BUSPIRONE HYDROCHLORIDE 5 MG: 5 TABLET ORAL at 08:02

## 2020-02-03 RX ADMIN — GABAPENTIN 600 MG: 300 CAPSULE ORAL at 08:02

## 2020-02-03 RX ADMIN — POTASSIUM CHLORIDE 10 MEQ: 10 INJECTION, SOLUTION INTRAVENOUS at 11:02

## 2020-02-03 RX ADMIN — PIPERACILLIN AND TAZOBACTAM 3.38 G: 3; .375 INJECTION, POWDER, LYOPHILIZED, FOR SOLUTION INTRAVENOUS; PARENTERAL at 06:02

## 2020-02-03 RX ADMIN — IPRATROPIUM BROMIDE AND ALBUTEROL SULFATE 3 ML: .5; 3 SOLUTION RESPIRATORY (INHALATION) at 08:02

## 2020-02-03 RX ADMIN — POTASSIUM CHLORIDE 20 MEQ: 1500 TABLET, EXTENDED RELEASE ORAL at 11:02

## 2020-02-03 RX ADMIN — IPRATROPIUM BROMIDE AND ALBUTEROL SULFATE 3 ML: .5; 3 SOLUTION RESPIRATORY (INHALATION) at 11:02

## 2020-02-03 RX ADMIN — POTASSIUM CHLORIDE 40 MEQ: 1500 TABLET, EXTENDED RELEASE ORAL at 08:02

## 2020-02-03 RX ADMIN — SODIUM CHLORIDE: 0.9 INJECTION, SOLUTION INTRAVENOUS at 06:02

## 2020-02-03 RX ADMIN — VANCOMYCIN HYDROCHLORIDE 1000 MG: 1 INJECTION, POWDER, LYOPHILIZED, FOR SOLUTION INTRAVENOUS at 06:02

## 2020-02-03 NOTE — NURSING
Received pt, via [facility], from [facility].  Pt is awake and oriented to person and situation, but reoriented to place and time.  Resp even and unlabored.  VSS.  PIV x 2 to each forearm is intact, easily flushes.  Arias cath to gravity is patent and draining clear yellow urine.  Small liquid BM cleaned as well.  Stage 2 sacral wound noted.  Barrier cream and Aqualcel foam dressing placed, after picture taken of wound.  Spouse is at bedside.  Dr. Patel notified of patient arrival.  See flowsheet for further assessment.

## 2020-02-04 LAB
ALBUMIN SERPL BCP-MCNC: 1.5 G/DL (ref 3.5–5.2)
ALP SERPL-CCNC: 56 U/L (ref 55–135)
ALT SERPL W/O P-5'-P-CCNC: 10 U/L (ref 10–44)
ANION GAP SERPL CALC-SCNC: 2 MMOL/L (ref 8–16)
AST SERPL-CCNC: 14 U/L (ref 10–40)
BASOPHILS # BLD AUTO: 0.02 K/UL (ref 0–0.2)
BASOPHILS NFR BLD: 0.2 % (ref 0–1.9)
BILIRUB SERPL-MCNC: 0.5 MG/DL (ref 0.1–1)
BUN SERPL-MCNC: 16 MG/DL (ref 8–23)
CALCIUM SERPL-MCNC: 8.6 MG/DL (ref 8.7–10.5)
CHLORIDE SERPL-SCNC: 116 MMOL/L (ref 95–110)
CO2 SERPL-SCNC: 17 MMOL/L (ref 23–29)
CREAT SERPL-MCNC: 0.7 MG/DL (ref 0.5–1.4)
DIFFERENTIAL METHOD: ABNORMAL
EOSINOPHIL # BLD AUTO: 0.2 K/UL (ref 0–0.5)
EOSINOPHIL NFR BLD: 2.2 % (ref 0–8)
ERYTHROCYTE [DISTWIDTH] IN BLOOD BY AUTOMATED COUNT: 15.7 % (ref 11.5–14.5)
EST. GFR  (AFRICAN AMERICAN): >60 ML/MIN/1.73 M^2
EST. GFR  (NON AFRICAN AMERICAN): >60 ML/MIN/1.73 M^2
GLUCOSE SERPL-MCNC: 78 MG/DL (ref 70–110)
HCT VFR BLD AUTO: 26.3 % (ref 37–48.5)
HGB BLD-MCNC: 9.1 G/DL (ref 12–16)
IMM GRANULOCYTES # BLD AUTO: 0.08 K/UL (ref 0–0.04)
IMM GRANULOCYTES NFR BLD AUTO: 0.8 % (ref 0–0.5)
LIPASE SERPL-CCNC: 30 U/L (ref 4–60)
LYMPHOCYTES # BLD AUTO: 0.8 K/UL (ref 1–4.8)
LYMPHOCYTES NFR BLD: 8 % (ref 18–48)
MCH RBC QN AUTO: 31.5 PG (ref 27–31)
MCHC RBC AUTO-ENTMCNC: 34.6 G/DL (ref 32–36)
MCV RBC AUTO: 91 FL (ref 82–98)
MONOCYTES # BLD AUTO: 0.5 K/UL (ref 0.3–1)
MONOCYTES NFR BLD: 4.7 % (ref 4–15)
NEUTROPHILS # BLD AUTO: 8.6 K/UL (ref 1.8–7.7)
NEUTROPHILS NFR BLD: 84.1 % (ref 38–73)
NRBC BLD-RTO: 0 /100 WBC
PLATELET # BLD AUTO: 152 K/UL (ref 150–350)
PMV BLD AUTO: 11.2 FL (ref 9.2–12.9)
POTASSIUM SERPL-SCNC: 2.7 MMOL/L (ref 3.5–5.1)
POTASSIUM SERPL-SCNC: 3.2 MMOL/L (ref 3.5–5.1)
PROT SERPL-MCNC: 3.9 G/DL (ref 6–8.4)
RBC # BLD AUTO: 2.89 M/UL (ref 4–5.4)
SODIUM SERPL-SCNC: 135 MMOL/L (ref 136–145)
WBC # BLD AUTO: 10.16 K/UL (ref 3.9–12.7)

## 2020-02-04 PROCEDURE — C9113 INJ PANTOPRAZOLE SODIUM, VIA: HCPCS | Performed by: INTERNAL MEDICINE

## 2020-02-04 PROCEDURE — 99900035 HC TECH TIME PER 15 MIN (STAT)

## 2020-02-04 PROCEDURE — 25500020 PHARM REV CODE 255: Performed by: INTERNAL MEDICINE

## 2020-02-04 PROCEDURE — 84132 ASSAY OF SERUM POTASSIUM: CPT

## 2020-02-04 PROCEDURE — 20000000 HC ICU ROOM

## 2020-02-04 PROCEDURE — 63600175 PHARM REV CODE 636 W HCPCS: Performed by: INTERNAL MEDICINE

## 2020-02-04 PROCEDURE — 94761 N-INVAS EAR/PLS OXIMETRY MLT: CPT

## 2020-02-04 PROCEDURE — 83690 ASSAY OF LIPASE: CPT

## 2020-02-04 PROCEDURE — 99291 PR CRITICAL CARE, E/M 30-74 MINUTES: ICD-10-PCS | Mod: ,,, | Performed by: INTERNAL MEDICINE

## 2020-02-04 PROCEDURE — 85025 COMPLETE CBC W/AUTO DIFF WBC: CPT

## 2020-02-04 PROCEDURE — 25000003 PHARM REV CODE 250: Performed by: INTERNAL MEDICINE

## 2020-02-04 PROCEDURE — 99291 CRITICAL CARE FIRST HOUR: CPT | Mod: ,,, | Performed by: INTERNAL MEDICINE

## 2020-02-04 PROCEDURE — 94640 AIRWAY INHALATION TREATMENT: CPT

## 2020-02-04 PROCEDURE — 80053 COMPREHEN METABOLIC PANEL: CPT

## 2020-02-04 PROCEDURE — 36415 COLL VENOUS BLD VENIPUNCTURE: CPT

## 2020-02-04 PROCEDURE — 63600175 PHARM REV CODE 636 W HCPCS: Performed by: HOSPITALIST

## 2020-02-04 PROCEDURE — 97161 PT EVAL LOW COMPLEX 20 MIN: CPT

## 2020-02-04 PROCEDURE — 25000242 PHARM REV CODE 250 ALT 637 W/ HCPCS: Performed by: INTERNAL MEDICINE

## 2020-02-04 PROCEDURE — 99222 PR INITIAL HOSPITAL CARE,LEVL II: ICD-10-PCS | Mod: ,,, | Performed by: SURGERY

## 2020-02-04 PROCEDURE — 99222 1ST HOSP IP/OBS MODERATE 55: CPT | Mod: ,,, | Performed by: SURGERY

## 2020-02-04 RX ORDER — POTASSIUM CHLORIDE 20 MEQ/1
40 TABLET, EXTENDED RELEASE ORAL ONCE
Status: COMPLETED | OUTPATIENT
Start: 2020-02-04 | End: 2020-02-04

## 2020-02-04 RX ORDER — POTASSIUM CHLORIDE 7.45 MG/ML
10 INJECTION INTRAVENOUS
Status: COMPLETED | OUTPATIENT
Start: 2020-02-04 | End: 2020-02-04

## 2020-02-04 RX ORDER — VANCOMYCIN HCL IN 5 % DEXTROSE 1G/250ML
1000 PLASTIC BAG, INJECTION (ML) INTRAVENOUS
Status: DISCONTINUED | OUTPATIENT
Start: 2020-02-04 | End: 2020-02-05 | Stop reason: CLARIF

## 2020-02-04 RX ADMIN — VANCOMYCIN HYDROCHLORIDE 1000 MG: 1 INJECTION, POWDER, LYOPHILIZED, FOR SOLUTION INTRAVENOUS at 05:02

## 2020-02-04 RX ADMIN — SODIUM CHLORIDE: 0.9 INJECTION, SOLUTION INTRAVENOUS at 04:02

## 2020-02-04 RX ADMIN — PANTOPRAZOLE SODIUM 40 MG: 40 INJECTION, POWDER, LYOPHILIZED, FOR SOLUTION INTRAVENOUS at 10:02

## 2020-02-04 RX ADMIN — POTASSIUM CHLORIDE 10 MEQ: 10 INJECTION, SOLUTION INTRAVENOUS at 10:02

## 2020-02-04 RX ADMIN — IPRATROPIUM BROMIDE AND ALBUTEROL SULFATE 3 ML: .5; 3 SOLUTION RESPIRATORY (INHALATION) at 03:02

## 2020-02-04 RX ADMIN — IOHEXOL 75 ML: 350 INJECTION, SOLUTION INTRAVENOUS at 06:02

## 2020-02-04 RX ADMIN — POTASSIUM CHLORIDE 10 MEQ: 10 INJECTION, SOLUTION INTRAVENOUS at 09:02

## 2020-02-04 RX ADMIN — IPRATROPIUM BROMIDE AND ALBUTEROL SULFATE 3 ML: .5; 3 SOLUTION RESPIRATORY (INHALATION) at 11:02

## 2020-02-04 RX ADMIN — POTASSIUM CHLORIDE 10 MEQ: 10 INJECTION, SOLUTION INTRAVENOUS at 08:02

## 2020-02-04 RX ADMIN — PIPERACILLIN AND TAZOBACTAM 3.38 G: 3; .375 INJECTION, POWDER, LYOPHILIZED, FOR SOLUTION INTRAVENOUS; PARENTERAL at 05:02

## 2020-02-04 RX ADMIN — OXYCODONE AND ACETAMINOPHEN 1 TABLET: 5; 325 TABLET ORAL at 12:02

## 2020-02-04 RX ADMIN — POTASSIUM CHLORIDE 10 MEQ: 10 INJECTION, SOLUTION INTRAVENOUS at 12:02

## 2020-02-04 RX ADMIN — IPRATROPIUM BROMIDE AND ALBUTEROL SULFATE 3 ML: .5; 3 SOLUTION RESPIRATORY (INHALATION) at 04:02

## 2020-02-04 RX ADMIN — POTASSIUM CHLORIDE 40 MEQ: 1500 TABLET, EXTENDED RELEASE ORAL at 02:02

## 2020-02-04 RX ADMIN — OXYBUTYNIN CHLORIDE 5 MG: 5 TABLET ORAL at 08:02

## 2020-02-04 RX ADMIN — PIPERACILLIN AND TAZOBACTAM 3.38 G: 3; .375 INJECTION, POWDER, LYOPHILIZED, FOR SOLUTION INTRAVENOUS; PARENTERAL at 08:02

## 2020-02-04 RX ADMIN — GABAPENTIN 600 MG: 300 CAPSULE ORAL at 08:02

## 2020-02-04 RX ADMIN — IOHEXOL 15 ML: 300 INJECTION, SOLUTION INTRAVENOUS at 05:02

## 2020-02-04 RX ADMIN — IPRATROPIUM BROMIDE AND ALBUTEROL SULFATE 3 ML: .5; 3 SOLUTION RESPIRATORY (INHALATION) at 08:02

## 2020-02-04 RX ADMIN — POTASSIUM CHLORIDE 20 MEQ: 1500 TABLET, EXTENDED RELEASE ORAL at 08:02

## 2020-02-04 RX ADMIN — BUSPIRONE HYDROCHLORIDE 5 MG: 5 TABLET ORAL at 08:02

## 2020-02-04 NOTE — PLAN OF CARE
02/04/20 1645   Discharge Assessment   Assessment Type Discharge Planning Assessment   Confirmed/corrected address and phone number on facesheet? Yes   Assessment information obtained from? Medical Record   Prior to hospitilization cognitive status: Not Oriented to Place;Not Oriented to Time   Prior to hospitalization functional status: Partially Dependent   Current cognitive status: Not Oriented to Place;Not Oriented to Time   Current Functional Status: Completely Dependent   Facility Arrived From: [facility]   Lives With spouse   Is patient able to care for self after discharge? No   Who are your caregiver(s) and their phone number(s)? MEGHANN FOY [phone]   Patient's perception of discharge disposition long-term acute care facility (LTAC)   Readmission Within the Last 30 Days current reason for admission unrelated to previous admission   If yes, most recent facility name: [facility]   Patient currently being followed by outpatient case management? No   Patient currently receives any other outside agency services? Yes  (PRIOR TO PREVIOUS ADMISSION PT [facility] FOLLOWED BY [facility])   How many hours a day does the patient receive services? 1   Name and contact number of agency or person providing outside services [facility] [phone]   Equipment Currently Used at Home bedside commode;wheelchair;walker, rolling   Do you have any problems affording any of your prescribed medications? No   Is the patient taking medications as prescribed? yes   Does the patient have transportation home? Yes   Transportation Anticipated family or friend will provide   Dialysis Name and Scheduled days N/A   Does the patient receive services at the [facility]? No   Discharge Plan A Long-term acute care facility (LTAC)   DME Needed Upon Discharge  none   Patient/Family in Agreement with Plan yes   Readmission Questionnaire   What do you believe caused you to be sick enough to be re-admitted? MD AT  LTAC  SENT PT HERE FOR SURGICAL CONSULT   How often do you need to have someone help you when you read instructions, pamphlets, or other written material from your doctor or pharmacy? Always   Do you have problems taking your medications as prescribed? No   Do you have any problems affording any of  your prescribed medications? No   Do you have problems obtaining/receiving your medications? No   Does the patient have transportation to healthcare appointments? Yes   Living Arrangements house   Does the patient have family/friends to help with healtcare needs after discharge? yes   PT RECENTLY ADMITTED TO [facility] AND TRANSFERRED TO [facility] IN [place]. LAST WEEK WE WERE INFORMED PT HAD FREE AIR IN HER BELLY AND LTAC WANTED TO RETURN HER FOR A SURGICAL CONSULT. SHE WAS READMITTED YESTERDAY. LTAC INDICATES THAT PT MAY RETURN THERE AT DISCHARGE IF SHE STILL MEETS CRITERIA. SW WILL FOLLOW AND ASSIST AS NEEDED.

## 2020-02-04 NOTE — NURSING
"Called spouse, Karsten, and asked him to sign a release form in order to obtain CT images from [facility], per MD request.  Stated he would be here soon.  Also called radiology dept at [facility] and spoke to "Bryan" who said her disc would be ready for  by  at the Radiology Dept .  Notified Karina, nursing supervisor.    "

## 2020-02-04 NOTE — NURSING
To CT scan per bed with RN in attendance.  Tolerated well.  No change in condition noted.  Back to room, un-eventful.  Siderails up X 2.  Call light within reach

## 2020-02-04 NOTE — HPI
Patient is a 69-year-old female that was recently admitted here for mental status change.  Patient found have a urinary tract infection and alcohol withdrawal.  Patient was discharged to LTAC on 01/29/2020.  Patient was stable over there and was found to have urinary tract infection and was treated initially with Rocephin.  That was discontinued and patient was started on Zosyn and vancomycin whenever CT scan of the abdomen was done due to abdominal pain on 01/31.  This CT scan showed a pneumoperitoneum with what appeared to be free air in the right upper quadrant.  No obvious source was noted this also was seen on plain film flat and upright of the abdomen.  Patient has continued to complain of pain and is tender in the right upper quadrant.  Patient was transferred back here for surgical attention to this problem.  Patient otherwise has a past medical history significant for arthritis, hypertension, questionable adrenal insufficiency, urinary tract infection, alcohol abuse, asthma, and gouty arthropathy.  Patient also has a long history of depression as well.  Patient is in no acute distress at this time.  Abdomen showing bowel sounds noted but there is tenderness in the right upper quadrant to the epigastric area.

## 2020-02-04 NOTE — PT/OT/SLP EVAL
Physical Therapy Evaluation    Patient Name:  Obdulia Yepez   MRN:  [id number]    Recommendations:     Discharge Recommendations:  LTACH (long-term acute care hospital)(patient was transferred to [facility] from [facility] in [place])   Discharge Equipment Recommendations: (to be determined)   Barriers to discharge: Decreased caregiver support and increased level of care required    Assessment:     Obdulia Yepez is a 69 y.o. female admitted with a medical diagnosis of Pneumoperitoneum.  She presents with the following impairments/functional limitations:  weakness, impaired endurance, impaired self care skills, impaired functional mobilty, pain, decreased ROM, impaired coordination, impaired fine motor, edema, impaired skin, impaired muscle length, orthopedic precautions, impaired joint extensibility, impaired balance. Patient is a frail, ill appearing female recently discharged from this facility on 1/29/20 to [facility]. Patient with significant deconditioning and disuse muscle atrophy requiring assistance for all ADL's and functional mobility needs. Patient with eyes closed throughout most of the eval requiring max cueing/coaxing for participation and attention to task.    Rehab Prognosis: Fair; patient would benefit from acute skilled PT services to address these deficits and reach maximum level of function.    Recent Surgery: * No surgery found *      Plan:     During this hospitalization, patient to be seen (QD M-F) to address the identified rehab impairments via gait training, therapeutic activities, therapeutic exercises and progress toward the following goals:    · Plan of Care Expires:  02/18/20    Subjective     Chief Complaint: weakness  Patient/Family Comments/goals: Patients spouse at bedside stating she just started physical therapy at [facility] yesterday but has not sat up at all.  Pain/Comfort:  · Pain Rating 1: (Patient did not verbalize pain upon PT questioning, her eyes remained closed throughout most  of evaluation. )    Patients cultural, spiritual, Sabianist conflicts given the current situation: no    Living Environment:  Prior to hospitalization patient was living with her  in a single story home with 2 steps to enter front, no steps to enter house from garage. Patient was transferred from this facility on 1/29/[facility] term medical care and rehab.   Prior to admission, patients level of function was assistance required for all ADL's.  Equipment used at home: bedside commode, walker, rolling, wheelchair.  DME owned (not currently used): none.  Upon discharge, patient will have assistance from [facility].    Objective:     Communicated with RN and spouse prior to session.  RN stating he gave patient pain medication just prior to PT arrival to unit. Patient found supine with eyes closed, blood pressure cuff, spence catheter, peripheral IV, pulse ox (continuous), telemetry, SCD  upon PT entry to room.    General Precautions: Standard, special contact   Orthopedic Precautions:(recent right ankle fracture, last ortho precautions noted were for RLE WBAT in CAM boot (which is not with her at this facility))   Braces: (CAM boot)     Exams:  · Cognitive Exam:  Patient is oriented to Person  · Gross Motor Coordination:  impaired secondary to weakness/deconditioning  · Skin Integrity/Edema:  -       Skin integrity: stage II sacral ulcer per RN (not assessed by PT)   · -       Edema: Pitting noted in bilateral distal UE's, L > R  · RUE ROM: limited A/PROM at all joints  · RUE Strength:  strength poor+, shoulder 2/5 to 2+/5, distal joints 2+/5 to 3-/5  · LUE ROM:  limited A/PROM at all joints  · LUE Strength:  strength poor+, shoulder 2/5 to 2+/5, distal joints 2+/5 to 3-/5  · RLE ROM: decreased AROM throughout secondary to weakness/deconditioning, PROM decreased at hip and ankle DF with foot drop  · RLE Strength: 2+/5 to 3-/5 range  · LLE ROM: decreased AROM throughout  secondary to weakness/deconditioning, PROM decreased at hip and ankle DF with foot drop  · LLE Strength:  2+/5 to 3-/5 range    Functional Mobility:  · Patient declined to participate in bed mobility activities at this time      Therapeutic Activities and Exercises:   passive heel cord and HS stretch with 20 sec hold x 3, AAROM to all extremities to tolerance.    AM-PAC 6 CLICK MOBILITY  Total Score:10     Patient left supine with all lines intact, call button in reach, RN notified and  present.    GOALS:   Multidisciplinary Problems     Physical Therapy Goals        Problem: Physical Therapy Goal    Goal Priority Disciplines Outcome Goal Variances Interventions   Physical Therapy Goal     PT, PT/OT      Description:  Goals to be met by: discharge     Patient will increase functional independence with mobility by performin. Supine to sit with Maximum Assistance  2. Sit to supine with Maximum Assistance  3. Rolling to Right with Maximum Assistance.  4. Sitting at edge of bed x5 minutes with CGA/MIN assist                      History:     Past Medical History:   Diagnosis Date    Allergy     Arthritis     Asthma     Depression     Gout     Hypotension     Sepsis        Past Surgical History:   Procedure Laterality Date    APPENDECTOMY       SECTION      CHOLECYSTECTOMY      CYSTOSCOPY W/ RETROGRADES Bilateral 2019    Procedure: CYSTOSCOPY, WITH RETROGRADE PYELOGRAM;  Surgeon: Trip Bacon MD;  Location: [facility] OR;  Service: Urology;  Laterality: Bilateral;    CYSTOSCOPY WITH BIOPSY OF BLADDER  2019    Procedure: CYSTOSCOPY, WITH BLADDER BIOPSY, WITH FULGURATION IF INDICATED;  Surgeon: Trip Bacon MD;  Location: [facility] OR;  Service: Urology;;    ESOPHAGOGASTRODUODENOSCOPY N/A 12/10/2019    Procedure: EGD (ESOPHAGOGASTRODUODENOSCOPY);  Surgeon: Shakeel Perez MD;  Location[facility];  Service: Endoscopy;  Laterality: N/A;    HYSTERECTOMY      SHOULDER SURGERY  Right 04/16/2019    STOMACH SURGERY      URETEROSCOPY Right 11/13/2019    Procedure: URETEROSCOPY;  Surgeon: Trip Bacon MD;  Location: [facility];  Service: Urology;  Laterality: Right;    WRIST SURGERY Left        Time Tracking:     PT Received On: 02/04/20  PT Start Time: 1314     PT Stop Time: 1336  PT Total Time (min): 22 min     Billable Minutes: Evaluation 22 min      Dave Coles, PT  02/04/2020

## 2020-02-04 NOTE — SUBJECTIVE & OBJECTIVE
Past Medical History:   Diagnosis Date    Allergy     Arthritis     Asthma     Depression     Gout     Hypotension     Sepsis        Past Surgical History:   Procedure Laterality Date    APPENDECTOMY       SECTION      CHOLECYSTECTOMY      CYSTOSCOPY W/ RETROGRADES Bilateral 2019    Procedure: CYSTOSCOPY, WITH RETROGRADE PYELOGRAM;  Surgeon: Trip Bacon MD;  Location: [facility] OR;  Service: Urology;  Laterality: Bilateral;    CYSTOSCOPY WITH BIOPSY OF BLADDER  2019    Procedure: CYSTOSCOPY, WITH BLADDER BIOPSY, WITH FULGURATION IF INDICATED;  Surgeon: Trip Bacon MD;  Location: [facility] OR;  Service: Urology;;    ESOPHAGOGASTRODUODENOSCOPY N/A 12/10/2019    Procedure: EGD (ESOPHAGOGASTRODUODENOSCOPY);  Surgeon: Shakeel Perez MD;  Location: [facility];  Service: Endoscopy;  Laterality: N/A;    HYSTERECTOMY      SHOULDER SURGERY Right 2019    STOMACH SURGERY      URETEROSCOPY Right 2019    Procedure: URETEROSCOPY;  Surgeon: Trip Bacon MD;  Location: [facility] OR;  Service: Urology;  Laterality: Right;    WRIST SURGERY Left        Review of patient's allergies indicates:   Allergen Reactions    Amoxicillin Diarrhea    Latex     Milk containing products     Opioids - morphine analogues     Peanut     Sodium phosphate     Soy     Sulfa (sulfonamide antibiotics)        Current Facility-Administered Medications on File Prior to Encounter   Medication    hylan g-f 20 48 mg/6 mL injection 48 mg    hylan g-f 20 48 mg/6 mL injection 48 mg    triamcinolone acetonide injection 80 mg     Current Outpatient Medications on File Prior to Encounter   Medication Sig    allopurinol (ZYLOPRIM) 300 MG tablet TAKE 1 TABLET BY MOUTH DAILY    budesonide-formoterol 160-4.5 mcg (SYMBICORT) 160-4.5 mcg/actuation HFAA Symbicort 160 mcg-4.5 mcg/actuation HFA aerosol inhaler   Inhale 2 puffs twice a day by inhalation route.    busPIRone (BUSPAR) 5 MG Tab Take 1 tablet (5 mg  total) by mouth 2 (two) times daily.    colestipol (COLESTID) 1 gram Tab Take 1 tablet (1 g total) by mouth 2 (two) times daily.    cyclobenzaprine (FLEXERIL) 10 MG tablet Take 1 tablet (10 mg total) by mouth 3 (three) times daily as needed.    cyproheptadine (PERIACTIN) 4 mg tablet TAKE 1 TABLET (4 MG TOTAL) BY MOUTH 3 (THREE) TIMES DAILY AS NEEDED.    escitalopram oxalate (LEXAPRO) 20 MG tablet Take 20 mg by mouth once daily.     fluticasone (FLONASE) 50 mcg/actuation nasal spray fluticasone 50 mcg/actuation nasal spray,suspension   Inhale 1 spray every day by intranasal route.    gabapentin (NEURONTIN) 300 MG capsule Take 3 capsules (900 mg total) by mouth 3 (three) times daily. (Patient taking differently: Take 600 mg by mouth 2 (two) times daily. )    hydrocortisone (CORTEF) 10 MG Tab TAKE 2 TABLETS BY MOUTH IN THE MORNING AND 1 TABLET IN THE EVENING    ipratropium (ATROVENT) 0.03 % nasal spray 2 sprays by Nasal route 3 (three) times daily.    lubiprostone (AMITIZA) 24 MCG Cap Take 24 mcg by mouth 2 (two) times daily with meals.    montelukast (SINGULAIR) 10 mg tablet TAKE 1 TABLET(S) EVERY DAY BY ORAL ROUTE.    multivitamin (THERAGRAN) per tablet Take 1 tablet by mouth once daily.    mupirocin (BACTROBAN) 2 % ointment mupirocin 2 % topical ointment   APPLY A SMALL AMOUNT TO THE AFFECTED AREA BY TOPICAL ROUTE 2 TIMES PER DAY    ondansetron (ZOFRAN) 4 MG tablet Take 1 tablet (4 mg total) by mouth every 8 (eight) hours as needed for Nausea.    oxybutynin (DITROPAN-XL) 5 MG TR24 oxybutynin chloride ER 5 mg tablet,extended release 24 hr   TAKE 1 TABLET BY MOUTH EVERY DAY    oxyCODONE-acetaminophen (PERCOCET)  mg per tablet Take 1 tablet by mouth every 4 (four) hours as needed for Pain.    pantoprazole (PROTONIX) 40 MG tablet Take 1 tablet (40 mg total) by mouth once daily.    polymyxin B sulf-trimethoprim (POLYTRIM) 10,000 unit- 1 mg/mL Drop Place 1 drop into the right eye every 4 (four)  hours.    vitamin E 1000 UNIT capsule Take 1,000 Units by mouth once daily.     Family History     Problem Relation (Age of Onset)    Asthma Mother    Bipolar disorder Sister    Colon cancer Maternal Grandmother, Maternal Grandfather    Pleurisy Father        Tobacco Use    Smoking status: Never Smoker    Smokeless tobacco: Never Used   Substance and Sexual Activity    Alcohol use: Yes     Comment: everyday drinker. has not had any in the past 2 days    Drug use: No    Sexual activity: Yes     Review of Systems   Constitutional: Positive for fatigue. Negative for activity change, appetite change and fever.   HENT: Negative for congestion, ear discharge, mouth sores, nosebleeds, rhinorrhea, sinus pressure, sinus pain and tinnitus.    Eyes: Negative.  Negative for pain, redness and itching.   Respiratory: Positive for chest tightness. Negative for apnea, cough, choking, shortness of breath, wheezing and stridor.    Cardiovascular: Negative for chest pain, palpitations and leg swelling.   Gastrointestinal: Positive for abdominal distention, abdominal pain and nausea. Negative for anal bleeding, blood in stool, constipation and diarrhea.   Endocrine: Negative.    Genitourinary: Negative for difficulty urinating, flank pain, frequency and urgency.   Musculoskeletal: Positive for arthralgias. Negative for back pain, gait problem and myalgias.   Skin: Negative for color change and pallor.   Allergic/Immunologic: Negative.    Neurological: Positive for weakness and light-headedness. Negative for dizziness, facial asymmetry and headaches.   Hematological: Negative for adenopathy. Does not bruise/bleed easily.   Psychiatric/Behavioral: The patient is nervous/anxious.      Objective:     Vital Signs (Most Recent):    Vital Signs (24h Range):  Temp:  [96.6 °F (35.9 °C)] 96.6 °F (35.9 °C)  Pulse:  [71] 71  Resp:  [16] 16  BP: (156)/(97) 156/97        There is no height or weight on file to calculate BMI.    Physical Exam    Constitutional: She is oriented to person, place, and time. She appears well-developed and well-nourished.   HENT:   Head: Normocephalic and atraumatic.   Eyes: Pupils are equal, round, and reactive to light. EOM are normal.   Neck: Normal range of motion. Neck supple. No tracheal deviation present. No thyromegaly present.   Cardiovascular: Normal rate, regular rhythm and normal heart sounds.   Pulmonary/Chest: She has wheezes. She has rales.   Abdominal: Soft. Bowel sounds are normal. She exhibits no distension. There is no tenderness. There is no rebound and no guarding.   Musculoskeletal: Normal range of motion.   Lymphadenopathy:     She has no cervical adenopathy.   Neurological: She is alert and oriented to person, place, and time.   Skin: Skin is warm and dry. Capillary refill takes less than 2 seconds.   Psychiatric: She has a normal mood and affect. Her behavior is normal. Judgment and thought content normal.   Nursing note and vitals reviewed.        CRANIAL NERVES     CN III, IV, VI   Pupils are equal, round, and reactive to light.  Extraocular motions are normal.        Significant Labs:   Recent Lab Results     None        All pertinent labs within the past 24 hours have been reviewed.    Significant Imaging: I have reviewed and interpreted all pertinent imaging results/findings within the past 24 hours.

## 2020-02-04 NOTE — H&P
[facility] - ICU Hospital Medicine  History & Physical    Patient Name: Obdulia Yepez  MRN: [id number]  Admission Date: 2/3/2020  Attending Physician: Jimenez Patel MD  Primary Care Provider: Og Perez MD         Patient information was obtained from patient and ER records.     Subjective:     Principal Problem:Pneumoperitoneum    Chief Complaint: No chief complaint on file.       HPI: Patient is a 69-year-old female that was recently admitted here for mental status change.  Patient found have a urinary tract infection and alcohol withdrawal.  Patient was discharged to LTAC on 01/29/2020.  Patient was stable over there and was found to have urinary tract infection and was treated initially with Rocephin.  That was discontinued and patient was started on Zosyn and vancomycin whenever CT scan of the abdomen was done due to abdominal pain on 01/31.  This CT scan showed a pneumoperitoneum with what appeared to be free air in the right upper quadrant.  No obvious source was noted this also was seen on plain film flat and upright of the abdomen.  Patient has continued to complain of pain and is tender in the right upper quadrant.  Patient was transferred back here for surgical attention to this problem.  Patient otherwise has a past medical history significant for arthritis, hypertension, questionable adrenal insufficiency, urinary tract infection, alcohol abuse, asthma, and gouty arthropathy.  Patient also has a long history of depression as well.  Patient is in no acute distress at this time.  Abdomen showing bowel sounds noted but there is tenderness in the right upper quadrant to the epigastric area.    No new subjective & objective note has been filed under this hospital service since the last note was generated.    Assessment/Plan:     * Pneumoperitoneum  02/03/2020:  Admit to ICU.  Consult General surgery  Continue antibiotics of Zosyn and vancomycin  Repeat labs now and in the a.m..   To include CBC CMP amylase and lipase  Continue current TPN feedings new  Nebulization treatments for pulmonary toilet    Critical Care time 60 minutes          VTE Risk Mitigation (From admission, onward)         Ordered     IP VTE LOW RISK PATIENT  Once      02/03/20 1744     Place MORIS hose  Until discontinued      02/03/20 1744     Place sequential compression device  Until discontinued      02/03/20 1744                   Jimenez Patel MD  Department of Hospital Medicine   [facility] - ICU

## 2020-02-04 NOTE — NURSING
Report received.  Assessment done. VSS. Resting quietly with eyes closed.  Resp even and unlabored.  Arias cath to gravity is patent and draining clear yellow urine.  Frequent turns with pillow support.  Sacral dressing covering stage 2 wound on buttocks.  Pics linked to pt file.  See flowsheet for further assessment.

## 2020-02-04 NOTE — PROGRESS NOTES
[facility] - ICU Hospital Medicine  Progress Note    Patient Name: Obdulia Yepez  MRN: [id number]  Patient Class: IP- Inpatient   Admission Date: 2/3/2020  Length of Stay: 1 days  Attending Physician: Jimenez Patel MD  Primary Care Provider: Og Perez MD        Subjective:     Principal Problem:Pneumoperitoneum        HPI:  Patient is a 69-year-old female that was recently admitted here for mental status change.  Patient found have a urinary tract infection and alcohol withdrawal.  Patient was discharged to LTAC on 01/29/2020.  Patient was stable over there and was found to have urinary tract infection and was treated initially with Rocephin.  That was discontinued and patient was started on Zosyn and vancomycin whenever CT scan of the abdomen was done due to abdominal pain on 01/31.  This CT scan showed a pneumoperitoneum with what appeared to be free air in the right upper quadrant.  No obvious source was noted this also was seen on plain film flat and upright of the abdomen.  Patient has continued to complain of pain and is tender in the right upper quadrant.  Patient was transferred back here for surgical attention to this problem.  Patient otherwise has a past medical history significant for arthritis, hypertension, questionable adrenal insufficiency, urinary tract infection, alcohol abuse, asthma, and gouty arthropathy.  Patient also has a long history of depression as well.  Patient is in no acute distress at this time.  Abdomen showing bowel sounds noted but there is tenderness in the right upper quadrant to the epigastric area.    Overview/Hospital Course:  02/04/2020:  Patient is alert intermittently confused and in no acute distress. Patient still complain of some abdominal pain. Otherwise labs show a sodium 135 potassium 2.7 chloride 116 bicarb 17 and liver function tests within normal ranges.  Lipase was 30 and normal white blood cell count 10.1 hemoglobin 9.1 hematocrit 26  platelets 152 differential 84 granulocytes 8 lymphocytes. Vs:BP (!) 139/101   Pulse 85   Temp 98.8 °F (37.1 °C)   Resp 18   Ht 5' (1.524 m)   Wt 48 kg (105 lb 13.1 oz)   LMP  (LMP Unknown)   SpO2 99%   Breastfeeding? No   BMI 20.67 kg/m²     CT scan of the abdomen with contrast reveals a the tubular structure in the left abdomen that is of uncertain etiology.  Please see radiology report.    CT Abd Pelvis:   Impression       1. Interval development of moderate bilateral pleural effusions with bibasilar dependent atelectasis.  2. Chronic small hepatic cysts.  3. Previous cholecystectomy with persistent moderate intra and extrahepatic biliary ductal dilatation.  4. Perihepatic ascites.  5. Mild bilateral hydronephrosis which has slightly decompressed over the interval.  6. Mild rectal impaction which has decompressed over the interval.  7. Interval development of an elongated fluid dilated structure of the left abdomen which is of uncertain etiology.  Differential diagnosis includes a focally dilated loop of small bowel.  However, this is considered unlikely as no oral contrast is present.  Differential diagnosis includes seroma versus developing intra-abdominal abscess.  Correlate clinically with possible fever and/or elevated white count.  Differential diagnosis also includes possible unusual lymphocele or mesenteric cyst.  8. Prior hysterectomy.  9. Arias catheter placement with circumferential bladder wall thickening.  This may represent a cystitis.  Correlate clinically with UA.  10. Bone demineralization.  11. Suspected anasarca.  The preliminary and final reports are concordant.      Electronically signed by: Vaughn Galarza  Date: 02/04/2020  Time: 10:20            Last Resulted: 02/04/20 10:20   Order Details View Encounter Lab and Collection Details Routing Result History            External Result Report     External Result Report   Narrative     EXAMINATION:  CT ABDOMEN PELVIS WITH  CONTRAST    CLINICAL HISTORY:  Abd pain, fever, abscess suspected;          Interval History:     Review of Systems   Constitutional: Positive for fatigue. Negative for activity change, appetite change and fever.   HENT: Negative for congestion, ear discharge, mouth sores, nosebleeds, rhinorrhea, sinus pressure, sinus pain and tinnitus.    Eyes: Negative.  Negative for pain, redness and itching.   Respiratory: Positive for shortness of breath. Negative for apnea, cough, choking, chest tightness, wheezing and stridor.    Cardiovascular: Negative for chest pain, palpitations and leg swelling.   Gastrointestinal: Positive for abdominal pain and nausea. Negative for abdominal distention, anal bleeding, blood in stool, constipation and diarrhea.   Endocrine: Negative.    Genitourinary: Negative for difficulty urinating, flank pain, frequency and urgency.   Musculoskeletal: Negative for arthralgias, back pain, gait problem and myalgias.   Skin: Negative for color change and pallor.   Allergic/Immunologic: Negative.    Neurological: Positive for dizziness and weakness. Negative for facial asymmetry, light-headedness and headaches.   Hematological: Negative for adenopathy. Does not bruise/bleed easily.   Psychiatric/Behavioral: Positive for confusion. The patient is nervous/anxious.      Objective:     Vital Signs (Most Recent):  Temp: 98.8 °F (37.1 °C) (02/04/20 1130)  Pulse: 85 (02/04/20 1200)  Resp: 18 (02/04/20 1200)  BP: (!) 139/101 (02/04/20 1200)  SpO2: 99 % (02/04/20 1200) Vital Signs (24h Range):  Temp:  [96.6 °F (35.9 °C)-98.8 °F (37.1 °C)] 98.8 °F (37.1 °C)  Pulse:  [] 85  Resp:  [9-61] 18  SpO2:  [97 %-100 %] 99 %  BP: ()/() 139/101     Weight: 48 kg (105 lb 13.1 oz)  Body mass index is 20.67 kg/m².    Intake/Output Summary (Last 24 hours) at 2/4/2020 1313  Last data filed at 2/4/2020 0447  Gross per 24 hour   Intake 2752.5 ml   Output 200 ml   Net 2552.5 ml      Physical Exam   Constitutional:  She is oriented to person, place, and time. She appears well-developed and well-nourished.   HENT:   Head: Normocephalic and atraumatic.   Eyes: Pupils are equal, round, and reactive to light. EOM are normal.   Neck: Normal range of motion. Neck supple. No tracheal deviation present. No thyromegaly present.   Cardiovascular: Normal rate, regular rhythm and normal heart sounds.   Pulmonary/Chest: Effort normal and breath sounds normal.   Abdominal: Soft. Bowel sounds are normal. She exhibits no distension. There is no tenderness. There is no rebound and no guarding.   Musculoskeletal: Normal range of motion.   Lymphadenopathy:     She has no cervical adenopathy.   Neurological: She is alert and oriented to person, place, and time.   Skin: Skin is warm and dry. Capillary refill takes less than 2 seconds.   Psychiatric: She has a normal mood and affect. Her behavior is normal. Judgment and thought content normal.       Significant Labs:   Recent Lab Results       02/04/20  0414   02/03/20  1823        Albumin 1.5 1.9     Alkaline Phosphatase 56 71     ALT 10 10     Amylase   55     Anion Gap 2 6     aPTT   35.1     AST 14 17     Baso # 0.02 0.03     Basophil% 0.2 0.2     BILIRUBIN TOTAL 0.5  Comment:  For infants and newborns, interpretation of results should be based  on gestational age, weight and in agreement with clinical  observations.  Premature Infant recommended reference ranges:  Up to 24 hours.............<8.0 mg/dL  Up to 48 hours............<12.0 mg/dL  3-5 days..................<15.0 mg/dL  6-29 days.................<15.0 mg/dL   0.8  Comment:  For infants and newborns, interpretation of results should be based  on gestational age, weight and in agreement with clinical  observations.  Premature Infant recommended reference ranges:  Up to 24 hours.............<8.0 mg/dL  Up to 48 hours............<12.0 mg/dL  3-5 days..................<15.0 mg/dL  6-29 days.................<15.0 mg/dL       BUN, Bld 16 17      Calcium 8.6 8.9     Chloride 116 110     CO2 17 19     Creatinine 0.7 0.7        0.7     Differential Method Automated Automated     eGFR if  >60.0 >60.0        >60.0     eGFR if non  >60.0  Comment:  Calculation used to obtain the estimated glomerular filtration  rate (eGFR) is the CKD-EPI equation.    >60.0  Comment:  Calculation used to obtain the estimated glomerular filtration  rate (eGFR) is the CKD-EPI equation.           >60.0  Comment:  Calculation used to obtain the estimated glomerular filtration  rate (eGFR) is the CKD-EPI equation.        Eos # 0.2 0.2     Eosinophil% 2.2 1.7     Glucose 78 78     Gran # (ANC) 8.6 10.7     Gran% 84.1 87.0     Hematocrit 26.3 31.8     Hemoglobin 9.1 11.2     Immature Grans (Abs) 0.08  Comment:  Mild elevation in immature granulocytes is non specific and   can be seen in a variety of conditions including stress response,   acute inflammation, trauma and pregnancy. Correlation with other   laboratory and clinical findings is essential.   0.11  Comment:  Mild elevation in immature granulocytes is non specific and   can be seen in a variety of conditions including stress response,   acute inflammation, trauma and pregnancy. Correlation with other   laboratory and clinical findings is essential.       Immature Granulocytes 0.8 0.9     INR   1.1  Comment:  Coumadin Therapy:  2.0 - 3.0 for INR for all indicators except mechanical heart valves  and antiphospholipid syndromes which should use 2.5 - 3.5.       Lipase 30 50     Lymph # 0.8 0.8     Lymph% 8.0 6.6     MCH 31.5 31.4     MCHC 34.6 35.2     MCV 91 89     Mono # 0.5 0.4     Mono% 4.7 3.6     MPV 11.2 11.2     nRBC 0 0     Platelets 152 188     Potassium 2.7  Comment:  K  critical result(s) called and verbal readback obtained from   SHAWN Araujo by TRACI 02/04/2020 05:34   2.1  Comment:  result(s) called and verbal readback obtained from   douglas ortega @ 6545 nh by [facility] 02/03/2020 18:59        PROTEIN TOTAL 3.9 4.8     Protime   12.3     RBC 2.89 3.57     RDW 15.7 15.7     Sodium 135 135     WBC 10.16 12.26         All pertinent labs within the past 24 hours have been reviewed.    Significant Imaging: I have reviewed and interpreted all pertinent imaging results/findings within the past 24 hours.     The evaluation, management and treatment of this [id number] of direct involvement.  This time was exclusive of any billable procedures.      Assessment/Plan:      * Pneumoperitoneum  02/03/2020:  Admit to ICU.  Consult General surgery  Continue antibiotics of Zosyn and vancomycin  Repeat labs now and in the a.m..  To include CBC CMP amylase and lipase  Continue current TPN feedings new  Nebulization treatments for pulmonary toilet    Critical Care time 60 minutes    02/04/2020:  · Continue current antibiotics this time.  · Defer to surgery regarding mass or fluid-filled structure in the abdomen  · No mention of free air was noted.  · Repeat labs in the a.m. to include CBC, CMP, chest x-ray          VTE Risk Mitigation (From admission, onward)         Ordered     IP VTE LOW RISK PATIENT  Once      02/03/20 1744     Place MORIS hose  Until discontinued      02/03/20 1744     Place sequential compression device  Until discontinued      02/03/20 1744                      Jimenez Patel MD  Department of Hospital Medicine   [facility] - ICU

## 2020-02-04 NOTE — H&P
[facility] - ICU Hospital Medicine  History & Physical    Patient Name: Obdulia Yepez  MRN: [id number]  Admission Date: 2/3/2020  Attending Physician: Jimenez Patel MD  Primary Care Provider: Og Perez MD         Patient information was obtained from patient and ER records.     Subjective:     Principal Problem:Pneumoperitoneum    Chief Complaint: No chief complaint on file.       HPI: Patient is a 69-year-old female that was recently admitted here for mental status change.  Patient found have a urinary tract infection and alcohol withdrawal.  Patient was discharged to [facility] on 2020.  Patient was stable over there and was found to have urinary tract infection and was treated initially with Rocephin.  That was discontinued and patient was started on Zosyn and vancomycin whenever CT scan of the abdomen was done due to abdominal pain on .  This CT scan showed a pneumoperitoneum with what appeared to be free air in the right upper quadrant.  No obvious source was noted this also was seen on plain film flat and upright of the abdomen.  Patient has continued to complain of pain and is tender in the right upper quadrant.  Patient was transferred back here for surgical attention to this problem.  Patient otherwise has a past medical history significant for arthritis, hypertension, questionable adrenal insufficiency, urinary tract infection, alcohol abuse, asthma, and gouty arthropathy.  Patient also has a long history of depression as well.  Patient is in no acute distress at this time.  Abdomen showing bowel sounds noted but there is tenderness in the right upper quadrant to the epigastric area.    Past Medical History:   Diagnosis Date    Allergy     Arthritis     Asthma     Depression     Gout     Hypotension     Sepsis        Past Surgical History:   Procedure Laterality Date    APPENDECTOMY       SECTION      CHOLECYSTECTOMY      CYSTOSCOPY W/ RETROGRADES  Bilateral 11/13/2019    Procedure: CYSTOSCOPY, WITH RETROGRADE PYELOGRAM;  Surgeon: Trip Bacon MD;  Location: [facility] OR;  Service: Urology;  Laterality: Bilateral;    CYSTOSCOPY WITH BIOPSY OF BLADDER  11/13/2019    Procedure: CYSTOSCOPY, WITH BLADDER BIOPSY, WITH FULGURATION IF INDICATED;  Surgeon: Trip Bacon MD;  Location: [facility] OR;  Service: Urology;;    ESOPHAGOGASTRODUODENOSCOPY N/A 12/10/2019    Procedure: EGD (ESOPHAGOGASTRODUODENOSCOPY);  Surgeon: Shakeel Perez MD;  Location: [facility] ENDO;  Service: Endoscopy;  Laterality: N/A;    HYSTERECTOMY      SHOULDER SURGERY Right 04/16/2019    STOMACH SURGERY      URETEROSCOPY Right 11/13/2019    Procedure: URETEROSCOPY;  Surgeon: Trip Bacon MD;  Location: [facility] OR;  Service: Urology;  Laterality: Right;    WRIST SURGERY Left        Review of patient's allergies indicates:   Allergen Reactions    Amoxicillin Diarrhea    Latex     Milk containing products     Opioids - morphine analogues     Peanut     Sodium phosphate     Soy     Sulfa (sulfonamide antibiotics)        Current Facility-Administered Medications on File Prior to Encounter   Medication    hylan g-f 20 48 mg/6 mL injection 48 mg    hylan g-f 20 48 mg/6 mL injection 48 mg    triamcinolone acetonide injection 80 mg     Current Outpatient Medications on File Prior to Encounter   Medication Sig    allopurinol (ZYLOPRIM) 300 MG tablet TAKE 1 TABLET BY MOUTH DAILY    budesonide-formoterol 160-4.5 mcg (SYMBICORT) 160-4.5 mcg/actuation HFAA Symbicort 160 mcg-4.5 mcg/actuation HFA aerosol inhaler   Inhale 2 puffs twice a day by inhalation route.    busPIRone (BUSPAR) 5 MG Tab Take 1 tablet (5 mg total) by mouth 2 (two) times daily.    colestipol (COLESTID) 1 gram Tab Take 1 tablet (1 g total) by mouth 2 (two) times daily.    cyclobenzaprine (FLEXERIL) 10 MG tablet Take 1 tablet (10 mg total) by mouth 3 (three) times daily as needed.    cyproheptadine (PERIACTIN) 4 mg  tablet TAKE 1 TABLET (4 MG TOTAL) BY MOUTH 3 (THREE) TIMES DAILY AS NEEDED.    escitalopram oxalate (LEXAPRO) 20 MG tablet Take 20 mg by mouth once daily.     fluticasone (FLONASE) 50 mcg/actuation nasal spray fluticasone 50 mcg/actuation nasal spray,suspension   Inhale 1 spray every day by intranasal route.    gabapentin (NEURONTIN) 300 MG capsule Take 3 capsules (900 mg total) by mouth 3 (three) times daily. (Patient taking differently: Take 600 mg by mouth 2 (two) times daily. )    hydrocortisone (CORTEF) 10 MG Tab TAKE 2 TABLETS BY MOUTH IN THE MORNING AND 1 TABLET IN THE EVENING    ipratropium (ATROVENT) 0.03 % nasal spray 2 sprays by Nasal route 3 (three) times daily.    lubiprostone (AMITIZA) 24 MCG Cap Take 24 mcg by mouth 2 (two) times daily with meals.    montelukast (SINGULAIR) 10 mg tablet TAKE 1 TABLET(S) EVERY DAY BY ORAL ROUTE.    multivitamin (THERAGRAN) per tablet Take 1 tablet by mouth once daily.    mupirocin (BACTROBAN) 2 % ointment mupirocin 2 % topical ointment   APPLY A SMALL AMOUNT TO THE AFFECTED AREA BY TOPICAL ROUTE 2 TIMES PER DAY    ondansetron (ZOFRAN) 4 MG tablet Take 1 tablet (4 mg total) by mouth every 8 (eight) hours as needed for Nausea.    oxybutynin (DITROPAN-XL) 5 MG TR24 oxybutynin chloride ER 5 mg tablet,extended release 24 hr   TAKE 1 TABLET BY MOUTH EVERY DAY    oxyCODONE-acetaminophen (PERCOCET)  mg per tablet Take 1 tablet by mouth every 4 (four) hours as needed for Pain.    pantoprazole (PROTONIX) 40 MG tablet Take 1 tablet (40 mg total) by mouth once daily.    polymyxin B sulf-trimethoprim (POLYTRIM) 10,000 unit- 1 mg/mL Drop Place 1 drop into the right eye every 4 (four) hours.    vitamin E 1000 UNIT capsule Take 1,000 Units by mouth once daily.     Family History     Problem Relation (Age of Onset)    Asthma Mother    Bipolar disorder Sister    Colon cancer Maternal Grandmother, Maternal Grandfather    Pleurisy Father        Tobacco Use    Smoking  status: Never Smoker    Smokeless tobacco: Never Used   Substance and Sexual Activity    Alcohol use: Yes     Comment: everyday drinker. has not had any in the past 2 days    Drug use: No    Sexual activity: Yes     Review of Systems   Constitutional: Positive for fatigue. Negative for activity change, appetite change and fever.   HENT: Negative for congestion, ear discharge, mouth sores, nosebleeds, rhinorrhea, sinus pressure, sinus pain and tinnitus.    Eyes: Negative.  Negative for pain, redness and itching.   Respiratory: Positive for chest tightness. Negative for apnea, cough, choking, shortness of breath, wheezing and stridor.    Cardiovascular: Negative for chest pain, palpitations and leg swelling.   Gastrointestinal: Positive for abdominal distention, abdominal pain and nausea. Negative for anal bleeding, blood in stool, constipation and diarrhea.   Endocrine: Negative.    Genitourinary: Negative for difficulty urinating, flank pain, frequency and urgency.   Musculoskeletal: Positive for arthralgias. Negative for back pain, gait problem and myalgias.   Skin: Negative for color change and pallor.   Allergic/Immunologic: Negative.    Neurological: Positive for weakness and light-headedness. Negative for dizziness, facial asymmetry and headaches.   Hematological: Negative for adenopathy. Does not bruise/bleed easily.   Psychiatric/Behavioral: The patient is nervous/anxious.      Objective:     Vital Signs (Most Recent):    Vital Signs (24h Range):  Temp:  [96.6 °F (35.9 °C)] 96.6 °F (35.9 °C)  Pulse:  [71] 71  Resp:  [16] 16  BP: (156)/(97) 156/97        There is no height or weight on file to calculate BMI.    Physical Exam   Constitutional: She is oriented to person, place, and time. She appears well-developed and well-nourished.   HENT:   Head: Normocephalic and atraumatic.   Eyes: Pupils are equal, round, and reactive to light. EOM are normal.   Neck: Normal range of motion. Neck supple. No tracheal  deviation present. No thyromegaly present.   Cardiovascular: Normal rate, regular rhythm and normal heart sounds.   Pulmonary/Chest: She has wheezes. She has rales.   Abdominal: Soft. Bowel sounds are normal. She exhibits no distension. There is no tenderness. There is no rebound and no guarding.   Musculoskeletal: Normal range of motion.   Lymphadenopathy:     She has no cervical adenopathy.   Neurological: She is alert and oriented to person, place, and time.   Skin: Skin is warm and dry. Capillary refill takes less than 2 seconds.   Psychiatric: She has a normal mood and affect. Her behavior is normal. Judgment and thought content normal.   Nursing note and vitals reviewed.        CRANIAL NERVES     CN III, IV, VI   Pupils are equal, round, and reactive to light.  Extraocular motions are normal.        Significant Labs:   Recent Lab Results     None        All pertinent labs within the past 24 hours have been reviewed.    Significant Imaging: I have reviewed and interpreted all pertinent imaging results/findings within the past 24 hours.    Assessment/Plan:     No notes have been filed under this hospital service.  Service: Hospital Medicine    VTE Risk Mitigation (From admission, onward)         Ordered     IP VTE LOW RISK PATIENT  Once      02/03/20 1744     Place MORIS hose  Until discontinued      02/03/20 1744     Place sequential compression device  Until discontinued      02/03/20 1744                   Jimenez Patel MD  Department of Hospital Medicine   [facility] - ICU

## 2020-02-04 NOTE — NURSING
Called and left message with Dr. Marquez.  Spouse is at bedside and is hoping to speak with him for an update.

## 2020-02-04 NOTE — HOSPITAL COURSE
02/04/2020:  Patient is alert intermittently confused and in no acute distress. Patient still complain of some abdominal pain. Otherwise labs show a sodium 135 potassium 2.7 chloride 116 bicarb 17 and liver function tests within normal ranges.  Lipase was 30 and normal white blood cell count 10.1 hemoglobin 9.1 hematocrit 26 platelets 152 differential 84 granulocytes 8 lymphocytes. Vs:BP (!) 139/101   Pulse 85   Temp 98.8 °F (37.1 °C)   Resp 18   Ht 5' (1.524 m)   Wt 48 kg (105 lb 13.1 oz)   LMP  (LMP Unknown)   SpO2 99%   Breastfeeding? No   BMI 20.67 kg/m²    CT scan of the abdomen with contrast reveals a the tubular structure in the left abdomen that is of uncertain etiology.  Please see radiology report.    CT Abd Pelvis:   Impression       1. Interval development of moderate bilateral pleural effusions with bibasilar dependent atelectasis.  2. Chronic small hepatic cysts.  3. Previous cholecystectomy with persistent moderate intra and extrahepatic biliary ductal dilatation.  4. Perihepatic ascites.  5. Mild bilateral hydronephrosis which has slightly decompressed over the interval.  6. Mild rectal impaction which has decompressed over the interval.  7. Interval development of an elongated fluid dilated structure of the left abdomen which is of uncertain etiology.  Differential diagnosis includes a focally dilated loop of small bowel.  However, this is considered unlikely as no oral contrast is present.  Differential diagnosis includes seroma versus developing intra-abdominal abscess.  Correlate clinically with possible fever and/or elevated white count.  Differential diagnosis also includes possible unusual lymphocele or mesenteric cyst.  8. Prior hysterectomy.  9. Arias catheter placement with circumferential bladder wall thickening.  This may represent a cystitis.  Correlate clinically with UA.  10. Bone demineralization.  11. Suspected anasarca.  The preliminary and final reports are  concordant.      Electronically signed by: Vaughn Galarza  Date: 02/04/2020  Time: 10:20            Last Resulted: 02/04/20 10:20   Order Details View Encounter Lab and Collection Details Routing Result History            External Result Report     External Result Report   Narrative     EXAMINATION:  CT ABDOMEN PELVIS WITH CONTRAST    CLINICAL HISTORY:  Abd pain, fever, abscess suspected;        02/05/2020:  Patient is alert and more verbal today than previous days.  Patient is alert to person and place but not time.  CT scan of the pelvis with cystogram today revealed that there is a bladder perforation with extravasation of contrast material.  This was noted by report as a 1.5 cm defect within the dome of the brow bladder consistent with a bladder tear.  Contrast material was extravasated into the peritoneal cavity.  No free intraperitoneal air Arias was in place.  Vital signs are stable with normal blood pressure no fever chills nausea vomiting and and O2 saturations are in the % saturation range.  Blood pressures are normal    02/06/2020:  Patient is stable this morning having no shortness of breath and still having some tenderness in the abdomen.  Urine is pink tends to and flowing well.  Labs:  Sodium 138 chloride 116 bicarb 18 glucose 66 and BUN creatinine were 9 and 0.7.  CBC white blood cell count 10.9 hemoglobin 9.4 hematocrit 27.6 platelets 195 and differential 85 granulocytes.  Clostridium difficile evaluation last evening showed positive antigen but negative toxin.    02/07/2020:  Patient is stable and more alert and oriented this morning.  MRI yesterday was showing no acute abnormality.  Labs today revealed no significant abnormality.  After discussion fully with Dr. Bacon, urologist, this patient will have no intervention for at least 4 weeks.  We will keep Arias in and treat conservatively over the next 4 weeks.  Patient will see Dr. Bacon and consult and will decide at that time what is the  best treatment course.  White blood cell count 8.9 hemoglobin 9.1 hematocrit 26.5 and platelets 204 differential 79 granulocytes 10 lymphocytes.  Patient will be sent back to [facility] today for continued convalescence care.  Prior to discharge patient will have a PICC line placed for total parental nutrition.

## 2020-02-04 NOTE — ASSESSMENT & PLAN NOTE
02/03/2020:  Admit to ICU.  Consult General surgery  Continue antibiotics of Zosyn and vancomycin  Repeat labs now and in the a.m..  To include CBC CMP amylase and lipase  Continue current TPN feedings new  Nebulization treatments for pulmonary toilet    Critical Care time 60 minutes    02/04/2020:  · Continue current antibiotics this time.  · Defer to surgery regarding mass or fluid-filled structure in the abdomen  · No mention of free air was noted.  · Repeat labs in the a.m. to include CBC, CMP, chest x-ray

## 2020-02-04 NOTE — SUBJECTIVE & OBJECTIVE
Interval History:     Review of Systems   Constitutional: Positive for fatigue. Negative for activity change, appetite change and fever.   HENT: Negative for congestion, ear discharge, mouth sores, nosebleeds, rhinorrhea, sinus pressure, sinus pain and tinnitus.    Eyes: Negative.  Negative for pain, redness and itching.   Respiratory: Positive for shortness of breath. Negative for apnea, cough, choking, chest tightness, wheezing and stridor.    Cardiovascular: Negative for chest pain, palpitations and leg swelling.   Gastrointestinal: Positive for abdominal pain and nausea. Negative for abdominal distention, anal bleeding, blood in stool, constipation and diarrhea.   Endocrine: Negative.    Genitourinary: Negative for difficulty urinating, flank pain, frequency and urgency.   Musculoskeletal: Negative for arthralgias, back pain, gait problem and myalgias.   Skin: Negative for color change and pallor.   Allergic/Immunologic: Negative.    Neurological: Positive for dizziness and weakness. Negative for facial asymmetry, light-headedness and headaches.   Hematological: Negative for adenopathy. Does not bruise/bleed easily.   Psychiatric/Behavioral: Positive for confusion. The patient is nervous/anxious.      Objective:     Vital Signs (Most Recent):  Temp: 98.8 °F (37.1 °C) (02/04/20 1130)  Pulse: 85 (02/04/20 1200)  Resp: 18 (02/04/20 1200)  BP: (!) 139/101 (02/04/20 1200)  SpO2: 99 % (02/04/20 1200) Vital Signs (24h Range):  Temp:  [96.6 °F (35.9 °C)-98.8 °F (37.1 °C)] 98.8 °F (37.1 °C)  Pulse:  [] 85  Resp:  [9-61] 18  SpO2:  [97 %-100 %] 99 %  BP: ()/() 139/101     Weight: 48 kg (105 lb 13.1 oz)  Body mass index is 20.67 kg/m².    Intake/Output Summary (Last 24 hours) at 2/4/2020 1313  Last data filed at 2/4/2020 0447  Gross per 24 hour   Intake 2752.5 ml   Output 200 ml   Net 2552.5 ml      Physical Exam   Constitutional: She is oriented to person, place, and time. She appears well-developed and  well-nourished.   HENT:   Head: Normocephalic and atraumatic.   Eyes: Pupils are equal, round, and reactive to light. EOM are normal.   Neck: Normal range of motion. Neck supple. No tracheal deviation present. No thyromegaly present.   Cardiovascular: Normal rate, regular rhythm and normal heart sounds.   Pulmonary/Chest: Effort normal and breath sounds normal.   Abdominal: Soft. Bowel sounds are normal. She exhibits no distension. There is no tenderness. There is no rebound and no guarding.   Musculoskeletal: Normal range of motion.   Lymphadenopathy:     She has no cervical adenopathy.   Neurological: She is alert and oriented to person, place, and time.   Skin: Skin is warm and dry. Capillary refill takes less than 2 seconds.   Psychiatric: She has a normal mood and affect. Her behavior is normal. Judgment and thought content normal.       Significant Labs:   Recent Lab Results       02/04/20  0414   02/03/20  1823        Albumin 1.5 1.9     Alkaline Phosphatase 56 71     ALT 10 10     Amylase   55     Anion Gap 2 6     aPTT   35.1     AST 14 17     Baso # 0.02 0.03     Basophil% 0.2 0.2     BILIRUBIN TOTAL 0.5  Comment:  For infants and newborns, interpretation of results should be based  on gestational age, weight and in agreement with clinical  observations.  Premature Infant recommended reference ranges:  Up to 24 hours.............<8.0 mg/dL  Up to 48 hours............<12.0 mg/dL  3-5 days..................<15.0 mg/dL  6-29 days.................<15.0 mg/dL   0.8  Comment:  For infants and newborns, interpretation of results should be based  on gestational age, weight and in agreement with clinical  observations.  Premature Infant recommended reference ranges:  Up to 24 hours.............<8.0 mg/dL  Up to 48 hours............<12.0 mg/dL  3-5 days..................<15.0 mg/dL  6-29 days.................<15.0 mg/dL       BUN, Bld 16 17     Calcium 8.6 8.9     Chloride 116 110     CO2 17 19     Creatinine 0.7  0.7        0.7     Differential Method Automated Automated     eGFR if  >60.0 >60.0        >60.0     eGFR if non  >60.0  Comment:  Calculation used to obtain the estimated glomerular filtration  rate (eGFR) is the CKD-EPI equation.    >60.0  Comment:  Calculation used to obtain the estimated glomerular filtration  rate (eGFR) is the CKD-EPI equation.           >60.0  Comment:  Calculation used to obtain the estimated glomerular filtration  rate (eGFR) is the CKD-EPI equation.        Eos # 0.2 0.2     Eosinophil% 2.2 1.7     Glucose 78 78     Gran # (ANC) 8.6 10.7     Gran% 84.1 87.0     Hematocrit 26.3 31.8     Hemoglobin 9.1 11.2     Immature Grans (Abs) 0.08  Comment:  Mild elevation in immature granulocytes is non specific and   can be seen in a variety of conditions including stress response,   acute inflammation, trauma and pregnancy. Correlation with other   laboratory and clinical findings is essential.   0.11  Comment:  Mild elevation in immature granulocytes is non specific and   can be seen in a variety of conditions including stress response,   acute inflammation, trauma and pregnancy. Correlation with other   laboratory and clinical findings is essential.       Immature Granulocytes 0.8 0.9     INR   1.1  Comment:  Coumadin Therapy:  2.0 - 3.0 for INR for all indicators except mechanical heart valves  and antiphospholipid syndromes which should use 2.5 - 3.5.       Lipase 30 50     Lymph # 0.8 0.8     Lymph% 8.0 6.6     MCH 31.5 31.4     MCHC 34.6 35.2     MCV 91 89     Mono # 0.5 0.4     Mono% 4.7 3.6     MPV 11.2 11.2     nRBC 0 0     Platelets 152 188     Potassium 2.7  Comment:  K  critical result(s) called and verbal readback obtained from   SHAWN Araujo by TRACI 02/04/2020 05:34   2.1  Comment:  result(s) called and verbal readback obtained from   douglas ortega @ [address] by [facility] 02/03/2020 18:59       PROTEIN TOTAL 3.9 4.8     Protime   12.3     RBC 2.89 3.57     RDW 15.7  15.7     Sodium 135 135     WBC 10.16 12.26         All pertinent labs within the past 24 hours have been reviewed.    Significant Imaging: I have reviewed and interpreted all pertinent imaging results/findings within the past 24 hours.     The evaluation, management and treatment of this [id number] of direct involvement.  This time was exclusive of any billable procedures.

## 2020-02-04 NOTE — PLAN OF CARE
Problem: Wound  Goal: Optimal Wound Healing  Outcome: Ongoing, Progressing     Problem: Adult Inpatient Plan of Care  Goal: Plan of Care Review  Outcome: Ongoing, Progressing  Goal: Patient-Specific Goal (Individualization)  Outcome: Ongoing, Progressing  Goal: Absence of Hospital-Acquired Illness or Injury  Outcome: Ongoing, Progressing  Goal: Optimal Comfort and Wellbeing  Outcome: Ongoing, Progressing  Goal: Readiness for Transition of Care  Outcome: Ongoing, Progressing  Goal: Rounds/Family Conference  Outcome: Ongoing, Progressing     Problem: Infection  Goal: Infection Symptom Resolution  Outcome: Ongoing, Progressing     Problem: Fall Injury Risk  Goal: Absence of Fall and Fall-Related Injury  Outcome: Ongoing, Progressing     Problem: Skin Injury Risk Increased  Goal: Skin Health and Integrity  Outcome: Ongoing, Progressing

## 2020-02-04 NOTE — ASSESSMENT & PLAN NOTE
02/03/2020:  Admit to ICU.  Consult General surgery  Continue antibiotics of Zosyn and vancomycin  Repeat labs now and in the a.m..  To include CBC CMP amylase and lipase  Continue current TPN feedings new  Nebulization treatments for pulmonary toilet    Critical Care time 60 minutes

## 2020-02-04 NOTE — CONSULTS
[facility]  General Surgery  Consult Note    Patient Name: Obdulia Yepze  MRN: [id number]  Admission Date: 2/3/2020  Attending Physician: Jimenez Patel MD   Consult Physician: Dale Marquez MD  Primary Care Provider: Og Perez MD    Patient information was obtained from patient.     Subjective:     Reason for consultation:  Abnormal CT scan abdomen.    History of Present Illness:    Obdulia Ypeez is a 69 y.o. female with a history of allergies, arthritis, asthma, depression, gout, previous sepsis was admitted to the medical service on 2020 with sepsis.  Patient treated with IV antibiotics, improved, subsequently was sent to an LTAC facility.  After discharge from this hospital, at the LTAC facility, patient was noted to have abdominal pain.  Abdominal pain subsequently resulted in abdominal x-ray as well as CT scan.  Evidence of pneumoperitoneum was present per the LTAC provider.  Patient was subsequently transferred back to the hospital for management.  Per the LTAC provider, there was possible radiologic evidence of a bladder perforation.  I currently do not have these CT scans or radiology reports to review to confirm.  Patient underwent CT scan here after readmission which showed evidence of a abnormal fluid collection left pericolic gutter.  No free air.  This is a walled-off fluid collection.  This is new since 2020.  Surgery now called in consultation.    Review of patient's allergies indicates:   Allergen Reactions    Amoxicillin Diarrhea    Latex     Milk containing products     Opioids - morphine analogues     Peanut     Sodium phosphate     Soy     Sulfa (sulfonamide antibiotics)        Past Medical History:   Diagnosis Date    Allergy     Arthritis     Asthma     Depression     Gout     Hypotension     Sepsis      Past Surgical History:   Procedure Laterality Date    APPENDECTOMY       SECTION      CHOLECYSTECTOMY       CYSTOSCOPY W/ RETROGRADES Bilateral 11/13/2019    Procedure: CYSTOSCOPY, WITH RETROGRADE PYELOGRAM;  Surgeon: Trip Bacon MD;  Location: [facility] OR;  Service: Urology;  Laterality: Bilateral;    CYSTOSCOPY WITH BIOPSY OF BLADDER  11/13/2019    Procedure: CYSTOSCOPY, WITH BLADDER BIOPSY, WITH FULGURATION IF INDICATED;  Surgeon: Trip Bacon MD;  Location: [facility] OR;  Service: Urology;;    ESOPHAGOGASTRODUODENOSCOPY N/A 12/10/2019    Procedure: EGD (ESOPHAGOGASTRODUODENOSCOPY);  Surgeon: Shakeel Perez MD;  Location: [facility] ENDO;  Service: Endoscopy;  Laterality: N/A;    HYSTERECTOMY      SHOULDER SURGERY Right 04/16/2019    STOMACH SURGERY      URETEROSCOPY Right 11/13/2019    Procedure: URETEROSCOPY;  Surgeon: Trip Bacon MD;  Location: [facility] OR;  Service: Urology;  Laterality: Right;    WRIST SURGERY Left      Family History     Problem Relation (Age of Onset)    Asthma Mother    Bipolar disorder Sister    Colon cancer Maternal Grandmother, Maternal Grandfather    Pleurisy Father        Tobacco Use    Smoking status: Never Smoker    Smokeless tobacco: Never Used   Substance and Sexual Activity    Alcohol use: Yes     Comment: everyday drinker. has not had any in the past 2 days    Drug use: No    Sexual activity: Yes     Review of Systems   Constitutional: Negative for activity change, appetite change, chills and fever.   HENT: Negative for congestion, dental problem and ear discharge.    Eyes: Negative for discharge and itching.   Respiratory: Negative for apnea, choking and chest tightness.    Cardiovascular: Negative for chest pain and leg swelling.   Gastrointestinal: Positive for abdominal pain (Mild). Negative for abdominal distention, anal bleeding, constipation, diarrhea and nausea.   Endocrine: Negative for cold intolerance and heat intolerance.   Genitourinary: Negative for difficulty urinating and dyspareunia.   Musculoskeletal: Negative for arthralgias and back pain.   Skin:  Negative for color change and pallor.   Neurological: Negative for dizziness and facial asymmetry.   Hematological: Negative for adenopathy. Does not bruise/bleed easily.   Psychiatric/Behavioral: Negative for agitation and behavioral problems.     Objective:     Vital Signs (Most Recent):  Temp: 98.6 °F (37 °C) (02/04/20 1530)  Pulse: 82 (02/04/20 1530)  Resp: 14 (02/04/20 1530)  BP: 126/71 (02/04/20 1530)  SpO2: 97 % (02/04/20 1530) Vital Signs (24h Range):  Temp:  [96.6 °F (35.9 °C)-98.8 °F (37.1 °C)] 98.6 °F (37 °C)  Pulse:  [] 82  Resp:  [9-61] 14  SpO2:  [96 %-100 %] 97 %  BP: ()/() 126/71     Weight: 48 kg (105 lb 13.1 oz)  Body mass index is 20.67 kg/m².    Physical Exam   Constitutional: She is oriented to person, place, and time. She appears well-developed. She appears cachectic. No distress.   Frail   HENT:   Head: Normocephalic and atraumatic.   Eyes: Pupils are equal, round, and reactive to light. EOM are normal.   Neck: Normal range of motion. Neck supple. No thyromegaly present.   Cardiovascular: Normal rate and regular rhythm.   Pulmonary/Chest: Effort normal and breath sounds normal.   Abdominal: Soft. Bowel sounds are normal. She exhibits no distension. There is tenderness in the suprapubic area and left lower quadrant.       Musculoskeletal: Normal range of motion. She exhibits no edema or deformity.   Neurological: She is alert and oriented to person, place, and time. No cranial nerve deficit.   Skin: Skin is warm. Capillary refill takes less than 2 seconds. No erythema.   Psychiatric: She has a normal mood and affect. Her behavior is normal.       Significant Labs:  CBC:   Recent Labs   Lab 02/04/20 0414   WBC 10.16   RBC 2.89*   HGB 9.1*   HCT 26.3*      MCV 91   MCH 31.5*   MCHC 34.6     BMP:   Recent Labs   Lab 02/04/20 0414   GLU 78   *   K 2.7*   *   CO2 17*   BUN 16   CREATININE 0.7   CALCIUM 8.6*     CMP:   Recent Labs   Lab 02/04/20  0414   GLU 78    CALCIUM 8.6*   ALBUMIN 1.5*   PROT 3.9*   *   K 2.7*   CO2 17*   *   BUN 16   CREATININE 0.7   ALKPHOS 56   ALT 10   AST 14   BILITOT 0.5     LFTs:   Recent Labs   Lab 02/04/20  0414   ALT 10   AST 14   ALKPHOS 56   BILITOT 0.5   PROT 3.9*   ALBUMIN 1.5*     Coagulation:   Recent Labs   Lab 02/03/20  1823   LABPROT 12.3   INR 1.1   APTT 35.1*     Specimen (12h ago, onward)    None        No results for input(s): COLORU, CLARITYU, SPECGRAV, PHUR, PROTEINUA, GLUCOSEU, BILIRUBINCON, BLOODU, WBCU, RBCU, BACTERIA, MUCUS, NITRITE, LEUKOCYTESUR, UROBILINOGEN, HYALINECASTS in the last 168 hours.    Significant Diagnostics:  CT: I have reviewed all pertinent results/findings within the past 24 hours and my personal findings are:  Tidwell defined left abdominal fluid collection.  No free air.  No air within fluid collection.    Assessment:   Obdulia Yepez is a 69 y.o. female who presents with abnormal CT scan.    Active Diagnoses:    Diagnosis Date Noted POA    PRINCIPAL PROBLEM:  Pneumoperitoneum [K66.8] 02/03/2020 Unknown    Pneumomediastinum [J98.2] 02/03/2020 Yes      Problems Resolved During this Admission:     VTE Risk Mitigation (From admission, onward)         Ordered     IP VTE LOW RISK PATIENT  Once      02/03/20 1744     Place MORIS hose  Until discontinued      02/03/20 1744     Place sequential compression device  Until discontinued      02/03/20 1744                Medical Decision Making/Plan:  Unsure of the etiology the patient's fluid within the intra-abdominal space. Differential diagnosis could include bladder perforation, walled off abscess, abnormal loop of small bowel within peristaltic phase, etc.  No peritoneal signs. No fevers.  Patient stable.  No significant leukocytosis.  Hungry.  Passing flatus.  Recommendation will be obtaining outside hospital CT scan images for Radiology to review here.  There was concern of a possible bladder perforation on images per the LTAC provider.  Consider CT  cystogram.  Surgery to follow however no acute surgery needs at this time.  Clear liquid diet okay.    Dale Marquez MD  General Surgery  [facility]

## 2020-02-05 LAB
ALBUMIN SERPL BCP-MCNC: 1.6 G/DL (ref 3.5–5.2)
ALP SERPL-CCNC: 61 U/L (ref 55–135)
ALT SERPL W/O P-5'-P-CCNC: 11 U/L (ref 10–44)
ANION GAP SERPL CALC-SCNC: 5 MMOL/L (ref 8–16)
AST SERPL-CCNC: 19 U/L (ref 10–40)
BASOPHILS # BLD AUTO: 0.05 K/UL (ref 0–0.2)
BASOPHILS NFR BLD: 0.5 % (ref 0–1.9)
BILIRUB SERPL-MCNC: 0.6 MG/DL (ref 0.1–1)
BUN SERPL-MCNC: 11 MG/DL (ref 8–23)
C DIFF GDH STL QL: POSITIVE
C DIFF TOX A+B STL QL IA: NEGATIVE
CALCIUM SERPL-MCNC: 8.3 MG/DL (ref 8.7–10.5)
CHLORIDE SERPL-SCNC: 111 MMOL/L (ref 95–110)
CO2 SERPL-SCNC: 13 MMOL/L (ref 23–29)
CREAT SERPL-MCNC: 0.7 MG/DL (ref 0.5–1.4)
DIFFERENTIAL METHOD: ABNORMAL
EOSINOPHIL # BLD AUTO: 0.2 K/UL (ref 0–0.5)
EOSINOPHIL NFR BLD: 1.9 % (ref 0–8)
ERYTHROCYTE [DISTWIDTH] IN BLOOD BY AUTOMATED COUNT: 17 % (ref 11.5–14.5)
EST. GFR  (AFRICAN AMERICAN): >60 ML/MIN/1.73 M^2
EST. GFR  (NON AFRICAN AMERICAN): >60 ML/MIN/1.73 M^2
GLUCOSE SERPL-MCNC: 60 MG/DL (ref 70–110)
HCT VFR BLD AUTO: 26.7 % (ref 37–48.5)
HGB BLD-MCNC: 9.2 G/DL (ref 12–16)
IMM GRANULOCYTES # BLD AUTO: 0.05 K/UL (ref 0–0.04)
IMM GRANULOCYTES NFR BLD AUTO: 0.5 % (ref 0–0.5)
LYMPHOCYTES # BLD AUTO: 0.7 K/UL (ref 1–4.8)
LYMPHOCYTES NFR BLD: 6.2 % (ref 18–48)
MCH RBC QN AUTO: 31.9 PG (ref 27–31)
MCHC RBC AUTO-ENTMCNC: 34.5 G/DL (ref 32–36)
MCV RBC AUTO: 93 FL (ref 82–98)
MONOCYTES # BLD AUTO: 0.5 K/UL (ref 0.3–1)
MONOCYTES NFR BLD: 4.6 % (ref 4–15)
NEUTROPHILS # BLD AUTO: 9.4 K/UL (ref 1.8–7.7)
NEUTROPHILS NFR BLD: 86.3 % (ref 38–73)
NRBC BLD-RTO: 0 /100 WBC
PLATELET # BLD AUTO: 167 K/UL (ref 150–350)
PMV BLD AUTO: 10.3 FL (ref 9.2–12.9)
POTASSIUM SERPL-SCNC: 4.1 MMOL/L (ref 3.5–5.1)
PROT SERPL-MCNC: 4.2 G/DL (ref 6–8.4)
RBC # BLD AUTO: 2.88 M/UL (ref 4–5.4)
SODIUM SERPL-SCNC: 129 MMOL/L (ref 136–145)
VANCOMYCIN TROUGH SERPL-MCNC: 36.6 UG/ML (ref 10–22)
WBC # BLD AUTO: 10.87 K/UL (ref 3.9–12.7)

## 2020-02-05 PROCEDURE — 94640 AIRWAY INHALATION TREATMENT: CPT

## 2020-02-05 PROCEDURE — 87324 CLOSTRIDIUM AG IA: CPT

## 2020-02-05 PROCEDURE — 99291 CRITICAL CARE FIRST HOUR: CPT | Mod: ,,, | Performed by: INTERNAL MEDICINE

## 2020-02-05 PROCEDURE — 87493 C DIFF AMPLIFIED PROBE: CPT

## 2020-02-05 PROCEDURE — 25000003 PHARM REV CODE 250: Performed by: INTERNAL MEDICINE

## 2020-02-05 PROCEDURE — 94761 N-INVAS EAR/PLS OXIMETRY MLT: CPT

## 2020-02-05 PROCEDURE — 99233 PR SUBSEQUENT HOSPITAL CARE,LEVL III: ICD-10-PCS | Mod: ,,, | Performed by: SURGERY

## 2020-02-05 PROCEDURE — 80202 ASSAY OF VANCOMYCIN: CPT

## 2020-02-05 PROCEDURE — C9113 INJ PANTOPRAZOLE SODIUM, VIA: HCPCS | Performed by: INTERNAL MEDICINE

## 2020-02-05 PROCEDURE — 63600175 PHARM REV CODE 636 W HCPCS: Performed by: INTERNAL MEDICINE

## 2020-02-05 PROCEDURE — 99233 SBSQ HOSP IP/OBS HIGH 50: CPT | Mod: ,,, | Performed by: SURGERY

## 2020-02-05 PROCEDURE — 87449 NOS EACH ORGANISM AG IA: CPT

## 2020-02-05 PROCEDURE — 80053 COMPREHEN METABOLIC PANEL: CPT

## 2020-02-05 PROCEDURE — 99291 PR CRITICAL CARE, E/M 30-74 MINUTES: ICD-10-PCS | Mod: ,,, | Performed by: INTERNAL MEDICINE

## 2020-02-05 PROCEDURE — 85025 COMPLETE CBC W/AUTO DIFF WBC: CPT

## 2020-02-05 PROCEDURE — 25000242 PHARM REV CODE 250 ALT 637 W/ HCPCS: Performed by: INTERNAL MEDICINE

## 2020-02-05 PROCEDURE — 20000000 HC ICU ROOM

## 2020-02-05 RX ORDER — IPRATROPIUM BROMIDE AND ALBUTEROL SULFATE 2.5; .5 MG/3ML; MG/3ML
3 SOLUTION RESPIRATORY (INHALATION) EVERY 6 HOURS
Status: DISCONTINUED | OUTPATIENT
Start: 2020-02-05 | End: 2020-02-07 | Stop reason: HOSPADM

## 2020-02-05 RX ADMIN — PIPERACILLIN AND TAZOBACTAM 3.38 G: 3; .375 INJECTION, POWDER, LYOPHILIZED, FOR SOLUTION INTRAVENOUS; PARENTERAL at 06:02

## 2020-02-05 RX ADMIN — IPRATROPIUM BROMIDE AND ALBUTEROL SULFATE 3 ML: .5; 3 SOLUTION RESPIRATORY (INHALATION) at 12:02

## 2020-02-05 RX ADMIN — GABAPENTIN 600 MG: 300 CAPSULE ORAL at 08:02

## 2020-02-05 RX ADMIN — PANTOPRAZOLE SODIUM 40 MG: 40 INJECTION, POWDER, LYOPHILIZED, FOR SOLUTION INTRAVENOUS at 10:02

## 2020-02-05 RX ADMIN — POTASSIUM CHLORIDE 20 MEQ: 1500 TABLET, EXTENDED RELEASE ORAL at 08:02

## 2020-02-05 RX ADMIN — IPRATROPIUM BROMIDE AND ALBUTEROL SULFATE 3 ML: .5; 3 SOLUTION RESPIRATORY (INHALATION) at 08:02

## 2020-02-05 RX ADMIN — IPRATROPIUM BROMIDE AND ALBUTEROL SULFATE 3 ML: .5; 3 SOLUTION RESPIRATORY (INHALATION) at 01:02

## 2020-02-05 RX ADMIN — BUSPIRONE HYDROCHLORIDE 5 MG: 5 TABLET ORAL at 10:02

## 2020-02-05 RX ADMIN — OXYCODONE AND ACETAMINOPHEN 1 TABLET: 5; 325 TABLET ORAL at 08:02

## 2020-02-05 RX ADMIN — DIPHENHYDRAMINE HYDROCHLORIDE 25 MG: 25 CAPSULE ORAL at 07:02

## 2020-02-05 RX ADMIN — OXYCODONE AND ACETAMINOPHEN 1 TABLET: 5; 325 TABLET ORAL at 10:02

## 2020-02-05 RX ADMIN — OXYBUTYNIN CHLORIDE 5 MG: 5 TABLET ORAL at 10:02

## 2020-02-05 RX ADMIN — GABAPENTIN 600 MG: 300 CAPSULE ORAL at 10:02

## 2020-02-05 RX ADMIN — MONTELUKAST SODIUM 10 MG: 10 TABLET, COATED ORAL at 10:02

## 2020-02-05 RX ADMIN — IPRATROPIUM BROMIDE AND ALBUTEROL SULFATE 3 ML: .5; 3 SOLUTION RESPIRATORY (INHALATION) at 07:02

## 2020-02-05 RX ADMIN — IPRATROPIUM BROMIDE AND ALBUTEROL SULFATE 3 ML: .5; 3 SOLUTION RESPIRATORY (INHALATION) at 03:02

## 2020-02-05 RX ADMIN — POTASSIUM CHLORIDE 20 MEQ: 1500 TABLET, EXTENDED RELEASE ORAL at 10:02

## 2020-02-05 RX ADMIN — SODIUM CHLORIDE: 0.9 INJECTION, SOLUTION INTRAVENOUS at 08:02

## 2020-02-05 RX ADMIN — BUSPIRONE HYDROCHLORIDE 5 MG: 5 TABLET ORAL at 08:02

## 2020-02-05 RX ADMIN — ESCITALOPRAM OXALATE 20 MG: 10 TABLET ORAL at 10:02

## 2020-02-05 RX ADMIN — OXYBUTYNIN CHLORIDE 5 MG: 5 TABLET ORAL at 08:02

## 2020-02-05 RX ADMIN — SODIUM CHLORIDE 1000 ML: 0.9 INJECTION, SOLUTION INTRAVENOUS at 10:02

## 2020-02-05 NOTE — PROGRESS NOTES
[facility]  General Surgery  Daily Note    Patient Name: Obdulia Yepez  MRN: [id number]  Admission Date: 2/3/2020  Attending Physician: Jimenez Patel MD   Consult Physician: Dale Marquez MD  Primary Care Provider: Og Perez MD    Subjective:     Principle Problem: Pneumoperitoneum    Last 24 hour history:  2/5/2020  Afebrile.  Vital signs stable.  Hungry.  No nausea vomiting.  Tolerating clears well.  Passing flatus.  No new issues or complaints this morning.  CT scans obtained from outside hospital.    Objective:     Vital Signs (Most Recent):  Temp: 98.1 °F (36.7 °C) (02/05/20 0000)  Pulse: 89 (02/05/20 0802)  Resp: 20 (02/05/20 0802)  BP: 109/73 (02/05/20 0000)  SpO2: 100 % (02/05/20 0802) Vital Signs (24h Range):  Temp:  [97.7 °F (36.5 °C)-98.8 °F (37.1 °C)] 98.1 °F (36.7 °C)  Pulse:  [73-90] 89  Resp:  [11-23] 20  SpO2:  [96 %-100 %] 100 %  BP: ()/() 109/73     Intake/Output last 24 hours:    Intake/Output Summary (Last 24 hours) at 2/5/2020 0949  Last data filed at 2/5/2020 0600  Gross per 24 hour   Intake 1550 ml   Output 3475 ml   Net -1925 ml       I/O last 3 completed shifts:  In: 4160 [P.O.:890; I.V.:2970; IV Piggyback:300]  Out: 3475 [Urine:3475]  No intake/output data recorded.    Weight: 48 kg (105 lb 13.1 oz)  Body mass index is 20.67 kg/m².    Gen: Wd Wn female currently in NAD  Heent: Nc/At, MMM  Eyes: Perrl, Eomi  Cv: RRR, no  M/g/r  Lung: Non-labored breathing, clear bilaterally  Abd: Soft, non-tender, non-distended    Significant Labs:  CBC:   Recent Labs   Lab 02/05/20  0547   WBC 10.87   RBC 2.88*   HGB 9.2*   HCT 26.7*      MCV 93   MCH 31.9*   MCHC 34.5     BMP:   Recent Labs   Lab 02/05/20  0535   GLU 60*   *   K 4.1   *   CO2 13*   BUN 11   CREATININE 0.7   CALCIUM 8.3*     CMP:   Recent Labs   Lab 02/05/20  0535   GLU 60*   CALCIUM 8.3*   ALBUMIN 1.6*   PROT 4.2*   *   K 4.1   CO2 13*   *   BUN 11    CREATININE 0.7   ALKPHOS 61   ALT 11   AST 19   BILITOT 0.6     LFTs:   Recent Labs   Lab 02/05/20  0535   ALT 11   AST 19   ALKPHOS 61   BILITOT 0.6   PROT 4.2*   ALBUMIN 1.6*     Coagulation:   Recent Labs   Lab 02/03/20  1823   LABPROT 12.3   INR 1.1   APTT 35.1*     Specimen (12h ago, onward)    None        No results for input(s): COLORU, CLARITYU, SPECGRAV, PHUR, PROTEINUA, GLUCOSEU, BILIRUBINCON, BLOODU, WBCU, RBCU, BACTERIA, MUCUS, NITRITE, LEUKOCYTESUR, UROBILINOGEN, HYALINECASTS in the last 168 hours.    Cultures:    Microbiology Results (last 7 days)     Procedure Component Value Units Date/Time    Stool culture [[id number]] Collected:  02/03/20 2300    Order Status:  Sent Specimen:  Stool Updated:  02/04/20 1023    E. coli 0157 antigen [[id number]] Collected:  02/03/20 2300    Order Status:  No result Specimen:  Stool Updated:  02/04/20 1023    Clostridium difficile EIA [[id number]]     Order Status:  Canceled Specimen:  Stool           Significant Diagnostics:  I personally reviewed the patient's last for abdominal CT scans with Radiology here at [place].  CT scan from 01/13/2020 without evidence of intra-abdominal free fluid.  No free air.  Thickened bladder wall with a dome of the bladder diverticulum.  CT scan from 01/31/2020 with 1-2 small flecks of free air under the right hemidiaphragm, air within bladder as well as near bladder, and free fluid within the abdomen, left abdominal, smaller than on later CT scans.  CT scan from 02/02/2020 with larger pocket of free fluid within left pericolic gutter, air within bladder and just around bladder, and small sathya of free air just under the right hemidiaphragm.  CT scan from 02/04/2020 without free air, free fluid within left pericolic gutter slightly increased in size, thickened bladder wall. No air in bladder.  Radiology believes the for CT scans suggestive of bladder perforation.    Assessment:   Obdulia Yepez is a 69 y.o. female who presents with free  intra-abdominal fluid.    Active Diagnoses:    Diagnosis Date Noted POA    PRINCIPAL PROBLEM:  Pneumoperitoneum [K66.8] 02/03/2020 Unknown    Pneumomediastinum [J98.2] 02/03/2020 Yes      Problems Resolved During this Admission:     VTE Risk Mitigation (From admission, onward)         Ordered     IP VTE LOW RISK PATIENT  Once      02/03/20 1744     Place MORIS hose  Until discontinued      02/03/20 1744     Place sequential compression device  Until discontinued      02/03/20 1744                Medical Decision Making/Plan:  Patient with free intra-abdominal fluid.  No current free air.  Very small areas of free air, flex at best on CT scans at [facility].  Radiologist of [facility] suggestive the patient likely bladder perforation.  Previous diverticulum in the dome of the bladder and Arias catheter insertions as well as cystitis with Pseudomonas increased patient's risk for bladder perforation.  For CT scans done in the last month.  For CT scan without free fluid.  Sec to CT scans with very small, flex, free air which resolved by the 4th CT scan within days.  Free fluid left pericolic gutter.  These all suggestive of bladder perforation.  From a intestinal standpoint, patient with no nausea vomiting.  Passing flatus.  No leukocytosis.  No fevers.  Recommendations will be for advancement of diet as patient tolerates.  Would recommend urology consultation and treatment for possible bladder perforation.  Call surgery with questions.    Dale Marquez MD  General Surgery  [facility]

## 2020-02-05 NOTE — NURSING
Spouse at bedside.  Update given concerning CT scan of bladder/pelvis.  No other changes reported.  Tolerating po fluids well.  Drank 100% Boost Breeze with lunch.

## 2020-02-05 NOTE — NURSING
Transport to CT for bladder imaging.  Does have blood tinged urine noted upon return from CT.  Dr. Patel at bedside and is aware.  Will closely monitor.  VSS. Tolerated well.

## 2020-02-05 NOTE — PT/OT/SLP PROGRESS
Spoke to RN prior to attempt to see patient, stating she presents as failure to thrive. Patient easily aroused upon entering room. Patient declined PT stating she would like to try this afternoon. Explained to patient there may not be a chance this pm she clarified understanding and even with motivation from therapist to participate patient declined.   Michelet Davis, PTA

## 2020-02-05 NOTE — NURSING
"Report received.  Assessment done. VSS.  Lethargic, but easily awakens.  Oriented to person only.  Resp are even and unlabored.  C/O intermittent pain to "everywhere".  Sacral drsg is clean and dry.  Arias cath to gravity is patent and draining yellow urine.  Frequent repositioning with pillows noted.  See flowsheet for further assessment.   "

## 2020-02-06 LAB
ALBUMIN SERPL BCP-MCNC: 1.6 G/DL (ref 3.5–5.2)
ALP SERPL-CCNC: 65 U/L (ref 55–135)
ALT SERPL W/O P-5'-P-CCNC: 10 U/L (ref 10–44)
AMMONIA PLAS-SCNC: 20 UMOL/L (ref 10–50)
ANION GAP SERPL CALC-SCNC: 4 MMOL/L (ref 8–16)
AST SERPL-CCNC: 17 U/L (ref 10–40)
BACTERIA #/AREA URNS HPF: ABNORMAL /HPF
BASOPHILS # BLD AUTO: 0.05 K/UL (ref 0–0.2)
BASOPHILS NFR BLD: 0.5 % (ref 0–1.9)
BILIRUB SERPL-MCNC: 0.5 MG/DL (ref 0.1–1)
BILIRUB UR QL STRIP: NEGATIVE
BUN SERPL-MCNC: 9 MG/DL (ref 8–23)
C DIFF TOX GENS STL QL NAA+PROBE: NEGATIVE
CALCIUM SERPL-MCNC: 8.3 MG/DL (ref 8.7–10.5)
CHLORIDE SERPL-SCNC: 116 MMOL/L (ref 95–110)
CLARITY UR: ABNORMAL
CO2 SERPL-SCNC: 18 MMOL/L (ref 23–29)
COLOR UR: ABNORMAL
CREAT SERPL-MCNC: 0.7 MG/DL (ref 0.5–1.4)
DIFFERENTIAL METHOD: ABNORMAL
EOSINOPHIL # BLD AUTO: 0.2 K/UL (ref 0–0.5)
EOSINOPHIL NFR BLD: 2 % (ref 0–8)
ERYTHROCYTE [DISTWIDTH] IN BLOOD BY AUTOMATED COUNT: 17.1 % (ref 11.5–14.5)
EST. GFR  (AFRICAN AMERICAN): >60 ML/MIN/1.73 M^2
EST. GFR  (NON AFRICAN AMERICAN): >60 ML/MIN/1.73 M^2
GLUCOSE SERPL-MCNC: 66 MG/DL (ref 70–110)
GLUCOSE UR QL STRIP: NEGATIVE
HCT VFR BLD AUTO: 27.6 % (ref 37–48.5)
HGB BLD-MCNC: 9.4 G/DL (ref 12–16)
HGB UR QL STRIP: ABNORMAL
IMM GRANULOCYTES # BLD AUTO: 0.07 K/UL (ref 0–0.04)
IMM GRANULOCYTES NFR BLD AUTO: 0.6 % (ref 0–0.5)
KETONES UR QL STRIP: NEGATIVE
LEUKOCYTE ESTERASE UR QL STRIP: ABNORMAL
LYMPHOCYTES # BLD AUTO: 0.6 K/UL (ref 1–4.8)
LYMPHOCYTES NFR BLD: 5.6 % (ref 18–48)
MCH RBC QN AUTO: 32.1 PG (ref 27–31)
MCHC RBC AUTO-ENTMCNC: 34.1 G/DL (ref 32–36)
MCV RBC AUTO: 94 FL (ref 82–98)
MICROSCOPIC COMMENT: ABNORMAL
MONOCYTES # BLD AUTO: 0.6 K/UL (ref 0.3–1)
MONOCYTES NFR BLD: 5.5 % (ref 4–15)
NEUTROPHILS # BLD AUTO: 9.4 K/UL (ref 1.8–7.7)
NEUTROPHILS NFR BLD: 85.8 % (ref 38–73)
NITRITE UR QL STRIP: NEGATIVE
NRBC BLD-RTO: 0 /100 WBC
PH UR STRIP: 7 [PH] (ref 5–8)
PLATELET # BLD AUTO: 195 K/UL (ref 150–350)
PMV BLD AUTO: 10.9 FL (ref 9.2–12.9)
POTASSIUM SERPL-SCNC: 3.9 MMOL/L (ref 3.5–5.1)
PROT SERPL-MCNC: 4.5 G/DL (ref 6–8.4)
PROT UR QL STRIP: ABNORMAL
RBC # BLD AUTO: 2.93 M/UL (ref 4–5.4)
RBC #/AREA URNS HPF: >100 /HPF (ref 0–4)
SODIUM SERPL-SCNC: 138 MMOL/L (ref 136–145)
SP GR UR STRIP: 1.01 (ref 1–1.03)
SQUAMOUS #/AREA URNS HPF: 5 /HPF
URN SPEC COLLECT METH UR: ABNORMAL
UROBILINOGEN UR STRIP-ACNC: NEGATIVE EU/DL
VANCOMYCIN SERPL-MCNC: 23.4 UG/ML
WBC # BLD AUTO: 10.99 K/UL (ref 3.9–12.7)
WBC #/AREA URNS HPF: 15 /HPF (ref 0–5)

## 2020-02-06 PROCEDURE — 80053 COMPREHEN METABOLIC PANEL: CPT

## 2020-02-06 PROCEDURE — 80202 ASSAY OF VANCOMYCIN: CPT

## 2020-02-06 PROCEDURE — 20000000 HC ICU ROOM

## 2020-02-06 PROCEDURE — 99291 PR CRITICAL CARE, E/M 30-74 MINUTES: ICD-10-PCS | Mod: ,,, | Performed by: INTERNAL MEDICINE

## 2020-02-06 PROCEDURE — 99291 CRITICAL CARE FIRST HOUR: CPT | Mod: ,,, | Performed by: INTERNAL MEDICINE

## 2020-02-06 PROCEDURE — 25000242 PHARM REV CODE 250 ALT 637 W/ HCPCS: Performed by: INTERNAL MEDICINE

## 2020-02-06 PROCEDURE — 36415 COLL VENOUS BLD VENIPUNCTURE: CPT

## 2020-02-06 PROCEDURE — 82140 ASSAY OF AMMONIA: CPT

## 2020-02-06 PROCEDURE — 87086 URINE CULTURE/COLONY COUNT: CPT

## 2020-02-06 PROCEDURE — 94640 AIRWAY INHALATION TREATMENT: CPT

## 2020-02-06 PROCEDURE — 25000003 PHARM REV CODE 250: Performed by: INTERNAL MEDICINE

## 2020-02-06 PROCEDURE — 63600175 PHARM REV CODE 636 W HCPCS: Performed by: INTERNAL MEDICINE

## 2020-02-06 PROCEDURE — 85025 COMPLETE CBC W/AUTO DIFF WBC: CPT

## 2020-02-06 PROCEDURE — 94761 N-INVAS EAR/PLS OXIMETRY MLT: CPT

## 2020-02-06 PROCEDURE — 81000 URINALYSIS NONAUTO W/SCOPE: CPT

## 2020-02-06 PROCEDURE — C9113 INJ PANTOPRAZOLE SODIUM, VIA: HCPCS | Performed by: INTERNAL MEDICINE

## 2020-02-06 RX ADMIN — BUSPIRONE HYDROCHLORIDE 5 MG: 5 TABLET ORAL at 11:02

## 2020-02-06 RX ADMIN — OXYBUTYNIN CHLORIDE 5 MG: 5 TABLET ORAL at 08:02

## 2020-02-06 RX ADMIN — PIPERACILLIN AND TAZOBACTAM 3.38 G: 3; .375 INJECTION, POWDER, LYOPHILIZED, FOR SOLUTION INTRAVENOUS; PARENTERAL at 05:02

## 2020-02-06 RX ADMIN — SODIUM CHLORIDE 1000 ML: 0.9 INJECTION, SOLUTION INTRAVENOUS at 08:02

## 2020-02-06 RX ADMIN — MONTELUKAST SODIUM 10 MG: 10 TABLET, COATED ORAL at 11:02

## 2020-02-06 RX ADMIN — OXYBUTYNIN CHLORIDE 5 MG: 5 TABLET ORAL at 11:02

## 2020-02-06 RX ADMIN — IPRATROPIUM BROMIDE AND ALBUTEROL SULFATE 3 ML: .5; 3 SOLUTION RESPIRATORY (INHALATION) at 06:02

## 2020-02-06 RX ADMIN — POTASSIUM CHLORIDE 20 MEQ: 1500 TABLET, EXTENDED RELEASE ORAL at 11:02

## 2020-02-06 RX ADMIN — IPRATROPIUM BROMIDE AND ALBUTEROL SULFATE 3 ML: .5; 3 SOLUTION RESPIRATORY (INHALATION) at 08:02

## 2020-02-06 RX ADMIN — ESCITALOPRAM OXALATE 20 MG: 10 TABLET ORAL at 11:02

## 2020-02-06 RX ADMIN — POTASSIUM CHLORIDE 20 MEQ: 1500 TABLET, EXTENDED RELEASE ORAL at 08:02

## 2020-02-06 RX ADMIN — GABAPENTIN 600 MG: 300 CAPSULE ORAL at 11:02

## 2020-02-06 RX ADMIN — BUSPIRONE HYDROCHLORIDE 5 MG: 5 TABLET ORAL at 08:02

## 2020-02-06 RX ADMIN — GABAPENTIN 600 MG: 300 CAPSULE ORAL at 08:02

## 2020-02-06 RX ADMIN — PANTOPRAZOLE SODIUM 40 MG: 40 INJECTION, POWDER, LYOPHILIZED, FOR SOLUTION INTRAVENOUS at 11:02

## 2020-02-06 RX ADMIN — OXYCODONE AND ACETAMINOPHEN 1 TABLET: 5; 325 TABLET ORAL at 08:02

## 2020-02-06 NOTE — ASSESSMENT & PLAN NOTE
02/05/2020:  · Continue current treatment with Arias catheter  · Expectant management  · Urology consult:  Full discussion with Dr. Bacon, urologist this morning  · CT scan with cystogram today revealed bladder tear approximately 1.5 cm  · Patient stable this time and monitor closely.

## 2020-02-06 NOTE — PROGRESS NOTES
[facility] - ICU Hospital Medicine  Progress Note    Patient Name: Obdulia Yepez  MRN: [id number]  Patient Class: IP- Inpatient   Admission Date: 2/3/2020  Length of Stay: 2 days  Attending Physician: Jimenez Patel MD  Primary Care Provider: Og Perez MD        Subjective:     Principal Problem:Pneumoperitoneum        HPI:  Patient is a 69-year-old female that was recently admitted here for mental status change.  Patient found have a urinary tract infection and alcohol withdrawal.  Patient was discharged to LT[facility] on 01/29/2020.  Patient was stable over there and was found to have urinary tract infection and was treated initially with Rocephin.  That was discontinued and patient was started on Zosyn and vancomycin whenever CT scan of the abdomen was done due to abdominal pain on 01/31.  This CT scan showed a pneumoperitoneum with what appeared to be free air in the right upper quadrant.  No obvious source was noted this also was seen on plain film flat and upright of the abdomen.  Patient has continued to complain of pain and is tender in the right upper quadrant.  Patient was transferred back here for surgical attention to this problem.  Patient otherwise has a past medical history significant for arthritis, hypertension, questionable adrenal insufficiency, urinary tract infection, alcohol abuse, asthma, and gouty arthropathy.  Patient also has a long history of depression as well.  Patient is in no acute distress at this time.  Abdomen showing bowel sounds noted but there is tenderness in the right upper quadrant to the epigastric area.    Overview/Hospital Course:  02/04/2020:  Patient is alert intermittently confused and in no acute distress. Patient still complain of some abdominal pain. Otherwise labs show a sodium 135 potassium 2.7 chloride 116 bicarb 17 and liver function tests within normal ranges.  Lipase was 30 and normal white blood cell count 10.1 hemoglobin 9.1 hematocrit 26  platelets 152 differential 84 granulocytes 8 lymphocytes. Vs:BP (!) 139/101   Pulse 85   Temp 98.8 °F (37.1 °C)   Resp 18   Ht 5' (1.524 m)   Wt 48 kg (105 lb 13.1 oz)   LMP  (LMP Unknown)   SpO2 99%   Breastfeeding? No   BMI 20.67 kg/m²     CT scan of the abdomen with contrast reveals a the tubular structure in the left abdomen that is of uncertain etiology.  Please see radiology report.    CT Abd Pelvis:   Impression       1. Interval development of moderate bilateral pleural effusions with bibasilar dependent atelectasis.  2. Chronic small hepatic cysts.  3. Previous cholecystectomy with persistent moderate intra and extrahepatic biliary ductal dilatation.  4. Perihepatic ascites.  5. Mild bilateral hydronephrosis which has slightly decompressed over the interval.  6. Mild rectal impaction which has decompressed over the interval.  7. Interval development of an elongated fluid dilated structure of the left abdomen which is of uncertain etiology.  Differential diagnosis includes a focally dilated loop of small bowel.  However, this is considered unlikely as no oral contrast is present.  Differential diagnosis includes seroma versus developing intra-abdominal abscess.  Correlate clinically with possible fever and/or elevated white count.  Differential diagnosis also includes possible unusual lymphocele or mesenteric cyst.  8. Prior hysterectomy.  9. Arias catheter placement with circumferential bladder wall thickening.  This may represent a cystitis.  Correlate clinically with UA.  10. Bone demineralization.  11. Suspected anasarca.  The preliminary and final reports are concordant.      Electronically signed by: Vaughn Galarza  Date: 02/04/2020  Time: 10:20            Last Resulted: 02/04/20 10:20   Order Details View Encounter Lab and Collection Details Routing Result History            External Result Report     External Result Report   Narrative     EXAMINATION:  CT ABDOMEN PELVIS WITH  CONTRAST    CLINICAL HISTORY:  Abd pain, fever, abscess suspected;        02/05/2020:  Patient is alert and more verbal today than previous days.  Patient is alert to person and place but not time.  CT scan of the pelvis with cystogram today revealed that there is a bladder perforation with extravasation of contrast material.  This was noted by report as a 1.5 cm defect within the dome of the brow bladder consistent with a bladder tear.  Contrast material was extravasated into the peritoneal cavity.  No free intraperitoneal air Arias was in place.  Vital signs are stable with normal blood pressure no fever chills nausea vomiting and and O2 saturations are in the % saturation range.  Blood pressures are normal    Interval History:     Review of Systems   Constitutional: Positive for fatigue. Negative for activity change, appetite change and fever.   HENT: Negative for congestion, ear discharge, mouth sores, nosebleeds, rhinorrhea, sinus pressure, sinus pain and tinnitus.    Eyes: Negative.  Negative for pain, redness and itching.   Respiratory: Negative for apnea, cough, choking, chest tightness, wheezing and stridor.    Cardiovascular: Negative for chest pain, palpitations and leg swelling.   Gastrointestinal: Positive for abdominal distention and abdominal pain. Negative for anal bleeding, blood in stool, constipation and diarrhea.   Endocrine: Negative.    Genitourinary: Negative for difficulty urinating, flank pain, frequency and urgency.   Musculoskeletal: Positive for arthralgias. Negative for back pain, gait problem and myalgias.   Skin: Negative for color change and pallor.   Allergic/Immunologic: Negative.    Neurological: Positive for weakness and light-headedness. Negative for dizziness, facial asymmetry and headaches.   Hematological: Negative for adenopathy. Does not bruise/bleed easily.   Psychiatric/Behavioral: Positive for confusion. The patient is nervous/anxious.      Objective:     Vital  Signs (Most Recent):  Temp: 98.5 °F (36.9 °C) (02/05/20 1600)  Pulse: 93 (02/05/20 1700)  Resp: 16 (02/05/20 1700)  BP: 133/82 (02/05/20 1700)  SpO2: 100 % (02/05/20 1700) Vital Signs (24h Range):  Temp:  [97.7 °F (36.5 °C)-98.5 °F (36.9 °C)] 98.5 °F (36.9 °C)  Pulse:  [] 93  Resp:  [11-25] 16  SpO2:  [98 %-100 %] 100 %  BP: ()/(59-87) 133/82     Weight: 48 kg (105 lb 13.1 oz)  Body mass index is 20.67 kg/m².    Intake/Output Summary (Last 24 hours) at 2/5/2020 1808  Last data filed at 2/5/2020 1700  Gross per 24 hour   Intake 1890 ml   Output 2975 ml   Net -1085 ml      Physical Exam   Constitutional: She is oriented to person, place, and time. She appears well-developed and well-nourished.   HENT:   Head: Normocephalic and atraumatic.   Eyes: Pupils are equal, round, and reactive to light. EOM are normal.   Neck: Normal range of motion. Neck supple. No tracheal deviation present. No thyromegaly present.   Cardiovascular: Normal rate, regular rhythm and normal heart sounds.   Pulmonary/Chest: Effort normal and breath sounds normal.   Abdominal: Soft. Bowel sounds are normal. She exhibits distension. There is tenderness. There is rebound. There is no guarding.   Musculoskeletal: Normal range of motion.   Lymphadenopathy:     She has no cervical adenopathy.   Neurological: She is alert and oriented to person, place, and time.   Skin: Skin is warm and dry. Capillary refill takes less than 2 seconds.   Psychiatric: She has a normal mood and affect. Her behavior is normal. Judgment and thought content normal.   Nursing note and vitals reviewed.      Significant Labs:   Recent Lab Results       02/05/20  1700   02/05/20  0547   02/05/20  0535        Albumin     1.6     Alkaline Phosphatase     61     ALT     11     Anion Gap     5     AST     19     Baso #   0.05       Basophil%   0.5       BILIRUBIN TOTAL     0.6  Comment:  For infants and newborns, interpretation of results should be based  on gestational  age, weight and in agreement with clinical  observations.  Premature Infant recommended reference ranges:  Up to 24 hours.............<8.0 mg/dL  Up to 48 hours............<12.0 mg/dL  3-5 days..................<15.0 mg/dL  6-29 days.................<15.0 mg/dL       BUN, Bld     11     Calcium     8.3     Chloride     111     CO2     13     Creatinine     0.7     Differential Method   Automated       eGFR if      >60.0     eGFR if non      >60.0  Comment:  Calculation used to obtain the estimated glomerular filtration  rate (eGFR) is the CKD-EPI equation.        Eos #   0.2       Eosinophil%   1.9       Glucose     60     Gran # (ANC)   9.4       Gran%   86.3       Hematocrit   26.7       Hemoglobin   9.2       Immature Grans (Abs)   0.05  Comment:  Mild elevation in immature granulocytes is non specific and   can be seen in a variety of conditions including stress response,   acute inflammation, trauma and pregnancy. Correlation with other   laboratory and clinical findings is essential.         Immature Granulocytes   0.5       Lymph #   0.7       Lymph%   6.2       MCH   31.9       MCHC   34.5       MCV   93       Mono #   0.5       Mono%   4.6       MPV   10.3       nRBC   0       Platelets   167       Potassium     4.1     PROTEIN TOTAL     4.2     RBC   2.88       RDW   17.0       Sodium     129     Vancomycin-Trough 36.6  Comment:  36.6 [facility]    critical result(s) called and verbal readback obtained   from  KEI Aaron RN by KB2 02/05/2020 17:42           WBC   10.87           All pertinent labs within the past 24 hours have been reviewed.    Significant Imaging: I have reviewed and interpreted all pertinent imaging results/findings within the past 24 hours.      Assessment/Plan:      * Pneumoperitoneum  02/03/2020:  Admit to ICU.  Consult General surgery  Continue antibiotics of Zosyn and vancomycin  Repeat labs now and in the a.m..  To include CBC CMP amylase and  lipase  Continue current TPN feedings new  Nebulization treatments for pulmonary toilet    Critical Care time 60 minutes    02/04/2020:  · Continue current antibiotics this time.  · Defer to surgery regarding mass or fluid-filled structure in the abdomen  · No mention of free air was noted.  · Repeat labs in the a.m. to include CBC, CMP, chest x-ray    02/05/2020:  · Continue current antibiotics.  · Urology consult has been placed full discussion with urologist Dr. Bacon has taken placed by me:  · Continue Arias catheter  · Repeat labs in the a.m..  Monitor for any clinical deterioration.        Perforation of bladder  02/05/2020:  · Continue current treatment with Arias catheter  · Expectant management  · Urology consult:  Full discussion with Dr. Bacon, urologist this morning  · CT scan with cystogram today revealed bladder tear approximately 1.5 cm  · Patient stable this time and monitor closely.        VTE Risk Mitigation (From admission, onward)         Ordered     IP VTE LOW RISK PATIENT  Once      02/03/20 1744     Place MORIS hose  Until discontinued      02/03/20 1744     Place sequential compression device  Until discontinued      02/03/20 1744              The evaluation, management and treatment of this patient involved Critical Care services  amounting to 30 minutes of direct involvement.  This time was exclusive of any billable procedures.        Jimenez Patel MD  Department of Hospital Medicine   [facility]

## 2020-02-06 NOTE — PT/OT/SLP PROGRESS
"Attempted to see patient at 925am patient was in MRI.    Attempted to see patient 4:10 pm patient sleeping upon arrival however aroused easily. Patient states "I don't want to exercise today, maybe tomorrow." explained to patient she said the same thing yesterday and needs to participate to build functional strength. She states she will try tomorrow but would like to rest at the moment.     Michelet Susan. PTA  " (3) adequate

## 2020-02-06 NOTE — SUBJECTIVE & OBJECTIVE
Interval History:     Review of Systems   Constitutional: Positive for fatigue. Negative for activity change, appetite change and fever.   HENT: Negative for congestion, ear discharge, mouth sores, nosebleeds, rhinorrhea, sinus pressure, sinus pain and tinnitus.    Eyes: Negative.  Negative for pain, redness and itching.   Respiratory: Negative for apnea, cough, choking, chest tightness, wheezing and stridor.    Cardiovascular: Negative for chest pain, palpitations and leg swelling.   Gastrointestinal: Positive for abdominal distention and abdominal pain. Negative for anal bleeding, blood in stool, constipation and diarrhea.   Endocrine: Negative.    Genitourinary: Negative for difficulty urinating, flank pain, frequency and urgency.   Musculoskeletal: Positive for arthralgias. Negative for back pain, gait problem and myalgias.   Skin: Negative for color change and pallor.   Allergic/Immunologic: Negative.    Neurological: Positive for weakness and light-headedness. Negative for dizziness, facial asymmetry and headaches.   Hematological: Negative for adenopathy. Does not bruise/bleed easily.   Psychiatric/Behavioral: Positive for confusion. The patient is nervous/anxious.      Objective:     Vital Signs (Most Recent):  Temp: 98.5 °F (36.9 °C) (02/05/20 1600)  Pulse: 93 (02/05/20 1700)  Resp: 16 (02/05/20 1700)  BP: 133/82 (02/05/20 1700)  SpO2: 100 % (02/05/20 1700) Vital Signs (24h Range):  Temp:  [97.7 °F (36.5 °C)-98.5 °F (36.9 °C)] 98.5 °F (36.9 °C)  Pulse:  [] 93  Resp:  [11-25] 16  SpO2:  [98 %-100 %] 100 %  BP: ()/(59-87) 133/82     Weight: 48 kg (105 lb 13.1 oz)  Body mass index is 20.67 kg/m².    Intake/Output Summary (Last 24 hours) at 2/5/2020 1808  Last data filed at 2/5/2020 1700  Gross per 24 hour   Intake 1890 ml   Output 2975 ml   Net -1085 ml      Physical Exam   Constitutional: She is oriented to person, place, and time. She appears well-developed and well-nourished.   HENT:   Head:  Normocephalic and atraumatic.   Eyes: Pupils are equal, round, and reactive to light. EOM are normal.   Neck: Normal range of motion. Neck supple. No tracheal deviation present. No thyromegaly present.   Cardiovascular: Normal rate, regular rhythm and normal heart sounds.   Pulmonary/Chest: Effort normal and breath sounds normal.   Abdominal: Soft. Bowel sounds are normal. She exhibits distension. There is tenderness. There is rebound. There is no guarding.   Musculoskeletal: Normal range of motion.   Lymphadenopathy:     She has no cervical adenopathy.   Neurological: She is alert and oriented to person, place, and time.   Skin: Skin is warm and dry. Capillary refill takes less than 2 seconds.   Psychiatric: She has a normal mood and affect. Her behavior is normal. Judgment and thought content normal.   Nursing note and vitals reviewed.      Significant Labs:   Recent Lab Results       02/05/20  1700   02/05/20  0547   02/05/20  0535        Albumin     1.6     Alkaline Phosphatase     61     ALT     11     Anion Gap     5     AST     19     Baso #   0.05       Basophil%   0.5       BILIRUBIN TOTAL     0.6  Comment:  For infants and newborns, interpretation of results should be based  on gestational age, weight and in agreement with clinical  observations.  Premature Infant recommended reference ranges:  Up to 24 hours.............<8.0 mg/dL  Up to 48 hours............<12.0 mg/dL  3-5 days..................<15.0 mg/dL  6-29 days.................<15.0 mg/dL       BUN, Bld     11     Calcium     8.3     Chloride     111     CO2     13     Creatinine     0.7     Differential Method   Automated       eGFR if      >60.0     eGFR if non      >60.0  Comment:  Calculation used to obtain the estimated glomerular filtration  rate (eGFR) is the CKD-EPI equation.        Eos #   0.2       Eosinophil%   1.9       Glucose     60     Gran # (ANC)   9.4       Gran%   86.3       Hematocrit   26.7        Hemoglobin   9.2       Immature Grans (Abs)   0.05  Comment:  Mild elevation in immature granulocytes is non specific and   can be seen in a variety of conditions including stress response,   acute inflammation, trauma and pregnancy. Correlation with other   laboratory and clinical findings is essential.         Immature Granulocytes   0.5       Lymph #   0.7       Lymph%   6.2       MCH   31.9       MCHC   34.5       MCV   93       Mono #   0.5       Mono%   4.6       MPV   10.3       nRBC   0       Platelets   167       Potassium     4.1     PROTEIN TOTAL     4.2     RBC   2.88       RDW   17.0       Sodium     129     Vancomycin-Trough 36.6  Comment:  36.6 vantc    critical result(s) called and verbal readback obtained   from  KEI Aaron RN by KB2 02/05/2020 17:42           WBC   10.87           All pertinent labs within the past 24 hours have been reviewed.    Significant Imaging: I have reviewed and interpreted all pertinent imaging results/findings within the past 24 hours.

## 2020-02-06 NOTE — PLAN OF CARE
02/06/20 1656   Discharge Reassessment   Assessment Type Discharge Planning Reassessment   PT IS PLEASANTLY CONFUSED TODAY. SHE TELLS SW SHE IS FEELING BETTER AND GETTING UP AND DRESSED NOW. TOMORROW SHE SAYS [place] BUT SHE WILL BE BACK TOMORROW NIGHT. [facility] WILL ACCEPT PT FOR CONTINUING CARE WHEN MD IS READY TO TRANSFER HER BACK THERE. HE IS AWAITING UROLOGY CONSULT NOW.  [facility] WILL CONTINUE TO FOLLOW.

## 2020-02-06 NOTE — SUBJECTIVE & OBJECTIVE
Interval History:     Review of Systems   Constitutional: Negative for activity change, appetite change, fatigue and fever.   HENT: Negative for congestion, ear discharge, mouth sores, nosebleeds, rhinorrhea, sinus pressure, sinus pain and tinnitus.    Eyes: Negative.  Negative for pain, redness and itching.   Respiratory: Negative for apnea, cough, choking, wheezing and stridor.    Cardiovascular: Negative for chest pain, palpitations and leg swelling.   Gastrointestinal: Positive for abdominal distention and abdominal pain. Negative for anal bleeding, blood in stool, constipation and diarrhea.   Endocrine: Negative.    Genitourinary: Negative for difficulty urinating, flank pain, frequency and urgency.   Musculoskeletal: Positive for arthralgias. Negative for back pain, gait problem and myalgias.   Skin: Negative for color change and pallor.   Allergic/Immunologic: Negative.    Neurological: Positive for dizziness and weakness. Negative for facial asymmetry, light-headedness and headaches.   Hematological: Negative for adenopathy. Does not bruise/bleed easily.   Psychiatric/Behavioral: Positive for confusion. The patient is nervous/anxious.      Objective:     Vital Signs (Most Recent):  Temp: 97.8 °F (36.6 °C) (02/06/20 0715)  Pulse: 93 (02/06/20 0915)  Resp: 11 (02/06/20 0915)  BP: (!) 113/56 (02/06/20 0900)  SpO2: 100 % (02/06/20 0915) Vital Signs (24h Range):  Temp:  [97.4 °F (36.3 °C)-98.5 °F (36.9 °C)] 97.8 °F (36.6 °C)  Pulse:  [] 93  Resp:  [10-35] 11  SpO2:  [98 %-100 %] 100 %  BP: ()/(50-95) 113/56     Weight: 48 kg (105 lb 13.1 oz)  Body mass index is 20.67 kg/m².    Intake/Output Summary (Last 24 hours) at 2/6/2020 1003  Last data filed at 2/6/2020 0551  Gross per 24 hour   Intake 3767.5 ml   Output 3350 ml   Net 417.5 ml      Physical Exam   Constitutional: She is oriented to person, place, and time. She appears well-developed and well-nourished.   HENT:   Head: Normocephalic and  atraumatic.   Eyes: Pupils are equal, round, and reactive to light. EOM are normal.   Neck: Normal range of motion. Neck supple. No tracheal deviation present. No thyromegaly present.   Cardiovascular: Normal rate, regular rhythm and normal heart sounds.   Pulmonary/Chest: Effort normal and breath sounds normal.   Abdominal: Soft. Bowel sounds are normal. She exhibits no distension. There is tenderness. There is guarding. There is no rebound.   Musculoskeletal: Normal range of motion.   Lymphadenopathy:     She has no cervical adenopathy.   Neurological: She is alert and oriented to person, place, and time.   Skin: Skin is warm and dry. Capillary refill takes less than 2 seconds.   Psychiatric: She has a normal mood and affect. Her behavior is normal. Judgment and thought content normal.   Nursing note and vitals reviewed.      Significant Labs:   Recent Lab Results       02/06/20  0542   02/05/20 2016 02/05/20  1700        Albumin 1.6         Alkaline Phosphatase 65         ALT 10         Anion Gap 4         AST 17         Baso # 0.05         Basophil% 0.5         BILIRUBIN TOTAL 0.5  Comment:  For infants and newborns, interpretation of results should be based  on gestational age, weight and in agreement with clinical  observations.  Premature Infant recommended reference ranges:  Up to 24 hours.............<8.0 mg/dL  Up to 48 hours............<12.0 mg/dL  3-5 days..................<15.0 mg/dL  6-29 days.................<15.0 mg/dL           BUN, Bld 9         C difficile Toxins A+B, EIA   Negative  Comment:  Testing not recommended for children <24 months old.       C. diff Antigen   Positive       Calcium 8.3         Chloride 116         CO2 18         Creatinine 0.7         Differential Method Automated         eGFR if  >60.0         eGFR if non  >60.0  Comment:  Calculation used to obtain the estimated glomerular filtration  rate (eGFR) is the CKD-EPI equation.             Eos # 0.2         Eosinophil% 2.0         Glucose 66         Gran # (ANC) 9.4         Gran% 85.8         Hematocrit 27.6         Hemoglobin 9.4         Immature Grans (Abs) 0.07  Comment:  Mild elevation in immature granulocytes is non specific and   can be seen in a variety of conditions including stress response,   acute inflammation, trauma and pregnancy. Correlation with other   laboratory and clinical findings is essential.           Immature Granulocytes 0.6         Lymph # 0.6         Lymph% 5.6         MCH 32.1         MCHC 34.1         MCV 94         Mono # 0.6         Mono% 5.5         MPV 10.9         nRBC 0         Platelets 195         Potassium 3.9         PROTEIN TOTAL 4.5         RBC 2.93         RDW 17.1         Sodium 138         Vancomycin-Trough     36.6  Comment:  36.6 vantc    critical result(s) called and verbal readback obtained   from  KEI Aaron RN by KB2 02/05/2020 17:42       WBC 10.99             All pertinent labs within the past 24 hours have been reviewed.    Significant Imaging: I have reviewed and interpreted all pertinent imaging results/findings within the past 24 hours.

## 2020-02-06 NOTE — PROGRESS NOTES
[facility] - ICU Hospital Medicine  Progress Note    Patient Name: Obdulia Yepez  MRN: [id number]  Patient Class: IP- Inpatient   Admission Date: 2/3/2020  Length of Stay: 3 days  Attending Physician: Jimenez Patel MD  Primary Care Provider: Og Perez MD        Subjective:     Principal Problem:Pneumoperitoneum        HPI:  Patient is a 69-year-old female that was recently admitted here for mental status change.  Patient found have a urinary tract infection and alcohol withdrawal.  Patient was discharged to LTAC on 01/29/2020.  Patient was stable over there and was found to have urinary tract infection and was treated initially with Rocephin.  That was discontinued and patient was started on Zosyn and vancomycin whenever CT scan of the abdomen was done due to abdominal pain on 01/31.  This CT scan showed a pneumoperitoneum with what appeared to be free air in the right upper quadrant.  No obvious source was noted this also was seen on plain film flat and upright of the abdomen.  Patient has continued to complain of pain and is tender in the right upper quadrant.  Patient was transferred back here for surgical attention to this problem.  Patient otherwise has a past medical history significant for arthritis, hypertension, questionable adrenal insufficiency, urinary tract infection, alcohol abuse, asthma, and gouty arthropathy.  Patient also has a long history of depression as well.  Patient is in no acute distress at this time.  Abdomen showing bowel sounds noted but there is tenderness in the right upper quadrant to the epigastric area.    Overview/Hospital Course:  02/04/2020:  Patient is alert intermittently confused and in no acute distress. Patient still complain of some abdominal pain. Otherwise labs show a sodium 135 potassium 2.7 chloride 116 bicarb 17 and liver function tests within normal ranges.  Lipase was 30 and normal white blood cell count 10.1 hemoglobin 9.1 hematocrit 26  platelets 152 differential 84 granulocytes 8 lymphocytes. Vs:BP (!) 139/101   Pulse 85   Temp 98.8 °F (37.1 °C)   Resp 18   Ht 5' (1.524 m)   Wt 48 kg (105 lb 13.1 oz)   LMP  (LMP Unknown)   SpO2 99%   Breastfeeding? No   BMI 20.67 kg/m²     CT scan of the abdomen with contrast reveals a the tubular structure in the left abdomen that is of uncertain etiology.  Please see radiology report.    CT Abd Pelvis:   Impression       1. Interval development of moderate bilateral pleural effusions with bibasilar dependent atelectasis.  2. Chronic small hepatic cysts.  3. Previous cholecystectomy with persistent moderate intra and extrahepatic biliary ductal dilatation.  4. Perihepatic ascites.  5. Mild bilateral hydronephrosis which has slightly decompressed over the interval.  6. Mild rectal impaction which has decompressed over the interval.  7. Interval development of an elongated fluid dilated structure of the left abdomen which is of uncertain etiology.  Differential diagnosis includes a focally dilated loop of small bowel.  However, this is considered unlikely as no oral contrast is present.  Differential diagnosis includes seroma versus developing intra-abdominal abscess.  Correlate clinically with possible fever and/or elevated white count.  Differential diagnosis also includes possible unusual lymphocele or mesenteric cyst.  8. Prior hysterectomy.  9. Arias catheter placement with circumferential bladder wall thickening.  This may represent a cystitis.  Correlate clinically with UA.  10. Bone demineralization.  11. Suspected anasarca.  The preliminary and final reports are concordant.      Electronically signed by: Vaughn Galarza  Date: 02/04/2020  Time: 10:20            Last Resulted: 02/04/20 10:20   Order Details View Encounter Lab and Collection Details Routing Result History            External Result Report     External Result Report   Narrative     EXAMINATION:  CT ABDOMEN PELVIS WITH  CONTRAST    CLINICAL HISTORY:  Abd pain, fever, abscess suspected;        02/05/2020:  Patient is alert and more verbal today than previous days.  Patient is alert to person and place but not time.  CT scan of the pelvis with cystogram today revealed that there is a bladder perforation with extravasation of contrast material.  This was noted by report as a 1.5 cm defect within the dome of the brow bladder consistent with a bladder tear.  Contrast material was extravasated into the peritoneal cavity.  No free intraperitoneal air Arias was in place.  Vital signs are stable with normal blood pressure no fever chills nausea vomiting and and O2 saturations are in the % saturation range.  Blood pressures are normal    02/06/2020:  Patient is stable this morning having no shortness of breath and still having some tenderness in the abdomen.  Urine is pink tends to and flowing well.  Labs:  Sodium 138 chloride 116 bicarb 18 glucose 66 and BUN creatinine were 9 and 0.7.  CBC white blood cell count 10.9 hemoglobin 9.4 hematocrit 27.6 platelets 195 and differential 85 granulocytes.  Clostridium difficile evaluation last evening showed positive antigen but negative toxin.    Interval History:     Review of Systems   Constitutional: Negative for activity change, appetite change, fatigue and fever.   HENT: Negative for congestion, ear discharge, mouth sores, nosebleeds, rhinorrhea, sinus pressure, sinus pain and tinnitus.    Eyes: Negative.  Negative for pain, redness and itching.   Respiratory: Negative for apnea, cough, choking, wheezing and stridor.    Cardiovascular: Negative for chest pain, palpitations and leg swelling.   Gastrointestinal: Positive for abdominal distention and abdominal pain. Negative for anal bleeding, blood in stool, constipation and diarrhea.   Endocrine: Negative.    Genitourinary: Negative for difficulty urinating, flank pain, frequency and urgency.   Musculoskeletal: Positive for arthralgias.  Negative for back pain, gait problem and myalgias.   Skin: Negative for color change and pallor.   Allergic/Immunologic: Negative.    Neurological: Positive for dizziness and weakness. Negative for facial asymmetry, light-headedness and headaches.   Hematological: Negative for adenopathy. Does not bruise/bleed easily.   Psychiatric/Behavioral: Positive for confusion. The patient is nervous/anxious.      Objective:     Vital Signs (Most Recent):  Temp: 97.8 °F (36.6 °C) (02/06/20 0715)  Pulse: 93 (02/06/20 0915)  Resp: 11 (02/06/20 0915)  BP: (!) 113/56 (02/06/20 0900)  SpO2: 100 % (02/06/20 0915) Vital Signs (24h Range):  Temp:  [97.4 °F (36.3 °C)-98.5 °F (36.9 °C)] 97.8 °F (36.6 °C)  Pulse:  [] 93  Resp:  [10-35] 11  SpO2:  [98 %-100 %] 100 %  BP: ()/(50-95) 113/56     Weight: 48 kg (105 lb 13.1 oz)  Body mass index is 20.67 kg/m².    Intake/Output Summary (Last 24 hours) at 2/6/2020 1003  Last data filed at 2/6/2020 0551  Gross per 24 hour   Intake 3767.5 ml   Output 3350 ml   Net 417.5 ml      Physical Exam   Constitutional: She is oriented to person, place, and time. She appears well-developed and well-nourished.   HENT:   Head: Normocephalic and atraumatic.   Eyes: Pupils are equal, round, and reactive to light. EOM are normal.   Neck: Normal range of motion. Neck supple. No tracheal deviation present. No thyromegaly present.   Cardiovascular: Normal rate, regular rhythm and normal heart sounds.   Pulmonary/Chest: Effort normal and breath sounds normal.   Abdominal: Soft. Bowel sounds are normal. She exhibits no distension. There is tenderness. There is guarding. There is no rebound.   Musculoskeletal: Normal range of motion.   Lymphadenopathy:     She has no cervical adenopathy.   Neurological: She is alert and oriented to person, place, and time.   Skin: Skin is warm and dry. Capillary refill takes less than 2 seconds.   Psychiatric: She has a normal mood and affect. Her behavior is normal.  Judgment and thought content normal.   Nursing note and vitals reviewed.      Significant Labs:   Recent Lab Results       02/06/20  0542   02/05/20 2016 02/05/20  1700        Albumin 1.6         Alkaline Phosphatase 65         ALT 10         Anion Gap 4         AST 17         Baso # 0.05         Basophil% 0.5         BILIRUBIN TOTAL 0.5  Comment:  For infants and newborns, interpretation of results should be based  on gestational age, weight and in agreement with clinical  observations.  Premature Infant recommended reference ranges:  Up to 24 hours.............<8.0 mg/dL  Up to 48 hours............<12.0 mg/dL  3-5 days..................<15.0 mg/dL  6-29 days.................<15.0 mg/dL           BUN, Bld 9         C difficile Toxins A+B, EIA   Negative  Comment:  Testing not recommended for children <24 months old.       C. diff Antigen   Positive       Calcium 8.3         Chloride 116         CO2 18         Creatinine 0.7         Differential Method Automated         eGFR if  >60.0         eGFR if non  >60.0  Comment:  Calculation used to obtain the estimated glomerular filtration  rate (eGFR) is the CKD-EPI equation.            Eos # 0.2         Eosinophil% 2.0         Glucose 66         Gran # (ANC) 9.4         Gran% 85.8         Hematocrit 27.6         Hemoglobin 9.4         Immature Grans (Abs) 0.07  Comment:  Mild elevation in immature granulocytes is non specific and   can be seen in a variety of conditions including stress response,   acute inflammation, trauma and pregnancy. Correlation with other   laboratory and clinical findings is essential.           Immature Granulocytes 0.6         Lymph # 0.6         Lymph% 5.6         MCH 32.1         MCHC 34.1         MCV 94         Mono # 0.6         Mono% 5.5         MPV 10.9         nRBC 0         Platelets 195         Potassium 3.9         PROTEIN TOTAL 4.5         RBC 2.93         RDW 17.1         Sodium 138          Vancomycin-Trough     36.6  Comment:  36.6 [facility]    critical result(s) called and verbal readback obtained   from  KEI Aaron RN by KB2 02/05/2020 17:42       WBC 10.99             All pertinent labs within the past 24 hours have been reviewed.    Significant Imaging: I have reviewed and interpreted all pertinent imaging results/findings within the past 24 hours.      Assessment/Plan:      * Pneumoperitoneum  02/03/2020:  Admit to ICU.  Consult General surgery  Continue antibiotics of Zosyn and vancomycin  Repeat labs now and in the a.m..  To include CBC CMP amylase and lipase  Continue current TPN feedings new  Nebulization treatments for pulmonary toilet    Critical Care time 60 minutes    02/04/2020:  · Continue current antibiotics this time.  · Defer to surgery regarding mass or fluid-filled structure in the abdomen  · No mention of free air was noted.  · Repeat labs in the a.m. to include CBC, CMP, chest x-ray    02/05/2020:  · Continue current antibiotics.  · Urology consult has been placed full discussion with urologist Dr. Bacon has taken placed by me:  · Continue Arias catheter  · Repeat labs in the a.m..  Monitor for any clinical deterioration.        Perforation of bladder  02/05/2020:  · Continue current treatment with Arias catheter  · Expectant management  · Urology consult:  Full discussion with Dr. Bacon, urologist this morning  · CT scan with cystogram today revealed bladder tear approximately 1.5 cm  · Patient stable this time and monitor closely.    02/06/2020  · Continue Arias catheter.  · Expectant management  · Urology consult:  Discussed this case in full with Dr. Bacon and will continue expectant management with Arias catheter in place and make other decisions subsequently.        VTE Risk Mitigation (From admission, onward)         Ordered     IP VTE LOW RISK PATIENT  Once      02/03/20 1744     Place MORIS hose  Until discontinued      02/03/20 1744     Place sequential compression device   Until discontinued      02/03/20 1744                The evaluation, management and treatment of this patient involved Critical Care services  amounting to 30 minutes of direct involvement.  This time was exclusive of any billable procedures.      Jimenez Patel MD  Department of Hospital Medicine   [facility] - ICU

## 2020-02-06 NOTE — ASSESSMENT & PLAN NOTE
02/03/2020:  Admit to ICU.  Consult General surgery  Continue antibiotics of Zosyn and vancomycin  Repeat labs now and in the a.m..  To include CBC CMP amylase and lipase  Continue current TPN feedings new  Nebulization treatments for pulmonary toilet    Critical Care time 60 minutes    02/04/2020:  · Continue current antibiotics this time.  · Defer to surgery regarding mass or fluid-filled structure in the abdomen  · No mention of free air was noted.  · Repeat labs in the a.m. to include CBC, CMP, chest x-ray    02/05/2020:  · Continue current antibiotics.  · Urology consult has been placed full discussion with urologist Dr. Bacon has taken placed by me:  · Continue Arias catheter  · Repeat labs in the a.m..  Monitor for any clinical deterioration.

## 2020-02-06 NOTE — CONSULTS
Mrs. Yepez is a 69-year-old female well known to me.  The patient was treated for recurrent urinary tract infections.  In November 2019 she underwent a cystoscopy with retrograde pyelograms.  The only finding during that episode was a chronic cystitis.  In January of this year she was admitted to the hospital for evaluation of urinary tract infection and eventually she was discharged to [facility].  She developed abdominal pain after the a.m. admission to the facility a CT scan was performed shows free air come back to the hospital on during this admission the patient has been found to have small bladder tear in the dome of the bladder. The patient had a minimal area contrast is spilling into the peritoneum.    This patient has significant comorbidities include and chronic alcoholism.  Malnourished.  Cardiac conditions.  A complete review of the past medical and surgical history was performed during this visit and also the current medications and allergies.    Review of systems    General the patient is disoriented.  Dermatologic denies any skin lesions or complaints.    Respiratory is the patient denies any shortness of breath or chronic cough.    Cardiovascular patient denies chest pain or palpitations.    GI the patient complains of abdominal pain reason for this admission.    Musculoskeletal minimal ambulation.    Genitourinary as per history.  History of chronic recurrent urinary tract infections.    Physical examination    Head and neck patient is normocephalic no evidence of trauma.  Neck is supple.    Chest symmetrical lungs are clear    Cardiovascular heart her for recovery from peripheral pulses are palpable in both size within normal limits.    Abdomen diffuse tenderness no distended no masses are palpable.    Genitourinary Arias in place draining pinkish urine.  Musculoskeletal normal ROM in the upper and lower extremities.    Impression bladder perforation.    Plan I did share these a patient with Dr. Piña  Bowen and we agree that they in would need to treat this patient conservatively with a Arias catheter for 4 weeks.  At that point we plan to repeat the cystogram to see if the defect heals spontaneously.  He failed to do so at that time we may consider his surgical repair of the bladder perforation.  I do suggest that the patient had a weak have to be in a better metabolic conditions since  the last serum albumin is only 1.5.  The patient was informed of the plan of treatment.  I would also suggest the patient be sent to LTAC with p.o. antibiotics during this month.  She continues to receive therapy in the form of Bactrim DS p.o. q.d..    I thank you for letting me participate in the care of your patient I will follow the patient with you as an outpatient

## 2020-02-06 NOTE — ASSESSMENT & PLAN NOTE
02/05/2020:  · Continue current treatment with Arias catheter  · Expectant management  · Urology consult:  Full discussion with Dr. Bacon, urologist this morning  · CT scan with cystogram today revealed bladder tear approximately 1.5 cm  · Patient stable this time and monitor closely.    02/06/2020  · Continue Arias catheter.  · Expectant management  · Urology consult:  Discussed this case in full with Dr. Bacon and will continue expectant management with Arias catheter in place and make other decisions subsequently.

## 2020-02-07 VITALS
TEMPERATURE: 97 F | HEART RATE: 86 BPM | HEIGHT: 60 IN | DIASTOLIC BLOOD PRESSURE: 74 MMHG | WEIGHT: 105.81 LBS | OXYGEN SATURATION: 100 % | RESPIRATION RATE: 14 BRPM | BODY MASS INDEX: 20.77 KG/M2 | SYSTOLIC BLOOD PRESSURE: 121 MMHG

## 2020-02-07 LAB
ALBUMIN SERPL BCP-MCNC: 1.5 G/DL (ref 3.5–5.2)
ALP SERPL-CCNC: 61 U/L (ref 55–135)
ALT SERPL W/O P-5'-P-CCNC: 9 U/L (ref 10–44)
ANION GAP SERPL CALC-SCNC: 4 MMOL/L (ref 8–16)
AST SERPL-CCNC: 15 U/L (ref 10–40)
BACTERIA STL CULT: NORMAL
BACTERIA UR CULT: NO GROWTH
BASOPHILS # BLD AUTO: 0.03 K/UL (ref 0–0.2)
BASOPHILS NFR BLD: 0.3 % (ref 0–1.9)
BILIRUB SERPL-MCNC: 0.6 MG/DL (ref 0.1–1)
BUN SERPL-MCNC: 7 MG/DL (ref 8–23)
CALCIUM SERPL-MCNC: 7.9 MG/DL (ref 8.7–10.5)
CHLORIDE SERPL-SCNC: 117 MMOL/L (ref 95–110)
CO2 SERPL-SCNC: 16 MMOL/L (ref 23–29)
CREAT SERPL-MCNC: 0.7 MG/DL (ref 0.5–1.4)
DIFFERENTIAL METHOD: ABNORMAL
EOSINOPHIL # BLD AUTO: 0.4 K/UL (ref 0–0.5)
EOSINOPHIL NFR BLD: 3.9 % (ref 0–8)
ERYTHROCYTE [DISTWIDTH] IN BLOOD BY AUTOMATED COUNT: 16.7 % (ref 11.5–14.5)
EST. GFR  (AFRICAN AMERICAN): >60 ML/MIN/1.73 M^2
EST. GFR  (NON AFRICAN AMERICAN): >60 ML/MIN/1.73 M^2
GLUCOSE SERPL-MCNC: 73 MG/DL (ref 70–110)
HCT VFR BLD AUTO: 26.5 % (ref 37–48.5)
HGB BLD-MCNC: 9.1 G/DL (ref 12–16)
IMM GRANULOCYTES # BLD AUTO: 0.05 K/UL (ref 0–0.04)
IMM GRANULOCYTES NFR BLD AUTO: 0.6 % (ref 0–0.5)
LYMPHOCYTES # BLD AUTO: 0.9 K/UL (ref 1–4.8)
LYMPHOCYTES NFR BLD: 10.1 % (ref 18–48)
MCH RBC QN AUTO: 32.3 PG (ref 27–31)
MCHC RBC AUTO-ENTMCNC: 34.3 G/DL (ref 32–36)
MCV RBC AUTO: 94 FL (ref 82–98)
MONOCYTES # BLD AUTO: 0.5 K/UL (ref 0.3–1)
MONOCYTES NFR BLD: 5.8 % (ref 4–15)
NEUTROPHILS # BLD AUTO: 7.1 K/UL (ref 1.8–7.7)
NEUTROPHILS NFR BLD: 79.3 % (ref 38–73)
NRBC BLD-RTO: 0 /100 WBC
PLATELET # BLD AUTO: 204 K/UL (ref 150–350)
PMV BLD AUTO: 10.6 FL (ref 9.2–12.9)
POTASSIUM SERPL-SCNC: 3.5 MMOL/L (ref 3.5–5.1)
PROT SERPL-MCNC: 4.4 G/DL (ref 6–8.4)
RBC # BLD AUTO: 2.82 M/UL (ref 4–5.4)
SODIUM SERPL-SCNC: 137 MMOL/L (ref 136–145)
VANCOMYCIN SERPL-MCNC: 20.9 UG/ML
WBC # BLD AUTO: 8.98 K/UL (ref 3.9–12.7)

## 2020-02-07 PROCEDURE — 36415 COLL VENOUS BLD VENIPUNCTURE: CPT

## 2020-02-07 PROCEDURE — 80202 ASSAY OF VANCOMYCIN: CPT

## 2020-02-07 PROCEDURE — 25000242 PHARM REV CODE 250 ALT 637 W/ HCPCS: Performed by: INTERNAL MEDICINE

## 2020-02-07 PROCEDURE — 97530 THERAPEUTIC ACTIVITIES: CPT | Mod: CQ

## 2020-02-07 PROCEDURE — 94640 AIRWAY INHALATION TREATMENT: CPT

## 2020-02-07 PROCEDURE — 80053 COMPREHEN METABOLIC PANEL: CPT

## 2020-02-07 PROCEDURE — 25000003 PHARM REV CODE 250: Performed by: INTERNAL MEDICINE

## 2020-02-07 PROCEDURE — 99239 HOSP IP/OBS DSCHRG MGMT >30: CPT | Mod: ,,, | Performed by: INTERNAL MEDICINE

## 2020-02-07 PROCEDURE — 85025 COMPLETE CBC W/AUTO DIFF WBC: CPT

## 2020-02-07 PROCEDURE — 97110 THERAPEUTIC EXERCISES: CPT | Mod: CQ

## 2020-02-07 PROCEDURE — C9113 INJ PANTOPRAZOLE SODIUM, VIA: HCPCS | Performed by: INTERNAL MEDICINE

## 2020-02-07 PROCEDURE — 99239 PR HOSPITAL DISCHARGE DAY,>30 MIN: ICD-10-PCS | Mod: ,,, | Performed by: INTERNAL MEDICINE

## 2020-02-07 PROCEDURE — 94761 N-INVAS EAR/PLS OXIMETRY MLT: CPT

## 2020-02-07 PROCEDURE — 63600175 PHARM REV CODE 636 W HCPCS: Performed by: INTERNAL MEDICINE

## 2020-02-07 RX ORDER — OXYCODONE AND ACETAMINOPHEN 10; 325 MG/1; MG/1
1 TABLET ORAL EVERY 4 HOURS PRN
Status: CANCELLED | OUTPATIENT
Start: 2020-02-07

## 2020-02-07 RX ORDER — SODIUM CHLORIDE 0.9 % (FLUSH) 0.9 %
10 SYRINGE (ML) INJECTION
Status: CANCELLED | OUTPATIENT
Start: 2020-02-07

## 2020-02-07 RX ORDER — MONTELUKAST SODIUM 10 MG/1
10 TABLET ORAL DAILY
Status: CANCELLED | OUTPATIENT
Start: 2020-02-08

## 2020-02-07 RX ORDER — ACETAMINOPHEN 325 MG/1
650 TABLET ORAL EVERY 4 HOURS PRN
Status: CANCELLED | OUTPATIENT
Start: 2020-02-07

## 2020-02-07 RX ORDER — IPRATROPIUM BROMIDE AND ALBUTEROL SULFATE 2.5; .5 MG/3ML; MG/3ML
3 SOLUTION RESPIRATORY (INHALATION) EVERY 6 HOURS
Status: CANCELLED | OUTPATIENT
Start: 2020-02-07

## 2020-02-07 RX ORDER — LORAZEPAM 2 MG/ML
1 INJECTION INTRAMUSCULAR
Status: CANCELLED | OUTPATIENT
Start: 2020-02-07

## 2020-02-07 RX ORDER — OXYBUTYNIN CHLORIDE 5 MG/1
5 TABLET ORAL 2 TIMES DAILY
Status: CANCELLED | OUTPATIENT
Start: 2020-02-07

## 2020-02-07 RX ORDER — POTASSIUM CHLORIDE 20 MEQ/1
20 TABLET, EXTENDED RELEASE ORAL 2 TIMES DAILY
Status: CANCELLED | OUTPATIENT
Start: 2020-02-07

## 2020-02-07 RX ORDER — BUSPIRONE HYDROCHLORIDE 5 MG/1
5 TABLET ORAL 2 TIMES DAILY
Status: CANCELLED | OUTPATIENT
Start: 2020-02-07

## 2020-02-07 RX ORDER — HYDROCODONE BITARTRATE AND ACETAMINOPHEN 500; 5 MG/1; MG/1
TABLET ORAL
Status: CANCELLED | OUTPATIENT
Start: 2020-02-07

## 2020-02-07 RX ORDER — ESCITALOPRAM OXALATE 10 MG/1
20 TABLET ORAL DAILY
Status: CANCELLED | OUTPATIENT
Start: 2020-02-08

## 2020-02-07 RX ORDER — ESCITALOPRAM OXALATE 10 MG/1
20 TABLET ORAL DAILY
Status: CANCELLED | OUTPATIENT
Start: 2020-02-07

## 2020-02-07 RX ORDER — MORPHINE SULFATE 4 MG/ML
2 INJECTION, SOLUTION INTRAMUSCULAR; INTRAVENOUS EVERY 4 HOURS PRN
Status: CANCELLED | OUTPATIENT
Start: 2020-02-07

## 2020-02-07 RX ORDER — ALLOPURINOL 100 MG/1
300 TABLET ORAL DAILY
Status: CANCELLED | OUTPATIENT
Start: 2020-02-07

## 2020-02-07 RX ORDER — SODIUM CHLORIDE 9 MG/ML
INJECTION, SOLUTION INTRAVENOUS CONTINUOUS
Status: CANCELLED | OUTPATIENT
Start: 2020-02-07

## 2020-02-07 RX ORDER — GABAPENTIN 300 MG/1
600 CAPSULE ORAL 2 TIMES DAILY
Status: CANCELLED | OUTPATIENT
Start: 2020-02-07

## 2020-02-07 RX ORDER — MONTELUKAST SODIUM 10 MG/1
10 TABLET ORAL DAILY
Status: CANCELLED | OUTPATIENT
Start: 2020-02-07

## 2020-02-07 RX ORDER — OXYCODONE AND ACETAMINOPHEN 5; 325 MG/1; MG/1
1 TABLET ORAL EVERY 6 HOURS PRN
Status: CANCELLED | OUTPATIENT
Start: 2020-02-07

## 2020-02-07 RX ORDER — PANTOPRAZOLE SODIUM 40 MG/10ML
40 INJECTION, POWDER, LYOPHILIZED, FOR SOLUTION INTRAVENOUS DAILY
Status: CANCELLED | OUTPATIENT
Start: 2020-02-08

## 2020-02-07 RX ORDER — ACETAMINOPHEN 325 MG/1
650 TABLET ORAL
Status: CANCELLED | OUTPATIENT
Start: 2020-02-07

## 2020-02-07 RX ORDER — CYPROHEPTADINE HYDROCHLORIDE 4 MG/1
4 TABLET ORAL 3 TIMES DAILY PRN
Status: CANCELLED | OUTPATIENT
Start: 2020-02-07

## 2020-02-07 RX ORDER — DIPHENHYDRAMINE HCL 25 MG
25 CAPSULE ORAL EVERY 6 HOURS PRN
Status: CANCELLED | OUTPATIENT
Start: 2020-02-07

## 2020-02-07 RX ORDER — ONDANSETRON 2 MG/ML
4 INJECTION INTRAMUSCULAR; INTRAVENOUS EVERY 6 HOURS PRN
Status: CANCELLED | OUTPATIENT
Start: 2020-02-07

## 2020-02-07 RX ADMIN — ESCITALOPRAM OXALATE 20 MG: 10 TABLET ORAL at 09:02

## 2020-02-07 RX ADMIN — MONTELUKAST SODIUM 10 MG: 10 TABLET, COATED ORAL at 09:02

## 2020-02-07 RX ADMIN — IPRATROPIUM BROMIDE AND ALBUTEROL SULFATE 3 ML: .5; 3 SOLUTION RESPIRATORY (INHALATION) at 07:02

## 2020-02-07 RX ADMIN — OXYBUTYNIN CHLORIDE 5 MG: 5 TABLET ORAL at 09:02

## 2020-02-07 RX ADMIN — PANTOPRAZOLE SODIUM 40 MG: 40 INJECTION, POWDER, LYOPHILIZED, FOR SOLUTION INTRAVENOUS at 09:02

## 2020-02-07 RX ADMIN — BUSPIRONE HYDROCHLORIDE 5 MG: 5 TABLET ORAL at 09:02

## 2020-02-07 RX ADMIN — SODIUM CHLORIDE 1000 ML: 0.9 INJECTION, SOLUTION INTRAVENOUS at 01:02

## 2020-02-07 RX ADMIN — GABAPENTIN 600 MG: 300 CAPSULE ORAL at 09:02

## 2020-02-07 RX ADMIN — PIPERACILLIN AND TAZOBACTAM 3.38 G: 3; .375 INJECTION, POWDER, LYOPHILIZED, FOR SOLUTION INTRAVENOUS; PARENTERAL at 06:02

## 2020-02-07 RX ADMIN — OXYCODONE AND ACETAMINOPHEN 1 TABLET: 5; 325 TABLET ORAL at 01:02

## 2020-02-07 RX ADMIN — VANCOMYCIN HYDROCHLORIDE 500 MG: 500 INJECTION, POWDER, LYOPHILIZED, FOR SOLUTION INTRAVENOUS at 12:02

## 2020-02-07 RX ADMIN — POTASSIUM CHLORIDE 20 MEQ: 1500 TABLET, EXTENDED RELEASE ORAL at 09:02

## 2020-02-07 RX ADMIN — IPRATROPIUM BROMIDE AND ALBUTEROL SULFATE 3 ML: .5; 3 SOLUTION RESPIRATORY (INHALATION) at 12:02

## 2020-02-07 NOTE — NURSING
Report given to Nurse at [facility] for transfer for LTAC, continue to await [facility] for patient transfer, patient and spouse aware of plan

## 2020-02-07 NOTE — NURSING
COMPLETE BED BATH GIVEN AND COMPLETE LINENS CHANGED  WITH PATIENTS GOWN CHANGED AT THIS TIME, PATIENT TOLERATED WELL.

## 2020-02-07 NOTE — DISCHARGE SUMMARY
[facility] - ICU Hospital Medicine  Discharge Summary      Patient Name: Obdulia Yepez  MRN: [id number]  Admission Date: 2/3/2020  Hospital Length of Stay: 4 days  Discharge Date and Time:  02/07/2020 12:44 PM  Attending Physician: Jimenez Patel MD   Discharging Provider: Jimenez Patel MD  Primary Care Provider: Og Perez MD      HPI:   Patient is a 69-year-old female that was recently admitted here for mental status change.  Patient found have a urinary tract infection and alcohol withdrawal.  Patient was discharged to LTAC on 01/29/2020.  Patient was stable over there and was found to have urinary tract infection and was treated initially with Rocephin.  That was discontinued and patient was started on Zosyn and vancomycin whenever CT scan of the abdomen was done due to abdominal pain on 01/31.  This CT scan showed a pneumoperitoneum with what appeared to be free air in the right upper quadrant.  No obvious source was noted this also was seen on plain film flat and upright of the abdomen.  Patient has continued to complain of pain and is tender in the right upper quadrant.  Patient was transferred back here for surgical attention to this problem.  Patient otherwise has a past medical history significant for arthritis, hypertension, questionable adrenal insufficiency, urinary tract infection, alcohol abuse, asthma, and gouty arthropathy.  Patient also has a long history of depression as well.  Patient is in no acute distress at this time.  Abdomen showing bowel sounds noted but there is tenderness in the right upper quadrant to the epigastric area.    * No surgery found *      Hospital Course:   02/04/2020:  Patient is alert intermittently confused and in no acute distress. Patient still complain of some abdominal pain. Otherwise labs show a sodium 135 potassium 2.7 chloride 116 bicarb 17 and liver function tests within normal ranges.  Lipase was 30 and normal white blood cell count  10.1 hemoglobin 9.1 hematocrit 26 platelets 152 differential 84 granulocytes 8 lymphocytes. Vs:BP (!) 139/101   Pulse 85   Temp 98.8 °F (37.1 °C)   Resp 18   Ht 5' (1.524 m)   Wt 48 kg (105 lb 13.1 oz)   LMP  (LMP Unknown)   SpO2 99%   Breastfeeding? No   BMI 20.67 kg/m²     CT scan of the abdomen with contrast reveals a the tubular structure in the left abdomen that is of uncertain etiology.  Please see radiology report.    CT Abd Pelvis:   Impression       1. Interval development of moderate bilateral pleural effusions with bibasilar dependent atelectasis.  2. Chronic small hepatic cysts.  3. Previous cholecystectomy with persistent moderate intra and extrahepatic biliary ductal dilatation.  4. Perihepatic ascites.  5. Mild bilateral hydronephrosis which has slightly decompressed over the interval.  6. Mild rectal impaction which has decompressed over the interval.  7. Interval development of an elongated fluid dilated structure of the left abdomen which is of uncertain etiology.  Differential diagnosis includes a focally dilated loop of small bowel.  However, this is considered unlikely as no oral contrast is present.  Differential diagnosis includes seroma versus developing intra-abdominal abscess.  Correlate clinically with possible fever and/or elevated white count.  Differential diagnosis also includes possible unusual lymphocele or mesenteric cyst.  8. Prior hysterectomy.  9. Arias catheter placement with circumferential bladder wall thickening.  This may represent a cystitis.  Correlate clinically with UA.  10. Bone demineralization.  11. Suspected anasarca.  The preliminary and final reports are concordant.      Electronically signed by: Vaughn Galarza  Date: 02/04/2020  Time: 10:20            Last Resulted: 02/04/20 10:20   Order Details View Encounter Lab and Collection Details Routing Result History            External Result Report     External Result Report   Narrative     EXAMINATION:  CT  ABDOMEN PELVIS WITH CONTRAST    CLINICAL HISTORY:  Abd pain, fever, abscess suspected;        02/05/2020:  Patient is alert and more verbal today than previous days.  Patient is alert to person and place but not time.  CT scan of the pelvis with cystogram today revealed that there is a bladder perforation with extravasation of contrast material.  This was noted by report as a 1.5 cm defect within the dome of the brow bladder consistent with a bladder tear.  Contrast material was extravasated into the peritoneal cavity.  No free intraperitoneal air Arias was in place.  Vital signs are stable with normal blood pressure no fever chills nausea vomiting and and O2 saturations are in the % saturation range.  Blood pressures are normal    02/06/2020:  Patient is stable this morning having no shortness of breath and still having some tenderness in the abdomen.  Urine is pink tends to and flowing well.  Labs:  Sodium 138 chloride 116 bicarb 18 glucose 66 and BUN creatinine were 9 and 0.7.  CBC white blood cell count 10.9 hemoglobin 9.4 hematocrit 27.6 platelets 195 and differential 85 granulocytes.  Clostridium difficile evaluation last evening showed positive antigen but negative toxin.    02/07/2020:  Patient is stable and more alert and oriented this morning.  MRI yesterday was showing no acute abnormality.  Labs today revealed no significant abnormality.  After discussion fully with Dr. Bacon, urologist, this patient will have no intervention for at least 4 weeks.  We will keep Arias in and treat conservatively over the next 4 weeks.  Patient will see Dr. Bacon and consult and will decide at that time what is the best treatment course.  White blood cell count 8.9 hemoglobin 9.1 hematocrit 26.5 and platelets 204 differential 79 granulocytes 10 lymphocytes.  Patient will be sent back to [facility] today for continued convalescence care.  Prior to discharge patient will have a PICC line placed for total parental  nutrition.     Consults:   Consults (From admission, onward)        Status Ordering Provider     Inpatient consult to General Surgery  Once     Provider:  Dale Marquez MD    Completed MARCIA THORNE     Inpatient consult to PICC team ([facility])  Once     Provider:  (Not yet assigned)    Acknowledged MARCIA THORNE     Inpatient consult to Urology  Once     Provider:  Trip Bacon MD    Completed MARCIA THORNE     IP consult to case management  Once     Provider:  (Not yet assigned)    Acknowledged MARCIA THORNE     Pharmacy to dose Vancomycin consult  Once     Provider:  (Not yet assigned)    Acknowledged MARCIA THORNE          * Perforation of bladder  02/05/2020:  · Continue current treatment with Arias catheter  · Expectant management  · Urology consult:  Full discussion with Dr. Bacon, urologist this morning  · CT scan with cystogram today revealed bladder tear approximately 1.5 cm  · Patient stable this time and monitor closely.    02/06/2020  · Continue Arias catheter.  · Expectant management  · Urology consult:  Discussed this case in full with Dr. Bacon and will continue expectant management with Arias catheter in place and make other decisions subsequently.    02/07/2020:  Discharge patient to LTAC when bed available  Continue same medications  Continue Arias as a conservative measure treatment for bladder perforation.  Continue TPN.        Final Active Diagnoses:    Diagnosis Date Noted POA    PRINCIPAL PROBLEM:  Perforation of bladder [N32.89] 02/05/2020 Unknown    Pneumomediastinum [J98.2] 02/03/2020 Yes    Pneumoperitoneum [K66.8] 02/03/2020 No      Problems Resolved During this Admission:       Discharged Condition: stable    Disposition:     Follow Up:    Patient Instructions:   No discharge procedures on file.    Significant Diagnostic Studies: Labs: All labs within the past 24 hours have been reviewed    Pending Diagnostic Studies:     None          Medications:  Reconciled Home Medications:      Medication List      ASK your doctor about these medications    allopurinoL 300 MG tablet  Commonly known as:  ZYLOPRIM  TAKE 1 TABLET BY MOUTH DAILY     budesonide-formoterol 160-4.5 mcg 160-4.5 mcg/actuation Hfaa  Commonly known as:  SYMBICORT  Symbicort 160 mcg-4.5 mcg/actuation HFA aerosol inhaler   Inhale 2 puffs twice a day by inhalation route.     busPIRone 5 MG Tab  Commonly known as:  BUSPAR  Take 1 tablet (5 mg total) by mouth 2 (two) times daily.     colestipoL 1 gram Tab  Commonly known as:  COLESTID  Take 1 tablet (1 g total) by mouth 2 (two) times daily.     cyclobenzaprine 10 MG tablet  Commonly known as:  FLEXERIL  Take 1 tablet (10 mg total) by mouth 3 (three) times daily as needed.     cyproheptadine 4 mg tablet  Commonly known as:  PERIACTIN  TAKE 1 TABLET (4 MG TOTAL) BY MOUTH 3 (THREE) TIMES DAILY AS NEEDED.     escitalopram oxalate 20 MG tablet  Commonly known as:  LEXAPRO  Take 20 mg by mouth once daily.     fluticasone propionate 50 mcg/actuation nasal spray  Commonly known as:  FLONASE  fluticasone 50 mcg/actuation nasal spray,suspension   Inhale 1 spray every day by intranasal route.     gabapentin 300 MG capsule  Commonly known as:  NEURONTIN  Take 3 capsules (900 mg total) by mouth 3 (three) times daily.     hydrocortisone 10 MG Tab  Commonly known as:  CORTEF  TAKE 2 TABLETS BY MOUTH IN THE MORNING AND 1 TABLET IN THE EVENING     ipratropium 0.03 % nasal spray  Commonly known as:  ATROVENT  2 sprays by Nasal route 3 (three) times daily.     lubiprostone 24 MCG Cap  Commonly known as:  AMITIZA  Take 24 mcg by mouth 2 (two) times daily with meals.     montelukast 10 mg tablet  Commonly known as:  SINGULAIR  TAKE 1 TABLET(S) EVERY DAY BY ORAL ROUTE.     multivitamin per tablet  Commonly known as:  THERAGRAN  Take 1 tablet by mouth once daily.     mupirocin 2 % ointment  Commonly known as:  BACTROBAN  mupirocin 2 % topical ointment   APPLY A  SMALL AMOUNT TO THE AFFECTED AREA BY TOPICAL ROUTE 2 TIMES PER DAY     ondansetron 4 MG tablet  Commonly known as:  ZOFRAN  Take 1 tablet (4 mg total) by mouth every 8 (eight) hours as needed for Nausea.     oxybutynin 5 MG Tr24  Commonly known as:  DITROPAN-XL  oxybutynin chloride ER 5 mg tablet,extended release 24 hr   TAKE 1 TABLET BY MOUTH EVERY DAY     oxyCODONE-acetaminophen  mg per tablet  Commonly known as:  PERCOCET  Take 1 tablet by mouth every 4 (four) hours as needed for Pain.     pantoprazole 40 MG tablet  Commonly known as:  PROTONIX  Take 1 tablet (40 mg total) by mouth once daily.     polymyxin B sulf-trimethoprim 10,000 unit- 1 mg/mL Drop  Commonly known as:  POLYTRIM  Place 1 drop into the right eye every 4 (four) hours.     vitamin E 1000 UNIT capsule  Take 1,000 Units by mouth once daily.            Indwelling Lines/Drains at time of discharge:   Lines/Drains/Airways     Drain                 Urethral Catheter 01/26/20 1644 Non-latex 16 Fr. 11 days          Pressure Ulcer                 Pressure Injury 01/26/20 1445 Right upper Coccyx Stage 2 11 days                Time spent on the discharge of patient: 45 minutes  Patient was seen and examined on the date of discharge and determined to be suitable for discharge.         Jimenez Patel MD  Department of Hospital Medicine  [facility]

## 2020-02-07 NOTE — PT/OT/SLP PROGRESS
Physical Therapy         Treatment        Obdulia Yepez 1950  MRN: [id number]     Date: 2/7/2020      Therapeutic Exercise x 24 min    Treatment Diagnosis: generalized deconditioning/weakness    General Precautions: Standard, special contact  Orthopedic Precautions: none   Braces: no      Previous Level of Function:       Subjective:  Communicated with patient and RN prior to session. RN states she continues to present as failure to thrive however is more alert today. Patient agreeable to participate with PT today    Chief Complaint: weakness         Treatment:    Exercise:    Static sitting: x 4 min without assistance for core stability.  LAQ: x 10  Ankle pumps: x 10  SLR: w min assistance x 10  Bridges: x 10  Isometric hip add: w pillow x 10    Bed Mobility :   Supine to sit: Maximum Assistance   Sit to supine: Maximum Assistance   Rolling: Moderate Assistance   Scooting: Moderate Assistance    Transfers:  Unable: not willing    Balance:   Static Sit: FAIR: Maintains without assist, but unable to take any challenges   Dynamic Sit:  FAIR: Cannot move trunk without losing balance  Static Stand: unable to assess   Dynamic stand:unable to assess    Assessment:    Obdulia Yepez is a 69 y.o. female admitted with a medical diagnosis of Pneumoperitoneum.  She presents with the following impairments/functional limitations:  weakness, impaired endurance, impaired self care skills, impaired functional mobilty, pain, decreased ROM, impaired coordination, impaired fine motor, edema, impaired skin, impaired muscle length, orthopedic precautions, impaired joint extensibility, impaired balance. Activity tolerance shows great progress today however continues to require motivation. Tolerates sitting fairly well however core musculature fatigues quickly. Continue to work on bed mobility.     Pain Scale: 0/10     Pain Location: none reported    Cognitive Exam:  Oriented to: Person, Place, Time and Situation  Follows  Commands/attention: Follows one-step commands  Communication: clear/fluent  Safety awareness/insight to disability: intact    Patient left supine with all lines intact, call button in reach, bed alarm on and nursing notified.    Rehab potential is good.    Activity tolerance: Fair    Equipment recommendations:   TBD    Education:  Importance of exercises and bed mobility      GOALS:   Patient goals: refer to eval  Family goals: refer to eval    Plan:   continue with current plan      Discharge recommendations:   LTAC      Michelet Susan, PTA

## 2020-02-07 NOTE — ASSESSMENT & PLAN NOTE
02/05/2020:  · Continue current treatment with Arias catheter  · Expectant management  · Urology consult:  Full discussion with Dr. Bacon, urologist this morning  · CT scan with cystogram today revealed bladder tear approximately 1.5 cm  · Patient stable this time and monitor closely.    02/06/2020  · Continue Arias catheter.  · Expectant management  · Urology consult:  Discussed this case in full with Dr. Bacon and will continue expectant management with Arias catheter in place and make other decisions subsequently.    02/07/2020:  Discharge patient to LTAC when bed available  Continue same medications  Continue Arias as a conservative measure treatment for bladder perforation.  Continue TPN.

## 2020-02-07 NOTE — NURSING
PATIENT RESTING QUIETLY IN BED SR UP  X2, C/L IN REACH, TURNED AND REPOSITIONED IN  BED TO THE LEFT SIDE, SUPPORTED WITH PILLOWS.

## 2020-02-07 NOTE — NURSING
PATIENT LYING IN SUPINE POSITION IN BED AWAKE WITH PERIODS OF CONFUSION NOTED, HOB ELEVATED 30 DEGREES, LUNG MILIAN ARE CLEAR IN ALL LOBES UPON AUSCULTATION, O2 ROOM %, BOWEL SOUNDS PRESENT IN ALL 4 QUADRANTS, MONSALVE CATHETER PATENT AND DRAINING LIGHT RED COLORED URINE INTO THE DRAINAGE BAG, IV INFUSING WELL INTO THE RIGHT UPPER ARM  #20 JELCO INTACT, NS AT 150CC/HR, SITE CLEAR OF REDNESS AND SWELLING, HEEL PROTECTORS IN PLACE TO BOTH FEET,  HEART RATE 92 SINUS RHYTHM NOTED, DRESSING DRY AND INTACT TO THE COCCYX AREA, BED IN LOW POSITION, DOOR OPEN, SR UP X 2, C/L IN REACH, NO DISTRESS NOTED.

## 2020-02-07 NOTE — PLAN OF CARE
02/07/20 1239   Final Note   Assessment Type Final Discharge Note   Anticipated Discharge Disposition LTAC   What phone number can be called within the next 1-3 days to see how you are doing after discharge? [phone]   MD IS READY FOR PT TO TRANSFER BACK TO [facility]. [facility] IS READY TO ACCEPT PT. PICC IS NOT NECESSARY ACCORDING TO ASSESSMENT RN BECAUSE DR CHUNG , SINCE PT IS MORE ALERT NOW,  WANTS TO HOLD OFF ON THE PICC.  TRANSPORTATION IS BEING SET UP WITH [facility]. PT IS DISCHARGED TO [facility] FOR CONTINUING LONG TERM ACUTE CARE. NO OTHER DISCHARGE NEEDS IDENTIFIED AT THIS TIME.

## 2020-02-10 NOTE — PHYSICIAN QUERY
PT Name: Obdulia Yepez  MR #: [id number]     PHYSICIAN QUERY -  ELECTROLYTE CLARIFICATION      CDS/: Luz Elena Vargas               Contact information:  [email or web address]  This form is a permanent document in the medical record.     Query Date: February 10, 2020    By submitting this query, we are merely seeking further clarification of documentation to reflect the severity of illness of your patient. Please utilize your independent clinical judgment when addressing the question(s) below.    The Medical record reflects the following:     Indicators   Supporting Clinical Findings Location in Medical Record   x Lab Value(s) Na: 135; 135; 129 Labs 2/3-2/5   x Treatment                                 Medication 0.9%  NaCl infusion MAR 2/3-2/7   x Other Patient otherwise has a past medical history significant for arthritis, hypertension, questionable adrenal insufficiency DC summary 2/7     Provider, please specify the diagnosis or diagnoses that correspond(s) to the above indicators. Destin all that apply:    [  x ] Hyponatremia   [   ] Possible Adrenal insufficiency   [   ] Other electrolyte disturbance (please specify): _______   [   ]  Clinically Undetermined       Please document in your progress notes daily for the duration of treatment until resolved, and include in your discharge summary.

## 2020-02-10 NOTE — PHYSICIAN QUERY
PT Name: Obdulia Yepez  MR #: [id number]    Physician Query Form - Relationship to Procedure Clarification     CDS/: Luz Elena Vargas               Contact information:  [email or web address]    This form is a permanent document in the medical record.     Query Date: February 10, 2020      By submitting this query, we are merely seeking further clarification of documentation. Please utilize your independent clinical judgment when addressing the question(s) below.    The Medical record contains the following:  Supporting Clinical Findings Location in Medical Record   Mrs. Yepez is a 69-year-old female well known to me.  The patient was treated for recurrent urinary tract infections.  In November 2019 she underwent a cystoscopy with retrograde pyelograms.  The only finding during that episode was a chronic cystitis.  In January of this year she was admitted to the hospital for evaluation of urinary tract infection and eventually she was discharged to [facility].  She developed abdominal pain after the a.m. admission to the facility a CT scan was performed shows free air come back to the hospital on during this admission the patient has been found to have small bladder tear in the dome of the bladder. The patient had a minimal area contrast is spilling into the peritoneum.        02/05/2020:  Patient is alert and more verbal today than previous days.  Patient is alert to person and place but not time.  CT scan of the pelvis with cystogram today revealed that there is a bladder perforation with extravasation of contrast material.  This was noted by report as a 1.5 cm defect within the dome of the brow bladder consistent with a bladder tear.  Contrast material was extravasated into the peritoneal cavity.  No free intraperitoneal air Arias was in place.    PRINCIPAL PROBLEM:   Perforation of bladder     Pneumomediastinum   Pneumoperitoneum  Urology consult note 2/6                        DC summary 2/7       Please clarify if  Perforation of bladder is      [x  ] Inherent/Integral to cystoscopy with retrograde pyelogram   [  ] Present, but not a complication of the cystoscopy with retrograde pyelogram   [  ] Incidental finding, not clinically significant   [  ] Complication of cystoscopy with retrograde pyelogram   [  ] Other (please specify): __________________   [  ] Clinically Undetermined

## 2020-02-10 NOTE — PHYSICIAN QUERY
PT Name: Obdulia Yepez  MR #: [id number]     PHYSICIAN QUERY -  ELECTROLYTE CLARIFICATION      CDS/: Luz Elena Vargas               Contact information:  [email or web address]  This form is a permanent document in the medical record.     Query Date: February 10, 2020    By submitting this query, we are merely seeking further clarification of documentation to reflect the severity of illness of your patient. Please utilize your independent clinical judgment when addressing the question(s) below.    The Medical record reflects the following:     Indicators   Supporting Clinical Findings Location in Medical Record   x Lab Value(s) K: 2.1; 2.7 Labs 2/3-2/4   x Treatment                                 Medication potassium chloride 10 mEq in 100 mL IVPB MAR 2/3-2/7    Other       Provider, please specify the diagnosis or diagnoses that correspond(s) to the above indicators. Destin all that apply:    [ x  ] Hypokalemia   [   ] Other electrolyte disturbance (please specify): _______   [   ]  Clinically Undetermined       Please document in your progress notes daily for the duration of treatment until resolved, and include in your discharge summary.

## 2020-02-10 NOTE — PHYSICIAN QUERY
PT Name: Obdulia Yepez  MR #: [id number]    Physician Query Form - Nutrition Clarification     CDS/: Luz Elena Vargas               Contact information:  [email or web address]    This form is a permanent document in the medical record.     Query Date: February 10, 2020    By submitting this query, we are merely seeking further clarification of documentation.. Please utilize your independent clinical judgment when addressing the question(s) below.    The Medical record contains the following:   Indicators  Supporting Clinical Findings Location in Medical Record   x % of Estimated Energy Intake over a time frame from p.o., TF, or TPN Patient will be sent back to [facility] today for continued convalescence care.  Prior to discharge patient will have a PICC line placed for total parental nutrition.  DC summary 2/7    Weight Status over a time frame      Subcutaneous Fat and/or Muscle Loss      Fluid Accumulation or Edema      Reduced  Strength     x Wt / BMI / Usual Body Weight BMI 20.67 kg/m²    DC summary 2/7   x Delayed Wound Healing / Failure to Thrive Constitutional: She is oriented to person, place, and time. She appears well-developed. She appears cachectic. No distress General Surgery consult note 2/4   x Acute or Chronic Illness Patient is a 69-year-old female that was recently admitted here for mental status change.  Patient found have a urinary tract infection and alcohol withdrawal.  Patient was [facility] to [facility] on 01/29/2020.  Patient was stable over there and was found to have urinary tract infection and was treated initially with Rocephin.    PRINCIPAL PROBLEM:   Perforation of bladder     Pneumomediastinum   Pneumoperitoneum  DC summary 2/7   x Medication cyproheptadine 4 mg tablet  Commonly known as:  PERIACTIN  TAKE 1 TABLET (4 MG TOTAL) BY MOUTH 3 (THREE) TIMES DAILY AS NEEDED DC summary 2/7    Treatment     x Other Constitutional: Positive for fatigue  Neurological: Positive for dizziness and  weakness.  Psychiatric/Behavioral: Positive for confusion. The patient is nervous/anxious.      This patient has significant comorbidities include and chronic alcoholism.  Malnourished.  Cardiac conditions PN 2/4          Urology consult note 2/6     AND / ABRAHAM Clinical Characteristics (October 2011)  A minimum of two characteristics is recommended for diagnosing either moderate or severe malnutrition   Mild Malnutrition Moderate Malnutrition Severe Malnutrition   Energy Intake from p.o., TF or TPN. < 75% intake of estimated energy needs for less than 7 days < 75% intake of estimated energy needs for greater than 7 days < 50% intake of estimated energy needs for > 5 days   Weight Loss 1-2% in 1 month  5% in 3 months  7.5% in 6 months  10% in 1 year 1-2 % in 1 week  5% in 1 month  7.5% in 3 months  10% in 6 months  20% in 1 year > 2% in 1 week  > 5% in 1 month  > 7.5% in 3 months  > 10% in 6 months  > 20% in 1 year   Physical Findings     None *Mild subcutaneous fat and/or muscle loss  *Mild fluid accumulation  *Stage II decubitus  *Surgical wound or non-healing wound *Mod/severe subcutaneous fat and/or muscle loss  *Mod/severe fluid accumulation  *Stage III or IV decubitus  *Non-healing surgical wound     Provider, please specify diagnosis or diagnoses associated with above clinical findings.    [  ] Mild Protein-Calorie Malnutrition   [ x ] Moderate Protein-Calorie Malnutrition   [  ] Severe Protein-Calorie Malnutrition   [  ] Cachexia   [  ] Underweight   [  ] Other Nutritional Diagnosis (please specify):    [  ] Other:    [  ] Clinically Undetermined       Please document in your progress notes daily for the duration of treatment until resolved and include in your discharge summary.

## 2020-02-12 ENCOUNTER — PATIENT OUTREACH (OUTPATIENT)
Dept: ADMINISTRATIVE | Facility: OTHER | Age: 70
End: 2020-02-12

## 2020-02-13 DIAGNOSIS — M25.571 RIGHT ANKLE PAIN, UNSPECIFIED CHRONICITY: Primary | ICD-10-CM

## 2020-02-19 ENCOUNTER — PATIENT OUTREACH (OUTPATIENT)
Dept: ADMINISTRATIVE | Facility: HOSPITAL | Age: 70
End: 2020-02-19

## 2020-03-02 PROCEDURE — G0180 PR HOME HEALTH MD CERTIFICATION: ICD-10-PCS | Mod: ,,, | Performed by: FAMILY MEDICINE

## 2020-03-02 PROCEDURE — G0180 MD CERTIFICATION HHA PATIENT: HCPCS | Mod: ,,, | Performed by: FAMILY MEDICINE

## 2020-03-04 ENCOUNTER — OFFICE VISIT (OUTPATIENT)
Dept: FAMILY MEDICINE | Facility: CLINIC | Age: 70
End: 2020-03-04
Payer: MEDICARE

## 2020-03-04 VITALS
WEIGHT: 83.88 LBS | BODY MASS INDEX: 16.47 KG/M2 | SYSTOLIC BLOOD PRESSURE: 131 MMHG | HEART RATE: 105 BPM | DIASTOLIC BLOOD PRESSURE: 89 MMHG | RESPIRATION RATE: 18 BRPM | HEIGHT: 60 IN

## 2020-03-04 DIAGNOSIS — E87.1 HYPONATREMIA: Primary | ICD-10-CM

## 2020-03-04 DIAGNOSIS — R06.02 SOB (SHORTNESS OF BREATH): ICD-10-CM

## 2020-03-04 DIAGNOSIS — I10 ESSENTIAL HYPERTENSION: ICD-10-CM

## 2020-03-04 PROCEDURE — 99999 PR PBB SHADOW E&M-EST. PATIENT-LVL III: ICD-10-PCS | Mod: PBBFAC,,, | Performed by: FAMILY MEDICINE

## 2020-03-04 PROCEDURE — 99214 OFFICE O/P EST MOD 30 MIN: CPT | Mod: S$PBB,,, | Performed by: FAMILY MEDICINE

## 2020-03-04 PROCEDURE — 99214 PR OFFICE/OUTPT VISIT, EST, LEVL IV, 30-39 MIN: ICD-10-PCS | Mod: S$PBB,,, | Performed by: FAMILY MEDICINE

## 2020-03-04 PROCEDURE — 99213 OFFICE O/P EST LOW 20 MIN: CPT | Mod: PBBFAC,PN | Performed by: FAMILY MEDICINE

## 2020-03-04 PROCEDURE — 99999 PR PBB SHADOW E&M-EST. PATIENT-LVL III: CPT | Mod: PBBFAC,,, | Performed by: FAMILY MEDICINE

## 2020-03-04 RX ORDER — TRAMADOL HYDROCHLORIDE 50 MG/1
TABLET ORAL
COMMUNITY
Start: 2020-02-28

## 2020-03-04 RX ORDER — MIRTAZAPINE 30 MG/1
30 TABLET, FILM COATED ORAL
COMMUNITY
Start: 2020-02-27

## 2020-03-05 ENCOUNTER — HOSPITAL ENCOUNTER (OUTPATIENT)
Dept: RADIOLOGY | Facility: HOSPITAL | Age: 70
Discharge: HOME | End: 2020-03-05
Attending: FAMILY MEDICINE
Payer: MEDICARE

## 2020-03-05 ENCOUNTER — OFFICE VISIT (OUTPATIENT)
Dept: UROLOGY | Facility: CLINIC | Age: 70
End: 2020-03-05
Payer: MEDICARE

## 2020-03-05 VITALS
DIASTOLIC BLOOD PRESSURE: 81 MMHG | HEIGHT: 60 IN | WEIGHT: 83 LBS | SYSTOLIC BLOOD PRESSURE: 130 MMHG | HEART RATE: 82 BPM | BODY MASS INDEX: 16.3 KG/M2 | RESPIRATION RATE: 13 BRPM | TEMPERATURE: 97 F

## 2020-03-05 DIAGNOSIS — N39.0 RECURRENT UTI: Primary | ICD-10-CM

## 2020-03-05 DIAGNOSIS — R06.02 SOB (SHORTNESS OF BREATH): ICD-10-CM

## 2020-03-05 PROCEDURE — 99999 PR PBB SHADOW E&M-EST. PATIENT-LVL III: CPT | Mod: PBBFAC,,, | Performed by: UROLOGY

## 2020-03-05 PROCEDURE — 71046 X-RAY EXAM CHEST 2 VIEWS: CPT | Mod: TC,PN

## 2020-03-05 PROCEDURE — 99212 PR OFFICE/OUTPT VISIT, EST, LEVL II, 10-19 MIN: ICD-10-PCS | Mod: S$PBB,,, | Performed by: UROLOGY

## 2020-03-05 PROCEDURE — 71046 X-RAY EXAM CHEST 2 VIEWS: CPT | Mod: 26,,, | Performed by: RADIOLOGY

## 2020-03-05 PROCEDURE — 99212 OFFICE O/P EST SF 10 MIN: CPT | Mod: S$PBB,,, | Performed by: UROLOGY

## 2020-03-05 PROCEDURE — 99999 PR PBB SHADOW E&M-EST. PATIENT-LVL III: ICD-10-PCS | Mod: PBBFAC,,, | Performed by: UROLOGY

## 2020-03-05 PROCEDURE — 71046 XR CHEST PA AND LATERAL: ICD-10-PCS | Mod: 26,,, | Performed by: RADIOLOGY

## 2020-03-05 PROCEDURE — 99213 OFFICE O/P EST LOW 20 MIN: CPT | Mod: PBBFAC,25,PN | Performed by: UROLOGY

## 2020-03-05 RX ORDER — NITROFURANTOIN 25; 75 MG/1; MG/1
100 CAPSULE ORAL 2 TIMES DAILY
Qty: 14 CAPSULE | Refills: 0 | Status: SHIPPED | OUTPATIENT
Start: 2020-03-05 | End: 2020-03-12

## 2020-03-05 NOTE — PROGRESS NOTES
Mrs. Yepez is a 69-year-old female who was found to have a small bladder perforation most likely secondary to catheterization November 2019.  The patient has been wearing a Arias catheter for spontaneous closure of that small perforation.  The Arias catheter is well tolerated have no problems with it.  He is also to be noted the patient have a long history of recurrent urinary tract infections and is requesting information what can be done to prevent these infections.  The patient have multiple allergies and multiple medical problems with a a.m. very low tolerance to medications and I explained to her that most likely will need to treat the infections as they arrive.  I feel that she would not tolerate 6 chronic suppression due to intestinal issues with antibiotics.    I suggested today that the we will start her on nitrofurantoin 100 mg p.o. b.i.d. tomorrow on have the catheter removed 4 days later.  Home health has been notified of the need of removal of the Arias catheter.  She is going to return to the clinic in approximately 4 weeks.  A prescription for nitrofurantoin was given for 7 days.  She is going to return to the clinic if she develops any other symptoms.  All the questions were answered to her and her  satisfaction the left the office in satisfactory condition.

## 2020-03-05 NOTE — PROGRESS NOTES
Spoke to [facility] employee,  Katina Kim via telephone.  She will inform the home health nurse the catheter needs to be removed Monday morning.

## 2020-03-06 ENCOUNTER — TELEPHONE (OUTPATIENT)
Dept: FAMILY MEDICINE | Facility: CLINIC | Age: 70
End: 2020-03-06

## 2020-03-06 ENCOUNTER — PATIENT MESSAGE (OUTPATIENT)
Dept: FAMILY MEDICINE | Facility: CLINIC | Age: 70
End: 2020-03-06

## 2020-03-06 NOTE — TELEPHONE ENCOUNTER
VO given for OT weekly.  Voiced understanding. No other concerns at this time.        ----- Message from Mayte Lu sent at 3/6/2020 11:39 AM CST -----  Contact: Christiana bailey at [facility] in home health  Type: Needs Medical Advice    Who Called:Christiana Lebron Call Back Number: [phone]  Additional Information: Christiana is stating that she evaluated the patient for Occupational therapy yesterday and recommends once a week visit.Please call back and advise.

## 2020-03-10 ENCOUNTER — TELEPHONE (OUTPATIENT)
Dept: HOME HEALTH SERVICES | Facility: HOSPITAL | Age: 70
End: 2020-03-10

## 2020-03-18 DIAGNOSIS — F41.1 GAD (GENERALIZED ANXIETY DISORDER): ICD-10-CM

## 2020-03-18 RX ORDER — BUSPIRONE HYDROCHLORIDE 5 MG/1
5 TABLET ORAL 2 TIMES DAILY
Qty: 60 TABLET | Refills: 11 | Status: SHIPPED | OUTPATIENT
Start: 2020-03-18 | End: 2021-03-18

## 2020-03-18 RX ORDER — OXYCODONE AND ACETAMINOPHEN 10; 325 MG/1; MG/1
1 TABLET ORAL EVERY 8 HOURS PRN
Qty: 90 TABLET | Refills: 0 | Status: SHIPPED | OUTPATIENT
Start: 2020-03-18

## 2020-03-24 ENCOUNTER — EXTERNAL HOME HEALTH (OUTPATIENT)
Dept: HOME HEALTH SERVICES | Facility: HOSPITAL | Age: 70
End: 2020-03-24
Payer: MEDICARE

## 2020-03-26 ENCOUNTER — TELEPHONE (OUTPATIENT)
Dept: FAMILY MEDICINE | Facility: CLINIC | Age: 70
End: 2020-03-26

## 2020-03-26 NOTE — TELEPHONE ENCOUNTER
Patient is requesting a prescription for a UTI. She said her urologist suggested a long term antibiotic for her UTI's.     Thank you

## 2020-03-27 RX ORDER — NITROFURANTOIN (MACROCRYSTALS) 100 MG/1
100 CAPSULE ORAL NIGHTLY
Qty: 30 CAPSULE | Refills: 2 | Status: SHIPPED | OUTPATIENT
Start: 2020-03-27 | End: 2020-04-01 | Stop reason: SDUPTHER

## 2020-04-01 ENCOUNTER — OFFICE VISIT (OUTPATIENT)
Dept: FAMILY MEDICINE | Facility: CLINIC | Age: 70
End: 2020-04-01
Payer: MEDICARE

## 2020-04-01 DIAGNOSIS — E87.1 HYPONATREMIA: Primary | ICD-10-CM

## 2020-04-01 DIAGNOSIS — R30.0 DYSURIA: ICD-10-CM

## 2020-04-01 PROBLEM — A41.51 SEPSIS DUE TO ESCHERICHIA COLI (E. COLI): Status: RESOLVED | Noted: 2020-01-26 | Resolved: 2020-04-01

## 2020-04-01 PROCEDURE — 99214 PR OFFICE/OUTPT VISIT, EST, LEVL IV, 30-39 MIN: ICD-10-PCS | Mod: 95,,, | Performed by: FAMILY MEDICINE

## 2020-04-01 PROCEDURE — 99214 OFFICE O/P EST MOD 30 MIN: CPT | Mod: 95,,, | Performed by: FAMILY MEDICINE

## 2020-04-01 RX ORDER — NITROFURANTOIN (MACROCRYSTALS) 100 MG/1
100 CAPSULE ORAL NIGHTLY
Qty: 30 CAPSULE | Refills: 2 | Status: SHIPPED | OUTPATIENT
Start: 2020-04-01 | End: 2020-04-09

## 2020-04-01 NOTE — PROGRESS NOTES
Patient ID: Obdulia Yepez is a 69 y.o. female.    Chief Complaint: follow up visit/med check  Still losing weight  Eating as much as she can tolerate  Still with nausea and fatigue  Otherwise at baseline    Review of Systems   Constitutional: Negative for activity change, appetite change, chills, fatigue and fever.   HENT: Negative for congestion, dental problem, facial swelling, nosebleeds, postnasal drip, sinus pain, sore throat, trouble swallowing and voice change.    Eyes: Negative for pain, discharge and visual disturbance.   Respiratory: Negative for apnea, cough, chest tightness and shortness of breath.    Cardiovascular: Negative for chest pain and palpitations.   Gastrointestinal: Negative for abdominal pain, blood in stool, constipation and nausea.   Endocrine: Negative for cold intolerance, polydipsia and polyuria.   Genitourinary: Negative for difficulty urinating, enuresis and flank pain.   Musculoskeletal: Positive for arthralgias, back pain, gait problem and myalgias.   Skin: Negative for color change.   Allergic/Immunologic: Negative for environmental allergies and immunocompromised state.   Neurological: Negative for dizziness and light-headedness.   Hematological: Negative for adenopathy.   Psychiatric/Behavioral: Negative for agitation, behavioral problems, decreased concentration and dysphoric mood. The patient is not nervous/anxious.    All other systems reviewed and are negative.        Reviewed family, medical, surgical, and social history.    Objective:        patient is speaking full sentences  mood and behavior appropriate  no signs of distress  no wheezing heard  No audible congestion in voice  No coughing on the phone  patient doesnt see pus on the tonsils  can move neck in all directions without pain  Reports able to walk normally  able to move all extremities without weakness  reports facial muscles look symmetrical  Patient has no tenderness over the abdomen with pressure  no CVA  tenderness according to patient  Assessment:       1. Hyponatremia    2. Dysuria        Plan:       Hyponatremia  -     CBC auto differential; Future; Expected date: 04/01/2020  -     Basic metabolic panel; Future; Expected date: 04/01/2020    Dysuria  -     nitrofurantoin (MACRODANTIN) 100 MG capsule; Take 1 capsule (100 mg total) by mouth nightly.  Dispense: 30 capsule; Refill: 2            Risks, benefits, and side effects were discussed with the patient. All questions were answered to the fullest satisfaction of the patient, and pt verbalized understanding and agreement to treatment plan. Pt was to call with any new or worsening symptoms, or present to the ER.    The patient location is:  Patient Home   Visit type: Virtual visit with synchronous audio and video  Each patient to whom he or she provides medical services by telemedicine is:  (1) informed of the relationship between the physician and patient and the respective role of any other health care provider with respect to management of the patient; and (2) notified that he or she may decline to receive medical services by telemedicine and may withdraw from such care at any time.

## 2020-04-02 ENCOUNTER — TELEPHONE (OUTPATIENT)
Dept: HOME HEALTH SERVICES | Facility: HOSPITAL | Age: 70
End: 2020-04-02

## 2020-04-07 ENCOUNTER — PATIENT MESSAGE (OUTPATIENT)
Dept: FAMILY MEDICINE | Facility: CLINIC | Age: 70
End: 2020-04-07

## 2020-04-08 ENCOUNTER — TELEPHONE (OUTPATIENT)
Dept: UROLOGY | Facility: CLINIC | Age: 70
End: 2020-04-08

## 2020-04-08 NOTE — TELEPHONE ENCOUNTER
Spoke to pt informed her on virtual visits appt steps to follow, pt confirmed appt and verbalized understanding.

## 2020-04-09 ENCOUNTER — OFFICE VISIT (OUTPATIENT)
Dept: UROLOGY | Facility: CLINIC | Age: 70
End: 2020-04-09
Payer: MEDICARE

## 2020-04-09 ENCOUNTER — PATIENT OUTREACH (OUTPATIENT)
Dept: ADMINISTRATIVE | Facility: OTHER | Age: 70
End: 2020-04-09

## 2020-04-09 DIAGNOSIS — N32.89 BLADDER WALL THICKENING: Primary | ICD-10-CM

## 2020-04-09 DIAGNOSIS — N39.0 RECURRENT UTI: ICD-10-CM

## 2020-04-09 PROCEDURE — 99441 PR PHYSICIAN TELEPHONE EVALUATION 5-10 MIN: ICD-10-PCS | Mod: 95,,, | Performed by: UROLOGY

## 2020-04-09 PROCEDURE — 99441 PR PHYSICIAN TELEPHONE EVALUATION 5-10 MIN: CPT | Mod: 95,,, | Performed by: UROLOGY

## 2020-04-09 RX ORDER — CIPROFLOXACIN 250 MG/1
250 TABLET, FILM COATED ORAL EVERY 12 HOURS
Qty: 20 TABLET | Refills: 0 | Status: SHIPPED | OUTPATIENT
Start: 2020-04-09 | End: 2020-04-19

## 2020-04-09 NOTE — PROGRESS NOTES
Subjective:       Patient ID: Obdulia Yepez is a 69 y.o. female.    Chief Complaint:   Chronic recurring urinary tract infections.  Dysuria.  Low-grade fever.  HPI   Mrs Yepez is a 69-year-old female have a long history of recurrent urinary tract infections.  In the past the patient was placed on suppressive therapy using Macrobid 100 mg p.o. q.d..  The patient referred that was taking the medication up to approximately a week 10 days ago.  At that point she lost the bottle of Macrobid 100 mg capsules and have no taking the medications since then.  At the present time she is complaining of frequency.  Urgency mild urge incontinence.  Also she refers that have dysuria.  The patient denies gross hematuria the patient referred that have mild low back pain especially on the left.  She denies any difficulty emptying the bladder.  Is to be noted this patient earlier this year have an episode of a bladder perforation cost by a catheterization.  At that point the patient was treated conservatively on the perforations seems to have healed with no difficulty.  And at the present time the patient is not using any catheter.  She also have a history of an incomplete bladder emptying.  She refers that the present time she is urinating as usual with an interrupted voiding but after 2 episodes of voiding she feels that this completely empty.    The past medical and past surgical history the current medications and allergies are well documented in the EHR and all these were reviewed by me during this visit.  The patient have several allergies including sulfa drugs.  Review of Systems    This was a telephone visit due to the inability of the patient to connect for tele video visit.  The patient referred that none of her other organs and system have any changes or other symptoms.  Objective:      Physical Exam    Due to the nature of this visit no physical examination was performed.  Due to the nature of this visit I could not observe  the patient neither.  Assessment:       1. Bladder wall thickening    2. Recurrent UTI        Plan:       Bladder wall thickening    Recurrent UTI  -     Urinalysis  -     Urine culture    Other orders  -     ciprofloxacin HCl (CIPRO) 250 MG tablet; Take 1 tablet (250 mg total) by mouth every 12 (twelve) hours. for 10 days  Dispense: 20 tablet; Refill: 0     The patient is to start taking Cipro 250 p.o. b.i.d. for the next 10 days.  She is going to be call next week with a report of the culture and sensitivity results and at that point we are going to determine the best course to follow regarding long-term suppression that this patient needs.

## 2020-04-13 ENCOUNTER — PATIENT OUTREACH (OUTPATIENT)
Dept: ADMINISTRATIVE | Facility: HOSPITAL | Age: 70
End: 2020-04-13

## 2020-04-13 ENCOUNTER — TELEPHONE (OUTPATIENT)
Dept: FAMILY MEDICINE | Facility: CLINIC | Age: 70
End: 2020-04-13

## 2020-04-13 NOTE — TELEPHONE ENCOUNTER
Pt had a burger @ [employer] and and has been having abdominal swelling and fever since eating the burger. Pt does not want to go to the ED, she wanted to let you know and see what she should do.

## 2020-04-16 ENCOUNTER — PATIENT MESSAGE (OUTPATIENT)
Dept: FAMILY MEDICINE | Facility: CLINIC | Age: 70
End: 2020-04-16

## 2020-04-17 ENCOUNTER — TELEPHONE (OUTPATIENT)
Dept: FAMILY MEDICINE | Facility: CLINIC | Age: 70
End: 2020-04-17

## 2020-04-17 NOTE — TELEPHONE ENCOUNTER
----- Message from Millie Jones sent at 4/17/2020 11:08 AM CDT -----  Contact: Renee with [facility]  Type: Needs Medical Advice  Who Called: Renee with [facility]  Symptoms (please be specific):  constipation  How long has patient had these symptoms:  2 week  Pharmacy name and phone #:    Best Call Back Number: [phone]  Additional Information: Patient has not had a bowel movement for 2 weeks and is vomiting. She is having a hard time keeping anything down. Please call Renee to advise. Thanks!

## 2020-04-20 ENCOUNTER — TELEPHONE (OUTPATIENT)
Dept: UROLOGY | Facility: CLINIC | Age: 70
End: 2020-04-20

## 2020-04-20 ENCOUNTER — TELEPHONE (OUTPATIENT)
Dept: GASTROENTEROLOGY | Facility: CLINIC | Age: 70
End: 2020-04-20

## 2020-04-20 NOTE — TELEPHONE ENCOUNTER
----- Message from Mellisa Colorado sent at 4/20/2020 11:21 AM CDT -----  Contact: Patient  Type: Needs Medical Advice  Who Called:  Patient  Best Call Back Number:    Additional Information: Calling to speak with someone in the office. Patient advised she has been having very serious gastro issues that the doctor should be aware of.

## 2020-04-20 NOTE — TELEPHONE ENCOUNTER
Pt states she has not had a BM x 10 days. Pt emphasizes that she does not want to go to the hospital. Pt says she has tried suppositories and stool softeners. Pt asked if she has tried any laxatives. Pt stated she has tried bisacodyl. Pt told to try a fleets enema, or magnesium citrate (1/2 a bottle). Pt stated she will try them.    Will follow-up with pt later in the day to see if OTC meds were effective. Pt told if meds are ineffective, she will need to seek treatment at the nearest ED. Pt verbalized understanding.

## 2020-04-20 NOTE — TELEPHONE ENCOUNTER
Attempted to call unable to LVM will try again     ----- Message from Mellisa Colorado sent at 4/20/2020 11:19 AM CDT -----  Contact: Patient  Type: Needs Medical Advice  Who Called:  Patient  Best Call Back Number:    Additional Information: Calling to speak with someone in the office. Patient advised she has been having very serious gastro issues that the doctor should be aware of.

## 2020-04-22 ENCOUNTER — TELEPHONE (OUTPATIENT)
Dept: GASTROENTEROLOGY | Facility: CLINIC | Age: 70
End: 2020-04-22

## 2020-04-22 NOTE — TELEPHONE ENCOUNTER
----- Message from Eloisa Ivan LPN sent at 4/21/2020 10:20 AM CDT -----  Patient aware that you are out of the clinic and can expect a call 04.22.2020.    Thank you so much,    Suzanna    ----- Message -----  From: Kae Menon  Sent: 4/21/2020   9:39 AM CDT  To: Chris Beckford Staff    Type:  Patient Returning Call    Who Called:  Obdulia  Who Left Message for Patient:  Nieves  Does the patient know what this is regarding?:  Follow up on progress  Best Call Back Number:  [phone]  Additional Information:  Thank you!

## 2020-04-22 NOTE — TELEPHONE ENCOUNTER
Spoke with .  said pt had one dose of magnesium citrate, and she did not try the fleets enema.  states pt had large BM, and is now able to tolerate food.

## 2020-04-23 ENCOUNTER — TELEPHONE (OUTPATIENT)
Dept: FAMILY MEDICINE | Facility: CLINIC | Age: 70
End: 2020-04-23

## 2020-04-23 ENCOUNTER — DOCUMENT SCAN (OUTPATIENT)
Dept: HOME HEALTH SERVICES | Facility: HOSPITAL | Age: 70
End: 2020-04-23
Payer: MEDICARE

## 2020-04-23 RX ORDER — POLYMYXIN B SULFATE AND TRIMETHOPRIM 1; 10000 MG/ML; [USP'U]/ML
1 SOLUTION OPHTHALMIC EVERY 4 HOURS
Qty: 10 ML | Refills: 0 | Status: SHIPPED | OUTPATIENT
Start: 2020-04-23

## 2020-04-23 NOTE — TELEPHONE ENCOUNTER
----- Message from Christina Hensley sent at 4/23/2020  8:24 AM CDT -----  Contact: Patient  Type: Needs Medical Advice  Who Called:  Patient  Pharmacy name and phone #:  [facility]  Best Call Back Number: [phone] (home)   Additional Information: the patient said she needs an eye drop called in for her eyes it has an antibiotic in it please call her to advise

## 2020-04-24 ENCOUNTER — DOCUMENT SCAN (OUTPATIENT)
Dept: HOME HEALTH SERVICES | Facility: HOSPITAL | Age: 70
End: 2020-04-24
Payer: MEDICARE

## 2020-04-30 ENCOUNTER — TELEPHONE (OUTPATIENT)
Dept: FAMILY MEDICINE | Facility: CLINIC | Age: 70
End: 2020-04-30

## 2020-04-30 DIAGNOSIS — L89.90 PRESSURE INJURY OF SKIN, UNSPECIFIED INJURY STAGE, UNSPECIFIED LOCATION: Primary | ICD-10-CM

## 2020-04-30 NOTE — TELEPHONE ENCOUNTER
Please advise    Thank you    ----- Message from Robby Drake sent at 4/30/2020  4:06 PM CDT -----  Contact: Renee with [facility]  Type: Needs Medical Advice  Who Called:  Renee  Symptoms (please be specific):  Bed sore getting worse / pain in pelvic area  How long has patient had these symptoms:  2 months / this week  Pharmacy name and phone #:    [facility] #[id number] - [place], [place] - [address]  [address] [place] [address]  Phone: [phone] Fax: [phone]    Best Call Back Number: [phone]  Additional Information: Please call to advise

## 2020-05-01 PROCEDURE — G0179 MD RECERTIFICATION HHA PT: HCPCS | Mod: ,,, | Performed by: FAMILY MEDICINE

## 2020-05-01 PROCEDURE — G0179 PR HOME HEALTH MD RECERTIFICATION: ICD-10-PCS | Mod: ,,, | Performed by: FAMILY MEDICINE

## 2020-05-05 ENCOUNTER — PATIENT MESSAGE (OUTPATIENT)
Dept: ADMINISTRATIVE | Facility: HOSPITAL | Age: 70
End: 2020-05-05

## 2020-05-06 ENCOUNTER — PATIENT OUTREACH (OUTPATIENT)
Dept: ADMINISTRATIVE | Facility: OTHER | Age: 70
End: 2020-05-06

## 2020-05-07 ENCOUNTER — TELEPHONE (OUTPATIENT)
Dept: FAMILY MEDICINE | Facility: CLINIC | Age: 70
End: 2020-05-07

## 2020-05-07 ENCOUNTER — HOSPITAL ENCOUNTER (INPATIENT)
Facility: HOSPITAL | Age: 70
LOS: 6 days | Discharge: HOSPICE | DRG: 682 | End: 2020-05-13
Attending: FAMILY MEDICINE | Admitting: INTERNAL MEDICINE
Payer: MEDICARE

## 2020-05-07 DIAGNOSIS — N17.9 ACUTE RENAL FAILURE, UNSPECIFIED ACUTE RENAL FAILURE TYPE: ICD-10-CM

## 2020-05-07 DIAGNOSIS — D64.9 CHRONIC ANEMIA: ICD-10-CM

## 2020-05-07 DIAGNOSIS — L89.150 PRESSURE INJURY OF SACRAL REGION, UNSTAGEABLE: ICD-10-CM

## 2020-05-07 DIAGNOSIS — N17.9 AKI (ACUTE KIDNEY INJURY): ICD-10-CM

## 2020-05-07 DIAGNOSIS — N13.30 BILATERAL HYDRONEPHROSIS: ICD-10-CM

## 2020-05-07 DIAGNOSIS — E87.1 HYPONATREMIA: ICD-10-CM

## 2020-05-07 DIAGNOSIS — R00.0 TACHYCARDIA: ICD-10-CM

## 2020-05-07 DIAGNOSIS — R10.84 GENERALIZED ABDOMINAL PAIN: ICD-10-CM

## 2020-05-07 DIAGNOSIS — R63.6 UNDERWEIGHT ON EXAMINATION: ICD-10-CM

## 2020-05-07 DIAGNOSIS — K59.03 DRUG-INDUCED CONSTIPATION: Primary | ICD-10-CM

## 2020-05-07 PROBLEM — R53.81 DEBILITY: Status: ACTIVE | Noted: 2020-05-07

## 2020-05-07 LAB
ALBUMIN SERPL BCP-MCNC: 2 G/DL (ref 3.5–5.2)
ALP SERPL-CCNC: 111 U/L (ref 55–135)
ALT SERPL W/O P-5'-P-CCNC: 11 U/L (ref 10–44)
ANION GAP SERPL CALC-SCNC: 11 MMOL/L (ref 8–16)
AST SERPL-CCNC: 17 U/L (ref 10–40)
BACTERIA #/AREA URNS HPF: ABNORMAL /HPF
BACTERIA #/AREA URNS HPF: ABNORMAL /HPF
BASOPHILS # BLD AUTO: 0.04 K/UL (ref 0–0.2)
BASOPHILS NFR BLD: 0.6 % (ref 0–1.9)
BILIRUB SERPL-MCNC: 0.9 MG/DL (ref 0.1–1)
BILIRUB UR QL STRIP: NEGATIVE
BILIRUB UR QL STRIP: NEGATIVE
BUN SERPL-MCNC: 57 MG/DL (ref 8–23)
CALCIUM SERPL-MCNC: 8.4 MG/DL (ref 8.7–10.5)
CHLORIDE SERPL-SCNC: 97 MMOL/L (ref 95–110)
CLARITY UR: ABNORMAL
CLARITY UR: ABNORMAL
CO2 SERPL-SCNC: 18 MMOL/L (ref 23–29)
COLOR UR: YELLOW
COLOR UR: YELLOW
CREAT SERPL-MCNC: 2.5 MG/DL (ref 0.5–1.4)
DIFFERENTIAL METHOD: ABNORMAL
EOSINOPHIL # BLD AUTO: 0.1 K/UL (ref 0–0.5)
EOSINOPHIL NFR BLD: 0.9 % (ref 0–8)
ERYTHROCYTE [DISTWIDTH] IN BLOOD BY AUTOMATED COUNT: 12.9 % (ref 11.5–14.5)
EST. GFR  (AFRICAN AMERICAN): 22.2 ML/MIN/1.73 M^2
EST. GFR  (NON AFRICAN AMERICAN): 19.3 ML/MIN/1.73 M^2
GLUCOSE SERPL-MCNC: 68 MG/DL (ref 70–110)
GLUCOSE UR QL STRIP: NEGATIVE
GLUCOSE UR QL STRIP: NEGATIVE
HCT VFR BLD AUTO: 28.4 % (ref 37–48.5)
HGB BLD-MCNC: 9.2 G/DL (ref 12–16)
HGB UR QL STRIP: ABNORMAL
HGB UR QL STRIP: ABNORMAL
HYALINE CASTS #/AREA URNS LPF: 0 /LPF
HYALINE CASTS #/AREA URNS LPF: 0 /LPF
IMM GRANULOCYTES # BLD AUTO: 0.06 K/UL (ref 0–0.04)
IMM GRANULOCYTES NFR BLD AUTO: 0.9 % (ref 0–0.5)
KETONES UR QL STRIP: ABNORMAL
KETONES UR QL STRIP: ABNORMAL
LEUKOCYTE ESTERASE UR QL STRIP: ABNORMAL
LEUKOCYTE ESTERASE UR QL STRIP: ABNORMAL
LIPASE SERPL-CCNC: 24 U/L (ref 4–60)
LYMPHOCYTES # BLD AUTO: 1.3 K/UL (ref 1–4.8)
LYMPHOCYTES NFR BLD: 18.8 % (ref 18–48)
MCH RBC QN AUTO: 31.2 PG (ref 27–31)
MCHC RBC AUTO-ENTMCNC: 32.4 G/DL (ref 32–36)
MCV RBC AUTO: 96 FL (ref 82–98)
MICROSCOPIC COMMENT: ABNORMAL
MICROSCOPIC COMMENT: ABNORMAL
MONOCYTES # BLD AUTO: 0.5 K/UL (ref 0.3–1)
MONOCYTES NFR BLD: 6.9 % (ref 4–15)
NEUTROPHILS # BLD AUTO: 4.9 K/UL (ref 1.8–7.7)
NEUTROPHILS NFR BLD: 71.9 % (ref 38–73)
NITRITE UR QL STRIP: NEGATIVE
NITRITE UR QL STRIP: NEGATIVE
NRBC BLD-RTO: 0 /100 WBC
PH UR STRIP: 7 [PH] (ref 5–8)
PH UR STRIP: 7 [PH] (ref 5–8)
PLATELET # BLD AUTO: 271 K/UL (ref 150–350)
PMV BLD AUTO: 9.7 FL (ref 9.2–12.9)
POTASSIUM SERPL-SCNC: 3.9 MMOL/L (ref 3.5–5.1)
PROT SERPL-MCNC: 5.4 G/DL (ref 6–8.4)
PROT UR QL STRIP: ABNORMAL
PROT UR QL STRIP: ABNORMAL
RBC # BLD AUTO: 2.95 M/UL (ref 4–5.4)
RBC #/AREA URNS HPF: 30 /HPF (ref 0–4)
RBC #/AREA URNS HPF: 40 /HPF (ref 0–4)
SARS-COV-2 RDRP RESP QL NAA+PROBE: NEGATIVE
SODIUM SERPL-SCNC: 126 MMOL/L (ref 136–145)
SP GR UR STRIP: 1.01 (ref 1–1.03)
SP GR UR STRIP: 1.01 (ref 1–1.03)
SQUAMOUS #/AREA URNS HPF: 13 /HPF
URN SPEC COLLECT METH UR: ABNORMAL
URN SPEC COLLECT METH UR: ABNORMAL
UROBILINOGEN UR STRIP-ACNC: NEGATIVE EU/DL
UROBILINOGEN UR STRIP-ACNC: NEGATIVE EU/DL
WBC # BLD AUTO: 6.85 K/UL (ref 3.9–12.7)
WBC #/AREA URNS HPF: >100 /HPF (ref 0–5)
WBC #/AREA URNS HPF: >100 /HPF (ref 0–5)

## 2020-05-07 PROCEDURE — 74176 CT ABD & PELVIS W/O CONTRAST: CPT | Mod: 26,,, | Performed by: RADIOLOGY

## 2020-05-07 PROCEDURE — 85025 COMPLETE CBC W/AUTO DIFF WBC: CPT

## 2020-05-07 PROCEDURE — 87077 CULTURE AEROBIC IDENTIFY: CPT

## 2020-05-07 PROCEDURE — 81000 URINALYSIS NONAUTO W/SCOPE: CPT | Mod: 91

## 2020-05-07 PROCEDURE — 96360 HYDRATION IV INFUSION INIT: CPT

## 2020-05-07 PROCEDURE — 36415 COLL VENOUS BLD VENIPUNCTURE: CPT

## 2020-05-07 PROCEDURE — 87086 URINE CULTURE/COLONY COUNT: CPT | Mod: 59

## 2020-05-07 PROCEDURE — 83935 ASSAY OF URINE OSMOLALITY: CPT

## 2020-05-07 PROCEDURE — 25000003 PHARM REV CODE 250: Performed by: FAMILY MEDICINE

## 2020-05-07 PROCEDURE — U0002 COVID-19 LAB TEST NON-CDC: HCPCS

## 2020-05-07 PROCEDURE — 74176 CT ABDOMEN PELVIS WITHOUT CONTRAST: ICD-10-PCS | Mod: 26,,, | Performed by: RADIOLOGY

## 2020-05-07 PROCEDURE — 84300 ASSAY OF URINE SODIUM: CPT

## 2020-05-07 PROCEDURE — 74176 CT ABD & PELVIS W/O CONTRAST: CPT | Mod: TC

## 2020-05-07 PROCEDURE — 25000003 PHARM REV CODE 250: Performed by: HOSPITALIST

## 2020-05-07 PROCEDURE — 83690 ASSAY OF LIPASE: CPT

## 2020-05-07 PROCEDURE — 21400001 HC TELEMETRY ROOM

## 2020-05-07 PROCEDURE — 87088 URINE BACTERIA CULTURE: CPT | Mod: 59

## 2020-05-07 PROCEDURE — 87086 URINE CULTURE/COLONY COUNT: CPT

## 2020-05-07 PROCEDURE — 99285 EMERGENCY DEPT VISIT HI MDM: CPT | Mod: 25

## 2020-05-07 PROCEDURE — 81000 URINALYSIS NONAUTO W/SCOPE: CPT

## 2020-05-07 PROCEDURE — 80053 COMPREHEN METABOLIC PANEL: CPT

## 2020-05-07 PROCEDURE — 87186 SC STD MICRODIL/AGAR DIL: CPT | Mod: 59

## 2020-05-07 RX ORDER — IPRATROPIUM BROMIDE AND ALBUTEROL SULFATE 2.5; .5 MG/3ML; MG/3ML
3 SOLUTION RESPIRATORY (INHALATION) EVERY 6 HOURS PRN
Status: DISCONTINUED | OUTPATIENT
Start: 2020-05-07 | End: 2020-05-13 | Stop reason: HOSPADM

## 2020-05-07 RX ORDER — GABAPENTIN 300 MG/1
600 CAPSULE ORAL 2 TIMES DAILY
Status: DISCONTINUED | OUTPATIENT
Start: 2020-05-07 | End: 2020-05-13 | Stop reason: HOSPADM

## 2020-05-07 RX ORDER — POLYETHYLENE GLYCOL 3350 17 G/17G
17 POWDER, FOR SOLUTION ORAL DAILY
Status: DISCONTINUED | OUTPATIENT
Start: 2020-05-08 | End: 2020-05-13 | Stop reason: HOSPADM

## 2020-05-07 RX ORDER — HYDROCODONE BITARTRATE AND ACETAMINOPHEN 5; 325 MG/1; MG/1
1 TABLET ORAL EVERY 6 HOURS PRN
Status: DISCONTINUED | OUTPATIENT
Start: 2020-05-07 | End: 2020-05-13 | Stop reason: HOSPADM

## 2020-05-07 RX ORDER — IPRATROPIUM BROMIDE 21 UG/1
2 SPRAY, METERED NASAL 3 TIMES DAILY
Status: DISCONTINUED | OUTPATIENT
Start: 2020-05-07 | End: 2020-05-11

## 2020-05-07 RX ORDER — ONDANSETRON 2 MG/ML
4 INJECTION INTRAMUSCULAR; INTRAVENOUS EVERY 8 HOURS PRN
Status: DISCONTINUED | OUTPATIENT
Start: 2020-05-07 | End: 2020-05-13 | Stop reason: HOSPADM

## 2020-05-07 RX ORDER — ZOLPIDEM TARTRATE 5 MG/1
5 TABLET ORAL NIGHTLY PRN
Status: DISCONTINUED | OUTPATIENT
Start: 2020-05-07 | End: 2020-05-08

## 2020-05-07 RX ORDER — MONTELUKAST SODIUM 10 MG/1
10 TABLET ORAL DAILY
Status: DISCONTINUED | OUTPATIENT
Start: 2020-05-08 | End: 2020-05-13 | Stop reason: HOSPADM

## 2020-05-07 RX ORDER — SODIUM CHLORIDE 9 MG/ML
INJECTION, SOLUTION INTRAVENOUS CONTINUOUS
Status: ACTIVE | OUTPATIENT
Start: 2020-05-07 | End: 2020-05-08

## 2020-05-07 RX ORDER — AMOXICILLIN 250 MG
1 CAPSULE ORAL 2 TIMES DAILY
Status: DISCONTINUED | OUTPATIENT
Start: 2020-05-07 | End: 2020-05-13 | Stop reason: HOSPADM

## 2020-05-07 RX ORDER — CYCLOBENZAPRINE HCL 5 MG
5 TABLET ORAL 3 TIMES DAILY PRN
Status: DISCONTINUED | OUTPATIENT
Start: 2020-05-07 | End: 2020-05-13 | Stop reason: HOSPADM

## 2020-05-07 RX ORDER — ESCITALOPRAM OXALATE 10 MG/1
20 TABLET ORAL DAILY
Status: DISCONTINUED | OUTPATIENT
Start: 2020-05-08 | End: 2020-05-13 | Stop reason: HOSPADM

## 2020-05-07 RX ORDER — PANTOPRAZOLE SODIUM 40 MG/1
40 TABLET, DELAYED RELEASE ORAL DAILY
Status: DISCONTINUED | OUTPATIENT
Start: 2020-05-08 | End: 2020-05-13 | Stop reason: HOSPADM

## 2020-05-07 RX ORDER — ALLOPURINOL 100 MG/1
300 TABLET ORAL DAILY
Status: DISCONTINUED | OUTPATIENT
Start: 2020-05-08 | End: 2020-05-13 | Stop reason: HOSPADM

## 2020-05-07 RX ORDER — BISACODYL 10 MG
10 SUPPOSITORY, RECTAL RECTAL DAILY PRN
Status: DISCONTINUED | OUTPATIENT
Start: 2020-05-07 | End: 2020-05-13 | Stop reason: HOSPADM

## 2020-05-07 RX ORDER — SODIUM CHLORIDE 9 MG/ML
1000 INJECTION, SOLUTION INTRAVENOUS
Status: COMPLETED | OUTPATIENT
Start: 2020-05-07 | End: 2020-05-07

## 2020-05-07 RX ADMIN — GABAPENTIN 600 MG: 300 CAPSULE ORAL at 09:05

## 2020-05-07 RX ADMIN — ZOLPIDEM TARTRATE 5 MG: 5 TABLET ORAL at 09:05

## 2020-05-07 RX ADMIN — SODIUM CHLORIDE 1000 ML: 0.9 INJECTION, SOLUTION INTRAVENOUS at 06:05

## 2020-05-07 RX ADMIN — SENNOSIDES AND DOCUSATE SODIUM 1 TABLET: 8.6; 5 TABLET ORAL at 09:05

## 2020-05-07 RX ADMIN — SODIUM CHLORIDE: 0.9 INJECTION, SOLUTION INTRAVENOUS at 09:05

## 2020-05-07 NOTE — ED PROVIDER NOTES
Encounter Date: 2020       History     Chief Complaint   Patient presents with    Abdominal Pain     Generalized cramping sensation.    Constipation     Last bowel movement x 8 days ago.    Anorexia     Last oral intake 2020 at approximately 1300. States decreased appetite and decreased oral intake.    Nausea     Intermittent without vomiting.    Skin Ulcer     Complaint of pressure ulcer to sacrum/coccyx with constant chronic pain.     69-year-old female presents per EMS complaining of abdominal cramping abdominal pain she has not had a bowel movement for at least 7-8 days she has had decreased appetite some nausea, no vomiting patient states she has had fever and a dry cough for the past few days she denies being exposed to COVID-19 and lives with her  who is not currently ill        Review of patient's allergies indicates:   Allergen Reactions    Amoxicillin Diarrhea    Latex     Milk containing products     Opioids - morphine analogues     Peanut     Sodium phosphate     Soy     Sulfa (sulfonamide antibiotics)      Past Medical History:   Diagnosis Date    Allergy     Arthritis     Asthma     Depression     Gout     Hypotension     Neuropathy     Sepsis      Past Surgical History:   Procedure Laterality Date    APPENDECTOMY       SECTION      CHOLECYSTECTOMY      CYSTOSCOPY W/ RETROGRADES Bilateral 2019    Procedure: CYSTOSCOPY, WITH RETROGRADE PYELOGRAM;  Surgeon: Trip Bacon MD;  Location: [facility] OR;  Service: Urology;  Laterality: Bilateral;    CYSTOSCOPY WITH BIOPSY OF BLADDER  2019    Procedure: CYSTOSCOPY, WITH BLADDER BIOPSY, WITH FULGURATION IF INDICATED;  Surgeon: Trip Bacon MD;  Location: [facility] OR;  Service: Urology;;    ESOPHAGOGASTRODUODENOSCOPY N/A 12/10/2019    Procedure: EGD (ESOPHAGOGASTRODUODENOSCOPY);  Surgeon: Shakeel Perez MD;  Location: [facility];  Service: Endoscopy;  Laterality: N/A;    HYSTERECTOMY       SHOULDER SURGERY Right 04/16/2019    STOMACH SURGERY      URETEROSCOPY Right 11/13/2019    Procedure: URETEROSCOPY;  Surgeon: Trip Bacon MD;  Location: [facility] OR;  Service: Urology;  Laterality: Right;    WRIST SURGERY Left      Family History   Problem Relation Age of Onset    Asthma Mother     Pleurisy Father     Bipolar disorder Sister     Colon cancer Maternal Grandmother     Colon cancer Maternal Grandfather      Social History     Tobacco Use    Smoking status: Never Smoker    Smokeless tobacco: Never Used   Substance Use Topics    Alcohol use: Yes     Comment: everyday drinker. has not had any in the past 2 days    Drug use: No     Review of Systems   Constitutional: Negative for fever.   HENT: Negative for sore throat.    Respiratory: Negative for shortness of breath.    Cardiovascular: Negative for chest pain.   Gastrointestinal: Negative for nausea.   Genitourinary: Negative for dysuria.   Musculoskeletal: Negative for back pain.   Skin: Negative for rash.   Neurological: Negative for weakness.   Hematological: Does not bruise/bleed easily.   Psychiatric/Behavioral: Negative for agitation.       Physical Exam     Initial Vitals [05/07/20 1707]   BP Pulse Resp Temp SpO2   94/73 (!) 111 20 99.1 °F (37.3 °C) 98 %      MAP       --         Physical Exam    Nursing note and vitals reviewed.  Constitutional: She is not diaphoretic. No distress.   Emaciated if not cachectic   HENT:   Head: Normocephalic and atraumatic.   Right Ear: External ear normal.   Left Ear: External ear normal.   Eyes: Pupils are equal, round, and reactive to light. Right eye exhibits no discharge. Left eye exhibits no discharge.   Neck: No tracheal deviation present. No JVD present.   Cardiovascular: Exam reveals no friction rub.    No murmur heard.  Pulmonary/Chest: No stridor. No respiratory distress. She has no wheezes. She has no rales.   Abdominal: Bowel sounds are normal. She exhibits no distension and no mass.  There is tenderness. There is no rebound and no guarding.   Musculoskeletal: She exhibits no tenderness.   Patient is emaciated with scattered ecchymoses over the arms and legs   Neurological: She is alert.   Skin: Skin is warm.   Sacral decubitis (per nurse report)   Psychiatric: She has a normal mood and affect.         ED Course   Procedures  Labs Reviewed   CBC W/ AUTO DIFFERENTIAL - Abnormal; Notable for the following components:       Result Value    RBC 2.95 (*)     Hemoglobin 9.2 (*)     Hematocrit 28.4 (*)     Mean Corpuscular Hemoglobin 31.2 (*)     Immature Granulocytes 0.9 (*)     Immature Grans (Abs) 0.06 (*)     All other components within normal limits   COMPREHENSIVE METABOLIC PANEL - Abnormal; Notable for the following components:    Sodium 126 (*)     CO2 18 (*)     Glucose 68 (*)     BUN, Bld 57 (*)     Creatinine 2.5 (*)     Calcium 8.4 (*)     Total Protein 5.4 (*)     Albumin 2.0 (*)     eGFR if  22.2 (*)     eGFR if non  19.3 (*)     All other components within normal limits   SARS-COV-2 RNA AMPLIFICATION, QUAL    Narrative:     What symptom criteria does the patient meet?->Cough  What symptom criteria does the patient meet?->Fever   LIPASE   URINALYSIS, REFLEX TO URINE CULTURE   URINALYSIS MICROSCOPIC          Imaging Results          CT Abdomen Pelvis  Without Contrast (Final result)  Result time 05/07/20 18:40:48    Final result by Jag Velarde Jr., MD (05/07/20 18:40:48)                 Impression:      Constipation versus fecal impaction noted.  Prior gastric bypass procedure.  Probable chronic tear of the right dome of the bladder with a 5.7 cm diverticulum of urine seen protruding anteriorly and to the right.  Free urine in the pelvis is not seen.  The bladder is distended without a catheter in place and there is bilateral moderate to severe hydronephrosis.  Prior cholecystectomy.  Prior hysterectomy.      Electronically signed by: Jag Velarde  MD  Date:    05/07/2020  Time:    18:40             Narrative:    EXAMINATION:  CT ABDOMEN PELVIS WITHOUT CONTRAST    CLINICAL HISTORY:  Abdominal pain, unspecified;    TECHNIQUE:  Low dose axial images, sagittal and coronal reformations were obtained from the lung bases to the pubic symphysis.    COMPARISON:  Pelvic CT cystography of February 5, 2020 and CT abdomen of February 4, 2020.    FINDINGS:  There is severe CT axial with absence of the subcutaneous fat.  At the lung bases and infiltrate is not seen.  Atelectasis and pleural effusions noted on the prior study have resolved.    The liver is of normal size and CT density without focal defect seen.  The gallbladder is absent with surgical clips noted.  The pancreas is of normal contour and CT density.  The spleen is of normal size and CT density.    The adrenal glands are not enlarged.  There is bilateral moderate to severe hydronephrosis and dilation of the ureters down to the distended bladder.  A tear of the anterior right dome of the bladder is again noted and there is a diverticulum of urine seen protruding superiorly and anteriorly.  This measures 5.7 cm in diameter.  This correlates with the prior CT cystography of February 2020.  A catheter is not identified in the bladder.  Another mass of the bladder wall is not seen.  A uterus is not noted.    The patient has had a gastric bypass with suture material identified.  There is air in loops of small bowel without distention and stool identified throughout the colon consistent with constipation or fecal impaction.  No free fluid or free air is seen.                                 Medical Decision Making:   ED Management:  Preliminary laboratory data without leukocytosis.  Chronic anemia unchanged from previous.  Metabolic panel with normal anion gap acidemia with acute renal failure and hyponatremia.  Preliminary CT without obstructive uropathy.  Moderate to severe bilateral hydronephrosis.  Patient with  history of same.  Chronic bladder diverticulum also noted.  Constipation without bowel obstruction.    Based on HPI, physical exam with clinical underweight/cachexia, clinical dehydration patient will be admitted for gentle IV hydration and correction of metabolic derangements, constipation and a dressing of chronic decubitus ulcer under the care of Hospital Medicine.  Patient may require skilled nursing facility placement after admission.              18:10 received report assumed patient care from Dr. New.         19:20 spoke with Dr. Simmons on-call for hospitalist Medicine regarding patient's acute renal failure clinically dehydration with confirmed constipation.  Amenable to admission for gentle rehydration, repeat labs to ensure metabolic stability.           Clinical Impression:       ICD-10-CM ICD-9-CM   1. Drug-induced constipation K59.03 564.09     E980.5   2. Acute renal failure, unspecified acute renal failure type N17.9 584.9   3. Generalized abdominal pain R10.84 789.07   4. Hyponatremia E87.1 276.1   5. Underweight on examination R63.6 783.22   6. Pressure injury of sacral region, unstageable L89.150 707.03     707.25   7. Bilateral hydronephrosis N13.30 591   8. Chronic anemia D64.9 285.9             ED Disposition Condition    Admit                           Martir Andrade,   05/07/20 7469

## 2020-05-07 NOTE — TELEPHONE ENCOUNTER
Spoke with Christiana/ [facility]. Advised her of Dr Perez' recommendation of pt going to ER at this time. She asked that we notify pt and . Message left on cellphone and home phone advising pt to go to ER now per Dr Perez' recommendation.

## 2020-05-07 NOTE — TELEPHONE ENCOUNTER
----- Message from Mane Samuel sent at 5/7/2020  2:55 PM CDT -----  Type: Needs Medical Advice  Who Called:  Christiana/ [facility]  Symptoms (please be specific): Wound has quadrupled in size, fever  How long has patient had these symptoms:      Best Call Back Number: Caller--[phone]  After 4:30 [phone]  Additional Information: Caller states that the patient needs to be seen ASAP.  Caller states that the patient refuses to go to the ER unless Dr. Perez tells her.  Please call patient ASAP

## 2020-05-07 NOTE — ED NOTES
Applied warm blankets on pt for comfort. Bed in lowest position with wheels locked and bed rails up X2. AA&O. No apparent distress noted. Will continue to monitor.

## 2020-05-07 NOTE — ED NOTES
Pt care and report assumed from berna ESCOBAR. Pt in stable condition with no complaints. I will continue to monitor.

## 2020-05-08 PROBLEM — L89.154 PRESSURE INJURY OF SACRAL REGION, STAGE 4: Status: ACTIVE | Noted: 2020-05-08

## 2020-05-08 PROBLEM — E87.6 HYPOKALEMIA: Status: ACTIVE | Noted: 2020-05-08

## 2020-05-08 LAB
ALBUMIN SERPL BCP-MCNC: 1.8 G/DL (ref 3.5–5.2)
ALP SERPL-CCNC: 92 U/L (ref 55–135)
ALT SERPL W/O P-5'-P-CCNC: 10 U/L (ref 10–44)
ANION GAP SERPL CALC-SCNC: 8 MMOL/L (ref 8–16)
AST SERPL-CCNC: 12 U/L (ref 10–40)
BASOPHILS # BLD AUTO: 0.03 K/UL (ref 0–0.2)
BASOPHILS NFR BLD: 0.6 % (ref 0–1.9)
BILIRUB SERPL-MCNC: 0.9 MG/DL (ref 0.1–1)
BUN SERPL-MCNC: 56 MG/DL (ref 8–23)
CALCIUM SERPL-MCNC: 7.9 MG/DL (ref 8.7–10.5)
CHLORIDE SERPL-SCNC: 106 MMOL/L (ref 95–110)
CO2 SERPL-SCNC: 17 MMOL/L (ref 23–29)
CREAT SERPL-MCNC: 2.2 MG/DL (ref 0.5–1.4)
DIFFERENTIAL METHOD: ABNORMAL
EOSINOPHIL # BLD AUTO: 0.1 K/UL (ref 0–0.5)
EOSINOPHIL NFR BLD: 1.5 % (ref 0–8)
ERYTHROCYTE [DISTWIDTH] IN BLOOD BY AUTOMATED COUNT: 13 % (ref 11.5–14.5)
EST. GFR  (AFRICAN AMERICAN): 26 ML/MIN/1.73 M^2
EST. GFR  (NON AFRICAN AMERICAN): 22.5 ML/MIN/1.73 M^2
GLUCOSE SERPL-MCNC: 76 MG/DL (ref 70–110)
HCT VFR BLD AUTO: 25.7 % (ref 37–48.5)
HGB BLD-MCNC: 8.4 G/DL (ref 12–16)
IMM GRANULOCYTES # BLD AUTO: 0.03 K/UL (ref 0–0.04)
IMM GRANULOCYTES NFR BLD AUTO: 0.6 % (ref 0–0.5)
LYMPHOCYTES # BLD AUTO: 0.7 K/UL (ref 1–4.8)
LYMPHOCYTES NFR BLD: 14.5 % (ref 18–48)
MAGNESIUM SERPL-MCNC: 2.4 MG/DL (ref 1.6–2.6)
MCH RBC QN AUTO: 31.6 PG (ref 27–31)
MCHC RBC AUTO-ENTMCNC: 32.7 G/DL (ref 32–36)
MCV RBC AUTO: 97 FL (ref 82–98)
MONOCYTES # BLD AUTO: 0.3 K/UL (ref 0.3–1)
MONOCYTES NFR BLD: 5.9 % (ref 4–15)
NEUTROPHILS # BLD AUTO: 3.7 K/UL (ref 1.8–7.7)
NEUTROPHILS NFR BLD: 76.9 % (ref 38–73)
NRBC BLD-RTO: 0 /100 WBC
OSMOLALITY SERPL: 292 MOSM/KG (ref 275–295)
OSMOLALITY UR: 209 MOSM/KG (ref 50–1200)
PHOSPHATE SERPL-MCNC: 4.6 MG/DL (ref 2.7–4.5)
PLATELET # BLD AUTO: 230 K/UL (ref 150–350)
PMV BLD AUTO: 9.9 FL (ref 9.2–12.9)
POTASSIUM SERPL-SCNC: 3.2 MMOL/L (ref 3.5–5.1)
PROT SERPL-MCNC: 4.5 G/DL (ref 6–8.4)
RBC # BLD AUTO: 2.66 M/UL (ref 4–5.4)
SODIUM SERPL-SCNC: 131 MMOL/L (ref 136–145)
SODIUM UR-SCNC: 46 MMOL/L (ref 20–250)
WBC # BLD AUTO: 4.77 K/UL (ref 3.9–12.7)

## 2020-05-08 PROCEDURE — 99222 PR INITIAL HOSPITAL CARE,LEVL II: ICD-10-PCS | Mod: ,,, | Performed by: SURGERY

## 2020-05-08 PROCEDURE — 63600175 PHARM REV CODE 636 W HCPCS: Performed by: HOSPITALIST

## 2020-05-08 PROCEDURE — 97802 MEDICAL NUTRITION INDIV IN: CPT

## 2020-05-08 PROCEDURE — S0179 MEGESTROL 20 MG: HCPCS | Performed by: INTERNAL MEDICINE

## 2020-05-08 PROCEDURE — 63600175 PHARM REV CODE 636 W HCPCS: Performed by: INTERNAL MEDICINE

## 2020-05-08 PROCEDURE — 83735 ASSAY OF MAGNESIUM: CPT

## 2020-05-08 PROCEDURE — 80053 COMPREHEN METABOLIC PANEL: CPT

## 2020-05-08 PROCEDURE — 99222 1ST HOSP IP/OBS MODERATE 55: CPT | Mod: ,,, | Performed by: SURGERY

## 2020-05-08 PROCEDURE — 21400001 HC TELEMETRY ROOM

## 2020-05-08 PROCEDURE — 97163 PT EVAL HIGH COMPLEX 45 MIN: CPT

## 2020-05-08 PROCEDURE — 84100 ASSAY OF PHOSPHORUS: CPT

## 2020-05-08 PROCEDURE — 36415 COLL VENOUS BLD VENIPUNCTURE: CPT

## 2020-05-08 PROCEDURE — 83930 ASSAY OF BLOOD OSMOLALITY: CPT

## 2020-05-08 PROCEDURE — 25000003 PHARM REV CODE 250: Performed by: HOSPITALIST

## 2020-05-08 PROCEDURE — 86580 TB INTRADERMAL TEST: CPT | Performed by: INTERNAL MEDICINE

## 2020-05-08 PROCEDURE — 85025 COMPLETE CBC W/AUTO DIFF WBC: CPT

## 2020-05-08 PROCEDURE — 30200315 PPD INTRADERMAL TEST REV CODE 302: Performed by: INTERNAL MEDICINE

## 2020-05-08 PROCEDURE — 99233 SBSQ HOSP IP/OBS HIGH 50: CPT | Mod: ,,, | Performed by: INTERNAL MEDICINE

## 2020-05-08 PROCEDURE — 25000003 PHARM REV CODE 250: Performed by: INTERNAL MEDICINE

## 2020-05-08 PROCEDURE — 99233 PR SUBSEQUENT HOSPITAL CARE,LEVL III: ICD-10-PCS | Mod: ,,, | Performed by: INTERNAL MEDICINE

## 2020-05-08 RX ORDER — MEROPENEM AND SODIUM CHLORIDE 1 G/50ML
1 INJECTION, SOLUTION INTRAVENOUS EVERY 12 HOURS
Status: DISCONTINUED | OUTPATIENT
Start: 2020-05-08 | End: 2020-05-13 | Stop reason: HOSPADM

## 2020-05-08 RX ORDER — POTASSIUM CHLORIDE 20 MEQ/1
40 TABLET, EXTENDED RELEASE ORAL ONCE
Status: COMPLETED | OUTPATIENT
Start: 2020-05-08 | End: 2020-05-08

## 2020-05-08 RX ORDER — POTASSIUM CHLORIDE 20 MEQ/1
20 TABLET, EXTENDED RELEASE ORAL 2 TIMES DAILY
Status: DISCONTINUED | OUTPATIENT
Start: 2020-05-08 | End: 2020-05-13 | Stop reason: HOSPADM

## 2020-05-08 RX ORDER — MEGESTROL ACETATE 40 MG/ML
200 SUSPENSION ORAL DAILY
Status: DISCONTINUED | OUTPATIENT
Start: 2020-05-08 | End: 2020-05-13 | Stop reason: HOSPADM

## 2020-05-08 RX ORDER — VANCOMYCIN HCL IN 5 % DEXTROSE 1G/250ML
1000 PLASTIC BAG, INJECTION (ML) INTRAVENOUS
Status: DISCONTINUED | OUTPATIENT
Start: 2020-05-08 | End: 2020-05-08

## 2020-05-08 RX ADMIN — MEROPENEM AND SODIUM CHLORIDE 1 G: 1 INJECTION, SOLUTION INTRAVENOUS at 09:05

## 2020-05-08 RX ADMIN — MEGESTROL ACETATE 200 MG: 40 SUSPENSION ORAL at 02:05

## 2020-05-08 RX ADMIN — SODIUM CHLORIDE: 0.9 INJECTION, SOLUTION INTRAVENOUS at 08:05

## 2020-05-08 RX ADMIN — VANCOMYCIN HYDROCHLORIDE 500 MG: 500 INJECTION, POWDER, LYOPHILIZED, FOR SOLUTION INTRAVENOUS at 12:05

## 2020-05-08 RX ADMIN — MONTELUKAST SODIUM 10 MG: 10 TABLET, COATED ORAL at 08:05

## 2020-05-08 RX ADMIN — GABAPENTIN 600 MG: 300 CAPSULE ORAL at 08:05

## 2020-05-08 RX ADMIN — ESCITALOPRAM OXALATE 20 MG: 10 TABLET ORAL at 08:05

## 2020-05-08 RX ADMIN — SENNOSIDES AND DOCUSATE SODIUM 1 TABLET: 8.6; 5 TABLET ORAL at 09:05

## 2020-05-08 RX ADMIN — SODIUM CHLORIDE 500 ML: 0.9 INJECTION, SOLUTION INTRAVENOUS at 06:05

## 2020-05-08 RX ADMIN — GABAPENTIN 600 MG: 300 CAPSULE ORAL at 09:05

## 2020-05-08 RX ADMIN — MEROPENEM AND SODIUM CHLORIDE 1 G: 1 INJECTION, SOLUTION INTRAVENOUS at 12:05

## 2020-05-08 RX ADMIN — ALLOPURINOL 300 MG: 100 TABLET ORAL at 08:05

## 2020-05-08 RX ADMIN — POTASSIUM CHLORIDE 20 MEQ: 1500 TABLET, EXTENDED RELEASE ORAL at 12:05

## 2020-05-08 RX ADMIN — SENNOSIDES AND DOCUSATE SODIUM 1 TABLET: 8.6; 5 TABLET ORAL at 08:05

## 2020-05-08 RX ADMIN — TUBERCULIN PURIFIED PROTEIN DERIVATIVE 5 UNITS: 5 INJECTION INTRADERMAL at 12:05

## 2020-05-08 RX ADMIN — POTASSIUM CHLORIDE 40 MEQ: 1500 TABLET, EXTENDED RELEASE ORAL at 12:05

## 2020-05-08 RX ADMIN — PANTOPRAZOLE SODIUM 40 MG: 40 TABLET, DELAYED RELEASE ORAL at 08:05

## 2020-05-08 RX ADMIN — POLYETHYLENE GLYCOL (3350) 17 G: 17 POWDER, FOR SOLUTION ORAL at 08:05

## 2020-05-08 RX ADMIN — POTASSIUM CHLORIDE 20 MEQ: 1500 TABLET, EXTENDED RELEASE ORAL at 09:05

## 2020-05-08 NOTE — SUBJECTIVE & OBJECTIVE
Interval History:     Review of Systems   Constitutional: Positive for activity change, appetite change and fatigue. Negative for fever.   HENT: Negative for congestion, ear discharge, mouth sores, nosebleeds, rhinorrhea, sinus pressure, sinus pain and tinnitus.    Eyes: Negative.  Negative for pain, redness and itching.   Respiratory: Negative for apnea, cough, choking, chest tightness, shortness of breath, wheezing and stridor.    Cardiovascular: Negative for chest pain, palpitations and leg swelling.   Gastrointestinal: Positive for abdominal pain, nausea, rectal pain and vomiting. Negative for abdominal distention, anal bleeding, blood in stool, constipation and diarrhea.   Endocrine: Negative.    Genitourinary: Positive for difficulty urinating. Negative for flank pain, frequency and urgency.   Musculoskeletal: Positive for arthralgias and myalgias. Negative for back pain and gait problem.   Skin: Negative for color change and pallor.   Allergic/Immunologic: Negative.    Neurological: Positive for dizziness, weakness and light-headedness. Negative for facial asymmetry and headaches.   Hematological: Negative for adenopathy. Does not bruise/bleed easily.   Psychiatric/Behavioral: The patient is nervous/anxious.      Objective:     Vital Signs (Most Recent):  Temp: 96.3 °F (35.7 °C) (05/08/20 1050)  Pulse: 96 (05/08/20 1050)  Resp: 18 (05/08/20 1050)  BP: (!) 91/51 (05/08/20 1050)  SpO2: 95 % (05/08/20 1050) Vital Signs (24h Range):  Temp:  [96.3 °F (35.7 °C)-99.1 °F (37.3 °C)] 96.3 °F (35.7 °C)  Pulse:  [] 96  Resp:  [16-20] 18  SpO2:  [95 %-98 %] 95 %  BP: ()/(51-73) 91/51     Weight: 33.5 kg (73 lb 13.7 oz)  Body mass index is 14.42 kg/m².    Intake/Output Summary (Last 24 hours) at 5/8/2020 1121  Last data filed at 5/8/2020 0900  Gross per 24 hour   Intake 1730 ml   Output 100 ml   Net 1630 ml      Physical Exam   Constitutional: She is oriented to person, place, and time. She appears  well-developed and well-nourished.   HENT:   Head: Normocephalic and atraumatic.   Right Ear: External ear normal.   Left Ear: External ear normal.   Nose: Nose normal.   Mouth/Throat: Oropharynx is clear and moist. No oropharyngeal exudate.   Eyes: Pupils are equal, round, and reactive to light. EOM are normal.   Neck: Normal range of motion. Neck supple. No tracheal deviation present. No thyromegaly present.   Cardiovascular: Normal rate, regular rhythm and normal heart sounds.   Pulmonary/Chest: Effort normal and breath sounds normal. No respiratory distress. She has no wheezes. She has no rales. She exhibits no tenderness.   Abdominal: Soft. Bowel sounds are normal. She exhibits no distension. There is tenderness. There is guarding. There is no rebound.   Musculoskeletal: Normal range of motion. She exhibits deformity.   Lymphadenopathy:     She has no cervical adenopathy.   Neurological: She is alert and oriented to person, place, and time. A sensory deficit is present.   Skin: Skin is warm and dry. Capillary refill takes less than 2 seconds.   Psychiatric: She has a normal mood and affect. Her behavior is normal. Judgment and thought content normal.   Nursing note and vitals reviewed.      Significant Labs:   Recent Lab Results       05/08/20  0551   05/07/20 2000 05/07/20  1931   05/07/20  1759        Albumin 1.8     2.0     Alkaline Phosphatase 92     111     ALT 10     11     Anion Gap 8     11     Appearance, UA   Hazy Cloudy       AST 12     17     Bacteria, UA   Many Moderate       Baso # 0.03     0.04     Basophil% 0.6     0.6     Bilirubin (UA)   Negative Negative       BILIRUBIN TOTAL 0.9  Comment:  For infants and newborns, interpretation of results should be based  on gestational age, weight and in agreement with clinical  observations.  Premature Infant recommended reference ranges:  Up to 24 hours.............<8.0 mg/dL  Up to 48 hours............<12.0 mg/dL  3-5 days..................<15.0  mg/dL  6-29 days.................<15.0 mg/dL       0.9  Comment:  For infants and newborns, interpretation of results should be based  on gestational age, weight and in agreement with clinical  observations.  Premature Infant recommended reference ranges:  Up to 24 hours.............<8.0 mg/dL  Up to 48 hours............<12.0 mg/dL  3-5 days..................<15.0 mg/dL  6-29 days.................<15.0 mg/dL       BUN, Bld 56     57     Calcium 7.9     8.4     Chloride 106     97     CO2 17     18     Color, UA   Yellow Yellow       Creatinine 2.2     2.5     Differential Method Automated     Automated     eGFR if  26.0     22.2     eGFR if non  22.5  Comment:  Calculation used to obtain the estimated glomerular filtration  rate (eGFR) is the CKD-EPI equation.        19.3  Comment:  Calculation used to obtain the estimated glomerular filtration  rate (eGFR) is the CKD-EPI equation.        Eos # 0.1     0.1     Eosinophil% 1.5     0.9     Glucose 76     68     Glucose, UA   Negative Negative       Gran # (ANC) 3.7     4.9     Gran% 76.9     71.9     Hematocrit 25.7     28.4     Hemoglobin 8.4     9.2     Hyaline Casts, UA   0 0       Immature Grans (Abs) 0.03  Comment:  Mild elevation in immature granulocytes is non specific and   can be seen in a variety of conditions including stress response,   acute inflammation, trauma and pregnancy. Correlation with other   laboratory and clinical findings is essential.       0.06  Comment:  Mild elevation in immature granulocytes is non specific and   can be seen in a variety of conditions including stress response,   acute inflammation, trauma and pregnancy. Correlation with other   laboratory and clinical findings is essential.       Immature Granulocytes 0.6     0.9     Ketones, UA   1+ 1+       Leukocytes, UA   3+ 3+       Lipase       24     Lymph # 0.7     1.3     Lymph% 14.5     18.8     Magnesium 2.4           MCH 31.6     31.2     MCHC  32.7     32.4     MCV 97     96     Microscopic Comment   SEE COMMENT  Comment:  Other formed elements not mentioned in the report are not   present in the microscopic examination.    SEE COMMENT  Comment:  Other formed elements not mentioned in the report are not   present in the microscopic examination.          Mono # 0.3     0.5     Mono% 5.9     6.9     MPV 9.9     9.7     NITRITE UA   Negative Negative       nRBC 0     0     Occult Blood UA   2+ 2+       pH, UA   7.0 7.0       Phosphorus 4.6           Platelets 230     271     Potassium 3.2     3.9     PROTEIN TOTAL 4.5     5.4     Protein, UA   2+  Comment:  Recommend a 24 hour urine protein or a urine   protein/creatinine ratio if globulin induced proteinuria is  clinically suspected.   2+  Comment:  Recommend a 24 hour urine protein or a urine   protein/creatinine ratio if globulin induced proteinuria is  clinically suspected.         RBC 2.66     2.95     RBC, UA   30 40       RDW 13.0     12.9     SARS-CoV-2 RNA, Amplification, Qual       Negative  Comment:  This test utilizes isothermal nucleic acid amplification   technology to detect the SARS-CoV-2 RdRp nucleic acid segment.   The analytical sensitivity (limit of detection) is 125 genome   equivalents/mL.   A POSITIVE result implies infection with the SARS-CoV-2 virus;  the patient is presumed to be contagious.    A NEGATIVE result means that SARS-CoV-2 nucleic acids are not  present above the limit of detection. It does not rule out the   possibility of COVID-19 and should not be the sole basis for   treatment decisions. If COVID-19 is strongly suspected based on  clinical and exposure history, re-testing should be considered.   This test is only for use under the Food and Drug   Administration s Emergency Use Authorization (EUA).   Commercial kits are provided by [employer].   Performance characteristics of the EUA have been independently  verified by [facility] Department  of  Pathology and Laboratory Medicine.   _________________________________________________________________  The ID NOW COVID-19 Letter of Authorization, along with the   authorized Fact Sheet for Healthcare Providers, the authorized Fact  Sheet for Patients, and authorized labeling are available on the FDA   website:  www.fda.gov/MedicalDevices/Safety/EmergencySituations/qlq684616.htm       Sodium 131     126     Specific [place], UA   1.010 1.015       Specimen UA   Urine, Clean Catch Urine, Clean Catch       Squam Epithel, UA   13         UROBILINOGEN UA   Negative Negative       WBC, UA   >100 >100       WBC 4.77     6.85         All pertinent labs within the past 24 hours have been reviewed.    Significant Imaging: I have reviewed and interpreted all pertinent imaging results/findings within the past 24 hours.

## 2020-05-08 NOTE — PLAN OF CARE
05/08/20 1324   Discharge Assessment   Assessment Type Discharge Planning Assessment   Confirmed/corrected address and phone number on facesheet? Yes   Assessment information obtained from? Caregiver   Expected Length of Stay (days) 3   Communicated expected length of stay with patient/caregiver yes   Prior to hospitilization cognitive status: Alert/Oriented  (Spouse reports recently the pt has exhibited intermittent episodes of confusion & hallucinations)   Prior to hospitalization functional status: Needs Assistance   Current cognitive status:   (Unable to assess this day. Pt sleeping and hard to arouse. Making statements while drifting back to sleeping)   Current Functional Status: Completely Dependent   Facility Arrived From: Home   Lives With spouse   Able to Return to Prior Arrangements yes   Is patient able to care for self after discharge? No   Who are your caregiver(s) and their phone number(s)? Pawan Yepez - spouse [phone]   Patient's perception of discharge disposition rehab facility  (Spouse in favor of rehabilitation prior to returning home if the pt is in agreement and appropriate. )   Readmission Within the Last 30 Days no previous admission in last 30 days   Patient currently being followed by outpatient case management? No   Patient currently receives any other outside agency services? Yes   How many hours a day does the patient receive services? 1   Name and contact number of agency or person providing outside services [facility] - [phone] contact is Mari   Is it the patient/care giver preference to resume care with the current outside agency? Other (comment)  (Yes, if the pt returns home from this admission. )   Equipment Currently Used at Home wheelchair;bedside commode;walker, rolling  (Adjustable bed and couch)   Do you have any problems affording any of your prescribed medications? No   Is the patient taking medications as prescribed? yes  (Pt takes medications  appropriately, but is often unable to keep them down secondary to frequent vomiting. )   Does the patient have transportation home? Yes   Transportation Anticipated family or friend will provide   Dialysis Name and Scheduled days n/a   Does the patient receive services at the [facility]? No   Discharge Plan A Skilled Nursing Facility   Discharge Plan B Home Health   DME Needed Upon Discharge  other (see comments)  (Unable to determine at this time )   Patient/Family in Agreement with Plan yes     Pt is a 69 year old female who is cared for at home by her spouse. Pt had multiple hospital and LTAC admissions in January & February of this year and per the spouse has not completely been well since these admissions. Pt is receiving [facility] home health services of a nurse, Aide, PT & OT. Spouse reported over the past two weeks she has not been feeling well and has progressively declined. Pt refused coming to the hospital despite spouse and home health nurse recommending she seek medical care.     Spouse states the pt makes attempts to eat, but has difficult time holding food and medications down when taken. She is taking medication for nausea before most meals in an effort to successfully nourish self, but has continued to lose weight. Spouse is in favor of rehab or skilled care prior to returning home if appropriate.    SW attempted to complete assessment with the pt, but she was sleeping and hard to arouse. SW will follow and assist with appropriate discharge plan that is determined during this admission.

## 2020-05-08 NOTE — CONSULTS
[facility] - Med Surg  Adult Nutrition  Consult Note    SUMMARY     Recommendations   1. Pt to consume 50% of trays.     2. Pt to consume 50% of Boost Breeze TID.     3. If patient unable to meet intake goals, RD recommends gastric scope to be performed to re-evaluate esophagitis that was discovered 12/19. Pt may need peg tube placement due to low oral intake & malnutrition diagnosis. Patient should not be placed on parental nutrition due to low BP & high refeeding syndrome possibility.       Goals: 1. Pt to tolerate 50% of regular diet. 2. Patient to tolerate 50% Boost Breeze TID 3. Pt to tolerate gastric scope procedure and tube feeding initiation   Nutrition Goal Status: new    Reason for Assessment    Reason For Assessment: consult  Diagnosis: gastrointestinal disease  Relevant Medical History: gout, hypotyension  Interdisciplinary Rounds: attended    Dx: Malnutrition related to chronic catabolic state as evidence by 9.2 kg weight loss in 3 months, <25% intake for 1 month, nutrition physical exam showing severe muscle mass loss, and nutrition physical exam showing showing sever subcutaneous fat loss    General Information Comments: Pt is not oriented to time or place. Per RN, intake is <25%. Pt came in due to impactment, given enema yestereday relieved this. Pt denied swallowing/chewing difficulty. Pt denied N/V/D/C at this moment. In rounds, case management patient stated she couldn't keep food down. When confirming this, patient denied and only said it happened sometimes. Due to patient's disorientation,it's unknown when she had nausea/vomiting. When performing Nutrition Physical Exam, patient was eating. Patient has obvious dexterity issues, eye site issues, and only uses one hand to eat. RD changed texture to bite cut/soft to help with this. Also, per [employer] and [employer], patient met criteria for malnutrition. MD was informed and stated he would add to problem  list.          Nutrition Discharge Planning: Pt to d/c  on high protein & high calorie diet. Possibly talk to MD about tube feeding placement.     Nutrition Risk Screen    Nutrition Risk Screen: other (see comments)(malnourished)    Anthropometrics    Temp: 96.3 °F (35.7 °C)  Height Method: Stated  Height: 5' (152.4 cm)  Height (inches): 60 in  Weight Method: Bed Scale  Weight: 33.5 kg (73 lb 13.7 oz)  Weight (lb): 73.85 lb  Ideal Body Weight (IBW), Female: 100 lb  % Ideal Body Weight, Female (lb): 75 %  BMI (Calculated): 14.4       Lab/Procedures/Meds    Pertinent Labs Reviewed: reviewed  Pertinent Labs Comments: Na 131, Protein 4.5, Albumin 1.8  Pertinent Medications Reviewed: reviewed  Pertinent Medications Comments: Pantoprazole      Estimated/Assessed Needs    Weight Used For Calorie Calculations: 40.9 kg (90 lb 2.7 oz)(Adjusted Body Weight)  Energy Calorie Requirements (kcal): 0032-2067 kcal per day (30-35 kcal/kg <-- to promote weight gain)  Energy Need Method: Kcal/kg  Protein Requirements: 57-65 g per day(1.4-1.6 g/kg <-- high due to malnutrition & to promote weight gain)  Weight Used For Protein Calculations: 40.9 kg (90 lb 2.7 oz)(Adjusted Body Weight)     Estimated Fluid Requirement Method: RDA Method  RDA Method (mL): 1200         Nutrition Prescription Ordered    Current Diet Order: Regular Diet    Evaluation of Received Nutrient/Fluid Intake    Energy Calories Required: not meeting needs  Protein Required: not meeting needs  Fluid Required: not meeting needs  % Intake of Estimated Energy Needs: 0 - 25 %  % Meal Intake: 0 - 25 %    Nutrition Risk    Level of Risk/Frequency of Follow-up: high     Assessment and Plan    No new Assessment & Plan notes have been filed under this hospital service since the last note was generated.  Service: Nutrition       Monitor and Evaluation    Food and Nutrient Intake: energy intake, food and beverage intake  Food and Nutrient Adminstration: diet order  Anthropometric  Measurements: weight  Biochemical Data, Medical Tests and Procedures: electrolyte and renal panel, lipid profile, gastrointestinal profile, glucose/endocrine profile, inflammatory profile  Nutrition-Focused Physical Findings: overall appearance     Malnutrition Assessment  Malnutrition Type: chronic illness, social/environmental circumstances  Energy Intake: severe energy intake, moderate energy intake  Skin (Micronutrient): bruised, cracked, cuts unhealed, thinned  Neck/Chest (Micronutrient): subcutaneous fat loss, bony prominence       Weight Loss (Malnutrition): greater than 7.5% in 3 months  Energy Intake (Malnutrition): less than 75% for greater than or equal to 3 months   Orbital Region (Subcutaneous Fat Loss): severe depletion  Upper Arm Region (Subcutaneous Fat Loss): moderate depletion  Thoracic and Lumbar Region: severe depletion   Anabaptist Region (Muscle Loss): moderate depletion  Clavicle Bone Region (Muscle Loss): severe depletion  Clavicle and Acromion Bone Region (Muscle Loss): severe depletion  Scapular Bone Region (Muscle Loss): severe depletion  Posterior Calf Region (Muscle Loss): severe depletion   Edema (Fluid Accumulation): 0-->no edema present(No edma present per RD examination)   Subcutaneous Fat Loss (Final Summary): severe protein-calorie malnutrition  Muscle Loss Evaluation (Final Summary): severe protein-calorie malnutrition    Severe Weight Loss (Malnutrition): greater than 7.5% in 3 months    Nutrition Follow-Up    RD Follow-up?: Yes

## 2020-05-08 NOTE — NURSING
MONSALVE PLACED AT THIS TIME. IMMEDIATELY CLOUDY YELLOW URINE; THEN TURN TO CLOUDY REDDISH THICK URINE. MD NOTIFIED. 500ML BOLUS ORDER . RBV. CL,RN

## 2020-05-08 NOTE — ASSESSMENT & PLAN NOTE
Lab Results   Component Value Date    CREATININE 2.5 (H) 05/07/2020     Sr Cr above baseline. Baseline GFR is normal  Trail of IVF's  Retroperitoneal sonogram in AM  CT scan: The bladder is distended without a catheter in place and there is bilateral moderate to severe hydronephrosis.   Consult Urology  Cont to monitor with daily labs   Avoid nephrotoxins.   Renally dose all medications    Serum bicarb level 18. Monitor for need to initiate sodium bicarb if continues to decrease.

## 2020-05-08 NOTE — CONSULTS
[facility] - Med Surg  General Surgery  Consult Note    Patient Name: bOdulia Yepez  MRN: [id number]  Admission Date: 2020  Attending Physician: Marquise Simmons MD   Consult Physician: Dale Marquez MD  Primary Care Provider: Og Perez MD    Patient information was obtained from patient.     Subjective:     Reason for consultation:  Sacral wound.    History of Present Illness:    Obdulia Yepez is a 69 y.o. female with a history of arthritis asthma depression gout hypotension previous sepsis previous bladder perforation treated per Urology non operative previous gastric bypass severe malnutrition was admitted to the medical service because of decreased ability to take care of her own health decreased ability to do activities of daily living.  Given her troubles, she called her primary care physician who recommend the patient go to the ER.  Patient was admitted to the medical service with failure to thrive, dehydration progressive weakness.  Significant AK I found.  Patient this morning noted to have sacral decubitus.  Surgery called in consultation.    Review of patient's allergies indicates:   Allergen Reactions    Amoxicillin Diarrhea    Latex     Milk containing products     Opioids - morphine analogues     Peanut     Sodium phosphate     Soy     Sulfa (sulfonamide antibiotics)        Past Medical History:   Diagnosis Date    Allergy     Arthritis     Asthma     Depression     Gout     Hypotension     Neuropathy     Sepsis      Past Surgical History:   Procedure Laterality Date    APPENDECTOMY       SECTION      CHOLECYSTECTOMY      CYSTOSCOPY W/ RETROGRADES Bilateral 2019    Procedure: CYSTOSCOPY, WITH RETROGRADE PYELOGRAM;  Surgeon: Trip Bacon MD;  Location: [facility];  Service: Urology;  Laterality: Bilateral;    CYSTOSCOPY WITH BIOPSY OF BLADDER  2019    Procedure: CYSTOSCOPY, WITH BLADDER BIOPSY, WITH FULGURATION IF INDICATED;   Surgeon: Trip Bacon MD;  Location: [facility] OR;  Service: Urology;;    ESOPHAGOGASTRODUODENOSCOPY N/A 12/10/2019    Procedure: EGD (ESOPHAGOGASTRODUODENOSCOPY);  Surgeon: Shakeel Perez MD;  Location: [facility] ENDO;  Service: Endoscopy;  Laterality: N/A;    HYSTERECTOMY      SHOULDER SURGERY Right 04/16/2019    STOMACH SURGERY      URETEROSCOPY Right 11/13/2019    Procedure: URETEROSCOPY;  Surgeon: Trip Bacon MD;  Location: [facility] OR;  Service: Urology;  Laterality: Right;    WRIST SURGERY Left      Family History     Problem Relation (Age of Onset)    Asthma Mother    Bipolar disorder Sister    Colon cancer Maternal Grandmother, Maternal Grandfather    Pleurisy Father        Tobacco Use    Smoking status: Never Smoker    Smokeless tobacco: Never Used   Substance and Sexual Activity    Alcohol use: Yes     Comment: everyday drinker. has not had any in the past 2 days    Drug use: No    Sexual activity: Yes     Review of Systems   Constitutional: Negative for activity change, appetite change, chills and fever.   HENT: Negative for congestion, dental problem and ear discharge.    Eyes: Negative for discharge and itching.   Respiratory: Negative for apnea, choking and chest tightness.    Cardiovascular: Negative for chest pain and leg swelling.   Gastrointestinal: Negative for abdominal distention, abdominal pain, anal bleeding, constipation, diarrhea and nausea.   Endocrine: Negative for cold intolerance and heat intolerance.   Genitourinary: Negative for difficulty urinating and dyspareunia.   Musculoskeletal: Negative for arthralgias and back pain.   Skin: Negative for color change and pallor.   Neurological: Positive for weakness. Negative for dizziness and facial asymmetry.   Hematological: Negative for adenopathy. Does not bruise/bleed easily.   Psychiatric/Behavioral: Negative for agitation and behavioral problems.     Objective:     Vital Signs (Most Recent):  Temp: 96.3 °F (35.7 °C) (05/08/20  1050)  Pulse: 96 (05/08/20 1050)  Resp: 18 (05/08/20 1050)  BP: (!) 91/51 (05/08/20 1050)  SpO2: 95 % (05/08/20 1050) Vital Signs (24h Range):  Temp:  [96.3 °F (35.7 °C)-99.1 °F (37.3 °C)] 96.3 °F (35.7 °C)  Pulse:  [] 96  Resp:  [16-20] 18  SpO2:  [95 %-98 %] 95 %  BP: ()/(51-73) 91/51     Weight: 33.5 kg (73 lb 13.7 oz)  Body mass index is 14.42 kg/m².    Physical Exam   Constitutional: She is oriented to person, place, and time. She appears well-developed. She appears cachectic. No distress.   HENT:   Head: Normocephalic and atraumatic.   Eyes: Pupils are equal, round, and reactive to light. EOM are normal.   Neck: Normal range of motion. Neck supple. No thyromegaly present.   Cardiovascular: Normal rate and regular rhythm.   Pulmonary/Chest: Effort normal and breath sounds normal.   Abdominal: Soft. Bowel sounds are normal. She exhibits no distension. There is no tenderness.   Musculoskeletal: Normal range of motion. She exhibits no edema or deformity.        Back:    Neurological: She is alert and oriented to person, place, and time. No cranial nerve deficit.   Skin: Skin is warm. Capillary refill takes less than 2 seconds. No erythema.   Psychiatric: She has a normal mood and affect. Her behavior is normal.       Significant Labs:  CBC:   Recent Labs   Lab 05/08/20  0551   WBC 4.77   RBC 2.66*   HGB 8.4*   HCT 25.7*      MCV 97   MCH 31.6*   MCHC 32.7     BMP:   Recent Labs   Lab 05/08/20  0551   GLU 76   *   K 3.2*      CO2 17*   BUN 56*   CREATININE 2.2*   CALCIUM 7.9*   MG 2.4     CMP:   Recent Labs   Lab 05/08/20  0551   GLU 76   CALCIUM 7.9*   ALBUMIN 1.8*   PROT 4.5*   *   K 3.2*   CO2 17*      BUN 56*   CREATININE 2.2*   ALKPHOS 92   ALT 10   AST 12   BILITOT 0.9     LFTs:   Recent Labs   Lab 05/08/20  0551   ALT 10   AST 12   ALKPHOS 92   BILITOT 0.9   PROT 4.5*   ALBUMIN 1.8*     Coagulation: No results for input(s): LABPROT, INR, APTT in the last 168  hours.  Specimen (12h ago, onward)    None        Recent Labs   Lab 05/07/20 2000   COLORU Yellow   SPECGRAV 1.010   PHUR 7.0   PROTEINUA 2+*   BACTERIA Many*   NITRITE Negative   LEUKOCYTESUR 3+*   UROBILINOGEN Negative   HYALINECASTS 0       Assessment:   Obdulia Yepez is a 69 y.o. female who presents with JOSELUIS (acute kidney injury).    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  Likely pressure sore from her current bed-bound status given her severe malnutrition with cachexia.  No evidence of infection, no leukocytosis, no fevers, no local signs of overt infection.  No need for surgical debridement at this time.  Recommendations will be collagenase/Santyl placement approximately nickel thick over the wound twice daily as well as offloading the wound with every 2 hr bed turns and specialty bed, air mattress.  Call surgery with questions.  Thank you for consult    Dale Marquez MD  General Surgery  [facility] - Med Surg

## 2020-05-08 NOTE — PLAN OF CARE
Problem: Wound  Goal: Optimal Wound Healing  Outcome: Ongoing, Progressing     Problem: Fall Injury Risk  Goal: Absence of Fall and Fall-Related Injury  Outcome: Ongoing, Progressing     Problem: Adult Inpatient Plan of Care  Goal: Plan of Care Review  Outcome: Ongoing, Progressing  Goal: Patient-Specific Goal (Individualization)  Outcome: Ongoing, Progressing  Goal: Absence of Hospital-Acquired Illness or Injury  Outcome: Ongoing, Progressing  Goal: Optimal Comfort and Wellbeing  Outcome: Ongoing, Progressing  Goal: Readiness for Transition of Care  Outcome: Ongoing, Progressing  Goal: Rounds/Family Conference  Outcome: Ongoing, Progressing     Problem: Infection  Goal: Infection Symptom Resolution  Outcome: Ongoing, Progressing     Problem: Electrolyte Imbalance (Acute Kidney Injury/Impairment)  Goal: Serum Electrolyte Balance  Outcome: Ongoing, Progressing     Problem: Fluid Imbalance (Acute Kidney Injury/Impairment)  Goal: Optimal Fluid Balance  Outcome: Ongoing, Progressing     Problem: Hematologic Alteration (Acute Kidney Injury/Impairment)  Goal: Hemoglobin, Hematocrit and Platelets Within Normal Range  Outcome: Ongoing, Progressing     Problem: Oral Intake Inadequate (Acute Kidney Injury/Impairment)  Goal: Optimal Nutrition Intake  Outcome: Ongoing, Progressing     Problem: Renal Function Impairment (Acute Kidney Injury/Impairment)  Goal: Effective Renal Function  Outcome: Ongoing, Progressing     Problem: Fluid Imbalance (Pneumonia)  Goal: Fluid Balance  Outcome: Ongoing, Progressing     Problem: Infection (Pneumonia)  Goal: Resolution of Infection Signs/Symptoms  Outcome: Ongoing, Progressing     Problem: Respiratory Compromise (Pneumonia)  Goal: Effective Oxygenation and Ventilation  Outcome: Ongoing, Progressing     Problem: Skin Injury Risk Increased  Goal: Skin Health and Integrity  Outcome: Ongoing, Progressing

## 2020-05-08 NOTE — SUBJECTIVE & OBJECTIVE
Past Medical History:   Diagnosis Date    Allergy     Arthritis     Asthma     Depression     Gout     Hypotension     Neuropathy     Sepsis        Past Surgical History:   Procedure Laterality Date    APPENDECTOMY       SECTION      CHOLECYSTECTOMY      CYSTOSCOPY W/ RETROGRADES Bilateral 2019    Procedure: CYSTOSCOPY, WITH RETROGRADE PYELOGRAM;  Surgeon: Trip Bacon MD;  Location: [facility] OR;  Service: Urology;  Laterality: Bilateral;    CYSTOSCOPY WITH BIOPSY OF BLADDER  2019    Procedure: CYSTOSCOPY, WITH BLADDER BIOPSY, WITH FULGURATION IF INDICATED;  Surgeon: Trip Bacon MD;  Location: [facility] OR;  Service: Urology;;    ESOPHAGOGASTRODUODENOSCOPY N/A 12/10/2019    Procedure: EGD (ESOPHAGOGASTRODUODENOSCOPY);  Surgeon: Shakeel Perez MD;  Location: [facility];  Service: Endoscopy;  Laterality: N/A;    HYSTERECTOMY      SHOULDER SURGERY Right 2019    STOMACH SURGERY      URETEROSCOPY Right 2019    Procedure: URETEROSCOPY;  Surgeon: Trip Bacon MD;  Location: [facility] OR;  Service: Urology;  Laterality: Right;    WRIST SURGERY Left        Review of patient's allergies indicates:   Allergen Reactions    Amoxicillin Diarrhea    Latex     Milk containing products     Opioids - morphine analogues     Peanut     Sodium phosphate     Soy     Sulfa (sulfonamide antibiotics)        Current Facility-Administered Medications on File Prior to Encounter   Medication    acetaminophen tablet 650 mg    hylan g-f 20 48 mg/6 mL injection 48 mg    hylan g-f 20 48 mg/6 mL injection 48 mg    triamcinolone acetonide injection 80 mg     Current Outpatient Medications on File Prior to Encounter   Medication Sig    allopurinol (ZYLOPRIM) 300 MG tablet TAKE 1 TABLET BY MOUTH DAILY    budesonide-formoterol 160-4.5 mcg (SYMBICORT) 160-4.5 mcg/actuation HFAA Symbicort 160 mcg-4.5 mcg/actuation HFA aerosol inhaler   Inhale 2 puffs twice a day by inhalation route.     busPIRone (BUSPAR) 5 MG Tab Take 1 tablet (5 mg total) by mouth 2 (two) times daily.    cadexomer iodine (IODOSORB) 0.9 % gel Apply topically daily as needed for Wound Care.    colestipol (COLESTID) 1 gram Tab Take 1 tablet (1 g total) by mouth 2 (two) times daily.    cyclobenzaprine (FLEXERIL) 10 MG tablet Take 1 tablet (10 mg total) by mouth 3 (three) times daily as needed.    cyproheptadine (PERIACTIN) 4 mg tablet TAKE 1 TABLET (4 MG TOTAL) BY MOUTH 3 (THREE) TIMES DAILY AS NEEDED.    escitalopram oxalate (LEXAPRO) 20 MG tablet Take 20 mg by mouth once daily.     ferrous sulfate, dried (SLOW FE) 160 mg (50 mg iron) TbSR Take 45 mg by mouth.     fluticasone (FLONASE) 50 mcg/actuation nasal spray fluticasone 50 mcg/actuation nasal spray,suspension   Inhale 1 spray every day by intranasal route.    gabapentin (NEURONTIN) 300 MG capsule Take 3 capsules (900 mg total) by mouth 3 (three) times daily. (Patient taking differently: Take 600 mg by mouth 2 (two) times daily. )    hydrocortisone (CORTEF) 10 MG Tab TAKE 2 TABLETS BY MOUTH IN THE MORNING AND 1 TABLET IN THE EVENING    ipratropium (ATROVENT) 0.03 % nasal spray 2 sprays by Nasal route 3 (three) times daily.    lubiprostone (AMITIZA) 24 MCG Cap Take 24 mcg by mouth 2 (two) times daily with meals.    mirtazapine (REMERON) 30 MG tablet Take 30 mg by mouth.    montelukast (SINGULAIR) 10 mg tablet TAKE 1 TABLET(S) EVERY DAY BY ORAL ROUTE.    multivitamin (THERAGRAN) per tablet Take 1 tablet by mouth once daily.    mupirocin (BACTROBAN) 2 % ointment mupirocin 2 % topical ointment   APPLY A SMALL AMOUNT TO THE AFFECTED AREA BY TOPICAL ROUTE 2 TIMES PER DAY    ondansetron (ZOFRAN) 4 MG tablet Take 1 tablet (4 mg total) by mouth every 8 (eight) hours as needed for Nausea.    oxyCODONE-acetaminophen (PERCOCET)  mg per tablet Take 1 tablet by mouth every 8 (eight) hours as needed for Pain.    pantoprazole (PROTONIX) 40 MG tablet Take 1 tablet (40 mg  total) by mouth once daily.    polymyxin B sulf-trimethoprim (POLYTRIM) 10,000 unit- 1 mg/mL Drop Place 1 drop into the right eye every 4 (four) hours.    traMADol (ULTRAM) 50 mg tablet     vitamin E 1000 UNIT capsule Take 1,000 Units by mouth once daily.     Family History     Problem Relation (Age of Onset)    Asthma Mother    Bipolar disorder Sister    Colon cancer Maternal Grandmother, Maternal Grandfather    Pleurisy Father        Tobacco Use    Smoking status: Never Smoker    Smokeless tobacco: Never Used   Substance and Sexual Activity    Alcohol use: Yes     Comment: everyday drinker. has not had any in the past 2 days    Drug use: No    Sexual activity: Yes     Review of Systems   Constitutional: Positive for activity change, appetite change and fatigue. Negative for chills and fever.   HENT: Negative.    Eyes: Negative.    Respiratory: Negative.    Cardiovascular: Negative.    Gastrointestinal: Positive for constipation and nausea.   Genitourinary: Negative for difficulty urinating and dyspareunia.   Neurological: Negative.    Hematological: Negative.    Psychiatric/Behavioral: Negative.      Objective:     Vital Signs (Most Recent):  Temp: 99.1 °F (37.3 °C) (05/07/20 1707)  Pulse: 110 (05/07/20 1709)  Resp: 20 (05/07/20 1707)  BP: (!) 89/66 (05/07/20 1709)  SpO2: 98 % (05/07/20 1709) Vital Signs (24h Range):  Temp:  [99.1 °F (37.3 °C)] 99.1 °F (37.3 °C)  Pulse:  [110-111] 110  Resp:  [20] 20  SpO2:  [98 %] 98 %  BP: (89-94)/(66-73) 89/66     Weight: 34 kg (75 lb)  Body mass index is 14.65 kg/m².    Physical Exam   Constitutional: She is oriented to person, place, and time. Vital signs are normal. She appears well-developed. She appears cachectic. She has a sickly appearance. She appears ill.   Elderly frail appearing female  Cachectic  Not in any acute distress   Cardiovascular: Tachycardia present.   Neurological: She is alert and oriented to person, place, and time. She is not disoriented.    Psychiatric: She has a normal mood and affect. Her speech is normal and behavior is normal. She is not actively hallucinating. Cognition and memory are impaired. She is inattentive.           Significant Labs:   CBC:   Recent Labs   Lab 05/07/20  1759   WBC 6.85   HGB 9.2*   HCT 28.4*        CMP:   Recent Labs   Lab 05/07/20  1759   *   K 3.9   CL 97   CO2 18*   GLU 68*   BUN 57*   CREATININE 2.5*   CALCIUM 8.4*   PROT 5.4*   ALBUMIN 2.0*   BILITOT 0.9   ALKPHOS 111   AST 17   ALT 11   ANIONGAP 11   EGFRNONAA 19.3*     Cardiac Markers: No results for input(s): CKMB, MYOGLOBIN, BNP, TROPISTAT in the last 48 hours.  Coagulation: No results for input(s): PT, INR, APTT in the last 48 hours.  Lactic Acid: No results for input(s): LACTATE in the last 48 hours.  Lipase:   Recent Labs   Lab 05/07/20  1759   LIPASE 24     Lipid Panel: No results for input(s): CHOL, HDL, LDLCALC, TRIG, CHOLHDL in the last 48 hours.  POCT Glucose: No results for input(s): POCTGLUCOSE in the last 48 hours.  TSH: No results for input(s): TSH in the last 4320 hours.  Urine Culture: No results for input(s): LABURIN in the last 48 hours.  Urine Studies:   Recent Labs   Lab 05/07/20  1931   COLORU Yellow   APPEARANCEUA Cloudy*   PHUR 7.0   SPECGRAV 1.015   PROTEINUA 2+*   GLUCUA Negative   KETONESU 1+*   BILIRUBINUA Negative   OCCULTUA 2+*   NITRITE Negative   UROBILINOGEN Negative   LEUKOCYTESUR 3+*   RBCUA 40*   WBCUA >100*   BACTERIA Moderate*   HYALINECASTS 0     All pertinent labs within the past 24 hours have been reviewed.    Significant Imaging: I have reviewed all pertinent imaging results/findings within the past 24 hours.

## 2020-05-08 NOTE — ASSESSMENT & PLAN NOTE
05/08/2020  Continue antibiotic therapy  Consult General surgery for possible debridement  Monitor blood cultures.  Follow-up otherwise as clinically indicated

## 2020-05-08 NOTE — PROGRESS NOTES
Pharmacokinetic Initial Assessment: IV Vancomycin    Assessment/Plan:    Initiate intravenous vancomycin with loading dose of 500 mg once followed by a maintenance dose of vancomycin 500mg IV every 48 hours  Desired empiric serum trough concentration is 10 to 20 mcg/mL  Draw vancomycin trough level 30 min prior to next  dose on 05/10/20 at approximately 1100   Pharmacy will continue to follow and monitor vancomycin.      Please contact pharmacy at extension [phone] with any questions regarding this assessment.     Thank you for the consult,   Ryan Abner       Patient brief summary:  Obdulia Yepez is a 69 y.o. female initiated on antimicrobial therapy with IV Vancomycin for treatment of suspected skin & soft tissue infection    Drug Allergies:   Review of patient's allergies indicates:   Allergen Reactions    Amoxicillin Diarrhea    Latex     Milk containing products     Opioids - morphine analogues     Peanut     Sodium phosphate     Soy     Sulfa (sulfonamide antibiotics)        Actual Body Weight:   33.5 kg    Renal Function:   Estimated Creatinine Clearance: 13.1 mL/min (A) (based on SCr of 2.2 mg/dL (H)).,     Dialysis Method (if applicable):  N/A    CBC (last 72 hours):  Recent Labs   Lab Result Units 05/07/20 1759 05/08/20  0551   WBC K/uL 6.85 4.77   Hemoglobin g/dL 9.2* 8.4*   Hematocrit % 28.4* 25.7*   Platelets K/uL 271 230   Gran% % 71.9 76.9*   Lymph% % 18.8 14.5*   Mono% % 6.9 5.9   Eosinophil% % 0.9 1.5   Basophil% % 0.6 0.6   Differential Method  Automated Automated       Metabolic Panel (last 72 hours):  Recent Labs   Lab Result Units 05/07/20 1759 05/07/20  1931 05/07/20 2000 05/08/20  0551   Sodium mmol/L 126*  --   --  131*   Sodium Urine Random mmol/L  --   --  46  --    Potassium mmol/L 3.9  --   --  3.2*   Chloride mmol/L 97  --   --  106   CO2 mmol/L 18*  --   --  17*   Glucose mg/dL 68*  --   --  76   Glucose, UA   --  Negative Negative  --    BUN, Bld mg/dL 57*  --   --  56*   Creatinine  mg/dL 2.5*  --   --  2.2*   Albumin g/dL 2.0*  --   --  1.8*   Total Bilirubin mg/dL 0.9  --   --  0.9   Alkaline Phosphatase U/L 111  --   --  92   AST U/L 17  --   --  12   ALT U/L 11  --   --  10   Magnesium mg/dL  --   --   --  2.4   Phosphorus mg/dL  --   --   --  4.6*       Drug levels (last 3 results):  No results for input(s): VANCOMYCINRA, VANCOMYCINPE, VANCOMYCINTR in the last 72 hours.    Microbiologic Results:  Microbiology Results (last 7 days)       Procedure Component Value Units Date/Time    Urine culture [[id number]] Collected:  05/07/20 2000    Order Status:  No result Specimen:  Urine Updated:  05/08/20 1041    Urine culture [[id number]] Collected:  05/07/20 1931    Order Status:  No result Specimen:  Urine Updated:  05/07/20 1947

## 2020-05-08 NOTE — HPI
History of Present Illness:  Patient is a 69 y.o. female who has a past medical history of Allergy, Arthritis, Asthma, Depression, Gout, Hypotension, Neuropathy, and Sepsis presented with multiple complaints of not feeling well. Patient states that she has been having declining health for the past several months to the point now she is not eating and she is bed bound. She lives at home with her  and needs max assistance with ADL's. She is not ambulating and now bed bound. She has developed a sacral decub pressure ulcer as a result. She called her PCP with multiple complaints of abdominal pain, nausea, poor oral intake and weakness who instructed her to come to ED.

## 2020-05-08 NOTE — HOSPITAL COURSE
05/08/2020  Patient is a 69-year-old female that was admitted overnight for failure to thrive, dehydration and her  states that she has progressively weakened and is now bedbound.  Patient has a sacral decubitus which is stage IV.  Patient has multiple complaints of abdominal pain nausea and weakness.  She notified her primary care physician and then was told to present to the emergency department for evaluation.  Patient appears very malnourished and emaciated.  Patient denies any pain but states just not feeling well.  Labs show a sodium 131 potassium 3.2 chloride 106 bicarb 17 glucose 76 BUN 56 creatinine 2.2 calcium 7.9 and GFR 26 albumin 1.8  White blood cell count 4.7 hemoglobin 8.4 hematocrit 26  Urinalysis showed specific gravity 1.0102+ protein 2+ occult blood 3+ leukocytes    5/9/2020  Continue IV fluids. Patient with gross hematuria after spence insertion. Urology consulted. Continue IV antibiotics. Monitor bicarb. Started on Lactated Ringers today. Encourage fluids. Surgery consulted for Stage IV decubitus ulcer, no plans for debridement at this time.    5/10/2020  Patient moved to ICU overnight for hypotension/blood pressure monitoring. Started on low dose levophed to maintain MAP. Continued on IV fluids at 125 mL/hr. Having episodes of sinus tachycardia in the 140s/150s that self-resolve. Discussed with Cardiology, do not appear to be atrial arrhythmias but rather sinus rhythm. Continue to monitor. Continue IV antibiotics - WBC normal, procalcitonin normal, lactic acid normal. Urine studies ordered. Started on stress dose steroids for possible adrenal insufficiency    05/11/2020:  Events of overnight reviewed.  Patient moved to ICU for hypotension.  Patient was started on pressors and IV fluids were continued.  Vital signs were blood pressure 89/66 pulse 78 respirations 12 temperature afebrile and O2 sat 99%.  White blood cell count 7.4 hemoglobin 8.5 hematocrit 25 platelets 247 89 granulocytes  6.6 lymphocytes.  Chem profile:  Sodium 133 potassium 3.9 chloride 114 bicarb 14 and BUN 28 creatinine 1.1 GFR 52 and glucose 118 calcium 8.4  Patient is very ill appearing and hypotensive.  Patient be kept on empiric antibiotics and blood cultures be repeated.  Patient has had very low urine output and will consider transfer for Nephrology support.    5/12/20  Patient stable this AM \  No pressors this morning.  Patient denies other complaints  Jaja lfunction continues to improve  Will Transfer to Telemetry  D/C planning to start    05/13/2020:   Patient will be discharged home today with [facility].  Patient's condition is serious and prognosis is poor.  Patient will be sent home and enroll in hospice.  This was a choice by the patient and family.  Patient otherwise will be treated with gentamicin 250 mg IM daily for.  Of 7 days.  This is to treat resistant urinary tract infection with Pseudomonas.  Otherwise care will be left up to hospice and hospice attending.

## 2020-05-08 NOTE — H&P
[facility] - ED Hospital Medicine  History & Physical    Patient Name: Obdulia Yepez  MRN: [id number]  Admission Date: 2020  Attending Physician: No att. providers found   Primary Care Provider: Og Perez MD         Patient information was obtained from patient and ER records.     Start time: 7:10 pm  Chief complaint: abdominal pain  The patient location is: [place]  Present with the patient at the time of the telemed[facility]    Subjective:     Principal Problem:JOSELUIS (acute kidney injury)    Chief Complaint:   Chief Complaint   Patient presents with    Abdominal Pain     Generalized cramping sensation.    Constipation     Last bowel movement x 8 days ago.    Anorexia     Last oral intake 2020 at approximately 1300. States decreased appetite and decreased oral intake.    Nausea     Intermittent without vomiting.    Skin Ulcer     Complaint of pressure ulcer to sacrum/coccyx with constant chronic pain.        HPI: History of Present Illness:  Patient is a 69 y.o. female who has a past medical history of Allergy, Arthritis, Asthma, Depression, Gout, Hypotension, Neuropathy, and Sepsis presented with multiple complaints of not feeling well. Patient states that she has been having declining health for the past several months to the point now she is not eating and she is bed bound. She lives at home with her  and needs max assistance with ADL's. She is not ambulating and now bed bound. She has developed a sacral decub pressure ulcer as a result. She called her PCP with multiple complaints of abdominal pain, nausea, poor oral intake and weakness who instructed her to come to ED.     Past Medical History:   Diagnosis Date    Allergy     Arthritis     Asthma     Depression     Gout     Hypotension     Neuropathy     Sepsis        Past Surgical History:   Procedure Laterality Date    APPENDECTOMY       SECTION      CHOLECYSTECTOMY       CYSTOSCOPY W/ RETROGRADES Bilateral 11/13/2019    Procedure: CYSTOSCOPY, WITH RETROGRADE PYELOGRAM;  Surgeon: Trip Bacon MD;  Location: [facility] OR;  Service: Urology;  Laterality: Bilateral;    CYSTOSCOPY WITH BIOPSY OF BLADDER  11/13/2019    Procedure: CYSTOSCOPY, WITH BLADDER BIOPSY, WITH FULGURATION IF INDICATED;  Surgeon: Trip Bacon MD;  Location: [facility] OR;  Service: Urology;;    ESOPHAGOGASTRODUODENOSCOPY N/A 12/10/2019    Procedure: EGD (ESOPHAGOGASTRODUODENOSCOPY);  Surgeon: Shakeel Perez MD;  Location: [facility] ENDO;  Service: Endoscopy;  Laterality: N/A;    HYSTERECTOMY      SHOULDER SURGERY Right 04/16/2019    STOMACH SURGERY      URETEROSCOPY Right 11/13/2019    Procedure: URETEROSCOPY;  Surgeon: Trip Bacon MD;  Location: [facility] OR;  Service: Urology;  Laterality: Right;    WRIST SURGERY Left        Review of patient's allergies indicates:   Allergen Reactions    Amoxicillin Diarrhea    Latex     Milk containing products     Opioids - morphine analogues     Peanut     Sodium phosphate     Soy     Sulfa (sulfonamide antibiotics)        Current Facility-Administered Medications on File Prior to Encounter   Medication    acetaminophen tablet 650 mg    hylan g-f 20 48 mg/6 mL injection 48 mg    hylan g-f 20 48 mg/6 mL injection 48 mg    triamcinolone acetonide injection 80 mg     Current Outpatient Medications on File Prior to Encounter   Medication Sig    allopurinol (ZYLOPRIM) 300 MG tablet TAKE 1 TABLET BY MOUTH DAILY    budesonide-formoterol 160-4.5 mcg (SYMBICORT) 160-4.5 mcg/actuation HFAA Symbicort 160 mcg-4.5 mcg/actuation HFA aerosol inhaler   Inhale 2 puffs twice a day by inhalation route.    busPIRone (BUSPAR) 5 MG Tab Take 1 tablet (5 mg total) by mouth 2 (two) times daily.    cadexomer iodine (IODOSORB) 0.9 % gel Apply topically daily as needed for Wound Care.    colestipol (COLESTID) 1 gram Tab Take 1 tablet (1 g total) by mouth 2 (two) times daily.     cyclobenzaprine (FLEXERIL) 10 MG tablet Take 1 tablet (10 mg total) by mouth 3 (three) times daily as needed.    cyproheptadine (PERIACTIN) 4 mg tablet TAKE 1 TABLET (4 MG TOTAL) BY MOUTH 3 (THREE) TIMES DAILY AS NEEDED.    escitalopram oxalate (LEXAPRO) 20 MG tablet Take 20 mg by mouth once daily.     ferrous sulfate, dried (SLOW FE) 160 mg (50 mg iron) TbSR Take 45 mg by mouth.     fluticasone (FLONASE) 50 mcg/actuation nasal spray fluticasone 50 mcg/actuation nasal spray,suspension   Inhale 1 spray every day by intranasal route.    gabapentin (NEURONTIN) 300 MG capsule Take 3 capsules (900 mg total) by mouth 3 (three) times daily. (Patient taking differently: Take 600 mg by mouth 2 (two) times daily. )    hydrocortisone (CORTEF) 10 MG Tab TAKE 2 TABLETS BY MOUTH IN THE MORNING AND 1 TABLET IN THE EVENING    ipratropium (ATROVENT) 0.03 % nasal spray 2 sprays by Nasal route 3 (three) times daily.    lubiprostone (AMITIZA) 24 MCG Cap Take 24 mcg by mouth 2 (two) times daily with meals.    mirtazapine (REMERON) 30 MG tablet Take 30 mg by mouth.    montelukast (SINGULAIR) 10 mg tablet TAKE 1 TABLET(S) EVERY DAY BY ORAL ROUTE.    multivitamin (THERAGRAN) per tablet Take 1 tablet by mouth once daily.    mupirocin (BACTROBAN) 2 % ointment mupirocin 2 % topical ointment   APPLY A SMALL AMOUNT TO THE AFFECTED AREA BY TOPICAL ROUTE 2 TIMES PER DAY    ondansetron (ZOFRAN) 4 MG tablet Take 1 tablet (4 mg total) by mouth every 8 (eight) hours as needed for Nausea.    oxyCODONE-acetaminophen (PERCOCET)  mg per tablet Take 1 tablet by mouth every 8 (eight) hours as needed for Pain.    pantoprazole (PROTONIX) 40 MG tablet Take 1 tablet (40 mg total) by mouth once daily.    polymyxin B sulf-trimethoprim (POLYTRIM) 10,000 unit- 1 mg/mL Drop Place 1 drop into the right eye every 4 (four) hours.    traMADol (ULTRAM) 50 mg tablet     vitamin E 1000 UNIT capsule Take 1,000 Units by mouth once daily.     Family  History     Problem Relation (Age of Onset)    Asthma Mother    Bipolar disorder Sister    Colon cancer Maternal Grandmother, Maternal Grandfather    Pleurisy Father        Tobacco Use    Smoking status: Never Smoker    Smokeless tobacco: Never Used   Substance and Sexual Activity    Alcohol use: Yes     Comment: everyday drinker. has not had any in the past 2 days    Drug use: No    Sexual activity: Yes     Review of Systems   Constitutional: Positive for activity change, appetite change and fatigue. Negative for chills and fever.   HENT: Negative.    Eyes: Negative.    Respiratory: Negative.    Cardiovascular: Negative.    Gastrointestinal: Positive for constipation and nausea.   Genitourinary: Negative for difficulty urinating and dyspareunia.   Neurological: Negative.    Hematological: Negative.    Psychiatric/Behavioral: Negative.      Objective:     Vital Signs (Most Recent):  Temp: 99.1 °F (37.3 °C) (05/07/20 1707)  Pulse: 110 (05/07/20 1709)  Resp: 20 (05/07/20 1707)  BP: (!) 89/66 (05/07/20 1709)  SpO2: 98 % (05/07/20 1709) Vital Signs (24h Range):  Temp:  [99.1 °F (37.3 °C)] 99.1 °F (37.3 °C)  Pulse:  [110-111] 110  Resp:  [20] 20  SpO2:  [98 %] 98 %  BP: (89-94)/(66-73) 89/66     Weight: 34 kg (75 lb)  Body mass index is 14.65 kg/m².    Physical Exam   Constitutional: She is oriented to person, place, and time. Vital signs are normal. She appears well-developed. She appears cachectic. She has a sickly appearance. She appears ill.   Elderly frail appearing female  Cachectic  Not in any acute distress   Cardiovascular: Tachycardia present.   Neurological: She is alert and oriented to person, place, and time. She is not disoriented.   Psychiatric: She has a normal mood and affect. Her speech is normal and behavior is normal. She is not actively hallucinating. Cognition and memory are impaired. She is inattentive.           Significant Labs:   CBC:   Recent Labs   Lab 05/07/20  1759   WBC 6.85   HGB 9.2*    HCT 28.4*        CMP:   Recent Labs   Lab 05/07/20  1759   *   K 3.9   CL 97   CO2 18*   GLU 68*   BUN 57*   CREATININE 2.5*   CALCIUM 8.4*   PROT 5.4*   ALBUMIN 2.0*   BILITOT 0.9   ALKPHOS 111   AST 17   ALT 11   ANIONGAP 11   EGFRNONAA 19.3*     Cardiac Markers: No results for input(s): CKMB, MYOGLOBIN, BNP, TROPISTAT in the last 48 hours.  Coagulation: No results for input(s): PT, INR, APTT in the last 48 hours.  Lactic Acid: No results for input(s): LACTATE in the last 48 hours.  Lipase:   Recent Labs   Lab 05/07/20  1759   LIPASE 24     Lipid Panel: No results for input(s): CHOL, HDL, LDLCALC, TRIG, CHOLHDL in the last 48 hours.  POCT Glucose: No results for input(s): POCTGLUCOSE in the last 48 hours.  TSH: No results for input(s): TSH in the last 4320 hours.  Urine Culture: No results for input(s): LABURIN in the last 48 hours.  Urine Studies:   Recent Labs   Lab 05/07/20  1931   COLORU Yellow   APPEARANCEUA Cloudy*   PHUR 7.0   SPECGRAV 1.015   PROTEINUA 2+*   GLUCUA Negative   KETONESU 1+*   BILIRUBINUA Negative   OCCULTUA 2+*   NITRITE Negative   UROBILINOGEN Negative   LEUKOCYTESUR 3+*   RBCUA 40*   WBCUA >100*   BACTERIA Moderate*   HYALINECASTS 0     All pertinent labs within the past 24 hours have been reviewed.    Significant Imaging: I have reviewed all pertinent imaging results/findings within the past 24 hours.    Assessment/Plan:     * JOSELUIS (acute kidney injury)  Lab Results   Component Value Date    CREATININE 2.5 (H) 05/07/2020     Sr Cr above baseline. Baseline GFR is normal  Trail of IVF's  Retroperitoneal sonogram in AM  CT scan: The bladder is distended without a catheter in place and there is bilateral moderate to severe hydronephrosis.   Consult Urology  Cont to monitor with daily labs   Avoid nephrotoxins.   Renally dose all medications    Serum bicarb level 18. Monitor for need to initiate sodium bicarb if continues to decrease.     Debility  Cont with PT/OT for gait  training and strengthening and restoration of ADL's   Encourage mobility, OOB in chair, and early ambulation as appropriate  Fall precautions   Monitor for bowel and bladder dysfunction  Monitor for and prevent skin breakdown and pressure ulcers    Constipation  Aggressive bowel regimen    Hyponatremia  Appears to be chronic.  Check serum and urine osm  Check urine sodium       VTE Risk Mitigation (From admission, onward)         Ordered     Place MORIS hose  Until discontinued      05/07/20 1953     IP VTE LOW RISK PATIENT  Once      05/07/20 1953                   End time:  7:57    Total time spent with patient: 47 min    The attending portion of this evaluation, treatment, and documentation was performed per Marquise Stauffer MD via audiovisual.      Marquise Stauffer MD  Department of Hospital Medicine   [facility] - ED

## 2020-05-08 NOTE — PT/OT/SLP EVAL
PhysicalTherapy   Evaluation    Obdulia Yepez 1950  MRN: [id number]     Date: 2020                          Evaluation and treat secondary to weakness     Diagnosis: Acute Renal Failure; Drug induced constipation   Treatment Diagnosis: weakness     Past Medical History:   Diagnosis Date    Allergy     Arthritis     Asthma     Depression     Gout     Hypotension     Neuropathy     Sepsis       Past Surgical History:   Procedure Laterality Date    APPENDECTOMY       SECTION      CHOLECYSTECTOMY      CYSTOSCOPY W/ RETROGRADES Bilateral 2019    Procedure: CYSTOSCOPY, WITH RETROGRADE PYELOGRAM;  Surgeon: Trip Bacon MD;  Location: [facility] OR;  Service: Urology;  Laterality: Bilateral;    CYSTOSCOPY WITH BIOPSY OF BLADDER  2019    Procedure: CYSTOSCOPY, WITH BLADDER BIOPSY, WITH FULGURATION IF INDICATED;  Surgeon: Trip Bacon MD;  Location: [facility] OR;  Service: Urology;;    ESOPHAGOGASTRODUODENOSCOPY N/A 12/10/2019    Procedure: EGD (ESOPHAGOGASTRODUODENOSCOPY);  Surgeon: Shakeel Perez MD;  [facility];  Service: Endoscopy;  Laterality: N/A;    HYSTERECTOMY      SHOULDER SURGERY Right 2019    STOMACH SURGERY      URETEROSCOPY Right 2019    Procedure: URETEROSCOPY;  Surgeon: Trip Bacon MD;  Location: [facility] OR;  Service: Urology;  Laterality: Right;    WRIST SURGERY Left        Referring physician: Jimenez Patel MD   Date referred to PT: 2020     General Precautions: Standard,    Orthopedic Precautions: n/a   Braces: n/a          Patient History:     Obdulia Yepez is a 70 yo female admitted with JOSELUIS; She is well known to this Physical Therapist from multiple previous encounters in our OP clinic following cervical spine fusion; multiple falls, pelvic fracture; bilateral knee OA; gait instability;   Within the past year she has also had bilateral Reverse TSA's - rehab performed at home. Patient was ambulatory with use of a RW Mod I when  "she was last seen in early 2019.       DME owned: rolling walker, standard walker, single point cane, bedside commode, shower chair and wheelchair    Previous Level of Function:  Patient stated that she has basically been bed bound for over a month; she has been receiving  physical therapy - but the therapist is only allowed to come 1x/week.  She indicated that she was able to walk with her RW and the PT 2 weeks ago for about 20ft.  She stated that she had not been out of bed since than (2 weeks). Obdulia stated that she requires assistance for all ADL's; Limited use of BUE's secondary to Reverse TSA's about 1 year ago as well as Bilateral LE/UE neuropathy. She stated that her , Xander, has been providing most of her care at home        Subjective:  Communicated with RNVerna prior to session.  Patient has a Stage 4 sacral decubitus ulcer; She is currently receiving antibiotics;fluids via IV; She has telemetry and puriwick.      Chief Complaint: "I am so weak, I can barely walk anymore".  Patient is severely malnourished, she stated that she just has no appetite for food and has found herself rationing the amount of food that she eats.  She stated that she has been depressed since the death of her son a year ago. She has significant muscle wasting throughout - current weight is around 79 pounds.          Objective:    Upper Extremity Range of Motion:  Right Upper Extremity limited, defer to OT evaluation   Left Upper Extremity limited, defer to OT evaluation    Upper Extremity Strength:  Right Upper Extremity: limited, defer to OT evaluation   Left Upper Extremity: limited - defer to OT evaluation    Lower Extremity Range of Motion:  Right Lower Extremity: WFL except decreased ankle dorsiflexion due to neuropathy  Left Lower Extremity: WFL except decreased ankle dorsiflexion due to neuropathy    Lower Extremity Strength:  Right Lower Extremity: Deficits: significant muscle wasting; strength grossly " assesed is 3/5 at hip and knees; ankle 3-/5  Left Lower Extremity: Deficits: significant muslce wasting; strength grossly assess is 3/5 at hip and knees; ankle 3-/5     Fine motor coordination: impaired bilateral hands/fingers due to neuropathy       Gross motor coordination: WFL    Functional Mobility:    Bed Mobility :   Supine to sit: Maximum Assistance   Sit to supine: Maximum Assistance   Rolling: Maximum Assistance   Scooting: Maximum Assistance    Transfers:  Sit <> Stand: Max assistance; patient with posterior lean, decreased ability to come to complete standing due to inability to bring trunk over her feet for balance     Wheelchair:  Not assessed     Gait:  Not assessed; does require use of RW     Balance:   Static Sit: FAIR-: Maintains without assist but inconsistent   Dynamic Sit:  FAIR+: Maintains balance through MINIMAL excursions of active trunk motion  Static Stand: 0: Needs MAXIMAL assist to maintain   Dynamic stand: 0: N/A    Assessment:    Cognitive Exam:  Oriented to: Person, Place, Time and Situation; kept eyes closed over 50% of the time during evaluation   Follows Commands/attention: Follows multistep  commands  Communication: clear/fluent; low voice volume   Safety awareness/insight to disability: intact    Skin integrity: Wound per Verna ESCOBAR - Stage 4 sacral decubitus ulcer  Edema: None noted     Sensation:      -       Impaired  light/touch bilateral UE's/LE's distally    Endurance deficit:  Poor activity and endurance level for all/any activity     Patient left supine with pillow under left side for pressure relief; all lines remain intact; call light within reach; RN present in room       Rehab potential is fair.    Activity tolerance: Poor    Equipment recommendations:   Patient has all needed equipment at the present time     Education:    Usha Steiner on AROM ex to perform for legs while in bed: SLR, heel slides; quad sets, gluteal sets     Plan of Care:    Patient will receive  Skilled Physical Therapy Services on a daily basis Monday - Friday for functional mobility training to include: TE, TA, Transfer training, Gait Training and neuromusclular re-education.       GOALS:   Patient goals: To get stronger   Family goals: N/A     Therapy goals:   1. Patient able to perform supine <> sit with moderate assist  2. Patient able to perform sit <>  stand with RW and moderate assist  3. Patient able to perform standing pivot transfer with RW and moderate assist from bed <> Chair/BSC   4. Patient able to ambulate with RW and moderate assist x 35ft.       Discharge recommendations:   Patient will benefit from SNF for continued Rehab intervention following acute stay.        Rosalba Thurman, PT 5/8/2020

## 2020-05-08 NOTE — ASSESSMENT & PLAN NOTE
Lab Results   Component Value Date    CREATININE 2.2 (H) 05/08/2020     Sr Cr above baseline. Baseline GFR is normal  Trail of IVF's  Retroperitoneal sonogram in AM  CT scan: The bladder is distended without a catheter in place and there is bilateral moderate to severe hydronephrosis.   Consult Urology  Cont to monitor with daily labs   Avoid nephrotoxins.   Renally dose all medications    Serum bicarb level 18. Monitor for need to initiate sodium bicarb if continues to decrease.     05/08/2020:  Agree with retroperitoneal sonogram  Will hold on urology consult for now  Continue hydration at current level.  Begin empiric antibiotics for urinary tract infection and possibly infected sacral decubitus.  Piperacillin 3.375 g IV q.8 hours  Vancomycin dosed by pharmacy

## 2020-05-08 NOTE — PLAN OF CARE
05/08/20 0955   Medicare Message   Important Message from Medicare regarding Discharge Appeal Rights Given to patient/caregiver;Explained to patient/caregiver;Signed/date by patient/caregiver  (Verbal consent was obtanied from the pts spouse. )   Date IMM was signed 05/07/20   Time IMM was signed 0955

## 2020-05-08 NOTE — PROGRESS NOTES
[facility] Medicine  Progress Note    Patient Name: Obdulia Yepez  MRN: [id number]  Patient Class: IP- Inpatient   Admission Date: 5/7/2020  Length of Stay: 1 days  Attending Physician: Marquise Simmons MD  Primary Care Provider: Og Perez MD        Subjective:     Principal Problem:JOSELUIS (acute kidney injury)        HPI:  History of Present Illness:  Patient is a 69 y.o. female who has a past medical history of Allergy, Arthritis, Asthma, Depression, Gout, Hypotension, Neuropathy, and Sepsis presented with multiple complaints of not feeling well. Patient states that she has been having declining health for the past several months to the point now she is not eating and she is bed bound. She lives at home with her  and needs max assistance with ADL's. She is not ambulating and now bed bound. She has developed a sacral decub pressure ulcer as a result. She called her PCP with multiple complaints of abdominal pain, nausea, poor oral intake and weakness who instructed her to come to ED.     Overview/Hospital Course:  05/08/2020  Patient is a 69-year-old female that was admitted overnight for failure to thrive, dehydration and her  states that she has progressively weakened and is now bedbound.  Patient has a sacral decubitus which is stage IV.  Patient has multiple complaints of abdominal pain nausea and weakness.  She notified her primary care physician and then was told to present to the emergency department for evaluation.  Patient appears very malnourished and emaciated.  Patient denies any pain but states just not feeling well.  Labs show a sodium 131 potassium 3.2 chloride 106 bicarb 17 glucose 76 BUN 56 creatinine 2.2 calcium 7.9 and GFR 26 albumin 1.8  White blood cell count 4.7 hemoglobin 8.4 hematocrit 26  Urinalysis showed specific gravity 1.0102+ protein 2+ occult blood 3+ leukocytes    Interval History:     Review of Systems   Constitutional: Positive  for activity change, appetite change and fatigue. Negative for fever.   HENT: Negative for congestion, ear discharge, mouth sores, nosebleeds, rhinorrhea, sinus pressure, sinus pain and tinnitus.    Eyes: Negative.  Negative for pain, redness and itching.   Respiratory: Negative for apnea, cough, choking, chest tightness, shortness of breath, wheezing and stridor.    Cardiovascular: Negative for chest pain, palpitations and leg swelling.   Gastrointestinal: Positive for abdominal pain, nausea, rectal pain and vomiting. Negative for abdominal distention, anal bleeding, blood in stool, constipation and diarrhea.   Endocrine: Negative.    Genitourinary: Positive for difficulty urinating. Negative for flank pain, frequency and urgency.   Musculoskeletal: Positive for arthralgias and myalgias. Negative for back pain and gait problem.   Skin: Negative for color change and pallor.   Allergic/Immunologic: Negative.    Neurological: Positive for dizziness, weakness and light-headedness. Negative for facial asymmetry and headaches.   Hematological: Negative for adenopathy. Does not bruise/bleed easily.   Psychiatric/Behavioral: The patient is nervous/anxious.      Objective:     Vital Signs (Most Recent):  Temp: 96.3 °F (35.7 °C) (05/08/20 1050)  Pulse: 96 (05/08/20 1050)  Resp: 18 (05/08/20 1050)  BP: (!) 91/51 (05/08/20 1050)  SpO2: 95 % (05/08/20 1050) Vital Signs (24h Range):  Temp:  [96.3 °F (35.7 °C)-99.1 °F (37.3 °C)] 96.3 °F (35.7 °C)  Pulse:  [] 96  Resp:  [16-20] 18  SpO2:  [95 %-98 %] 95 %  BP: ()/(51-73) 91/51     Weight: 33.5 kg (73 lb 13.7 oz)  Body mass index is 14.42 kg/m².    Intake/Output Summary (Last 24 hours) at 5/8/2020 1121  Last data filed at 5/8/2020 0900  Gross per 24 hour   Intake 1730 ml   Output 100 ml   Net 1630 ml      Physical Exam   Constitutional: She is oriented to person, place, and time. She appears well-developed and well-nourished.   HENT:   Head: Normocephalic and  atraumatic.   Right Ear: External ear normal.   Left Ear: External ear normal.   Nose: Nose normal.   Mouth/Throat: Oropharynx is clear and moist. No oropharyngeal exudate.   Eyes: Pupils are equal, round, and reactive to light. EOM are normal.   Neck: Normal range of motion. Neck supple. No tracheal deviation present. No thyromegaly present.   Cardiovascular: Normal rate, regular rhythm and normal heart sounds.   Pulmonary/Chest: Effort normal and breath sounds normal. No respiratory distress. She has no wheezes. She has no rales. She exhibits no tenderness.   Abdominal: Soft. Bowel sounds are normal. She exhibits no distension. There is tenderness. There is guarding. There is no rebound.   Musculoskeletal: Normal range of motion. She exhibits deformity.   Lymphadenopathy:     She has no cervical adenopathy.   Neurological: She is alert and oriented to person, place, and time. A sensory deficit is present.   Skin: Skin is warm and dry. Capillary refill takes less than 2 seconds.   Psychiatric: She has a normal mood and affect. Her behavior is normal. Judgment and thought content normal.   Nursing note and vitals reviewed.      Significant Labs:   Recent Lab Results       05/08/20  0551   05/07/20 2000 05/07/20  1931   05/07/20  1759        Albumin 1.8     2.0     Alkaline Phosphatase 92     111     ALT 10     11     Anion Gap 8     11     Appearance, UA   Hazy Cloudy       AST 12     17     Bacteria, UA   Many Moderate       Baso # 0.03     0.04     Basophil% 0.6     0.6     Bilirubin (UA)   Negative Negative       BILIRUBIN TOTAL 0.9  Comment:  For infants and newborns, interpretation of results should be based  on gestational age, weight and in agreement with clinical  observations.  Premature Infant recommended reference ranges:  Up to 24 hours.............<8.0 mg/dL  Up to 48 hours............<12.0 mg/dL  3-5 days..................<15.0 mg/dL  6-29 days.................<15.0 mg/dL       0.9  Comment:  For  infants and newborns, interpretation of results should be based  on gestational age, weight and in agreement with clinical  observations.  Premature Infant recommended reference ranges:  Up to 24 hours.............<8.0 mg/dL  Up to 48 hours............<12.0 mg/dL  3-5 days..................<15.0 mg/dL  6-29 days.................<15.0 mg/dL       BUN, Bld 56     57     Calcium 7.9     8.4     Chloride 106     97     CO2 17     18     Color, UA   Yellow Yellow       Creatinine 2.2     2.5     Differential Method Automated     Automated     eGFR if  26.0     22.2     eGFR if non  22.5  Comment:  Calculation used to obtain the estimated glomerular filtration  rate (eGFR) is the CKD-EPI equation.        19.3  Comment:  Calculation used to obtain the estimated glomerular filtration  rate (eGFR) is the CKD-EPI equation.        Eos # 0.1     0.1     Eosinophil% 1.5     0.9     Glucose 76     68     Glucose, UA   Negative Negative       Gran # (ANC) 3.7     4.9     Gran% 76.9     71.9     Hematocrit 25.7     28.4     Hemoglobin 8.4     9.2     Hyaline Casts, UA   0 0       Immature Grans (Abs) 0.03  Comment:  Mild elevation in immature granulocytes is non specific and   can be seen in a variety of conditions including stress response,   acute inflammation, trauma and pregnancy. Correlation with other   laboratory and clinical findings is essential.       0.06  Comment:  Mild elevation in immature granulocytes is non specific and   can be seen in a variety of conditions including stress response,   acute inflammation, trauma and pregnancy. Correlation with other   laboratory and clinical findings is essential.       Immature Granulocytes 0.6     0.9     Ketones, UA   1+ 1+       Leukocytes, UA   3+ 3+       Lipase       24     Lymph # 0.7     1.3     Lymph% 14.5     18.8     Magnesium 2.4           MCH 31.6     31.2     MCHC 32.7     32.4     MCV 97     96     Microscopic Comment   SEE  COMMENT  Comment:  Other formed elements not mentioned in the report are not   present in the microscopic examination.    SEE COMMENT  Comment:  Other formed elements not mentioned in the report are not   present in the microscopic examination.          Mono # 0.3     0.5     Mono% 5.9     6.9     MPV 9.9     9.7     NITRITE UA   Negative Negative       nRBC 0     0     Occult Blood UA   2+ 2+       pH, UA   7.0 7.0       Phosphorus 4.6           Platelets 230     271     Potassium 3.2     3.9     PROTEIN TOTAL 4.5     5.4     Protein, UA   2+  Comment:  Recommend a 24 hour urine protein or a urine   protein/creatinine ratio if globulin induced proteinuria is  clinically suspected.   2+  Comment:  Recommend a 24 hour urine protein or a urine   protein/creatinine ratio if globulin induced proteinuria is  clinically suspected.         RBC 2.66     2.95     RBC, UA   30 40       RDW 13.0     12.9     SARS-CoV-2 RNA, Amplification, Qual       Negative  Comment:  This test utilizes isothermal nucleic acid amplification   technology to detect the SARS-CoV-2 RdRp nucleic acid segment.   The analytical sensitivity (limit of detection) is 125 genome   equivalents/mL.   A POSITIVE result implies infection with the SARS-CoV-2 virus;  the patient is presumed to be contagious.    A NEGATIVE result means that SARS-CoV-2 nucleic acids are not  present above the limit of detection. It does not rule out the   possibility of COVID-19 and should not be the sole basis for   treatment decisions. If COVID-19 is strongly suspected based on  clinical and exposure history, re-testing should be considered.   This test is only for use under the Food and Drug   Administration s Emergency Use Authorization (EUA).   Commercial kits are provided by [employer].   Performance characteristics of the EUA have been independently  verified by [facility] Department of  Pathology and Laboratory Medicine.    _________________________________________________________________  The ID NOW COVID-19 Letter of Authorization, along with the   authorized Fact Sheet for Healthcare Providers, the authorized Fact  Sheet for Patients, and authorized labeling are available on the FDA   website:  www.fda.gov/MedicalDevices/Safety/EmergencySituations/kpg497142.htm       Sodium 131     126     Specific [place], UA   1.010 1.015       Specimen UA   Urine, Clean Catch Urine, Clean Catch       Squam Epithel, UA   13         UROBILINOGEN UA   Negative Negative       WBC, UA   >100 >100       WBC 4.77     6.85         All pertinent labs within the past 24 hours have been reviewed.    Significant Imaging: I have reviewed and interpreted all pertinent imaging results/findings within the past 24 hours.      Assessment/Plan:      * JOSELUIS (acute kidney injury)  Lab Results   Component Value Date    CREATININE 2.2 (H) 05/08/2020     Sr Cr above baseline. Baseline GFR is normal  Trail of IVF's  Retroperitoneal sonogram in AM  CT scan: The bladder is distended without a catheter in place and there is bilateral moderate to severe hydronephrosis.   Consult Urology  Cont to monitor with daily labs   Avoid nephrotoxins.   Renally dose all medications    Serum bicarb level 18. Monitor for need to initiate sodium bicarb if continues to decrease.     05/08/2020:  Agree with retroperitoneal sonogram  Will hold on urology consult for now  Continue hydration at current level.  Begin empiric antibiotics for urinary tract infection and possibly infected sacral decubitus.  Piperacillin 3.375 g IV q.8 hours  Vancomycin dosed by pharmacy    Hypokalemia  05/08/2020  Replete potassium as needed  Recheck labs in the a.m. to include CBC, CMP      Pressure injury of sacral region, stage 4  05/08/2020  Continue antibiotic therapy  Consult General surgery for possible debridement  Monitor blood cultures.  Follow-up otherwise as clinically indicated      Debility  Cont  with PT/OT for gait training and strengthening and restoration of ADL's   Encourage mobility, OOB in chair, and early ambulation as appropriate  Fall precautions   Monitor for bowel and bladder dysfunction  Monitor for and prevent skin breakdown and pressure ulcers    Constipation  Aggressive bowel regimen    Hyponatremia  Appears to be chronic.  Check serum and urine osm  Check urine sodium       VTE Risk Mitigation (From admission, onward)         Ordered     Place MORIS hose  Until discontinued      05/07/20 1953     IP VTE LOW RISK PATIENT  Once      05/07/20 1953                      Jimenez Patel MD  Department of Hospital Medicine   [facility]

## 2020-05-09 ENCOUNTER — ANESTHESIA EVENT (OUTPATIENT)
Dept: INTENSIVE CARE | Facility: HOSPITAL | Age: 70
DRG: 682 | End: 2020-05-09
Payer: MEDICARE

## 2020-05-09 ENCOUNTER — ANESTHESIA (OUTPATIENT)
Dept: INTENSIVE CARE | Facility: HOSPITAL | Age: 70
DRG: 682 | End: 2020-05-09
Payer: MEDICARE

## 2020-05-09 PROBLEM — R31.0 GROSS HEMATURIA: Status: ACTIVE | Noted: 2020-05-09

## 2020-05-09 PROBLEM — E86.1 HYPOTENSION DUE TO HYPOVOLEMIA: Status: ACTIVE | Noted: 2020-05-09

## 2020-05-09 PROBLEM — I95.9 HYPOTENSION: Status: ACTIVE | Noted: 2020-05-09

## 2020-05-09 LAB
ALBUMIN SERPL BCP-MCNC: 1.6 G/DL (ref 3.5–5.2)
ALBUMIN SERPL BCP-MCNC: 1.8 G/DL (ref 3.5–5.2)
ALLENS TEST: ABNORMAL
ALP SERPL-CCNC: 81 U/L (ref 55–135)
ALP SERPL-CCNC: 89 U/L (ref 55–135)
ALT SERPL W/O P-5'-P-CCNC: 10 U/L (ref 10–44)
ALT SERPL W/O P-5'-P-CCNC: 11 U/L (ref 10–44)
ANION GAP SERPL CALC-SCNC: 4 MMOL/L (ref 8–16)
ANION GAP SERPL CALC-SCNC: 5 MMOL/L (ref 8–16)
AST SERPL-CCNC: 11 U/L (ref 10–40)
AST SERPL-CCNC: 13 U/L (ref 10–40)
BASOPHILS # BLD AUTO: 0.02 K/UL (ref 0–0.2)
BASOPHILS # BLD AUTO: 0.02 K/UL (ref 0–0.2)
BASOPHILS # BLD AUTO: 0.03 K/UL (ref 0–0.2)
BASOPHILS NFR BLD: 0.4 % (ref 0–1.9)
BASOPHILS NFR BLD: 0.5 % (ref 0–1.9)
BASOPHILS NFR BLD: 0.5 % (ref 0–1.9)
BILIRUB SERPL-MCNC: 0.4 MG/DL (ref 0.1–1)
BILIRUB SERPL-MCNC: 0.6 MG/DL (ref 0.1–1)
BNP SERPL-MCNC: 203 PG/ML (ref 0–99)
BUN SERPL-MCNC: 37 MG/DL (ref 8–23)
BUN SERPL-MCNC: 45 MG/DL (ref 8–23)
CALCIUM SERPL-MCNC: 8.1 MG/DL (ref 8.7–10.5)
CALCIUM SERPL-MCNC: 8.2 MG/DL (ref 8.7–10.5)
CHLORIDE SERPL-SCNC: 113 MMOL/L (ref 95–110)
CHLORIDE SERPL-SCNC: 115 MMOL/L (ref 95–110)
CO2 SERPL-SCNC: 15 MMOL/L (ref 23–29)
CO2 SERPL-SCNC: 16 MMOL/L (ref 23–29)
CREAT SERPL-MCNC: 1.4 MG/DL (ref 0.5–1.4)
CREAT SERPL-MCNC: 1.7 MG/DL (ref 0.5–1.4)
DELSYS: ABNORMAL
DIFFERENTIAL METHOD: ABNORMAL
EOSINOPHIL # BLD AUTO: 0.1 K/UL (ref 0–0.5)
EOSINOPHIL # BLD AUTO: 0.2 K/UL (ref 0–0.5)
EOSINOPHIL # BLD AUTO: 0.2 K/UL (ref 0–0.5)
EOSINOPHIL NFR BLD: 2.9 % (ref 0–8)
EOSINOPHIL NFR BLD: 3.2 % (ref 0–8)
EOSINOPHIL NFR BLD: 3.8 % (ref 0–8)
ERYTHROCYTE [DISTWIDTH] IN BLOOD BY AUTOMATED COUNT: 13.4 % (ref 11.5–14.5)
ERYTHROCYTE [DISTWIDTH] IN BLOOD BY AUTOMATED COUNT: 13.5 % (ref 11.5–14.5)
ERYTHROCYTE [DISTWIDTH] IN BLOOD BY AUTOMATED COUNT: 13.5 % (ref 11.5–14.5)
EST. GFR  (AFRICAN AMERICAN): 35.5 ML/MIN/1.73 M^2
EST. GFR  (AFRICAN AMERICAN): 44.9 ML/MIN/1.73 M^2
EST. GFR  (NON AFRICAN AMERICAN): 30.8 ML/MIN/1.73 M^2
EST. GFR  (NON AFRICAN AMERICAN): 38.9 ML/MIN/1.73 M^2
FIO2: 21
GLUCOSE SERPL-MCNC: 93 MG/DL (ref 70–110)
GLUCOSE SERPL-MCNC: 95 MG/DL (ref 70–110)
HCO3 UR-SCNC: 12.8 MMOL/L (ref 24–28)
HCT VFR BLD AUTO: 25.5 % (ref 37–48.5)
HCT VFR BLD AUTO: 26.7 % (ref 37–48.5)
HCT VFR BLD AUTO: 27.3 % (ref 37–48.5)
HGB BLD-MCNC: 8.2 G/DL (ref 12–16)
HGB BLD-MCNC: 8.7 G/DL (ref 12–16)
HGB BLD-MCNC: 8.8 G/DL (ref 12–16)
IMM GRANULOCYTES # BLD AUTO: 0.02 K/UL (ref 0–0.04)
IMM GRANULOCYTES # BLD AUTO: 0.03 K/UL (ref 0–0.04)
IMM GRANULOCYTES # BLD AUTO: 0.04 K/UL (ref 0–0.04)
IMM GRANULOCYTES NFR BLD AUTO: 0.5 % (ref 0–0.5)
IMM GRANULOCYTES NFR BLD AUTO: 0.6 % (ref 0–0.5)
IMM GRANULOCYTES NFR BLD AUTO: 0.7 % (ref 0–0.5)
LACTATE SERPL-SCNC: 1.2 MMOL/L (ref 0.5–2.2)
LYMPHOCYTES # BLD AUTO: 0.4 K/UL (ref 1–4.8)
LYMPHOCYTES # BLD AUTO: 0.4 K/UL (ref 1–4.8)
LYMPHOCYTES # BLD AUTO: 0.5 K/UL (ref 1–4.8)
LYMPHOCYTES NFR BLD: 12 % (ref 18–48)
LYMPHOCYTES NFR BLD: 6.9 % (ref 18–48)
LYMPHOCYTES NFR BLD: 8.4 % (ref 18–48)
MAGNESIUM SERPL-MCNC: 2.2 MG/DL (ref 1.6–2.6)
MCH RBC QN AUTO: 31.2 PG (ref 27–31)
MCH RBC QN AUTO: 31.7 PG (ref 27–31)
MCH RBC QN AUTO: 31.8 PG (ref 27–31)
MCHC RBC AUTO-ENTMCNC: 32.2 G/DL (ref 32–36)
MCHC RBC AUTO-ENTMCNC: 32.2 G/DL (ref 32–36)
MCHC RBC AUTO-ENTMCNC: 32.6 G/DL (ref 32–36)
MCV RBC AUTO: 97 FL (ref 82–98)
MCV RBC AUTO: 97 FL (ref 82–98)
MCV RBC AUTO: 98 FL (ref 82–98)
MODE: ABNORMAL
MONOCYTES # BLD AUTO: 0.2 K/UL (ref 0.3–1)
MONOCYTES # BLD AUTO: 0.3 K/UL (ref 0.3–1)
MONOCYTES # BLD AUTO: 0.3 K/UL (ref 0.3–1)
MONOCYTES NFR BLD: 4.1 % (ref 4–15)
MONOCYTES NFR BLD: 4.4 % (ref 4–15)
MONOCYTES NFR BLD: 6 % (ref 4–15)
NEUTROPHILS # BLD AUTO: 3.4 K/UL (ref 1.8–7.7)
NEUTROPHILS # BLD AUTO: 4.3 K/UL (ref 1.8–7.7)
NEUTROPHILS # BLD AUTO: 5.1 K/UL (ref 1.8–7.7)
NEUTROPHILS NFR BLD: 77.8 % (ref 38–73)
NEUTROPHILS NFR BLD: 83.6 % (ref 38–73)
NEUTROPHILS NFR BLD: 83.7 % (ref 38–73)
NRBC BLD-RTO: 0 /100 WBC
PCO2 BLDA: 25.7 MMHG (ref 35–45)
PH SMN: 7.31 [PH] (ref 7.35–7.45)
PHOSPHATE SERPL-MCNC: 3.2 MG/DL (ref 2.7–4.5)
PLATELET # BLD AUTO: 203 K/UL (ref 150–350)
PLATELET # BLD AUTO: 223 K/UL (ref 150–350)
PLATELET # BLD AUTO: 236 K/UL (ref 150–350)
PMV BLD AUTO: 9.5 FL (ref 9.2–12.9)
PMV BLD AUTO: 9.6 FL (ref 9.2–12.9)
PMV BLD AUTO: 9.8 FL (ref 9.2–12.9)
PO2 BLDA: 125 MMHG (ref 80–100)
POC BE: -14 MMOL/L
POC SATURATED O2: 99 % (ref 95–100)
POC TCO2: 14 MMOL/L (ref 23–27)
POTASSIUM SERPL-SCNC: 3.7 MMOL/L (ref 3.5–5.1)
POTASSIUM SERPL-SCNC: 4.2 MMOL/L (ref 3.5–5.1)
PROCALCITONIN SERPL IA-MCNC: 0.13 NG/ML
PROT SERPL-MCNC: 4.4 G/DL (ref 6–8.4)
PROT SERPL-MCNC: 5 G/DL (ref 6–8.4)
RBC # BLD AUTO: 2.63 M/UL (ref 4–5.4)
RBC # BLD AUTO: 2.74 M/UL (ref 4–5.4)
RBC # BLD AUTO: 2.78 M/UL (ref 4–5.4)
SAMPLE: ABNORMAL
SITE: ABNORMAL
SODIUM SERPL-SCNC: 133 MMOL/L (ref 136–145)
SODIUM SERPL-SCNC: 135 MMOL/L (ref 136–145)
SP02: 96
TROPONIN I SERPL DL<=0.01 NG/ML-MCNC: <0.01 NG/ML (ref 0.02–0.5)
WBC # BLD AUTO: 4.33 K/UL (ref 3.9–12.7)
WBC # BLD AUTO: 5.1 K/UL (ref 3.9–12.7)
WBC # BLD AUTO: 6.11 K/UL (ref 3.9–12.7)

## 2020-05-09 PROCEDURE — 83605 ASSAY OF LACTIC ACID: CPT

## 2020-05-09 PROCEDURE — S0179 MEGESTROL 20 MG: HCPCS | Performed by: INTERNAL MEDICINE

## 2020-05-09 PROCEDURE — 85025 COMPLETE CBC W/AUTO DIFF WBC: CPT | Mod: 91

## 2020-05-09 PROCEDURE — 63600175 PHARM REV CODE 636 W HCPCS: Performed by: INTERNAL MEDICINE

## 2020-05-09 PROCEDURE — 93005 ELECTROCARDIOGRAM TRACING: CPT

## 2020-05-09 PROCEDURE — 99233 SBSQ HOSP IP/OBS HIGH 50: CPT | Mod: ,,, | Performed by: FAMILY MEDICINE

## 2020-05-09 PROCEDURE — 63600175 PHARM REV CODE 636 W HCPCS: Performed by: FAMILY MEDICINE

## 2020-05-09 PROCEDURE — 25000003 PHARM REV CODE 250: Performed by: HOSPITALIST

## 2020-05-09 PROCEDURE — 82533 TOTAL CORTISOL: CPT

## 2020-05-09 PROCEDURE — 36600 WITHDRAWAL OF ARTERIAL BLOOD: CPT

## 2020-05-09 PROCEDURE — 36556 INSERT NON-TUNNEL CV CATH: CPT | Mod: ,,, | Performed by: ANESTHESIOLOGY

## 2020-05-09 PROCEDURE — 80053 COMPREHEN METABOLIC PANEL: CPT

## 2020-05-09 PROCEDURE — 83735 ASSAY OF MAGNESIUM: CPT

## 2020-05-09 PROCEDURE — 36556 CENTRAL LINE: ICD-10-PCS | Mod: ,,, | Performed by: ANESTHESIOLOGY

## 2020-05-09 PROCEDURE — 84484 ASSAY OF TROPONIN QUANT: CPT

## 2020-05-09 PROCEDURE — 36556 INSERT NON-TUNNEL CV CATH: CPT

## 2020-05-09 PROCEDURE — 94760 N-INVAS EAR/PLS OXIMETRY 1: CPT

## 2020-05-09 PROCEDURE — 99900035 HC TECH TIME PER 15 MIN (STAT)

## 2020-05-09 PROCEDURE — 25000003 PHARM REV CODE 250: Performed by: INTERNAL MEDICINE

## 2020-05-09 PROCEDURE — 20000000 HC ICU ROOM

## 2020-05-09 PROCEDURE — 99233 PR SUBSEQUENT HOSPITAL CARE,LEVL III: ICD-10-PCS | Mod: ,,, | Performed by: FAMILY MEDICINE

## 2020-05-09 PROCEDURE — 84145 PROCALCITONIN (PCT): CPT

## 2020-05-09 PROCEDURE — 82803 BLOOD GASES ANY COMBINATION: CPT

## 2020-05-09 PROCEDURE — 83880 ASSAY OF NATRIURETIC PEPTIDE: CPT

## 2020-05-09 PROCEDURE — 84100 ASSAY OF PHOSPHORUS: CPT

## 2020-05-09 PROCEDURE — 36415 COLL VENOUS BLD VENIPUNCTURE: CPT

## 2020-05-09 PROCEDURE — 80053 COMPREHEN METABOLIC PANEL: CPT | Mod: 91

## 2020-05-09 RX ORDER — HYDROCORTISONE 5 MG/1
10 TABLET ORAL NIGHTLY
Status: DISCONTINUED | OUTPATIENT
Start: 2020-05-10 | End: 2020-05-10

## 2020-05-09 RX ORDER — SODIUM CHLORIDE, SODIUM LACTATE, POTASSIUM CHLORIDE, CALCIUM CHLORIDE 600; 310; 30; 20 MG/100ML; MG/100ML; MG/100ML; MG/100ML
INJECTION, SOLUTION INTRAVENOUS CONTINUOUS
Status: DISCONTINUED | OUTPATIENT
Start: 2020-05-09 | End: 2020-05-13 | Stop reason: HOSPADM

## 2020-05-09 RX ORDER — HYDROCORTISONE 5 MG/1
20 TABLET ORAL DAILY
Status: DISCONTINUED | OUTPATIENT
Start: 2020-05-10 | End: 2020-05-10

## 2020-05-09 RX ADMIN — SODIUM CHLORIDE 1000 ML: 0.9 INJECTION, SOLUTION INTRAVENOUS at 08:05

## 2020-05-09 RX ADMIN — SENNOSIDES AND DOCUSATE SODIUM 1 TABLET: 8.6; 5 TABLET ORAL at 08:05

## 2020-05-09 RX ADMIN — GABAPENTIN 600 MG: 300 CAPSULE ORAL at 08:05

## 2020-05-09 RX ADMIN — POLYETHYLENE GLYCOL (3350) 17 G: 17 POWDER, FOR SOLUTION ORAL at 08:05

## 2020-05-09 RX ADMIN — CYCLOBENZAPRINE HYDROCHLORIDE 5 MG: 5 TABLET, FILM COATED ORAL at 06:05

## 2020-05-09 RX ADMIN — MONTELUKAST SODIUM 10 MG: 10 TABLET, COATED ORAL at 08:05

## 2020-05-09 RX ADMIN — SODIUM CHLORIDE, SODIUM LACTATE, POTASSIUM CHLORIDE, AND CALCIUM CHLORIDE: .6; .31; .03; .02 INJECTION, SOLUTION INTRAVENOUS at 10:05

## 2020-05-09 RX ADMIN — PANTOPRAZOLE SODIUM 40 MG: 40 TABLET, DELAYED RELEASE ORAL at 08:05

## 2020-05-09 RX ADMIN — MEROPENEM AND SODIUM CHLORIDE 1 G: 1 INJECTION, SOLUTION INTRAVENOUS at 08:05

## 2020-05-09 RX ADMIN — POTASSIUM CHLORIDE 20 MEQ: 1500 TABLET, EXTENDED RELEASE ORAL at 08:05

## 2020-05-09 RX ADMIN — SODIUM CHLORIDE, SODIUM LACTATE, POTASSIUM CHLORIDE, AND CALCIUM CHLORIDE: .6; .31; .03; .02 INJECTION, SOLUTION INTRAVENOUS at 08:05

## 2020-05-09 RX ADMIN — ALLOPURINOL 300 MG: 100 TABLET ORAL at 08:05

## 2020-05-09 RX ADMIN — MEGESTROL ACETATE 200 MG: 40 SUSPENSION ORAL at 08:05

## 2020-05-09 RX ADMIN — ESCITALOPRAM OXALATE 20 MG: 10 TABLET ORAL at 08:05

## 2020-05-09 NOTE — ASSESSMENT & PLAN NOTE
Appears to be chronic.  Check serum and urine osm  Check urine sodium     5/9/2020  Resolving  Monitor daily BMP

## 2020-05-09 NOTE — ASSESSMENT & PLAN NOTE
Started on LRs @ 75 mL/hr  Continue IV antibiotics  Monitor Hgb   Monitor BP closely    5/10/2020  Patient moved overnight to ICU for monitoring  A-line placed  Unable to get central line placed because of anatomy - recommend IR if needed in AM  Continue IV antibiotics though patient does not necessarily have sepsis picture at this time  Monitor heart rate - having episodes of sinus tach, troponin normal, BNP trending down  LRs increased to 125 mL/hr, may change to D5 NS  Required levophed briefly to maintain MAP of 65, wean today; oxygenation is good

## 2020-05-09 NOTE — NURSING
Notified md of hallucinations. Order received from osmani/c ambien. Given restoril 5mg prn nightly. Cl,rn

## 2020-05-09 NOTE — ASSESSMENT & PLAN NOTE
Patient with gross hematuria starting on the night of 5/8/2020. Arias inserted non-traumatically and output was immediately bloody. Hemoglobin is stable. Urology consulted given new gross hematuria in setting of prior bladder perforation. Continue to monitor urine output and CBC.

## 2020-05-09 NOTE — PROGRESS NOTES
[facility]  Progress Note    Patient Name: Obdulia Yepez  MRN: [id number]  Patient Class: IP- Inpatient   Admission Date: 5/7/2020  Length of Stay: 2 days  Attending Physician: Marquise Simmons MD  Primary Care Provider: Og Perez MD        Subjective:     Principal Problem:JOSELUIS (acute kidney injury)        HPI:  History of Present Illness:  Patient is a 69 y.o. female who has a past medical history of Allergy, Arthritis, Asthma, Depression, Gout, Hypotension, Neuropathy, and Sepsis presented with multiple complaints of not feeling well. Patient states that she has been having declining health for the past several months to the point now she is not eating and she is bed bound. She lives at home with her  and needs max assistance with ADL's. She is not ambulating and now bed bound. She has developed a sacral decub pressure ulcer as a result. She called her PCP with multiple complaints of abdominal pain, nausea, poor oral intake and weakness who instructed her to come to ED.     Overview/Hospital Course:  05/08/2020  Patient is a 69-year-old female that was admitted overnight for failure to thrive, dehydration and her  states that she has progressively weakened and is now bedbound.  Patient has a sacral decubitus which is stage IV.  Patient has multiple complaints of abdominal pain nausea and weakness.  She notified her primary care physician and then was told to present to the emergency department for evaluation.  Patient appears very malnourished and emaciated.  Patient denies any pain but states just not feeling well.  Labs show a sodium 131 potassium 3.2 chloride 106 bicarb 17 glucose 76 BUN 56 creatinine 2.2 calcium 7.9 and GFR 26 albumin 1.8  White blood cell count 4.7 hemoglobin 8.4 hematocrit 26  Urinalysis showed specific gravity 1.0102+ protein 2+ occult blood 3+ leukocytes    5/9/2020  Continue IV fluids. Patient with gross hematuria after  spence insertion. Urology consulted. Continue IV antibiotics. Monitor bicarb. Started on Lactated Ringers today. Encourage fluids. Surgery consulted for Stage IV decubitus ulcer, no plans for debridement at this time.    Interval History: Slight interval improvement.     Review of Systems   Constitutional: Negative for fatigue and fever.   HENT: Negative.    Eyes: Negative.    Respiratory: Negative for shortness of breath.    Cardiovascular: Negative for chest pain.   Gastrointestinal: Positive for constipation. Negative for abdominal pain, nausea and vomiting.   Endocrine: Negative.    Genitourinary: Positive for hematuria.   Musculoskeletal: Positive for back pain.   Neurological: Negative.    Psychiatric/Behavioral: Negative.      Objective:     Vital Signs (Most Recent):  Temp: 96.9 °F (36.1 °C) (05/09/20 1042)  Pulse: 104 (05/09/20 1042)  Resp: 18 (05/09/20 1042)  BP: 93/62 (05/09/20 1042)  SpO2: 96 % (05/09/20 1042) Vital Signs (24h Range):  Temp:  [96.2 °F (35.7 °C)-97.9 °F (36.6 °C)] 96.9 °F (36.1 °C)  Pulse:  [] 104  Resp:  [16-18] 18  SpO2:  [95 %-98 %] 96 %  BP: ()/(49-68) 93/62     Weight: 33.5 kg (73 lb 13.7 oz)  Body mass index is 14.42 kg/m².    Intake/Output Summary (Last 24 hours) at 5/9/2020 1217  Last data filed at 5/9/2020 0600  Gross per 24 hour   Intake 1160 ml   Output 975 ml   Net 185 ml      Physical Exam   Constitutional: She is oriented to person, place, and time.   Thin female, lying bed, no acute distress   HENT:   Head: Normocephalic and atraumatic.   Right Ear: External ear normal.   Left Ear: External ear normal.   Nose: Nose normal.   Mouth/Throat: Oropharynx is clear and moist.   Eyes: Conjunctivae are normal.   Cardiovascular: Normal rate, regular rhythm, normal heart sounds and intact distal pulses.   No murmur heard.  Pulmonary/Chest: Effort normal and breath sounds normal. No stridor. No respiratory distress. She has no wheezes.   Abdominal:   Bowel sounds present,  palpable mass in the RLQ, no guarding/tenderness   Musculoskeletal: She exhibits no edema.   Neurological: She is alert and oriented to person, place, and time.   Skin: Skin is warm and dry. She is not diaphoretic.   Psychiatric: She has a normal mood and affect.   Vitals reviewed.      Significant Labs:   Recent Lab Results       05/09/20  0724        Albumin 1.6     Alkaline Phosphatase 81     ALT 10     Anion Gap 4     AST 11     Baso # 0.02     Basophil% 0.5     BILIRUBIN TOTAL 0.4  Comment:  For infants and newborns, interpretation of results should be based  on gestational age, weight and in agreement with clinical  observations.  Premature Infant recommended reference ranges:  Up to 24 hours.............<8.0 mg/dL  Up to 48 hours............<12.0 mg/dL  3-5 days..................<15.0 mg/dL  6-29 days.................<15.0 mg/dL       BUN, Bld 45     Calcium 8.2     Chloride 115     CO2 16     Creatinine 1.7     Differential Method Automated     eGFR if African American 35.5     eGFR if non  30.8  Comment:  Calculation used to obtain the estimated glomerular filtration  rate (eGFR) is the CKD-EPI equation.        Eos # 0.1     Eosinophil% 3.2     Glucose 93     Gran # (ANC) 3.4     Gran% 77.8     Hematocrit 25.5     Hemoglobin 8.2     Immature Grans (Abs) 0.02  Comment:  Mild elevation in immature granulocytes is non specific and   can be seen in a variety of conditions including stress response,   acute inflammation, trauma and pregnancy. Correlation with other   laboratory and clinical findings is essential.       Immature Granulocytes 0.5     Lymph # 0.5     Lymph% 12.0     Magnesium 2.2     MCH 31.2     MCHC 32.2     MCV 97     Mono # 0.3     Mono% 6.0     MPV 9.5     nRBC 0     Phosphorus 3.2     Platelets 203     Potassium 4.2     PROTEIN TOTAL 4.4     RBC 2.63     RDW 13.4     Sodium 135     WBC 4.33         All pertinent labs within the past 24 hours have been reviewed.    Significant  Imaging: I have reviewed and interpreted all pertinent imaging results/findings within the past 24 hours.      Assessment/Plan:      * JOSELUIS (acute kidney injury)  Lab Results   Component Value Date    CREATININE 1.7 (H) 05/09/2020     Sr Cr above baseline. Baseline GFR is normal  Trail of IVF's  Retroperitoneal sonogram in AM  CT scan: The bladder is distended without a catheter in place and there is bilateral moderate to severe hydronephrosis.   Consult Urology  Cont to monitor with daily labs   Avoid nephrotoxins.   Renally dose all medications    Serum bicarb level 18. Monitor for need to initiate sodium bicarb if continues to decrease.     05/08/2020:  Agree with retroperitoneal sonogram  Will hold on urology consult for now  Continue hydration at current level.  Begin empiric antibiotics for urinary tract infection and possibly infected sacral decubitus.  Piperacillin 3.375 g IV q.8 hours  Vancomycin dosed by pharmacy    5/9/2020  Retroperitoneal ultrasound with thickened bladder wall (chronic), bladder perforation (likely chronic) vs diverticulum  sCr improving   Continue IV hydration, IV antibiotics  Urology consulted for gross hematuria though this is possibly from spence insertion    Gross hematuria  Patient with gross hematuria starting on the night of 5/8/2020. Spence inserted non-traumatically and output was immediately bloody. Hemoglobin is stable. Urology consulted given new gross hematuria in setting of prior bladder perforation. Continue to monitor urine output and CBC.       Hypokalemia  05/08/2020  Replete potassium as needed  Recheck labs in the a.m. to include CBC, CMP      Pressure injury of sacral region, stage 4  05/08/2020  Continue antibiotic therapy  Consult General surgery for possible debridement  Monitor blood cultures.  Follow-up otherwise as clinically indicated    5/9/2020  Continue IV antibiotics  General Surgery consulted - wound care instructions placed, no plan for debridement at  this time  Blood cultures for fever/elevated WBC      Debility  Cont with PT/OT for gait training and strengthening and restoration of ADL's   Encourage mobility, OOB in chair, and early ambulation as appropriate  Fall precautions   Monitor for bowel and bladder dysfunction  Monitor for and prevent skin breakdown and pressure ulcers    Constipation  Aggressive bowel regimen    5/9/2020  Continue bowel regimen   Minimize opioids    Hyponatremia  Appears to be chronic.  Check serum and urine osm  Check urine sodium     5/9/2020  Resolving  Monitor daily BMP    Hypotension   Monitor BP   Fluid resuscitation   monitor Hgb    VTE Risk Mitigation (From admission, onward)         Ordered     Place MORIS hose  Until discontinued      05/07/20 1953     IP VTE LOW RISK PATIENT  Once      05/07/20 1953                      Amy Ochoa MD  Department of Hospital Medicine   [facility]

## 2020-05-09 NOTE — PLAN OF CARE
Problem: Wound  Goal: Optimal Wound Healing  Outcome: Ongoing, Progressing     Problem: Fall Injury Risk  Goal: Absence of Fall and Fall-Related Injury  Outcome: Ongoing, Progressing     Problem: Adult Inpatient Plan of Care  Goal: Plan of Care Review  Outcome: Ongoing, Progressing  Goal: Optimal Comfort and Wellbeing  Outcome: Ongoing, Progressing

## 2020-05-09 NOTE — NURSING
Notified Dr. Ochoa of patient's increased heart rate and sepsis warning. New orders given and followed. Will continue to monitor. SHAWN JASSO

## 2020-05-09 NOTE — SUBJECTIVE & OBJECTIVE
Interval History: Slight interval improvement.     Review of Systems   Constitutional: Negative for fatigue and fever.   HENT: Negative.    Eyes: Negative.    Respiratory: Negative for shortness of breath.    Cardiovascular: Negative for chest pain.   Gastrointestinal: Positive for constipation. Negative for abdominal pain, nausea and vomiting.   Endocrine: Negative.    Genitourinary: Positive for hematuria.   Musculoskeletal: Positive for back pain.   Neurological: Negative.    Psychiatric/Behavioral: Negative.      Objective:     Vital Signs (Most Recent):  Temp: 96.9 °F (36.1 °C) (05/09/20 1042)  Pulse: 104 (05/09/20 1042)  Resp: 18 (05/09/20 1042)  BP: 93/62 (05/09/20 1042)  SpO2: 96 % (05/09/20 1042) Vital Signs (24h Range):  Temp:  [96.2 °F (35.7 °C)-97.9 °F (36.6 °C)] 96.9 °F (36.1 °C)  Pulse:  [] 104  Resp:  [16-18] 18  SpO2:  [95 %-98 %] 96 %  BP: ()/(49-68) 93/62     Weight: 33.5 kg (73 lb 13.7 oz)  Body mass index is 14.42 kg/m².    Intake/Output Summary (Last 24 hours) at 5/9/2020 1217  Last data filed at 5/9/2020 0600  Gross per 24 hour   Intake 1160 ml   Output 975 ml   Net 185 ml      Physical Exam   Constitutional: She is oriented to person, place, and time.   Thin female, lying bed, no acute distress   HENT:   Head: Normocephalic and atraumatic.   Right Ear: External ear normal.   Left Ear: External ear normal.   Nose: Nose normal.   Mouth/Throat: Oropharynx is clear and moist.   Eyes: Conjunctivae are normal.   Cardiovascular: Normal rate, regular rhythm, normal heart sounds and intact distal pulses.   No murmur heard.  Pulmonary/Chest: Effort normal and breath sounds normal. No stridor. No respiratory distress. She has no wheezes.   Abdominal:   Bowel sounds present, palpable mass in the RLQ, no guarding/tenderness   Musculoskeletal: She exhibits no edema.   Neurological: She is alert and oriented to person, place, and time.   Skin: Skin is warm and dry. She is not diaphoretic.    Psychiatric: She has a normal mood and affect.   Vitals reviewed.      Significant Labs:   Recent Lab Results       05/09/20  0724        Albumin 1.6     Alkaline Phosphatase 81     ALT 10     Anion Gap 4     AST 11     Baso # 0.02     Basophil% 0.5     BILIRUBIN TOTAL 0.4  Comment:  For infants and newborns, interpretation of results should be based  on gestational age, weight and in agreement with clinical  observations.  Premature Infant recommended reference ranges:  Up to 24 hours.............<8.0 mg/dL  Up to 48 hours............<12.0 mg/dL  3-5 days..................<15.0 mg/dL  6-29 days.................<15.0 mg/dL       BUN, Bld 45     Calcium 8.2     Chloride 115     CO2 16     Creatinine 1.7     Differential Method Automated     eGFR if African American 35.5     eGFR if non  30.8  Comment:  Calculation used to obtain the estimated glomerular filtration  rate (eGFR) is the CKD-EPI equation.        Eos # 0.1     Eosinophil% 3.2     Glucose 93     Gran # (ANC) 3.4     Gran% 77.8     Hematocrit 25.5     Hemoglobin 8.2     Immature Grans (Abs) 0.02  Comment:  Mild elevation in immature granulocytes is non specific and   can be seen in a variety of conditions including stress response,   acute inflammation, trauma and pregnancy. Correlation with other   laboratory and clinical findings is essential.       Immature Granulocytes 0.5     Lymph # 0.5     Lymph% 12.0     Magnesium 2.2     MCH 31.2     MCHC 32.2     MCV 97     Mono # 0.3     Mono% 6.0     MPV 9.5     nRBC 0     Phosphorus 3.2     Platelets 203     Potassium 4.2     PROTEIN TOTAL 4.4     RBC 2.63     RDW 13.4     Sodium 135     WBC 4.33         All pertinent labs within the past 24 hours have been reviewed.    Significant Imaging: I have reviewed and interpreted all pertinent imaging results/findings within the past 24 hours.

## 2020-05-09 NOTE — ASSESSMENT & PLAN NOTE
05/08/2020  Continue antibiotic therapy  Consult General surgery for possible debridement  Monitor blood cultures.  Follow-up otherwise as clinically indicated    5/9/2020  Continue IV antibiotics  General Surgery consulted - wound care instructions placed, no plan for debridement at this time  Blood cultures for fever/elevated WBC

## 2020-05-09 NOTE — PLAN OF CARE
Problem: Wound  Goal: Optimal Wound Healing  Outcome: Ongoing, Progressing     Problem: Fall Injury Risk  Goal: Absence of Fall and Fall-Related Injury  Outcome: Ongoing, Progressing     Problem: Adult Inpatient Plan of Care  Goal: Plan of Care Review  Outcome: Ongoing, Progressing     Problem: Adult Inpatient Plan of Care  Goal: Patient-Specific Goal (Individualization)  Outcome: Ongoing, Progressing     Problem: Adult Inpatient Plan of Care  Goal: Absence of Hospital-Acquired Illness or Injury  Outcome: Ongoing, Progressing     Problem: Adult Inpatient Plan of Care  Goal: Optimal Comfort and Wellbeing  Outcome: Ongoing, Progressing     Problem: Adult Inpatient Plan of Care  Goal: Readiness for Transition of Care  Outcome: Ongoing, Progressing     Problem: Adult Inpatient Plan of Care  Goal: Rounds/Family Conference  Outcome: Ongoing, Progressing     Problem: Infection  Goal: Infection Symptom Resolution  Outcome: Ongoing, Progressing     Problem: Electrolyte Imbalance (Acute Kidney Injury/Impairment)  Goal: Serum Electrolyte Balance  Outcome: Ongoing, Progressing     Problem: Fluid Imbalance (Acute Kidney Injury/Impairment)  Goal: Optimal Fluid Balance  Outcome: Ongoing, Progressing

## 2020-05-10 PROBLEM — E87.20 METABOLIC ACIDOSIS, NORMAL ANION GAP (NAG): Status: ACTIVE | Noted: 2020-05-10

## 2020-05-10 LAB
ALBUMIN SERPL BCP-MCNC: 1.5 G/DL (ref 3.5–5.2)
ALP SERPL-CCNC: 81 U/L (ref 55–135)
ALT SERPL W/O P-5'-P-CCNC: 9 U/L (ref 10–44)
ANION GAP SERPL CALC-SCNC: 5 MMOL/L (ref 8–16)
AST SERPL-CCNC: 15 U/L (ref 10–40)
BACTERIA #/AREA URNS HPF: ABNORMAL /HPF
BACTERIA UR CULT: ABNORMAL
BACTERIA UR CULT: ABNORMAL
BASOPHILS # BLD AUTO: 0.01 K/UL (ref 0–0.2)
BASOPHILS NFR BLD: 0.2 % (ref 0–1.9)
BILIRUB SERPL-MCNC: 0.4 MG/DL (ref 0.1–1)
BILIRUB UR QL STRIP: ABNORMAL
BUN SERPL-MCNC: 33 MG/DL (ref 8–23)
CALCIUM SERPL-MCNC: 7.8 MG/DL (ref 8.7–10.5)
CHLORIDE SERPL-SCNC: 113 MMOL/L (ref 95–110)
CHLORIDE UR-SCNC: 76 MMOL/L (ref 25–200)
CLARITY UR: ABNORMAL
CO2 SERPL-SCNC: 13 MMOL/L (ref 23–29)
COLOR UR: ABNORMAL
CORTIS SERPL-MCNC: 13.9 UG/DL
CREAT SERPL-MCNC: 1.2 MG/DL (ref 0.5–1.4)
DIFFERENTIAL METHOD: ABNORMAL
EOSINOPHIL # BLD AUTO: 0 K/UL (ref 0–0.5)
EOSINOPHIL NFR BLD: 0.6 % (ref 0–8)
ERYTHROCYTE [DISTWIDTH] IN BLOOD BY AUTOMATED COUNT: 13.6 % (ref 11.5–14.5)
EST. GFR  (AFRICAN AMERICAN): 54 ML/MIN/1.73 M^2
EST. GFR  (NON AFRICAN AMERICAN): 46.9 ML/MIN/1.73 M^2
GLUCOSE SERPL-MCNC: 102 MG/DL (ref 70–110)
GLUCOSE UR QL STRIP: NEGATIVE
HCT VFR BLD AUTO: 24.6 % (ref 37–48.5)
HGB BLD-MCNC: 8.1 G/DL (ref 12–16)
HGB UR QL STRIP: ABNORMAL
HYALINE CASTS #/AREA URNS LPF: ABNORMAL /LPF
IMM GRANULOCYTES # BLD AUTO: 0.06 K/UL (ref 0–0.04)
IMM GRANULOCYTES NFR BLD AUTO: 1.2 % (ref 0–0.5)
KETONES UR QL STRIP: ABNORMAL
LEUKOCYTE ESTERASE UR QL STRIP: ABNORMAL
LYMPHOCYTES # BLD AUTO: 0.4 K/UL (ref 1–4.8)
LYMPHOCYTES NFR BLD: 7.3 % (ref 18–48)
MAGNESIUM SERPL-MCNC: 1.8 MG/DL (ref 1.6–2.6)
MCH RBC QN AUTO: 31.6 PG (ref 27–31)
MCHC RBC AUTO-ENTMCNC: 32.9 G/DL (ref 32–36)
MCV RBC AUTO: 96 FL (ref 82–98)
MICROSCOPIC COMMENT: ABNORMAL
MONOCYTES # BLD AUTO: 0.1 K/UL (ref 0.3–1)
MONOCYTES NFR BLD: 1.6 % (ref 4–15)
NEUTROPHILS # BLD AUTO: 4.4 K/UL (ref 1.8–7.7)
NEUTROPHILS NFR BLD: 89.1 % (ref 38–73)
NITRITE UR QL STRIP: NEGATIVE
NRBC BLD-RTO: 0 /100 WBC
OSMOLALITY UR: 347 MOSM/KG (ref 50–1200)
PH UR STRIP: 6 [PH] (ref 5–8)
PHOSPHATE SERPL-MCNC: 2.9 MG/DL (ref 2.7–4.5)
PLATELET # BLD AUTO: 235 K/UL (ref 150–350)
PMV BLD AUTO: 9.8 FL (ref 9.2–12.9)
POTASSIUM SERPL-SCNC: 3.6 MMOL/L (ref 3.5–5.1)
PROT SERPL-MCNC: 4.4 G/DL (ref 6–8.4)
PROT UR QL STRIP: ABNORMAL
RBC # BLD AUTO: 2.56 M/UL (ref 4–5.4)
RBC #/AREA URNS HPF: 100 /HPF (ref 0–4)
SODIUM SERPL-SCNC: 131 MMOL/L (ref 136–145)
SODIUM UR-SCNC: 54 MMOL/L (ref 20–250)
SP GR UR STRIP: 1.01 (ref 1–1.03)
SQUAMOUS #/AREA URNS HPF: 2 /HPF
URN SPEC COLLECT METH UR: ABNORMAL
UROBILINOGEN UR STRIP-ACNC: NEGATIVE EU/DL
VANCOMYCIN TROUGH SERPL-MCNC: 4.1 UG/ML (ref 10–22)
WBC # BLD AUTO: 4.9 K/UL (ref 3.9–12.7)
WBC #/AREA URNS HPF: 100 /HPF (ref 0–5)
WBC CLUMPS URNS QL MICRO: ABNORMAL

## 2020-05-10 PROCEDURE — 36556 INSERT NON-TUNNEL CV CATH: CPT

## 2020-05-10 PROCEDURE — 85025 COMPLETE CBC W/AUTO DIFF WBC: CPT

## 2020-05-10 PROCEDURE — 80053 COMPREHEN METABOLIC PANEL: CPT

## 2020-05-10 PROCEDURE — 83935 ASSAY OF URINE OSMOLALITY: CPT

## 2020-05-10 PROCEDURE — 63600175 PHARM REV CODE 636 W HCPCS: Performed by: FAMILY MEDICINE

## 2020-05-10 PROCEDURE — 63600175 PHARM REV CODE 636 W HCPCS: Performed by: INTERNAL MEDICINE

## 2020-05-10 PROCEDURE — 99233 SBSQ HOSP IP/OBS HIGH 50: CPT | Mod: ,,, | Performed by: FAMILY MEDICINE

## 2020-05-10 PROCEDURE — 25000003 PHARM REV CODE 250

## 2020-05-10 PROCEDURE — 80202 ASSAY OF VANCOMYCIN: CPT

## 2020-05-10 PROCEDURE — 81000 URINALYSIS NONAUTO W/SCOPE: CPT

## 2020-05-10 PROCEDURE — 63600175 PHARM REV CODE 636 W HCPCS: Performed by: HOSPITALIST

## 2020-05-10 PROCEDURE — 99233 PR SUBSEQUENT HOSPITAL CARE,LEVL III: ICD-10-PCS | Mod: ,,, | Performed by: FAMILY MEDICINE

## 2020-05-10 PROCEDURE — 84300 ASSAY OF URINE SODIUM: CPT

## 2020-05-10 PROCEDURE — 84100 ASSAY OF PHOSPHORUS: CPT

## 2020-05-10 PROCEDURE — S0179 MEGESTROL 20 MG: HCPCS | Performed by: INTERNAL MEDICINE

## 2020-05-10 PROCEDURE — 25000003 PHARM REV CODE 250: Performed by: INTERNAL MEDICINE

## 2020-05-10 PROCEDURE — 20000000 HC ICU ROOM

## 2020-05-10 PROCEDURE — 25000003 PHARM REV CODE 250: Performed by: HOSPITALIST

## 2020-05-10 PROCEDURE — 82436 ASSAY OF URINE CHLORIDE: CPT

## 2020-05-10 PROCEDURE — 36415 COLL VENOUS BLD VENIPUNCTURE: CPT

## 2020-05-10 PROCEDURE — 83735 ASSAY OF MAGNESIUM: CPT

## 2020-05-10 RX ORDER — NOREPINEPHRINE BITARTRATE/D5W 8 MG/250ML
PLASTIC BAG, INJECTION (ML) INTRAVENOUS
Status: COMPLETED
Start: 2020-05-10 | End: 2020-05-10

## 2020-05-10 RX ORDER — VANCOMYCIN HCL IN 5 % DEXTROSE 1G/250ML
1000 PLASTIC BAG, INJECTION (ML) INTRAVENOUS
Status: DISCONTINUED | OUTPATIENT
Start: 2020-05-10 | End: 2020-05-11

## 2020-05-10 RX ORDER — NOREPINEPHRINE BITARTRATE/D5W 8 MG/250ML
0.02 PLASTIC BAG, INJECTION (ML) INTRAVENOUS CONTINUOUS
Status: DISCONTINUED | OUTPATIENT
Start: 2020-05-10 | End: 2020-05-12

## 2020-05-10 RX ADMIN — Medication 0.02 MCG/KG/MIN: at 02:05

## 2020-05-10 RX ADMIN — SODIUM CHLORIDE, SODIUM LACTATE, POTASSIUM CHLORIDE, AND CALCIUM CHLORIDE: .6; .31; .03; .02 INJECTION, SOLUTION INTRAVENOUS at 04:05

## 2020-05-10 RX ADMIN — MEROPENEM AND SODIUM CHLORIDE 1 G: 1 INJECTION, SOLUTION INTRAVENOUS at 08:05

## 2020-05-10 RX ADMIN — PANTOPRAZOLE SODIUM 40 MG: 40 TABLET, DELAYED RELEASE ORAL at 08:05

## 2020-05-10 RX ADMIN — GABAPENTIN 600 MG: 300 CAPSULE ORAL at 08:05

## 2020-05-10 RX ADMIN — SENNOSIDES AND DOCUSATE SODIUM 1 TABLET: 8.6; 5 TABLET ORAL at 08:05

## 2020-05-10 RX ADMIN — ESCITALOPRAM OXALATE 20 MG: 10 TABLET ORAL at 08:05

## 2020-05-10 RX ADMIN — VANCOMYCIN HYDROCHLORIDE 1000 MG: 1 INJECTION, POWDER, LYOPHILIZED, FOR SOLUTION INTRAVENOUS at 01:05

## 2020-05-10 RX ADMIN — SODIUM CHLORIDE, SODIUM LACTATE, POTASSIUM CHLORIDE, AND CALCIUM CHLORIDE: .6; .31; .03; .02 INJECTION, SOLUTION INTRAVENOUS at 06:05

## 2020-05-10 RX ADMIN — MEGESTROL ACETATE 200 MG: 40 SUSPENSION ORAL at 08:05

## 2020-05-10 RX ADMIN — POTASSIUM CHLORIDE 20 MEQ: 1500 TABLET, EXTENDED RELEASE ORAL at 08:05

## 2020-05-10 RX ADMIN — HYDROCORTISONE SODIUM SUCCINATE 100 MG: 250 INJECTION, POWDER, FOR SOLUTION INTRAMUSCULAR; INTRAVENOUS at 04:05

## 2020-05-10 RX ADMIN — MONTELUKAST SODIUM 10 MG: 10 TABLET, COATED ORAL at 08:05

## 2020-05-10 RX ADMIN — POLYETHYLENE GLYCOL (3350) 17 G: 17 POWDER, FOR SOLUTION ORAL at 08:05

## 2020-05-10 RX ADMIN — HYDROCORTISONE SODIUM SUCCINATE 100 MG: 250 INJECTION, POWDER, FOR SOLUTION INTRAMUSCULAR; INTRAVENOUS at 09:05

## 2020-05-10 RX ADMIN — ALLOPURINOL 300 MG: 100 TABLET ORAL at 08:05

## 2020-05-10 RX ADMIN — HYDROCORTISONE SODIUM SUCCINATE 100 MG: 100 INJECTION, POWDER, FOR SOLUTION INTRAMUSCULAR; INTRAVENOUS at 12:05

## 2020-05-10 NOTE — PROGRESS NOTES
[facility] - ICU Hospital Medicine  Progress Note    Patient Name: Obdulia Yepez  MRN: [id number]  Patient Class: IP- Inpatient   Admission Date: 5/7/2020  Length of Stay: 3 days  Attending Physician: Marquise Simmons MD  Primary Care Provider: Og Perez MD        Subjective:     Principal Problem:JOSELUIS (acute kidney injury)        HPI:  History of Present Illness:  Patient is a 69 y.o. female who has a past medical history of Allergy, Arthritis, Asthma, Depression, Gout, Hypotension, Neuropathy, and Sepsis presented with multiple complaints of not feeling well. Patient states that she has been having declining health for the past several months to the point now she is not eating and she is bed bound. She lives at home with her  and needs max assistance with ADL's. She is not ambulating and now bed bound. She has developed a sacral decub pressure ulcer as a result. She called her PCP with multiple complaints of abdominal pain, nausea, poor oral intake and weakness who instructed her to come to ED.     Overview/Hospital Course:  05/08/2020  Patient is a 69-year-old female that was admitted overnight for failure to thrive, dehydration and her  states that she has progressively weakened and is now bedbound.  Patient has a sacral decubitus which is stage IV.  Patient has multiple complaints of abdominal pain nausea and weakness.  She notified her primary care physician and then was told to present to the emergency department for evaluation.  Patient appears very malnourished and emaciated.  Patient denies any pain but states just not feeling well.  Labs show a sodium 131 potassium 3.2 chloride 106 bicarb 17 glucose 76 BUN 56 creatinine 2.2 calcium 7.9 and GFR 26 albumin 1.8  White blood cell count 4.7 hemoglobin 8.4 hematocrit 26  Urinalysis showed specific gravity 1.0102+ protein 2+ occult blood 3+ leukocytes    5/9/2020  Continue IV fluids. Patient with gross hematuria after spence  insertion. Urology consulted. Continue IV antibiotics. Monitor bicarb. Started on Lactated Ringers today. Encourage fluids. Surgery consulted for Stage IV decubitus ulcer, no plans for debridement at this time.    5/10/2020  Patient moved to ICU overnight for hypotension/blood pressure monitoring. Started on low dose levophed to maintain MAP. Continued on IV fluids at 125 mL/hr. Having episodes of sinus tachycardia in the 140s/150s that self-resolve. Discussed with Cardiology, do not appear to be atrial arrhythmias but rather sinus rhythm. Continue to monitor. Continue IV antibiotics - WBC normal, procalcitonin normal, lactic acid normal. Urine studies ordered. Started on stress dose steroids for possible adrenal insufficiency.    Interval History: See hospital course. Moved to ICU. Stable.     Review of Systems   Constitutional: Positive for fatigue. Negative for fever.   HENT: Negative.    Respiratory: Negative for shortness of breath.    Cardiovascular: Negative for chest pain.   Gastrointestinal: Negative for abdominal pain.   Genitourinary: Positive for hematuria.   Musculoskeletal: Negative.    Skin: Negative.    Neurological: Positive for weakness.   Psychiatric/Behavioral: Negative.      Objective:     Vital Signs (Most Recent):  Temp: 97.4 °F (36.3 °C) (05/10/20 1130)  Pulse: 89 (05/10/20 1130)  Resp: 12 (05/10/20 1130)  BP: 111/66 (05/10/20 1130)  SpO2: 99 % (05/10/20 1130) Vital Signs (24h Range):  Temp:  [96.7 °F (35.9 °C)-99.2 °F (37.3 °C)] 97.4 °F (36.3 °C)  Pulse:  [] 89  Resp:  [8-25] 12  SpO2:  [92 %-100 %] 99 %  BP: ()/(41-93) 111/66     Weight: 33.5 kg (73 lb 13.7 oz)  Body mass index is 14.42 kg/m².    Intake/Output Summary (Last 24 hours) at 5/10/2020 1139  Last data filed at 5/10/2020 1056  Gross per 24 hour   Intake 4537.24 ml   Output 775 ml   Net 3762.24 ml      Physical Exam   Constitutional: She is oriented to person, place, and time. No distress.   Thin female, lying bed, no  acute distress   HENT:   Head: Normocephalic and atraumatic.   Right Ear: External ear normal.   Left Ear: External ear normal.   Nose: Nose normal.   Mouth/Throat: Oropharynx is clear and moist.   Eyes: Conjunctivae are normal.   Cardiovascular: Normal rate, regular rhythm, normal heart sounds and intact distal pulses.   No murmur heard.  Pulmonary/Chest: Effort normal and breath sounds normal. No stridor. No respiratory distress. She has no wheezes.   Abdominal: Bowel sounds are normal. She exhibits no distension. There is no tenderness.   Musculoskeletal: She exhibits no edema.   Neurological: She is alert and oriented to person, place, and time.   Patient is alert and conversational though fatigued   Skin: Skin is warm and dry. She is not diaphoretic.   Psychiatric: She has a normal mood and affect.   Vitals reviewed.      Significant Labs:   Recent Lab Results       05/10/20  0908   05/10/20  0450   05/09/20  2335   05/09/20  2219   05/09/20  1817        Procalcitonin         0.13  Comment:  A concentration < 0.25 ng/mL represents a low risk bacterial   infection.  Procalcitonin may not be accurate among patients with localized   infection, recent trauma or major surgery, immunosuppressed state,   invasive fungal infection, renal dysfunction. Decisions regarding   initiation or continuation of antibiotic therapy should not be based   solely on procalcitonin levels.       Albumin   1.5   1.8       Alkaline Phosphatase   81   89       Allens Test     N/A         ALT   9   11       Anion Gap   5   5       Appearance, UA Cloudy             AST   15   13       Bacteria, UA few             Baso #   0.01   0.02 0.03     Basophil%   0.2   0.4 0.5     Bilirubin (UA) 1+  Comment:  Positive urine bilirubin is not confirmed. Correlate with   serum bilirubin and clinical presentation.               BILIRUBIN TOTAL   0.4  Comment:  For infants and newborns, interpretation of results should be based  on gestational age,  weight and in agreement with clinical  observations.  Premature Infant recommended reference ranges:  Up to 24 hours.............<8.0 mg/dL  Up to 48 hours............<12.0 mg/dL  3-5 days..................<15.0 mg/dL  6-29 days.................<15.0 mg/dL     0.6  Comment:  For infants and newborns, interpretation of results should be based  on gestational age, weight and in agreement with clinical  observations.  Premature Infant recommended reference ranges:  Up to 24 hours.............<8.0 mg/dL  Up to 48 hours............<12.0 mg/dL  3-5 days..................<15.0 mg/dL  6-29 days.................<15.0 mg/dL         BNP       203  Comment:  Values of less than 100 pg/ml are consistent with non-CHF populations.       Site     RB         BUN, Bld   33   37       Calcium   7.8   8.1       Chloride   113   113       CO2   13   15       Color, UA Brown             Cortisol       13.90  Comment:  Cortisol Reference Range:  Before 10:00 am: 4.46-22.7 ug/dL  After 5:00 pm:    1.7-14.1 ug/dL         Creatinine   1.2   1.4       DelSys     Room Air         Differential Method   Automated   Automated Automated     eGFR if    54.0   44.9       eGFR if non    46.9  Comment:  Calculation used to obtain the estimated glomerular filtration  rate (eGFR) is the CKD-EPI equation.      38.9  Comment:  Calculation used to obtain the estimated glomerular filtration  rate (eGFR) is the CKD-EPI equation.          Eos #   0.0   0.2 0.2     Eosinophil%   0.6   2.9 3.8     FiO2     21         Glucose   102   95       Glucose, UA Negative             Gran # (ANC)   4.4   4.3 5.1     Gran%   89.1   83.6 83.7     Hematocrit   24.6   26.7 27.3     Hemoglobin   8.1   8.7 8.8     Hyaline Casts, UA none             Immature Grans (Abs)   0.06  Comment:  Mild elevation in immature granulocytes is non specific and   can be seen in a variety of conditions including stress response,   acute inflammation, trauma and  pregnancy. Correlation with other   laboratory and clinical findings is essential.     0.03  Comment:  Mild elevation in immature granulocytes is non specific and   can be seen in a variety of conditions including stress response,   acute inflammation, trauma and pregnancy. Correlation with other   laboratory and clinical findings is essential.   0.04  Comment:  Mild elevation in immature granulocytes is non specific and   can be seen in a variety of conditions including stress response,   acute inflammation, trauma and pregnancy. Correlation with other   laboratory and clinical findings is essential.       Immature Granulocytes   1.2   0.6 0.7     Ketones, UA Trace             Lactate, Brown       1.2  Comment:  Falsely low lactic acid results can be found in samples   containing >=13.0 mg/dL total bilirubin and/or >=3.5 mg/dL   direct bilirubin.         Leukocytes, UA 3+             Lymph #   0.4   0.4 0.4     Lymph%   7.3   8.4 6.9     Magnesium   1.8           MCH   31.6   31.8 31.7     MCHC   32.9   32.6 32.2     MCV   96   97 98     Microscopic Comment SEE COMMENT  Comment:  Other formed elements not mentioned in the report are not   present in the microscopic examination.                Mode     SPONT         Mono #   0.1   0.2 0.3     Mono%   1.6   4.1 4.4     MPV   9.8   9.8 9.6     NITRITE UA Negative             nRBC   0   0 0     Occult Blood UA 3+             pH, UA 6.0             Phosphorus   2.9           Platelets   235   223 236     POC BE     -14         POC HCO3     12.8         POC PCO2     25.7         POC PH     7.306         POC PO2     125         POC SATURATED O2     99         POC TCO2     14         Potassium   3.6   3.7       PROTEIN TOTAL   4.4   5.0       Protein, UA 3+  Comment:  Recommend a 24 hour urine protein or a urine   protein/creatinine ratio if globulin induced proteinuria is  clinically suspected.               RBC   2.56   2.74 2.78     RBC,              RDW   13.6    13.5 13.5     Sample     ARTERIAL         Sodium   131   133       Sp02     96         Specific [place], UA 1.015             Specimen UA Urine, Clean Catch             Squam Epithel, UA 2             Troponin I         <0.01     UROBILINOGEN UA Negative             WBC Clumps, UA moderate             WBC,              WBC   4.90   5.10 6.11                        All pertinent labs within the past 24 hours have been reviewed.    Significant Imaging: I have reviewed and interpreted all pertinent imaging results/findings within the past 24 hours.      Assessment/Plan:      * JOSELUIS (acute kidney injury)  Lab Results   Component Value Date    CREATININE 1.2 05/10/2020     Sr Cr above baseline. Baseline GFR is normal  Trail of IVF's  Retroperitoneal sonogram in AM  CT scan: The bladder is distended without a catheter in place and there is bilateral moderate to severe hydronephrosis.   Consult Urology  Cont to monitor with daily labs   Avoid nephrotoxins.   Renally dose all medications    Serum bicarb level 18. Monitor for need to initiate sodium bicarb if continues to decrease.     05/08/2020:  Agree with retroperitoneal sonogram  Will hold on urology consult for now  Continue hydration at current level.  Begin empiric antibiotics for urinary tract infection and possibly infected sacral decubitus.  Piperacillin 3.375 g IV q.8 hours  Vancomycin dosed by pharmacy    5/9/2020  Retroperitoneal ultrasound with thickened bladder wall (chronic), bladder perforation (likely chronic) vs diverticulum  sCr improving   Continue IV hydration, IV antibiotics  Urology consulted for gross hematuria though this is possibly from spence insertion    5/10/2020  Renal function has significantly improved  Continue IV fluids    Metabolic acidosis, normal anion gap (NAG)  PH 7.3  Respiratory compensation is appropriate  Renal function has recovered - possible RTA 2 vs 4  Cortisol pending, ACTH not ordered; will start stress dose steroids  while labs are pending - hydrocortisone 100 mg q8h IV  Urine studies repeated for possible Type 2 RTA  Monitor pH      Hypotension due to hypovolemia  Started on LRs @ 75 mL/hr  Continue IV antibiotics  Monitor Hgb   Monitor BP closely    5/10/2020  Patient moved overnight to ICU for monitoring  A-line placed  Unable to get central line placed because of anatomy - recommend IR if needed in AM  Continue IV antibiotics though patient does not necessarily have sepsis picture at this time  Monitor heart rate - having episodes of sinus tach, troponin normal, BNP trending down  LRs increased to 125 mL/hr, may change to D5 NS  Required levophed briefly to maintain MAP of 65, wean today; oxygenation is good        Gross hematuria  Patient with gross hematuria starting on the night of 5/8/2020. Arias inserted non-traumatically and output was immediately bloody. Hemoglobin is stable. Urology consulted given new gross hematuria in setting of prior bladder perforation. Continue to monitor urine output and CBC.       Hypokalemia  05/08/2020  Replete potassium as needed  Recheck labs in the a.m. to include CBC, CMP      Pressure injury of sacral region, stage 4  05/08/2020  Continue antibiotic therapy  Consult General surgery for possible debridement  Monitor blood cultures.  Follow-up otherwise as clinically indicated    5/9/2020  Continue IV antibiotics  General Surgery consulted - wound care instructions placed, no plan for debridement at this time  Blood cultures for fever/elevated WBC      Debility  Cont with PT/OT for gait training and strengthening and restoration of ADL's   Encourage mobility, OOB in chair, and early ambulation as appropriate  Fall precautions   Monitor for bowel and bladder dysfunction  Monitor for and prevent skin breakdown and pressure ulcers    Constipation  Aggressive bowel regimen    5/9/2020  Continue bowel regimen   Minimize opioids    Hyponatremia  Appears to be chronic.  Check serum and urine  osm  Check urine sodium     5/9/2020  Resolving  Monitor daily BMP      VTE Risk Mitigation (From admission, onward)         Ordered     Place MORIS hose  Until discontinued      05/07/20 1953     IP VTE LOW RISK PATIENT  Once      05/07/20 1953                      Amy Ochoa MD  Department of Hospital Medicine   [facility] - [facility]

## 2020-05-10 NOTE — NURSING
Received report from SHAWN Barker. Pt transferred from medical/surgical floor d/t hypotension and tachycardia. Pt awake and alert upon arrival to ICU. HR 110s. BP noted @ 80s/70s with MAP >70. Bright red blood with clots noted in spence catheter.     [id number] Dr. Johnson at bedside to place central line. Procedure consent obtained from  by telephone per SHAWN Perdomo/SHAWN Barker. Time out called @ this time.

## 2020-05-10 NOTE — ANESTHESIA PROCEDURE NOTES
"Central Line    Diagnosis: hypotension  Patient location during procedure: ICU  Procedure start time: 5/9/2020 10:30 PM  Timeout: 5/9/2020 10:30 PM  Procedure end time: 5/9/2020 11:02 PM    Staffing  Authorizing Provider: Justin Johnson MD  Performing Provider: Justin Johnson MD    Staffing  Anesthesiologist: Justin Johnson MD  Anesthesiologist was present at the time of the procedure.  Preanesthetic Checklist  Completed: patient identified, site marked, surgical consent, pre-op evaluation, timeout performed, IV checked, risks and benefits discussed, monitors and equipment checked and anesthesia consent given  Indication   Indication: hemodynamic monitoring, vascular access, med administration     Anesthesia   local infiltration    Central Line   Skin Prep: skin prepped with ChloraPrep, skin prep agent completely dried prior to procedure  maximum sterile barriers used during central venous catheter insertion  hand hygiene performed prior to central venous catheter insertion  Location: left, internal jugular.   Catheter type: single lumen  Ultrasound: none   Manometry: Venous cannualation confirmed by visual estimation of blood vessel pressure using manometry.  Insertion Attempts: 1   Securement:line sutured, sterile dressing applied and blood return through all ports    Post-Procedure   X-Ray: no pneumothorax on x-ray, placement verified by x-ray and tip termination  Adverse Events:none    Guidewire Guidewire removed intact. Guidewire removed intact, verified with nurse.  Additional Notes  Left IJ easily cannulated one single wall stick.  Unable to pass Jwire past approx 10-15cm before meeting resistance to advancement of the Jwire despite multiple attempts.  3 1/2 " catheter easily advanced over Jwire with good non pulsatile dark blood return after wire removed.  Multiple attempts to pass jwire through the catheter again met the same obstruction.  Because of increased risk of damage to the intrathoracic great " "vessels further attempts to advance Jwire were halted.  The 3 1/2" catheter was left in the IJ.  Additional attempts may be tried after hydration.             "

## 2020-05-10 NOTE — ASSESSMENT & PLAN NOTE
Lab Results   Component Value Date    CREATININE 1.2 05/10/2020     Sr Cr above baseline. Baseline GFR is normal  Trail of IVF's  Retroperitoneal sonogram in AM  CT scan: The bladder is distended without a catheter in place and there is bilateral moderate to severe hydronephrosis.   Consult Urology  Cont to monitor with daily labs   Avoid nephrotoxins.   Renally dose all medications    Serum bicarb level 18. Monitor for need to initiate sodium bicarb if continues to decrease.     05/08/2020:  Agree with retroperitoneal sonogram  Will hold on urology consult for now  Continue hydration at current level.  Begin empiric antibiotics for urinary tract infection and possibly infected sacral decubitus.  Piperacillin 3.375 g IV q.8 hours  Vancomycin dosed by pharmacy    5/9/2020  Retroperitoneal ultrasound with thickened bladder wall (chronic), bladder perforation (likely chronic) vs diverticulum  sCr improving   Continue IV hydration, IV antibiotics  Urology consulted for gross hematuria though this is possibly from sepnce insertion    5/10/2020  Renal function has significantly improved  Continue IV fluids

## 2020-05-10 NOTE — NURSING
Dr. Johnson by and Dr. Ochoa on unit.  Discussing positional central line.   Radiology to reposition or restart in am

## 2020-05-10 NOTE — ASSESSMENT & PLAN NOTE
PH 7.3  Respiratory compensation is appropriate  Renal function has recovered - possible RTA 2 vs 4  Cortisol pending, ACTH not ordered; will start stress dose steroids while labs are pending - hydrocortisone 100 mg q8h IV  Urine studies repeated for possible Type 2 RTA  Monitor pH

## 2020-05-10 NOTE — SUBJECTIVE & OBJECTIVE
Interval History: See hospital course. Moved to ICU. Stable.     Review of Systems   Constitutional: Positive for fatigue. Negative for fever.   HENT: Negative.    Respiratory: Negative for shortness of breath.    Cardiovascular: Negative for chest pain.   Gastrointestinal: Negative for abdominal pain.   Genitourinary: Positive for hematuria.   Musculoskeletal: Negative.    Skin: Negative.    Neurological: Positive for weakness.   Psychiatric/Behavioral: Negative.      Objective:     Vital Signs (Most Recent):  Temp: 97.4 °F (36.3 °C) (05/10/20 1130)  Pulse: 89 (05/10/20 1130)  Resp: 12 (05/10/20 1130)  BP: 111/66 (05/10/20 1130)  SpO2: 99 % (05/10/20 1130) Vital Signs (24h Range):  Temp:  [96.7 °F (35.9 °C)-99.2 °F (37.3 °C)] 97.4 °F (36.3 °C)  Pulse:  [] 89  Resp:  [8-25] 12  SpO2:  [92 %-100 %] 99 %  BP: ()/(41-93) 111/66     Weight: 33.5 kg (73 lb 13.7 oz)  Body mass index is 14.42 kg/m².    Intake/Output Summary (Last 24 hours) at 5/10/2020 1139  Last data filed at 5/10/2020 1056  Gross per 24 hour   Intake 4537.24 ml   Output 775 ml   Net 3762.24 ml      Physical Exam   Constitutional: She is oriented to person, place, and time. No distress.   Thin female, lying bed, no acute distress   HENT:   Head: Normocephalic and atraumatic.   Right Ear: External ear normal.   Left Ear: External ear normal.   Nose: Nose normal.   Mouth/Throat: Oropharynx is clear and moist.   Eyes: Conjunctivae are normal.   Cardiovascular: Normal rate, regular rhythm, normal heart sounds and intact distal pulses.   No murmur heard.  Pulmonary/Chest: Effort normal and breath sounds normal. No stridor. No respiratory distress. She has no wheezes.   Abdominal: Bowel sounds are normal. She exhibits no distension. There is no tenderness.   Musculoskeletal: She exhibits no edema.   Neurological: She is alert and oriented to person, place, and time.   Patient is alert and conversational though fatigued   Skin: Skin is warm and dry.  She is not diaphoretic.   Psychiatric: She has a normal mood and affect.   Vitals reviewed.      Significant Labs:   Recent Lab Results       05/10/20  0908   05/10/20  0450   05/09/20  2335   05/09/20  2219   05/09/20  1817        Procalcitonin         0.13  Comment:  A concentration < 0.25 ng/mL represents a low risk bacterial   infection.  Procalcitonin may not be accurate among patients with localized   infection, recent trauma or major surgery, immunosuppressed state,   invasive fungal infection, renal dysfunction. Decisions regarding   initiation or continuation of antibiotic therapy should not be based   solely on procalcitonin levels.       Albumin   1.5   1.8       Alkaline Phosphatase   81   89       Allens Test     N/A         ALT   9   11       Anion Gap   5   5       Appearance, UA Cloudy             AST   15   13       Bacteria, UA few             Baso #   0.01   0.02 0.03     Basophil%   0.2   0.4 0.5     Bilirubin (UA) 1+  Comment:  Positive urine bilirubin is not confirmed. Correlate with   serum bilirubin and clinical presentation.               BILIRUBIN TOTAL   0.4  Comment:  For infants and newborns, interpretation of results should be based  on gestational age, weight and in agreement with clinical  observations.  Premature Infant recommended reference ranges:  Up to 24 hours.............<8.0 mg/dL  Up to 48 hours............<12.0 mg/dL  3-5 days..................<15.0 mg/dL  6-29 days.................<15.0 mg/dL     0.6  Comment:  For infants and newborns, interpretation of results should be based  on gestational age, weight and in agreement with clinical  observations.  Premature Infant recommended reference ranges:  Up to 24 hours.............<8.0 mg/dL  Up to 48 hours............<12.0 mg/dL  3-5 days..................<15.0 mg/dL  6-29 days.................<15.0 mg/dL         BNP       203  Comment:  Values of less than 100 pg/ml are consistent with non-CHF populations.       Site     RB          BUN, Bld   33   37       Calcium   7.8   8.1       Chloride   113   113       CO2   13   15       Color, UA Brown             Cortisol       13.90  Comment:  Cortisol Reference Range:  Before 10:00 am: 4.46-22.7 ug/dL  After 5:00 pm:    1.7-14.1 ug/dL         Creatinine   1.2   1.4       DelSys     Room Air         Differential Method   Automated   Automated Automated     eGFR if    54.0   44.9       eGFR if non    46.9  Comment:  Calculation used to obtain the estimated glomerular filtration  rate (eGFR) is the CKD-EPI equation.      38.9  Comment:  Calculation used to obtain the estimated glomerular filtration  rate (eGFR) is the CKD-EPI equation.          Eos #   0.0   0.2 0.2     Eosinophil%   0.6   2.9 3.8     FiO2     21         Glucose   102   95       Glucose, UA Negative             Gran # (ANC)   4.4   4.3 5.1     Gran%   89.1   83.6 83.7     Hematocrit   24.6   26.7 27.3     Hemoglobin   8.1   8.7 8.8     Hyaline Casts, UA none             Immature Grans (Abs)   0.06  Comment:  Mild elevation in immature granulocytes is non specific and   can be seen in a variety of conditions including stress response,   acute inflammation, trauma and pregnancy. Correlation with other   laboratory and clinical findings is essential.     0.03  Comment:  Mild elevation in immature granulocytes is non specific and   can be seen in a variety of conditions including stress response,   acute inflammation, trauma and pregnancy. Correlation with other   laboratory and clinical findings is essential.   0.04  Comment:  Mild elevation in immature granulocytes is non specific and   can be seen in a variety of conditions including stress response,   acute inflammation, trauma and pregnancy. Correlation with other   laboratory and clinical findings is essential.       Immature Granulocytes   1.2   0.6 0.7     Ketones, UA Trace             Lactate, Brown       1.2  Comment:  Falsely low lactic acid  results can be found in samples   containing >=13.0 mg/dL total bilirubin and/or >=3.5 mg/dL   direct bilirubin.         Leukocytes, UA 3+             Lymph #   0.4   0.4 0.4     Lymph%   7.3   8.4 6.9     Magnesium   1.8           MCH   31.6   31.8 31.7     MCHC   32.9   32.6 32.2     MCV   96   97 98     Microscopic Comment SEE COMMENT  Comment:  Other formed elements not mentioned in the report are not   present in the microscopic examination.                Mode     SPONT         Mono #   0.1   0.2 0.3     Mono%   1.6   4.1 4.4     MPV   9.8   9.8 9.6     NITRITE UA Negative             nRBC   0   0 0     Occult Blood UA 3+             pH, UA 6.0             Phosphorus   2.9           Platelets   235   223 236     POC BE     -14         POC HCO3     12.8         POC PCO2     25.7         POC PH     7.306         POC PO2     125         POC SATURATED O2     99         POC TCO2     14         Potassium   3.6   3.7       PROTEIN TOTAL   4.4   5.0       Protein, UA 3+  Comment:  Recommend a 24 hour urine protein or a urine   protein/creatinine ratio if globulin induced proteinuria is  clinically suspected.               RBC   2.56   2.74 2.78     RBC,              RDW   13.6   13.5 13.5     Sample     ARTERIAL         Sodium   131   133       Sp02     96         Specific Gratis, UA 1.015             Specimen UA Urine, Clean Catch             Squam Epithel, UA 2             Troponin I         <0.01     UROBILINOGEN UA Negative             WBC Clumps, UA moderate             WBC,              WBC   4.90   5.10 6.11                        All pertinent labs within the past 24 hours have been reviewed.    Significant Imaging: I have reviewed and interpreted all pertinent imaging results/findings within the past 24 hours.

## 2020-05-10 NOTE — PLAN OF CARE
Pt being monitored for BP, HR.   Iv antibiotics being administered. Monitoring I&O.  Attempting to wean Levophed.  Very poor appetite.  Monitoring wounds

## 2020-05-10 NOTE — NURSING
"2000>Dr. Marquise Simmons notified of pt's bp 63/49 pulse 110. Patient reports, "feeling fine." Bolus ordered.   2130>Dr. Simmons messaged on pt's bp 85/41 pulse 105 after 1 l bolus. He said to transfer pt to unit. Leanne, supervisor, spoke c patient's  to obtain consent for a central line. Consent obtained. Dr. Simmons said to give another liter bolus NS after blood drawn. Blood drawn and run to lab immediately. Pt's belongings gathered. Updated pt she will go to unit. Pt pleasantly disoriented. Transferred to unit at 2225 and placed on continuous pulse ox, cardiac monitor. 1 L NS bolus initiated. Report given to SHAWN Diaz. Pt was turned q2 since 6:30 pm.   "

## 2020-05-10 NOTE — PLAN OF CARE
Problem: Wound  Goal: Optimal Wound Healing  Outcome: Ongoing, Progressing  Intervention: Promote Effective Wound Healing  Flowsheets (Taken 5/10/2020 0018)  Oral Nutrition Promotion: calorie dense foods provided  Sleep/Rest Enhancement: consistent schedule promoted     Problem: Fall Injury Risk  Goal: Absence of Fall and Fall-Related Injury  Outcome: Ongoing, Progressing  Intervention: Identify and Manage Contributors to Fall Injury Risk  Flowsheets (Taken 5/10/2020 0018)  Medication Review/Management: --  Intervention: Promote Injury-Free Environment  Flowsheets (Taken 5/10/2020 0018)  Safety Promotion/Fall Prevention: pulse ox; side rails raised x 3; bed alarm set  Environmental Safety Modification: clutter free environment maintained; room near unit station     Problem: Renal Function Impairment (Acute Kidney Injury/Impairment)  Goal: Effective Renal Function  Outcome: Ongoing, Progressing  Intervention: Monitor and Support Renal Function  Flowsheets (Taken 5/10/2020 0018)  Stabilization Measures: fluid resuscitation initiated; legs elevated  Medication Review/Management: infusion titrated; provider consulted

## 2020-05-11 LAB
ALBUMIN SERPL BCP-MCNC: 1.6 G/DL (ref 3.5–5.2)
ALP SERPL-CCNC: 74 U/L (ref 55–135)
ALT SERPL W/O P-5'-P-CCNC: 9 U/L (ref 10–44)
ANION GAP SERPL CALC-SCNC: 5 MMOL/L (ref 8–16)
AST SERPL-CCNC: 17 U/L (ref 10–40)
BASOPHILS # BLD AUTO: 0 K/UL (ref 0–0.2)
BASOPHILS NFR BLD: 0 % (ref 0–1.9)
BILIRUB SERPL-MCNC: 0.5 MG/DL (ref 0.1–1)
BUN SERPL-MCNC: 28 MG/DL (ref 8–23)
CALCIUM SERPL-MCNC: 8.4 MG/DL (ref 8.7–10.5)
CHLORIDE SERPL-SCNC: 114 MMOL/L (ref 95–110)
CO2 SERPL-SCNC: 14 MMOL/L (ref 23–29)
CREAT SERPL-MCNC: 1.1 MG/DL (ref 0.5–1.4)
DIFFERENTIAL METHOD: ABNORMAL
EOSINOPHIL # BLD AUTO: 0 K/UL (ref 0–0.5)
EOSINOPHIL NFR BLD: 0 % (ref 0–8)
ERYTHROCYTE [DISTWIDTH] IN BLOOD BY AUTOMATED COUNT: 13.9 % (ref 11.5–14.5)
EST. GFR  (AFRICAN AMERICAN): >60 ML/MIN/1.73 M^2
EST. GFR  (NON AFRICAN AMERICAN): 52.1 ML/MIN/1.73 M^2
GLUCOSE SERPL-MCNC: 118 MG/DL (ref 70–110)
HCT VFR BLD AUTO: 25.4 % (ref 37–48.5)
HGB BLD-MCNC: 8.5 G/DL (ref 12–16)
IMM GRANULOCYTES # BLD AUTO: 0.07 K/UL (ref 0–0.04)
IMM GRANULOCYTES NFR BLD AUTO: 0.9 % (ref 0–0.5)
LYMPHOCYTES # BLD AUTO: 0.6 K/UL (ref 1–4.8)
LYMPHOCYTES NFR BLD: 8 % (ref 18–48)
MAGNESIUM SERPL-MCNC: 1.7 MG/DL (ref 1.6–2.6)
MCH RBC QN AUTO: 31.8 PG (ref 27–31)
MCHC RBC AUTO-ENTMCNC: 33.5 G/DL (ref 32–36)
MCV RBC AUTO: 95 FL (ref 82–98)
MONOCYTES # BLD AUTO: 0.1 K/UL (ref 0.3–1)
MONOCYTES NFR BLD: 1.6 % (ref 4–15)
NEUTROPHILS # BLD AUTO: 6.6 K/UL (ref 1.8–7.7)
NEUTROPHILS NFR BLD: 89.5 % (ref 38–73)
NRBC BLD-RTO: 0 /100 WBC
PHOSPHATE SERPL-MCNC: 3 MG/DL (ref 2.7–4.5)
PLATELET # BLD AUTO: 247 K/UL (ref 150–350)
PMV BLD AUTO: 9.7 FL (ref 9.2–12.9)
POTASSIUM SERPL-SCNC: 3.9 MMOL/L (ref 3.5–5.1)
PROT SERPL-MCNC: 4.3 G/DL (ref 6–8.4)
RBC # BLD AUTO: 2.67 M/UL (ref 4–5.4)
SODIUM SERPL-SCNC: 133 MMOL/L (ref 136–145)
TB INDURATION 48 - 72 HR READ: 0 MM
WBC # BLD AUTO: 7.41 K/UL (ref 3.9–12.7)

## 2020-05-11 PROCEDURE — 63600175 PHARM REV CODE 636 W HCPCS: Performed by: FAMILY MEDICINE

## 2020-05-11 PROCEDURE — 97166 OT EVAL MOD COMPLEX 45 MIN: CPT

## 2020-05-11 PROCEDURE — 63600175 PHARM REV CODE 636 W HCPCS: Performed by: HOSPITALIST

## 2020-05-11 PROCEDURE — 99291 CRITICAL CARE FIRST HOUR: CPT | Mod: ,,, | Performed by: INTERNAL MEDICINE

## 2020-05-11 PROCEDURE — 83735 ASSAY OF MAGNESIUM: CPT

## 2020-05-11 PROCEDURE — 80053 COMPREHEN METABOLIC PANEL: CPT

## 2020-05-11 PROCEDURE — 99291 PR CRITICAL CARE, E/M 30-74 MINUTES: ICD-10-PCS | Mod: ,,, | Performed by: INTERNAL MEDICINE

## 2020-05-11 PROCEDURE — 20000000 HC ICU ROOM

## 2020-05-11 PROCEDURE — 25000003 PHARM REV CODE 250: Performed by: INTERNAL MEDICINE

## 2020-05-11 PROCEDURE — 85025 COMPLETE CBC W/AUTO DIFF WBC: CPT

## 2020-05-11 PROCEDURE — 84100 ASSAY OF PHOSPHORUS: CPT

## 2020-05-11 PROCEDURE — 63600175 PHARM REV CODE 636 W HCPCS: Performed by: INTERNAL MEDICINE

## 2020-05-11 PROCEDURE — 25000003 PHARM REV CODE 250: Performed by: HOSPITALIST

## 2020-05-11 PROCEDURE — 97110 THERAPEUTIC EXERCISES: CPT

## 2020-05-11 PROCEDURE — S0179 MEGESTROL 20 MG: HCPCS | Performed by: INTERNAL MEDICINE

## 2020-05-11 RX ORDER — IPRATROPIUM BROMIDE 21 UG/1
2 SPRAY, METERED NASAL 3 TIMES DAILY
Status: DISCONTINUED | OUTPATIENT
Start: 2020-05-11 | End: 2020-05-13 | Stop reason: HOSPADM

## 2020-05-11 RX ORDER — SODIUM CHLORIDE 9 MG/ML
INJECTION, SOLUTION INTRAVENOUS CONTINUOUS
Status: DISCONTINUED | OUTPATIENT
Start: 2020-05-11 | End: 2020-05-12

## 2020-05-11 RX ADMIN — HYDROCORTISONE SODIUM SUCCINATE 100 MG: 250 INJECTION, POWDER, FOR SOLUTION INTRAMUSCULAR; INTRAVENOUS at 09:05

## 2020-05-11 RX ADMIN — SODIUM CHLORIDE, SODIUM LACTATE, POTASSIUM CHLORIDE, AND CALCIUM CHLORIDE: .6; .31; .03; .02 INJECTION, SOLUTION INTRAVENOUS at 11:05

## 2020-05-11 RX ADMIN — PANTOPRAZOLE SODIUM 40 MG: 40 TABLET, DELAYED RELEASE ORAL at 09:05

## 2020-05-11 RX ADMIN — HYDROCORTISONE SODIUM SUCCINATE 100 MG: 250 INJECTION, POWDER, FOR SOLUTION INTRAMUSCULAR; INTRAVENOUS at 12:05

## 2020-05-11 RX ADMIN — SENNOSIDES AND DOCUSATE SODIUM 1 TABLET: 8.6; 5 TABLET ORAL at 08:05

## 2020-05-11 RX ADMIN — MONTELUKAST SODIUM 10 MG: 10 TABLET, COATED ORAL at 09:05

## 2020-05-11 RX ADMIN — ALLOPURINOL 300 MG: 100 TABLET ORAL at 09:05

## 2020-05-11 RX ADMIN — POTASSIUM CHLORIDE 20 MEQ: 1500 TABLET, EXTENDED RELEASE ORAL at 09:05

## 2020-05-11 RX ADMIN — SODIUM CHLORIDE, SODIUM LACTATE, POTASSIUM CHLORIDE, AND CALCIUM CHLORIDE: .6; .31; .03; .02 INJECTION, SOLUTION INTRAVENOUS at 02:05

## 2020-05-11 RX ADMIN — MEROPENEM AND SODIUM CHLORIDE 1 G: 1 INJECTION, SOLUTION INTRAVENOUS at 08:05

## 2020-05-11 RX ADMIN — ESCITALOPRAM OXALATE 20 MG: 10 TABLET ORAL at 09:05

## 2020-05-11 RX ADMIN — SODIUM CHLORIDE: 0.9 INJECTION, SOLUTION INTRAVENOUS at 01:05

## 2020-05-11 RX ADMIN — POTASSIUM CHLORIDE 20 MEQ: 1500 TABLET, EXTENDED RELEASE ORAL at 08:05

## 2020-05-11 RX ADMIN — GABAPENTIN 600 MG: 300 CAPSULE ORAL at 09:05

## 2020-05-11 RX ADMIN — MEROPENEM AND SODIUM CHLORIDE 1 G: 1 INJECTION, SOLUTION INTRAVENOUS at 09:05

## 2020-05-11 RX ADMIN — HYDROCORTISONE SODIUM SUCCINATE 100 MG: 250 INJECTION, POWDER, FOR SOLUTION INTRAMUSCULAR; INTRAVENOUS at 06:05

## 2020-05-11 RX ADMIN — GABAPENTIN 600 MG: 300 CAPSULE ORAL at 08:05

## 2020-05-11 RX ADMIN — SENNOSIDES AND DOCUSATE SODIUM 1 TABLET: 8.6; 5 TABLET ORAL at 09:05

## 2020-05-11 RX ADMIN — MEGESTROL ACETATE 200 MG: 40 SUSPENSION ORAL at 09:05

## 2020-05-11 NOTE — NURSING
Pt oliguric with 80mL of urine output over the last four hours. Also now hypothermic with 95.4 rectal temperature. Ambrose hugger placed. Dr. JOSE Simmosn MD informed. No new orders received. Will continue to monitor.

## 2020-05-11 NOTE — PT/OT/SLP EVAL
Occupational Therapy   Evaluation    Name: Obdulia Yepez  MRN: [id number]  Admitting Diagnosis:  JOSELUIS (acute kidney injury)      Recommendations:     Discharge Recommendations:  ECF vs Hospice   Discharge Equipment Recommendations:   Hospital bed with specialized mattress for skin breakdown (pending home with hospice)  Barriers to discharge:   ability for caregiver to provide required assistance patient needs     Assessment:     Obdulia Yepez is a 69 y.o. female with a medical diagnosis of JOSELUIS (acute kidney injury).  She presents with debility, severe weakness, failure to thrive, sacral unstageable ulcer). Performance deficits affecting function:  muscle weakness    Rehab Prognosis: Poor; patient would benefit from acute skilled OT services to address these deficits and reach maximum level of function.       Plan:     Patient to be seen   to address the above listed problems via    · Plan of Care Expires:  05/18/2020  · Plan of Care Reviewed with:  patient    Subjective     Chief Complaint: severe weakness admitted wit JOSELUIS  Patient/Family Comments/goals: patient seen in ICU. Difficult to arouse. She was able to intermittently speak or follow simple commands. She is confused, reports she is at home and talking to her son. Her son was involved in an MVA in recent years and is no longer living, from my recollection when she was treated in outpatient therapy in the past year or two.    Occupational Profile:  Living Environment: Lives with  in [place]  Previous level of function: Declining function over the last months; bedbound and MAX Assist in ADL's per review of chart  Roles and Routines: bedbound over last months. Prior, patient used AE to ambulate and had history of multiple falls.  Equipment Used at Home:   RW, Wheelchair  Assistance upon Discharge: MAX to Total Assist     Pain/Comfort:  ·  Patient repeats in multiple inquires that she is not in pain     Patients cultural, spiritual, [employer] conflicts  given the current situation:  none    Objective:     Communicated with: RN prior to session.  Patient found HOB elevated with heated blankets   upon OT entry to room.    General Precautions: Standard,   Fall  Orthopedic Precautions:  Previous R and L shoulder injuries  Braces:   NA    Occupational Performance:    Bed Mobility:    · Patient completed Rolling/Turning to Left with  total assistance    Functional Mobility/Transfers:  Activity not performed.    Activities of Daily Living:    · Total Assist toileting (catheter)  · MAX Assist feeding with patient reportedly refusing/turning head away with attempts   · Total Assist UB and LB dressing  ·  Total Assist grooming  · Total Assist bathing    Cognitive/Visual Perceptual:  Cognitive/Psychosocial Skills:     -       Oriented to: Person   -       Follows Commands/attention:Follows one-step commands    Physical Exam:  Skin integrity: Report of unstageable decubitus sacral ulcer per RN  Upper Extremity Range of Motion:     -       Right Upper Extremity: PROM WFL  -       Left Upper Extremity: PROM WFL   Upper Extremity Strength:    -       Right Upper Extremity: Deficits: Unable to consistently follow MMT cues  -       Left Upper Extremity: Deficits: Unable to consistently follow MMT cues   Strength:    -       Right Upper Extremity: Deficits: 3/5  -       Left Upper Extremity: Deficits: 3-/5    AMPAC 6 Click ADL:  AMPAC Total Score:      Treatment & Education:  Patient received PROM B shoulders with some participation in AAROM intermittently. Patient received AAROM B elbow flex/ext; active  with assisted ext in B hands. AAROM B knee flex/ext. PROM B hip flex. PROM B dorsiflexion and AAROM plantar flexion. Bed mobility attempted in position changes with patient LUE placed on bed rail and cued to assist in rolling. Patient unable to consistently /pull; requiring Total Assist. Patient positioned on R side with pillow placement under hip, with assist of RN.    Education:    Patient left right sidelying with all lines intact and bed alarm on    GOALS:   Patient will participate in bed mobility task rolling R and L MAX Assist.   Patient will participate in BUE AAROM throughout to improve ROM and strength for transfers.   Patient will tolerate sitting supported edge of bed up to 2 mins for improved ADL participation.     History:     Past Medical History:   Diagnosis Date    Allergy     Arthritis     Asthma     Depression     Gout     Hypotension     Neuropathy     Sepsis        Past Surgical History:   Procedure Laterality Date    APPENDECTOMY       SECTION      CHOLECYSTECTOMY      CYSTOSCOPY W/ RETROGRADES Bilateral 2019    Procedure: CYSTOSCOPY, WITH RETROGRADE PYELOGRAM;  Surgeon: Trip Bacon MD;  Location: [facility] OR;  Service: Urology;  Laterality: Bilateral;    CYSTOSCOPY WITH BIOPSY OF BLADDER  2019    Procedure: CYSTOSCOPY, WITH BLADDER BIOPSY, WITH FULGURATION IF INDICATED;  Surgeon: Trip Bacon MD;  Location: [facility] OR;  Service: Urology;;    ESOPHAGOGASTRODUODENOSCOPY N/A 12/10/2019    Procedure: EGD (ESOPHAGOGASTRODUODENOSCOPY);  Surgeon: Shakeel Perez MD;  Location: [facility];  Service: Endoscopy;  Laterality: N/A;    HYSTERECTOMY      SHOULDER SURGERY Right 2019    STOMACH SURGERY      URETEROSCOPY Right 2019    Procedure: URETEROSCOPY;  Surgeon: Trip Bacon MD;  Location: [facility] OR;  Service: Urology;  Laterality: Right;    WRIST SURGERY Left        Time Tracking:     OT Date of Treatment:  2020  OT Start Time:  1220  OT Stop Time:  1245  OT Total Time (min):      Billable Minutes:Evaluation 15  Therapeutic Exercise 10    Heather Caro OT  2020

## 2020-05-11 NOTE — NURSING
Spoke with Dr. Pat to follow up about transfer and to inform him that pt has had no urine output during shift. Pt spence catheter has been flushed.

## 2020-05-11 NOTE — NURSING
No urine output in spence catheter. Upon chart review, it is noted that pt had minimal UO over last 12hrs. 120mL of dark red urine with large clots aspirated from spence cath. Urine now flowing freely to cathter. Will continue to monitor.

## 2020-05-11 NOTE — ASSESSMENT & PLAN NOTE
05/08/2020  Continue antibiotic therapy  Consult General surgery for possible debridement  Monitor blood cultures.  Follow-up otherwise as clinically indicated    5/9/2020  Continue IV antibiotics  General Surgery consulted - wound care instructions placed, no plan for debridement at this time  Blood cultures for fever/elevated WBC    05/11/2020:  Continue current antibiotics.  Follow-up labs in the a.m..

## 2020-05-11 NOTE — PLAN OF CARE
Problem: Wound  Goal: Optimal Wound Healing  Outcome: Ongoing, Progressing  Intervention: Promote Effective Wound Healing  Flowsheets (Taken 5/11/2020 0017)  Oral Nutrition Promotion: calorie dense liquids provided  Sleep/Rest Enhancement: awakenings minimized  Pain Management Interventions: care clustered     Problem: Fall Injury Risk  Goal: Absence of Fall and Fall-Related Injury  Outcome: Ongoing, Progressing  Intervention: Promote Injury-Free Environment  Flowsheets (Taken 5/11/2020 0017)  Safety Promotion/Fall Prevention: side rails raised x 3; pulse ox  Environmental Safety Modification: clutter free environment maintained; room near unit station

## 2020-05-11 NOTE — ASSESSMENT & PLAN NOTE
Lab Results   Component Value Date    CREATININE 1.1 05/11/2020     Sr Cr above baseline. Baseline GFR is normal  Trail of IVF's  Retroperitoneal sonogram in AM  CT scan: The bladder is distended without a catheter in place and there is bilateral moderate to severe hydronephrosis.   Consult Urology  Cont to monitor with daily labs   Avoid nephrotoxins.   Renally dose all medications    Serum bicarb level 18. Monitor for need to initiate sodium bicarb if continues to decrease.     05/08/2020:  Agree with retroperitoneal sonogram  Will hold on urology consult for now  Continue hydration at current level.  Begin empiric antibiotics for urinary tract infection and possibly infected sacral decubitus.  Piperacillin 3.375 g IV q.8 hours  Vancomycin dosed by pharmacy    5/9/2020  Retroperitoneal ultrasound with thickened bladder wall (chronic), bladder perforation (likely chronic) vs diverticulum  sCr improving   Continue IV hydration, IV antibiotics  Urology consulted for gross hematuria though this is possibly from spence insertion    5/10/2020  Renal function has significantly improved  Continue IV fluids    05/11/2020:  Renal function continues to improve.  Continue IV fluids.  Monitor urine output.  So far today there is been very little urine put out.  Repeat labs in the a.m. to include CBC and CMP  If urine output does not improve will look to transfer for for Nephrology help

## 2020-05-11 NOTE — PROGRESS NOTES
[facility] - ICU Hospital Medicine  Progress Note    Patient Name: Obdulia Yepez  MRN: [id number]  Patient Class: IP- Inpatient   Admission Date: 5/7/2020  Length of Stay: 4 days  Attending Physician: Marquise Simmons MD  Primary Care Provider: Og Perez MD        Subjective:     Principal Problem:JOSELUIS (acute kidney injury)        HPI:  History of Present Illness:  Patient is a 69 y.o. female who has a past medical history of Allergy, Arthritis, Asthma, Depression, Gout, Hypotension, Neuropathy, and Sepsis presented with multiple complaints of not feeling well. Patient states that she has been having declining health for the past several months to the point now she is not eating and she is bed bound. She lives at home with her  and needs max assistance with ADL's. She is not ambulating and now bed bound. She has developed a sacral decub pressure ulcer as a result. She called her PCP with multiple complaints of abdominal pain, nausea, poor oral intake and weakness who instructed her to come to ED.     Overview/Hospital Course:  05/08/2020  Patient is a 69-year-old female that was admitted overnight for failure to thrive, dehydration and her  states that she has progressively weakened and is now bedbound.  Patient has a sacral decubitus which is stage IV.  Patient has multiple complaints of abdominal pain nausea and weakness.  She notified her primary care physician and then was told to present to the emergency department for evaluation.  Patient appears very malnourished and emaciated.  Patient denies any pain but states just not feeling well.  Labs show a sodium 131 potassium 3.2 chloride 106 bicarb 17 glucose 76 BUN 56 creatinine 2.2 calcium 7.9 and GFR 26 albumin 1.8  White blood cell count 4.7 hemoglobin 8.4 hematocrit 26  Urinalysis showed specific gravity 1.0102+ protein 2+ occult blood 3+ leukocytes    5/9/2020  Continue IV fluids. Patient with gross hematuria after spence  insertion. Urology consulted. Continue IV antibiotics. Monitor bicarb. Started on Lactated Ringers today. Encourage fluids. Surgery consulted for Stage IV decubitus ulcer, no plans for debridement at this time.    5/10/2020  Patient moved to ICU overnight for hypotension/blood pressure monitoring. Started on low dose levophed to maintain MAP. Continued on IV fluids at 125 mL/hr. Having episodes of sinus tachycardia in the 140s/150s that self-resolve. Discussed with Cardiology, do not appear to be atrial arrhythmias but rather sinus rhythm. Continue to monitor. Continue IV antibiotics - WBC normal, procalcitonin normal, lactic acid normal. Urine studies ordered. Started on stress dose steroids for possible adrenal insufficiency    05/11/2020:  Events of overnight reviewed.  Patient moved to ICU for hypotension.  Patient was started on pressors and IV fluids were continued.  Vital signs were blood pressure 89/66 pulse 78 respirations 12 temperature afebrile and O2 sat 99%.  White blood cell count 7.4 hemoglobin 8.5 hematocrit 25 platelets 247 89 granulocytes 6.6 lymphocytes.  Chem profile:  Sodium 133 potassium 3.9 chloride 114 bicarb 14 and BUN 28 creatinine 1.1 GFR 52 and glucose 118 calcium 8.4  Patient is very ill appearing and hypotensive.  Patient be kept on empiric antibiotics and blood cultures be repeated.  Patient has had very low urine output and will consider transfer for Nephrology support.    Interval History:     Review of Systems   Constitutional: Positive for activity change and fatigue. Negative for appetite change and fever.   HENT: Negative for congestion, ear discharge, mouth sores, nosebleeds, rhinorrhea, sinus pressure, sinus pain and tinnitus.    Eyes: Negative.  Negative for pain, redness and itching.   Respiratory: Positive for cough and shortness of breath. Negative for apnea, choking, chest tightness, wheezing and stridor.    Cardiovascular: Negative for chest pain, palpitations and leg  swelling.   Gastrointestinal: Positive for nausea. Negative for abdominal distention, abdominal pain, anal bleeding, blood in stool, constipation and diarrhea.   Endocrine: Negative.    Genitourinary: Negative for difficulty urinating, flank pain, frequency and urgency.   Musculoskeletal: Positive for arthralgias, gait problem and myalgias. Negative for back pain.   Skin: Negative for color change and pallor.   Allergic/Immunologic: Negative.    Neurological: Positive for dizziness and weakness. Negative for facial asymmetry, light-headedness and headaches.   Hematological: Negative for adenopathy. Does not bruise/bleed easily.   Psychiatric/Behavioral: The patient is nervous/anxious.      Objective:     Vital Signs (Most Recent):  Temp: 96.8 °F (36 °C) (05/11/20 0830)  Pulse: 89 (05/11/20 1500)  Resp: 11 (05/11/20 1500)  BP: 98/69 (05/11/20 1500)  SpO2: 98 % (05/11/20 1500) Vital Signs (24h Range):  Temp:  [95.4 °F (35.2 °C)-97.5 °F (36.4 °C)] 96.8 °F (36 °C)  Pulse:  [] 89  Resp:  [9-20] 11  SpO2:  [96 %-100 %] 98 %  BP: ()/() 98/69     Weight: 33.5 kg (73 lb 13.7 oz)  Body mass index is 14.42 kg/m².    Intake/Output Summary (Last 24 hours) at 5/11/2020 1632  Last data filed at 5/11/2020 1200  Gross per 24 hour   Intake 2075.2 ml   Output 285 ml   Net 1790.2 ml      Physical Exam   Constitutional: She appears distressed.   HENT:   Head: Atraumatic.   Eyes: Pupils are equal, round, and reactive to light. EOM are normal.   Neck: Normal range of motion. Neck supple.   Cardiovascular: Normal rate, regular rhythm, normal heart sounds and intact distal pulses.   Pulmonary/Chest: Effort normal. She has rales.   Abdominal: Soft. Bowel sounds are normal.   Musculoskeletal: Normal range of motion.   Neurological: She is alert.   Skin: Skin is warm. Capillary refill takes less than 2 seconds. There is pallor.   Psychiatric: She has a normal mood and affect.       Significant Labs:   Recent Lab Results        05/11/20  0445   05/11/20  0110        TB Induration 48 - 72 hr read   0     Albumin 1.6       Alkaline Phosphatase 74       ALT 9       Anion Gap 5       AST 17       Baso # 0.00       Basophil% 0.0       BILIRUBIN TOTAL 0.5  Comment:  For infants and newborns, interpretation of results should be based  on gestational age, weight and in agreement with clinical  observations.  Premature Infant recommended reference ranges:  Up to 24 hours.............<8.0 mg/dL  Up to 48 hours............<12.0 mg/dL  3-5 days..................<15.0 mg/dL  6-29 days.................<15.0 mg/dL         BUN, Bld 28       Calcium 8.4       Chloride 114       CO2 14       Creatinine 1.1       Differential Method Automated       eGFR if  >60.0       eGFR if non  52.1  Comment:  Calculation used to obtain the estimated glomerular filtration  rate (eGFR) is the CKD-EPI equation.          Eos # 0.0       Eosinophil% 0.0       Glucose 118       Gran # (ANC) 6.6       Gran% 89.5       Hematocrit 25.4       Hemoglobin 8.5       Immature Grans (Abs) 0.07  Comment:  Mild elevation in immature granulocytes is non specific and   can be seen in a variety of conditions including stress response,   acute inflammation, trauma and pregnancy. Correlation with other   laboratory and clinical findings is essential.         Immature Granulocytes 0.9       Lymph # 0.6       Lymph% 8.0       Magnesium 1.7       MCH 31.8       MCHC 33.5       MCV 95       Mono # 0.1       Mono% 1.6       MPV 9.7       nRBC 0       Phosphorus 3.0       Platelets 247       Potassium 3.9       PROTEIN TOTAL 4.3       RBC 2.67       RDW 13.9       Sodium 133       WBC 7.41           All pertinent labs within the past 24 hours have been reviewed.    Significant Imaging: I have reviewed and interpreted all pertinent imaging results/findings within the past 24 hours.      Assessment/Plan:      * JOSELUIS (acute kidney injury)  Lab Results   Component Value  Date    CREATININE 1.1 05/11/2020     Sr Cr above baseline. Baseline GFR is normal  Trail of IVF's  Retroperitoneal sonogram in AM  CT scan: The bladder is distended without a catheter in place and there is bilateral moderate to severe hydronephrosis.   Consult Urology  Cont to monitor with daily labs   Avoid nephrotoxins.   Renally dose all medications    Serum bicarb level 18. Monitor for need to initiate sodium bicarb if continues to decrease.     05/08/2020:  Agree with retroperitoneal sonogram  Will hold on urology consult for now  Continue hydration at current level.  Begin empiric antibiotics for urinary tract infection and possibly infected sacral decubitus.  Piperacillin 3.375 g IV q.8 hours  Vancomycin dosed by pharmacy    5/9/2020  Retroperitoneal ultrasound with thickened bladder wall (chronic), bladder perforation (likely chronic) vs diverticulum  sCr improving   Continue IV hydration, IV antibiotics  Urology consulted for gross hematuria though this is possibly from spence insertion    5/10/2020  Renal function has significantly improved  Continue IV fluids    05/11/2020:  Renal function continues to improve.  Continue IV fluids.  Monitor urine output.  So far today there is been very little urine put out.  Repeat labs in the a.m. to include CBC and CMP  If urine output does not improve will look to transfer for for Nephrology help    Metabolic acidosis, normal anion gap (NAG)  PH 7.3  Respiratory compensation is appropriate  Renal function has recovered - possible RTA 2 vs 4  Cortisol pending, ACTH not ordered; will start stress dose steroids while labs are pending - hydrocortisone 100 mg q8h IV  Urine studies repeated for possible Type 2 RTA  Monitor pH      Hypotension due to hypovolemia  Started on LRs @ 75 mL/hr  Continue IV antibiotics  Monitor Hgb   Monitor BP closely    5/10/2020  Patient moved overnight to ICU for monitoring  A-line placed  Unable to get central line placed because of anatomy -  recommend IR if needed in AM  Continue IV antibiotics though patient does not necessarily have sepsis picture at this time  Monitor heart rate - having episodes of sinus tach, troponin normal, BNP trending down  LRs increased to 125 mL/hr, may change to D5 NS  Required levophed briefly to maintain MAP of 65, wean today; oxygenation is good        Gross hematuria  Patient with gross hematuria starting on the night of 5/8/2020. Arias inserted non-traumatically and output was immediately bloody. Hemoglobin is stable. Urology consulted given new gross hematuria in setting of prior bladder perforation. Continue to monitor urine output and CBC.       Hypokalemia  05/08/2020  Replete potassium as needed  Recheck labs in the a.m. to include CBC, CMP      Pressure injury of sacral region, stage 4  05/08/2020  Continue antibiotic therapy  Consult General surgery for possible debridement  Monitor blood cultures.  Follow-up otherwise as clinically indicated    5/9/2020  Continue IV antibiotics  General Surgery consulted - wound care instructions placed, no plan for debridement at this time  Blood cultures for fever/elevated WBC    05/11/2020:  Continue current antibiotics.  Follow-up labs in the a.m..        Debility  Cont with PT/OT for gait training and strengthening and restoration of ADL's   Encourage mobility, OOB in chair, and early ambulation as appropriate  Fall precautions   Monitor for bowel and bladder dysfunction  Monitor for and prevent skin breakdown and pressure ulcers    Constipation  Aggressive bowel regimen    5/9/2020  Continue bowel regimen   Minimize opioids    Hyponatremia  Appears to be chronic.  Check serum and urine osm  Check urine sodium     5/9/2020  Resolving  Monitor daily BMP      VTE Risk Mitigation (From admission, onward)         Ordered     Place MORIS hose  Until discontinued      05/07/20 1953     IP VTE LOW RISK PATIENT  Once      05/07/20 1953              The evaluation, management and  treatment of this patient involved Critical Care services  amounting to 40 minutes of direct involvement.  This time was exclusive of any billable procedures.      Jimenez Patel MD  Department of Hospital Medicine   [facility] - [facility]

## 2020-05-11 NOTE — SUBJECTIVE & OBJECTIVE
Interval History:     Review of Systems   Constitutional: Positive for activity change and fatigue. Negative for appetite change and fever.   HENT: Negative for congestion, ear discharge, mouth sores, nosebleeds, rhinorrhea, sinus pressure, sinus pain and tinnitus.    Eyes: Negative.  Negative for pain, redness and itching.   Respiratory: Positive for cough and shortness of breath. Negative for apnea, choking, chest tightness, wheezing and stridor.    Cardiovascular: Negative for chest pain, palpitations and leg swelling.   Gastrointestinal: Positive for nausea. Negative for abdominal distention, abdominal pain, anal bleeding, blood in stool, constipation and diarrhea.   Endocrine: Negative.    Genitourinary: Negative for difficulty urinating, flank pain, frequency and urgency.   Musculoskeletal: Positive for arthralgias, gait problem and myalgias. Negative for back pain.   Skin: Negative for color change and pallor.   Allergic/Immunologic: Negative.    Neurological: Positive for dizziness and weakness. Negative for facial asymmetry, light-headedness and headaches.   Hematological: Negative for adenopathy. Does not bruise/bleed easily.   Psychiatric/Behavioral: The patient is nervous/anxious.      Objective:     Vital Signs (Most Recent):  Temp: 96.8 °F (36 °C) (05/11/20 0830)  Pulse: 89 (05/11/20 1500)  Resp: 11 (05/11/20 1500)  BP: 98/69 (05/11/20 1500)  SpO2: 98 % (05/11/20 1500) Vital Signs (24h Range):  Temp:  [95.4 °F (35.2 °C)-97.5 °F (36.4 °C)] 96.8 °F (36 °C)  Pulse:  [] 89  Resp:  [9-20] 11  SpO2:  [96 %-100 %] 98 %  BP: ()/() 98/69     Weight: 33.5 kg (73 lb 13.7 oz)  Body mass index is 14.42 kg/m².    Intake/Output Summary (Last 24 hours) at 5/11/2020 1632  Last data filed at 5/11/2020 1200  Gross per 24 hour   Intake 2075.2 ml   Output 285 ml   Net 1790.2 ml      Physical Exam   Constitutional: She appears distressed.   HENT:   Head: Atraumatic.   Eyes: Pupils are equal, round, and  reactive to light. EOM are normal.   Neck: Normal range of motion. Neck supple.   Cardiovascular: Normal rate, regular rhythm, normal heart sounds and intact distal pulses.   Pulmonary/Chest: Effort normal. She has rales.   Abdominal: Soft. Bowel sounds are normal.   Musculoskeletal: Normal range of motion.   Neurological: She is alert.   Skin: Skin is warm. Capillary refill takes less than 2 seconds. There is pallor.   Psychiatric: She has a normal mood and affect.       Significant Labs:   Recent Lab Results       05/11/20  0445   05/11/20  0110        TB Induration 48 - 72 hr read   0     Albumin 1.6       Alkaline Phosphatase 74       ALT 9       Anion Gap 5       AST 17       Baso # 0.00       Basophil% 0.0       BILIRUBIN TOTAL 0.5  Comment:  For infants and newborns, interpretation of results should be based  on gestational age, weight and in agreement with clinical  observations.  Premature Infant recommended reference ranges:  Up to 24 hours.............<8.0 mg/dL  Up to 48 hours............<12.0 mg/dL  3-5 days..................<15.0 mg/dL  6-29 days.................<15.0 mg/dL         BUN, Bld 28       Calcium 8.4       Chloride 114       CO2 14       Creatinine 1.1       Differential Method Automated       eGFR if  >60.0       eGFR if non  52.1  Comment:  Calculation used to obtain the estimated glomerular filtration  rate (eGFR) is the CKD-EPI equation.          Eos # 0.0       Eosinophil% 0.0       Glucose 118       Gran # (ANC) 6.6       Gran% 89.5       Hematocrit 25.4       Hemoglobin 8.5       Immature Grans (Abs) 0.07  Comment:  Mild elevation in immature granulocytes is non specific and   can be seen in a variety of conditions including stress response,   acute inflammation, trauma and pregnancy. Correlation with other   laboratory and clinical findings is essential.         Immature Granulocytes 0.9       Lymph # 0.6       Lymph% 8.0       Magnesium 1.7       MCH  31.8       MCHC 33.5       MCV 95       Mono # 0.1       Mono% 1.6       MPV 9.7       nRBC 0       Phosphorus 3.0       Platelets 247       Potassium 3.9       PROTEIN TOTAL 4.3       RBC 2.67       RDW 13.9       Sodium 133       WBC 7.41           All pertinent labs within the past 24 hours have been reviewed.    Significant Imaging: I have reviewed and interpreted all pertinent imaging results/findings within the past 24 hours.

## 2020-05-11 NOTE — NURSING
Informed Dr. Pat about pt atheter not draining and urine output. Ordered to replace spence. Spence replacement unsuccessful due to swollen vagina. Informed Dr. Pat. Ordered to leave spence out and place an external catheter.

## 2020-05-11 NOTE — NURSING
Received report from SHAWN Biggs. Pt appears to be resting comfortably in bed. BP and HR noted to be WNL.     Drips:   LR @ 125mL/hr  Levophed @ 0.02mcg/kg/hr

## 2020-05-11 NOTE — NURSING
Pt's BP 75/55 (MAP 64). Levophed infusion restarted @ 0.02mcg/kg/hr. Will continue to  Monitor closely.

## 2020-05-12 ENCOUNTER — DOCUMENT SCAN (OUTPATIENT)
Dept: HOME HEALTH SERVICES | Facility: HOSPITAL | Age: 70
End: 2020-05-12
Payer: MEDICARE

## 2020-05-12 LAB
ALBUMIN SERPL BCP-MCNC: 1.5 G/DL (ref 3.5–5.2)
ALP SERPL-CCNC: 67 U/L (ref 55–135)
ALT SERPL W/O P-5'-P-CCNC: 11 U/L (ref 10–44)
ANION GAP SERPL CALC-SCNC: 3 MMOL/L (ref 8–16)
AST SERPL-CCNC: 18 U/L (ref 10–40)
BASOPHILS # BLD AUTO: 0.01 K/UL (ref 0–0.2)
BASOPHILS NFR BLD: 0.1 % (ref 0–1.9)
BILIRUB SERPL-MCNC: 0.7 MG/DL (ref 0.1–1)
BUN SERPL-MCNC: 26 MG/DL (ref 8–23)
CALCIUM SERPL-MCNC: 8.2 MG/DL (ref 8.7–10.5)
CHLORIDE SERPL-SCNC: 117 MMOL/L (ref 95–110)
CO2 SERPL-SCNC: 15 MMOL/L (ref 23–29)
CREAT SERPL-MCNC: 1 MG/DL (ref 0.5–1.4)
DIFFERENTIAL METHOD: ABNORMAL
EOSINOPHIL # BLD AUTO: 0 K/UL (ref 0–0.5)
EOSINOPHIL NFR BLD: 0 % (ref 0–8)
ERYTHROCYTE [DISTWIDTH] IN BLOOD BY AUTOMATED COUNT: 13.9 % (ref 11.5–14.5)
EST. GFR  (AFRICAN AMERICAN): >60 ML/MIN/1.73 M^2
EST. GFR  (NON AFRICAN AMERICAN): 58.4 ML/MIN/1.73 M^2
GLUCOSE SERPL-MCNC: 99 MG/DL (ref 70–110)
HCT VFR BLD AUTO: 22.3 % (ref 37–48.5)
HGB BLD-MCNC: 7.6 G/DL (ref 12–16)
IMM GRANULOCYTES # BLD AUTO: 0.07 K/UL (ref 0–0.04)
IMM GRANULOCYTES NFR BLD AUTO: 1 % (ref 0–0.5)
LYMPHOCYTES # BLD AUTO: 0.6 K/UL (ref 1–4.8)
LYMPHOCYTES NFR BLD: 9.1 % (ref 18–48)
MAGNESIUM SERPL-MCNC: 1.6 MG/DL (ref 1.6–2.6)
MCH RBC QN AUTO: 32.2 PG (ref 27–31)
MCHC RBC AUTO-ENTMCNC: 34.1 G/DL (ref 32–36)
MCV RBC AUTO: 95 FL (ref 82–98)
MONOCYTES # BLD AUTO: 0.1 K/UL (ref 0.3–1)
MONOCYTES NFR BLD: 1.6 % (ref 4–15)
NEUTROPHILS # BLD AUTO: 5.9 K/UL (ref 1.8–7.7)
NEUTROPHILS NFR BLD: 88.2 % (ref 38–73)
NRBC BLD-RTO: 0 /100 WBC
PHOSPHATE SERPL-MCNC: 3.3 MG/DL (ref 2.7–4.5)
PLATELET # BLD AUTO: 197 K/UL (ref 150–350)
PMV BLD AUTO: 10 FL (ref 9.2–12.9)
POTASSIUM SERPL-SCNC: 4.2 MMOL/L (ref 3.5–5.1)
PROT SERPL-MCNC: 4.1 G/DL (ref 6–8.4)
RBC # BLD AUTO: 2.36 M/UL (ref 4–5.4)
SODIUM SERPL-SCNC: 135 MMOL/L (ref 136–145)
VANCOMYCIN TROUGH SERPL-MCNC: 11 UG/ML (ref 10–22)
WBC # BLD AUTO: 6.69 K/UL (ref 3.9–12.7)

## 2020-05-12 PROCEDURE — 21400001 HC TELEMETRY ROOM

## 2020-05-12 PROCEDURE — 63600175 PHARM REV CODE 636 W HCPCS: Performed by: HOSPITALIST

## 2020-05-12 PROCEDURE — 85025 COMPLETE CBC W/AUTO DIFF WBC: CPT

## 2020-05-12 PROCEDURE — 25000003 PHARM REV CODE 250: Performed by: INTERNAL MEDICINE

## 2020-05-12 PROCEDURE — 63600175 PHARM REV CODE 636 W HCPCS: Performed by: FAMILY MEDICINE

## 2020-05-12 PROCEDURE — 80202 ASSAY OF VANCOMYCIN: CPT

## 2020-05-12 PROCEDURE — S0179 MEGESTROL 20 MG: HCPCS | Performed by: INTERNAL MEDICINE

## 2020-05-12 PROCEDURE — 83735 ASSAY OF MAGNESIUM: CPT

## 2020-05-12 PROCEDURE — 63600175 PHARM REV CODE 636 W HCPCS: Performed by: INTERNAL MEDICINE

## 2020-05-12 PROCEDURE — 99233 PR SUBSEQUENT HOSPITAL CARE,LEVL III: ICD-10-PCS | Mod: ,,, | Performed by: INTERNAL MEDICINE

## 2020-05-12 PROCEDURE — 84100 ASSAY OF PHOSPHORUS: CPT

## 2020-05-12 PROCEDURE — 94760 N-INVAS EAR/PLS OXIMETRY 1: CPT

## 2020-05-12 PROCEDURE — 80053 COMPREHEN METABOLIC PANEL: CPT

## 2020-05-12 PROCEDURE — 99233 SBSQ HOSP IP/OBS HIGH 50: CPT | Mod: ,,, | Performed by: INTERNAL MEDICINE

## 2020-05-12 PROCEDURE — 97803 MED NUTRITION INDIV SUBSEQ: CPT

## 2020-05-12 PROCEDURE — 25000003 PHARM REV CODE 250: Performed by: HOSPITALIST

## 2020-05-12 RX ORDER — FLUTICASONE FUROATE AND VILANTEROL 100; 25 UG/1; UG/1
1 POWDER RESPIRATORY (INHALATION) DAILY
Status: DISCONTINUED | OUTPATIENT
Start: 2020-05-12 | End: 2020-05-13 | Stop reason: HOSPADM

## 2020-05-12 RX ORDER — MIRTAZAPINE 7.5 MG/1
30 TABLET, FILM COATED ORAL NIGHTLY
Status: DISCONTINUED | OUTPATIENT
Start: 2020-05-12 | End: 2020-05-13 | Stop reason: HOSPADM

## 2020-05-12 RX ORDER — MUPIROCIN 20 MG/G
OINTMENT TOPICAL DAILY
Status: DISCONTINUED | OUTPATIENT
Start: 2020-05-12 | End: 2020-05-13 | Stop reason: HOSPADM

## 2020-05-12 RX ORDER — PREDNISONE 1 MG/1
3 TABLET ORAL EVERY 12 HOURS
Status: DISCONTINUED | OUTPATIENT
Start: 2020-05-12 | End: 2020-05-13 | Stop reason: HOSPADM

## 2020-05-12 RX ADMIN — MONTELUKAST SODIUM 10 MG: 10 TABLET, COATED ORAL at 08:05

## 2020-05-12 RX ADMIN — SENNOSIDES AND DOCUSATE SODIUM 1 TABLET: 8.6; 5 TABLET ORAL at 09:05

## 2020-05-12 RX ADMIN — MEROPENEM AND SODIUM CHLORIDE 1 G: 1 INJECTION, SOLUTION INTRAVENOUS at 08:05

## 2020-05-12 RX ADMIN — MUPIROCIN: 20 OINTMENT TOPICAL at 12:05

## 2020-05-12 RX ADMIN — POTASSIUM CHLORIDE 20 MEQ: 1500 TABLET, EXTENDED RELEASE ORAL at 09:05

## 2020-05-12 RX ADMIN — SODIUM CHLORIDE, SODIUM LACTATE, POTASSIUM CHLORIDE, AND CALCIUM CHLORIDE: .6; .31; .03; .02 INJECTION, SOLUTION INTRAVENOUS at 09:05

## 2020-05-12 RX ADMIN — HYDROCORTISONE SODIUM SUCCINATE 100 MG: 250 INJECTION, POWDER, FOR SOLUTION INTRAMUSCULAR; INTRAVENOUS at 09:05

## 2020-05-12 RX ADMIN — POLYETHYLENE GLYCOL (3350) 17 G: 17 POWDER, FOR SOLUTION ORAL at 09:05

## 2020-05-12 RX ADMIN — HYDROCORTISONE SODIUM SUCCINATE 100 MG: 250 INJECTION, POWDER, FOR SOLUTION INTRAMUSCULAR; INTRAVENOUS at 12:05

## 2020-05-12 RX ADMIN — PREDNISONE 3 MG: 1 TABLET ORAL at 09:05

## 2020-05-12 RX ADMIN — GABAPENTIN 600 MG: 300 CAPSULE ORAL at 08:05

## 2020-05-12 RX ADMIN — POTASSIUM CHLORIDE 20 MEQ: 1500 TABLET, EXTENDED RELEASE ORAL at 08:05

## 2020-05-12 RX ADMIN — ESCITALOPRAM OXALATE 20 MG: 10 TABLET ORAL at 08:05

## 2020-05-12 RX ADMIN — GABAPENTIN 600 MG: 300 CAPSULE ORAL at 09:05

## 2020-05-12 RX ADMIN — PANTOPRAZOLE SODIUM 40 MG: 40 TABLET, DELAYED RELEASE ORAL at 08:05

## 2020-05-12 RX ADMIN — MEGESTROL ACETATE 200 MG: 40 SUSPENSION ORAL at 08:05

## 2020-05-12 RX ADMIN — ALLOPURINOL 300 MG: 100 TABLET ORAL at 08:05

## 2020-05-12 RX ADMIN — SODIUM CHLORIDE, SODIUM LACTATE, POTASSIUM CHLORIDE, AND CALCIUM CHLORIDE 1000 ML: .6; .31; .03; .02 INJECTION, SOLUTION INTRAVENOUS at 12:05

## 2020-05-12 RX ADMIN — MIRTAZAPINE 30 MG: 7.5 TABLET ORAL at 09:05

## 2020-05-12 RX ADMIN — MEROPENEM AND SODIUM CHLORIDE 1 G: 1 INJECTION, SOLUTION INTRAVENOUS at 09:05

## 2020-05-12 NOTE — NURSING
"REPOSITIONED IN BED TO SUPINE POSITION, HOB ELEVATED TO 50 DEGREES,  CONSUMED 240CC OF WATER ROOM TEMPERATURE, PATIENT STATES, "I DON'T WANT ICE EITHER."  "

## 2020-05-12 NOTE — PHYSICIAN QUERY
PT Name: Obdulia Yepez  MR #: [id number]    Physician Query Form - Perfusion Diagnosis Clarification     CDS/: Amy Calles RN             Contact information: [phone]  This form is a permanent document in the medical record.     Query Date: May 12, 2020    By submitting this query, we are merely seeking further clarification of documentation. Please utilize your independent clinical judgment when addressing the question(s) below.    The medical record contains the following:    Indicators   Supporting Clinical Findings   Location in Medical Record   x Acute Illness (e.g. AMI, Sepsis, etc.) Hypotension due to hypovolemia  Acute kidney injury  Acute cystitis  Hyponatremia    Xray line placement  Clinical history:  Shock   5/13 DC summary          5/10 Cxr   x Acidosis documented Metabolic acidosis   5/13 DC summary   x ABGs / Labs PSEUDOMONAS AERUGINOSA   >100,000 cfu/ml     POC PH 7.306 (L)   POC PCO2 25.7 (LL)   POC PO2 125 (H)   POC SATURATED O2 99     WBC= 4.77, 4.33, 6.11, 4.90, 6.69   5/11 Urine culture    5/9 ABG            5/8-5/12 Lab   x Vital Signs BP= 72/49, 69/43, 87/70, 79/51, 75/55, 78/53   HR= 123, 134, 122, 115, 114, 123, 116, 115  RR= 20, 25, 20, 18, 20  Temp= 99.1, 96.7, 96.2, 95.4 5/7-11 Flowsheet   x Hypotension or Low Blood Pressure documented Hypotension due to hypovolemia  Patient moved overnight to ICU for monitoring  A-line placed    Patient moved to ICU overnight for hypotension/blood pressure monitoring. Started on low dose levophed to maintain MAP. Continued on IV fluids at 125 mL/hr. Having episodes of sinus tachycardia in the 140s/150s that self-resolve.   5/11 Hosp med PN    Altered Mental Status or Confusion      Diaphoresis, Cold Extremities or Cyanosis     x Oliguria Patient has had very low urine output and will consider transfer for Nephrology support. 5/11 Hosp med PN       x Medication/Treatment:  -Vasopressors  -Inotropic Drugs  -IV Fluids  -Cardiac Assist  Devices  -Hemodynamic Monitoring  -Blood/Blood Products Norepinephrine gtt  NS 999ml bolus  -150cc/hr  LR 125cc/hr  Vancomycin IVPB  Meropenem IVPB    ICU  A-line  Arias  CT abdomen  CBC, CMP  Cardiac monitor  Pulse oximetry  CXR  Urine culture  Strict input and output   5/10-11 MAR  5/11 MAR  5/7-8 MAR  5/9-12 MAR      5/8-5/11 NSG orders    Other:       Provider, please specify diagnosis or diagnoses associated with above clinical findings.      [x   ] Hypovolemic Shock   [   ] Other Shock (please specify):   [   ] Shock Unspecified   [   ] Other Condition (please specify):   [  ] Clinically Undetermined         Please document in your progress notes daily for the duration of treatment until resolved and include in your discharge summary.

## 2020-05-12 NOTE — NURSING
Bladder scan indicated 264 ml of urine. Patient did have a large bowel movement with some urine output.

## 2020-05-12 NOTE — PLAN OF CARE
Problem: Wound  Goal: Optimal Wound Healing  Outcome: Ongoing, Progressing     Problem: Adult Inpatient Plan of Care  Goal: Optimal Comfort and Wellbeing  Outcome: Ongoing, Progressing  Goal: Readiness for Transition of Care  Outcome: Ongoing, Progressing     Problem: Electrolyte Imbalance (Acute Kidney Injury/Impairment)  Goal: Serum Electrolyte Balance  Outcome: Ongoing, Progressing     Problem: Fluid Imbalance (Acute Kidney Injury/Impairment)  Goal: Optimal Fluid Balance  Outcome: Ongoing, Progressing     Problem: Hematologic Alteration (Acute Kidney Injury/Impairment)  Goal: Hemoglobin, Hematocrit and Platelets Within Normal Range  Outcome: Ongoing, Progressing

## 2020-05-12 NOTE — PROGRESS NOTES
[facility] - ICU  Adult Nutrition  Progress Note    SUMMARY       Recommendations  1. Pt to consume 50% of trays.     2. Pt to consume 50% of Boost Breeze TID.    3. If patient unable to meet intake goals, RD recommends gastric scope to be performed to re-evaluate esophagitis that was discovered 12/19. Pt may need peg tube placement due to low intake. Patient should not be placed on parental nutrition due to low BP & high refeeding syndrome possibility.       Goals: 1. Pt to tolerate 50% of regular diet. 2. Patient to tolerate 50% Boost Breeze TID 3. Pt to tolerate gastric scope procedure and tube feeding initiation   Nutrition Goal Status: goal not met, progressing towards goal    Reason for Assessment    Reason For Assessment: RD follow-up  Diagnosis: gastrointestinal disease    Dx: Malnutrition related to chronic catabolic state as evidence by 9.2 kg weight loss in 3 months, <25% intake for 1 month, nutrition physical exam showing severe muscle mass loss, and nutrition physical exam showing showing sever subcutaneous fat loss       Relevant Medical History: gout, hypotyension  Interdisciplinary Rounds: attended    General Information Comments: Pt transferred to ICU for hypotension. Last BM remains 5/9/20. Pt has had little output of urine. She is incontinent of bowel and bladder. RN denied N/V/D. Intake still low <15%, she is drinking her Boost Breeze TID. RN denies swallowing/chewing difficulty. Pt has dextirity issue & needs total assistance to eat.   Nutrition Discharge Planning: Pt to d/c  on high protein & high calorie diet. Possibly talk to MD about tube feeding placement.     Nutrition Risk Screen    Nutrition Risk Screen: other (see comments)(malnourished)    Anthropometrics    Temp: 98.6 °F (37 °C)  Height Method: Stated  Height: 5' (152.4 cm)  Height (inches): 60 in  Weight Method: Bed Scale  Weight: 33.5 kg (73 lb 13.7 oz)  Weight (lb): 73.85 lb  Ideal Body Weight (IBW), Female:  100 lb  % Ideal Body Weight, Female (lb): 75 %  BMI (Calculated): 14.4       Lab/Procedures/Meds    Pertinent Labs Reviewed: reviewed  Pertinent Labs Comments: Na 133, Chloride 114, protein 4.3, Bun 28  Pertinent Medications Reviewed: reviewed  Pertinent Medications Comments: Pantoprazole, milk of magnesia    Physical Findings/Assessment         Estimated/Assessed Needs    Weight Used For Calorie Calculations: 40.9 kg (90 lb 2.7 oz)(Adjusted Body Weight)  Energy Calorie Requirements (kcal): 0209-7032 kcal per day (30-35 kcal/kg <-- to promote weight gain)  Energy Need Method: Kcal/kg  Protein Requirements: 57-65 g per day(1.4-1.6 g/kg <-- high due to malnutrition & to promote weight gain)  Weight Used For Protein Calculations: 40.9 kg (90 lb 2.7 oz)(Adjusted Body Weight)     Estimated Fluid Requirement Method: RDA Method  RDA Method (mL): 1200         Nutrition Prescription Ordered    Current Diet Order: Regular Diet    Evaluation of Received Nutrient/Fluid Intake    Energy Calories Required: not meeting needs  Protein Required: not meeting needs  Fluid Required: not meeting needs  % Intake of Estimated Energy Needs: 0 - 25 %  % Meal Intake: 0 - 25 %    Nutrition Risk    Level of Risk/Frequency of Follow-up: high     Assessment and Plan    No new Assessment & Plan notes have been filed under this hospital service since the last note was generated.  Service: Nutrition       Monitor and Evaluation    Food and Nutrient Intake: energy intake, food and beverage intake  Food and Nutrient Adminstration: diet order  Anthropometric Measurements: weight  Biochemical Data, Medical Tests and Procedures: electrolyte and renal panel, lipid profile, gastrointestinal profile, glucose/endocrine profile, inflammatory profile  Nutrition-Focused Physical Findings: overall appearance     Malnutrition Assessment  Malnutrition Type: chronic illness, social/environmental circumstances  Energy Intake: severe energy intake, moderate energy  intake  Skin (Micronutrient): bruised, cracked, cuts unhealed, thinned  Neck/Chest (Micronutrient): subcutaneous fat loss, bony prominence       Weight Loss (Malnutrition): greater than 7.5% in 3 months  Energy Intake (Malnutrition): less than 75% for greater than or equal to 3 months   Orbital Region (Subcutaneous Fat Loss): severe depletion  Upper Arm Region (Subcutaneous Fat Loss): moderate depletion  Thoracic and Lumbar Region: severe depletion   Jainism Region (Muscle Loss): moderate depletion  Clavicle Bone Region (Muscle Loss): severe depletion  Clavicle and Acromion Bone Region (Muscle Loss): severe depletion  Scapular Bone Region (Muscle Loss): severe depletion  Posterior Calf Region (Muscle Loss): severe depletion   Edema (Fluid Accumulation): 0-->no edema present(No edma present per RD examination)   Subcutaneous Fat Loss (Final Summary): severe protein-calorie malnutrition  Muscle Loss Evaluation (Final Summary): severe protein-calorie malnutrition    Severe Weight Loss (Malnutrition): greater than 7.5% in 3 months    Nutrition Follow-Up    RD Follow-up?: Yes

## 2020-05-12 NOTE — NURSING
PATIENT HAD A INCONTINENT EPISODE OF URINE, PATIENT CLEANED AND INCONTINENT PAD CHANGED AT THIS TIME.

## 2020-05-12 NOTE — PROVIDER TRANSFER
[facility] - ICU  Transfer of Care Note      Patient Name: Obdulia Yepez  MRN: [id number]  Admission Date: 5/7/2020  Hospital Length of Stay: 5 days  Transfer Date: 5/12/2020  Attending Physician: Marquise Simmons MD   Primary Care Provider: Og Perez MD  Reason for Admission: JOSELUIS    HPI:   History of Present Illness:  Patient is a 69 y.o. female who has a past medical history of Allergy, Arthritis, Asthma, Depression, Gout, Hypotension, Neuropathy, and Sepsis presented with multiple complaints of not feeling well. Patient states that she has been having declining health for the past several months to the point now she is not eating and she is bed bound. She lives at home with her  and needs max assistance with ADL's. She is not ambulating and now bed bound. She has developed a sacral decub pressure ulcer as a result. She called her PCP with multiple complaints of abdominal pain, nausea, poor oral intake and weakness who instructed her to come to ED.     Hospital Course:   05/08/2020  Patient is a 69-year-old female that was admitted overnight for failure to thrive, dehydration and her  states that she has progressively weakened and is now bedbound.  Patient has a sacral decubitus which is stage IV.  Patient has multiple complaints of abdominal pain nausea and weakness.  She notified her primary care physician and then was told to present to the emergency department for evaluation.  Patient appears very malnourished and emaciated.  Patient denies any pain but states just not feeling well.  Labs show a sodium 131 potassium 3.2 chloride 106 bicarb 17 glucose 76 BUN 56 creatinine 2.2 calcium 7.9 and GFR 26 albumin 1.8  White blood cell count 4.7 hemoglobin 8.4 hematocrit 26  Urinalysis showed specific gravity 1.0102+ protein 2+ occult blood 3+ leukocytes    5/9/2020  Continue IV fluids. Patient with gross hematuria after spence insertion. Urology consulted. Continue IV antibiotics.  Monitor bicarb. Started on Lactated Ringers today. Encourage fluids. Surgery consulted for Stage IV decubitus ulcer, no plans for debridement at this time.    5/10/2020  Patient moved to ICU overnight for hypotension/blood pressure monitoring. Started on low dose levophed to maintain MAP. Continued on IV fluids at 125 mL/hr. Having episodes of sinus tachycardia in the 140s/150s that self-resolve. Discussed with Cardiology, do not appear to be atrial arrhythmias but rather sinus rhythm. Continue to monitor. Continue IV antibiotics - WBC normal, procalcitonin normal, lactic acid normal. Urine studies ordered. Started on stress dose steroids for possible adrenal insufficiency    05/11/2020:  Events of overnight reviewed.  Patient moved to ICU for hypotension.  Patient was started on pressors and IV fluids were continued.  Vital signs were blood pressure 89/66 pulse 78 respirations 12 temperature afebrile and O2 sat 99%.  White blood cell count 7.4 hemoglobin 8.5 hematocrit 25 platelets 247 89 granulocytes 6.6 lymphocytes.  Chem profile:  Sodium 133 potassium 3.9 chloride 114 bicarb 14 and BUN 28 creatinine 1.1 GFR 52 and glucose 118 calcium 8.4  Patient is very ill appearing and hypotensive.  Patient be kept on empiric antibiotics and blood cultures be repeated.  Patient has had very low urine output and will consider transfer for Nephrology support.    5/12/20  Patient stable this AM \  No pressors this morning.  Patient denies other complaints  Jaja lfunction continues to improve  Will Transfer to Telemetry  D/C planning to start    * Hypotension due to hypovolemia  Started on LRs @ 75 mL/hr  Continue IV antibiotics  Monitor Hgb   Monitor BP closely    5/10/2020  Patient moved overnight to ICU for monitoring  A-line placed  Unable to get central line placed because of anatomy - recommend IR if needed in AM  Continue IV antibiotics though patient does not necessarily have sepsis picture at this time  Monitor heart  rate - having episodes of sinus tach, troponin normal, BNP trending down  LRs increased to 125 mL/hr, may change to D5 NS  Required levophed briefly to maintain MAP of 65, wean today; oxygenation is good        Metabolic acidosis, normal anion gap (NAG)  PH 7.3  Respiratory compensation is appropriate  Renal function has recovered - possible RTA 2 vs 4  Cortisol pending, ACTH not ordered; will start stress dose steroids while labs are pending - hydrocortisone 100 mg q8h IV  Urine studies repeated for possible Type 2 RTA  Monitor pH      Gross hematuria  Patient with gross hematuria starting on the night of 5/8/2020. Arias inserted non-traumatically and output was immediately bloody. Hemoglobin is stable. Urology consulted given new gross hematuria in setting of prior bladder perforation. Continue to monitor urine output and CBC.       Hypokalemia  05/08/2020  Replete potassium as needed  Recheck labs in the a.m. to include CBC, CMP      Pressure injury of sacral region, stage 4  05/08/2020  Continue antibiotic therapy  Consult General surgery for possible debridement  Monitor blood cultures.  Follow-up otherwise as clinically indicated    5/9/2020  Continue IV antibiotics  General Surgery consulted - wound care instructions placed, no plan for debridement at this time  Blood cultures for fever/elevated WBC    05/11/2020:  Continue current antibiotics.  Follow-up labs in the a.m..        Debility  Cont with PT/OT for gait training and strengthening and restoration of ADL's   Encourage mobility, OOB in chair, and early ambulation as appropriate  Fall precautions   Monitor for bowel and bladder dysfunction  Monitor for and prevent skin breakdown and pressure ulcers    JOSELUIS (acute kidney injury)  Lab Results   Component Value Date    CREATININE 1.0 05/12/2020     Sr Cr above baseline. Baseline GFR is normal  Trail of IVF's  Retroperitoneal sonogram in AM  CT scan: The bladder is distended without a catheter in place and  there is bilateral moderate to severe hydronephrosis.   Consult Urology  Cont to monitor with daily labs   Avoid nephrotoxins.   Renally dose all medications    Serum bicarb level 18. Monitor for need to initiate sodium bicarb if continues to decrease.     05/08/2020:  Agree with retroperitoneal sonogram  Will hold on urology consult for now  Continue hydration at current level.  Begin empiric antibiotics for urinary tract infection and possibly infected sacral decubitus.  Piperacillin 3.375 g IV q.8 hours  Vancomycin dosed by pharmacy    5/9/2020  Retroperitoneal ultrasound with thickened bladder wall (chronic), bladder perforation (likely chronic) vs diverticulum  sCr improving   Continue IV hydration, IV antibiotics  Urology consulted for gross hematuria though this is possibly from spence insertion    5/10/2020  Renal function has significantly improved  Continue IV fluids    05/11/2020:  Renal function continues to improve.  Continue IV fluids.  Monitor urine output.  So far today there is been very little urine put out.  Repeat labs in the a.m. to include CBC and CMP  If urine output does not improve will look to transfer for for Nephrology help    5/12/20:  Tranfer to Telemetryh  Continue same treatment  Dietary consult  D/C planning to start today    Constipation  Aggressive bowel regimen    5/9/2020  Continue bowel regimen   Minimize opioids    Hyponatremia  Appears to be chronic.  Check serum and urine osm  Check urine sodium     5/9/2020  Resolving  Monitor daily BMP      Consults (From admission, onward)        Status Ordering Provider     Inpatient consult to Case Management  Once     Provider:  (Not yet assigned)    Acknowledged MARCIA THORNE     Inpatient consult to General Surgery  Once     Provider:  Dale Marquez MD    Completed MARCIA THORNE     Inpatient consult to Registered Dietitian/Nutritionist  Once     Provider:  (Not yet assigned)    Completed DOLORES DIAS     Inpatient  consult to Urology  Once     Provider:  (Not yet assigned)    Acknowledged DL RAM     Inpatient consult to Urology  Once     Provider:  Trip Bacon MD    Acknowledged MARCIA THORNE        * No surgery found *    Diet Orders          Diet Adult Regular (IDDSI Level 7) [place]; Soft in Texture: Regular starting at 05/12 1144    Dietary nutrition supplements [place]; Boost Breeze - Wild Delgado starting at 05/08 1715        Activity Orders          Diet Adult Regular (IDDSI Level 7) [place]; Soft in Texture: Regular starting at 05/12 1144    Straight Cath starting at 05/07 1951        Pending Diagnostic Studies:     Procedure Component Value Units Date/Time    US Retroperitoneal Complete (Kidney and [[id number]]     Order Status:  Sent Lab Status:  No result         Marcia Thorne MD  [facility] - ICU

## 2020-05-12 NOTE — PLAN OF CARE
Problem: Malnutrition  Goal: Improved Nutritional Intake  Description  Malnutrition related to chronic catabolic state as evidence by 9.2 kg weight loss in 3 months, <25% intake for 1 month, nutrition physical exam showing severe muscle mass loss, and nutrition physical exam showing showing sever subcutaneous fat loss   5/12/2020 0952 by Toma Wills RD  Outcome: Ongoing, Not Progressing  5/12/2020 0952 by Toma Wills RD  Outcome: Ongoing, Not Progressing  Intervention: Promote and Optimize Nutrition Support  Flowsheets (Taken 5/12/2020 0952)  Nutrition Support Management: other (see comments)   If patient to be transferred to F, RD suggests gastric scope to be performed to re-assess esophagitis and preferably have peg tube placement. Pt needs enteral support now. Intake too low to gain weight in order to survive.

## 2020-05-12 NOTE — NURSING
The patient has voided a large amount in the absorbent pad.  Patient is talking with spouse on the phone.

## 2020-05-12 NOTE — PT/OT/SLP PROGRESS
Patient was transferred from [facility] to ICU on [facility] 5/10/2020.  She will require new orders to resume Physical Therapy when she is medically stable.

## 2020-05-12 NOTE — NURSING
TURNED AND REPOSITIONED IN BED HOB ELEVATED 45 DEGREES, AWAKE AND ALERT TO PLACE, LUNG MILIAN ARE CLEAR IN ALL LOBES WITH DIMINISHED BREATH SOUNDS, BOWEL SOUNDS PRESENT IN ALL 4 QUADRANTS,  PUREWICK IN PLACE WITH SCANT AMOUNT OF HUMBERTO COLORED URINE INN SUCTION CONTAINER, LEFT I/J  IV INTACT WITH LR AT 125CC/HR, SITE CLEAR OF REDNESS AND SWELLING, DRESSING DRY AND INTACT TO THE SACRAL-COCCYX AREA, BED IN LOW POSITION, DOOR OPEN, SR UP X 2, C/L IN REACH, BED BRAKES ON, NO DISTRESS NOTED.

## 2020-05-12 NOTE — ASSESSMENT & PLAN NOTE
Lab Results   Component Value Date    CREATININE 1.0 05/12/2020     Sr Cr above baseline. Baseline GFR is normal  Trail of IVF's  Retroperitoneal sonogram in AM  CT scan: The bladder is distended without a catheter in place and there is bilateral moderate to severe hydronephrosis.   Consult Urology  Cont to monitor with daily labs   Avoid nephrotoxins.   Renally dose all medications    Serum bicarb level 18. Monitor for need to initiate sodium bicarb if continues to decrease.     05/08/2020:  Agree with retroperitoneal sonogram  Will hold on urology consult for now  Continue hydration at current level.  Begin empiric antibiotics for urinary tract infection and possibly infected sacral decubitus.  Piperacillin 3.375 g IV q.8 hours  Vancomycin dosed by pharmacy    5/9/2020  Retroperitoneal ultrasound with thickened bladder wall (chronic), bladder perforation (likely chronic) vs diverticulum  sCr improving   Continue IV hydration, IV antibiotics  Urology consulted for gross hematuria though this is possibly from spence insertion    5/10/2020  Renal function has significantly improved  Continue IV fluids    05/11/2020:  Renal function continues to improve.  Continue IV fluids.  Monitor urine output.  So far today there is been very little urine put out.  Repeat labs in the a.m. to include CBC and CMP  If urine output does not improve will look to transfer for for Nephrology help    5/12/20:  Tranfer to Telemetry  Continue same treatment  Dietary consult  D/C planning to start today

## 2020-05-12 NOTE — NURSING
Patient has consumed 120 ml of applesauce and 120 ml apple juice. Patient refused to eat breakfast tray.

## 2020-05-13 ENCOUNTER — PATIENT OUTREACH (OUTPATIENT)
Dept: ADMINISTRATIVE | Facility: HOSPITAL | Age: 70
End: 2020-05-13

## 2020-05-13 VITALS
BODY MASS INDEX: 14.5 KG/M2 | SYSTOLIC BLOOD PRESSURE: 91 MMHG | WEIGHT: 73.88 LBS | OXYGEN SATURATION: 94 % | HEIGHT: 60 IN | TEMPERATURE: 97 F | DIASTOLIC BLOOD PRESSURE: 71 MMHG | HEART RATE: 115 BPM | RESPIRATION RATE: 18 BRPM

## 2020-05-13 PROBLEM — N30.01 ACUTE CYSTITIS WITH HEMATURIA: Status: ACTIVE | Noted: 2020-05-13

## 2020-05-13 PROCEDURE — 63600175 PHARM REV CODE 636 W HCPCS: Performed by: INTERNAL MEDICINE

## 2020-05-13 PROCEDURE — 25000003 PHARM REV CODE 250: Performed by: INTERNAL MEDICINE

## 2020-05-13 PROCEDURE — 99239 HOSP IP/OBS DSCHRG MGMT >30: CPT | Mod: ,,, | Performed by: INTERNAL MEDICINE

## 2020-05-13 PROCEDURE — 94761 N-INVAS EAR/PLS OXIMETRY MLT: CPT

## 2020-05-13 PROCEDURE — S0179 MEGESTROL 20 MG: HCPCS | Performed by: INTERNAL MEDICINE

## 2020-05-13 PROCEDURE — 99239 PR HOSPITAL DISCHARGE DAY,>30 MIN: ICD-10-PCS | Mod: ,,, | Performed by: INTERNAL MEDICINE

## 2020-05-13 RX ADMIN — ESCITALOPRAM OXALATE 20 MG: 10 TABLET ORAL at 09:05

## 2020-05-13 RX ADMIN — GABAPENTIN 600 MG: 300 CAPSULE ORAL at 09:05

## 2020-05-13 RX ADMIN — MEROPENEM AND SODIUM CHLORIDE 1 G: 1 INJECTION, SOLUTION INTRAVENOUS at 09:05

## 2020-05-13 RX ADMIN — PANTOPRAZOLE SODIUM 40 MG: 40 TABLET, DELAYED RELEASE ORAL at 09:05

## 2020-05-13 RX ADMIN — ALLOPURINOL 300 MG: 100 TABLET ORAL at 09:05

## 2020-05-13 RX ADMIN — PREDNISONE 3 MG: 1 TABLET ORAL at 09:05

## 2020-05-13 RX ADMIN — MONTELUKAST SODIUM 10 MG: 10 TABLET, COATED ORAL at 09:05

## 2020-05-13 RX ADMIN — GENTAMICIN SULFATE 235.2 MG: 40 INJECTION, SOLUTION INTRAMUSCULAR; INTRAVENOUS at 02:05

## 2020-05-13 RX ADMIN — POTASSIUM CHLORIDE 20 MEQ: 1500 TABLET, EXTENDED RELEASE ORAL at 09:05

## 2020-05-13 RX ADMIN — MEGESTROL ACETATE 200 MG: 40 SUSPENSION ORAL at 09:05

## 2020-05-13 NOTE — NURSING
IV discontinued from left IJ.  [facility] to pick patient up.  Arias intact as requested per hospice.  Belongings sent with patient.  Patient in no acute distress.  ROMERO, RN

## 2020-05-13 NOTE — PROGRESS NOTES
[facility] Medicine  Progress Note    Patient Name: Obdulia Yepez  MRN: [id number]  Patient Class: IP- Inpatient   Admission Date: 5/7/2020  Length of Stay: 6 days  Attending Physician: No att. providers found  Primary Care Provider: Og Perez MD        Subjective:     Principal Problem:Hypotension due to hypovolemia        HPI:  History of Present Illness:  Patient is a 69 y.o. female who has a past medical history of Allergy, Arthritis, Asthma, Depression, Gout, Hypotension, Neuropathy, and Sepsis presented with multiple complaints of not feeling well. Patient states that she has been having declining health for the past several months to the point now she is not eating and she is bed bound. She lives at home with her  and needs max assistance with ADL's. She is not ambulating and now bed bound. She has developed a sacral decub pressure ulcer as a result. She called her PCP with multiple complaints of abdominal pain, nausea, poor oral intake and weakness who instructed her to come to ED.     Overview/Hospital Course:  05/08/2020  Patient is a 69-year-old female that was admitted overnight for failure to thrive, dehydration and her  states that she has progressively weakened and is now bedbound.  Patient has a sacral decubitus which is stage IV.  Patient has multiple complaints of abdominal pain nausea and weakness.  She notified her primary care physician and then was told to present to the emergency department for evaluation.  Patient appears very malnourished and emaciated.  Patient denies any pain but states just not feeling well.  Labs show a sodium 131 potassium 3.2 chloride 106 bicarb 17 glucose 76 BUN 56 creatinine 2.2 calcium 7.9 and GFR 26 albumin 1.8  White blood cell count 4.7 hemoglobin 8.4 hematocrit 26  Urinalysis showed specific gravity 1.0102+ protein 2+ occult blood 3+ leukocytes    5/9/2020  Continue IV fluids. Patient with gross  hematuria after specne insertion. Urology consulted. Continue IV antibiotics. Monitor bicarb. Started on Lactated Ringers today. Encourage fluids. Surgery consulted for Stage IV decubitus ulcer, no plans for debridement at this time.    5/10/2020  Patient moved to ICU overnight for hypotension/blood pressure monitoring. Started on low dose levophed to maintain MAP. Continued on IV fluids at 125 mL/hr. Having episodes of sinus tachycardia in the 140s/150s that self-resolve. Discussed with Cardiology, do not appear to be atrial arrhythmias but rather sinus rhythm. Continue to monitor. Continue IV antibiotics - WBC normal, procalcitonin normal, lactic acid normal. Urine studies ordered. Started on stress dose steroids for possible adrenal insufficiency    05/11/2020:  Events of overnight reviewed.  Patient moved to ICU for hypotension.  Patient was started on pressors and IV fluids were continued.  Vital signs were blood pressure 89/66 pulse 78 respirations 12 temperature afebrile and O2 sat 99%.  White blood cell count 7.4 hemoglobin 8.5 hematocrit 25 platelets 247 89 granulocytes 6.6 lymphocytes.  Chem profile:  Sodium 133 potassium 3.9 chloride 114 bicarb 14 and BUN 28 creatinine 1.1 GFR 52 and glucose 118 calcium 8.4  Patient is very ill appearing and hypotensive.  Patient be kept on empiric antibiotics and blood cultures be repeated.  Patient has had very low urine output and will consider transfer for Nephrology support.    5/12/20  Patient stable this AM \  No pressors this morning.  Patient denies other complaints  Jaja lfunction continues to improve  Will Transfer to Telemetry  D/C planning to start    05/13/2020:   Patient will be discharged home today with [facility].  Patient's condition is serious and prognosis is poor.  Patient will be sent home and enroll in hospice.  This was a choice by the patient and family.  Patient otherwise will be treated with gentamicin 250 mg IM daily for.  Of 7 days.   This is to treat resistant urinary tract infection with Pseudomonas.  Otherwise care will be left up to hospice and hospice attending.    Past Medical History:   Diagnosis Date    Allergy     Arthritis     Asthma     Depression     Gout     Hypotension     Neuropathy     Sepsis        Past Surgical History:   Procedure Laterality Date    APPENDECTOMY       SECTION      CHOLECYSTECTOMY      CYSTOSCOPY W/ RETROGRADES Bilateral 2019    Procedure: CYSTOSCOPY, WITH RETROGRADE PYELOGRAM;  Surgeon: Trip Bacon MD;  Location: [facility] OR;  Service: Urology;  Laterality: Bilateral;    CYSTOSCOPY WITH BIOPSY OF BLADDER  2019    Procedure: CYSTOSCOPY, WITH BLADDER BIOPSY, WITH FULGURATION IF INDICATED;  Surgeon: Trip Bacon MD;  Location: [facility] OR;  Service: Urology;;    ESOPHAGOGASTRODUODENOSCOPY N/A 12/10/2019    Procedure: EGD (ESOPHAGOGASTRODUODENOSCOPY);  Surgeon: Shakeel Perez MD;  Location: [facility];  Service: Endoscopy;  Laterality: N/A;    HYSTERECTOMY      SHOULDER SURGERY Right 2019    STOMACH SURGERY      URETEROSCOPY Right 2019    Procedure: URETEROSCOPY;  Surgeon: Trip Bacon MD;  Location: [facility] OR;  Service: Urology;  Laterality: Right;    WRIST SURGERY Left        Review of patient's allergies indicates:   Allergen Reactions    Amoxicillin Diarrhea    Latex     Milk containing products     Opioids - morphine analogues     Peanut     Sodium phosphate     Soy     Sulfa (sulfonamide antibiotics)        Current Facility-Administered Medications on File Prior to Encounter   Medication    hylan g-f 20 48 mg/6 mL injection 48 mg    hylan g-f 20 48 mg/6 mL injection 48 mg    triamcinolone acetonide injection 80 mg     Current Outpatient Medications on File Prior to Encounter   Medication Sig    allopurinol (ZYLOPRIM) 300 MG tablet TAKE 1 TABLET BY MOUTH DAILY    budesonide-formoterol 160-4.5 mcg (SYMBICORT) 160-4.5 mcg/actuation HFAA  Symbicort 160 mcg-4.5 mcg/actuation HFA aerosol inhaler   Inhale 2 puffs twice a day by inhalation route.    busPIRone (BUSPAR) 5 MG Tab Take 1 tablet (5 mg total) by mouth 2 (two) times daily.    cadexomer iodine (IODOSORB) 0.9 % gel Apply topically daily as needed for Wound Care.    colestipol (COLESTID) 1 gram Tab Take 1 tablet (1 g total) by mouth 2 (two) times daily.    cyclobenzaprine (FLEXERIL) 10 MG tablet Take 1 tablet (10 mg total) by mouth 3 (three) times daily as needed.    cyproheptadine (PERIACTIN) 4 mg tablet TAKE 1 TABLET (4 MG TOTAL) BY MOUTH 3 (THREE) TIMES DAILY AS NEEDED.    escitalopram oxalate (LEXAPRO) 20 MG tablet Take 20 mg by mouth once daily.     ferrous sulfate, dried (SLOW FE) 160 mg (50 mg iron) TbSR Take 45 mg by mouth.     fluticasone (FLONASE) 50 mcg/actuation nasal spray fluticasone 50 mcg/actuation nasal spray,suspension   Inhale 1 spray every day by intranasal route.    gabapentin (NEURONTIN) 300 MG capsule Take 3 capsules (900 mg total) by mouth 3 (three) times daily. (Patient taking differently: Take 600 mg by mouth 2 (two) times daily. )    hydrocortisone (CORTEF) 10 MG Tab TAKE 2 TABLETS BY MOUTH IN THE MORNING AND 1 TABLET IN THE EVENING    ipratropium (ATROVENT) 0.03 % nasal spray 2 sprays by Nasal route 3 (three) times daily.    lubiprostone (AMITIZA) 24 MCG Cap Take 24 mcg by mouth 2 (two) times daily with meals.    mirtazapine (REMERON) 30 MG tablet Take 30 mg by mouth.    montelukast (SINGULAIR) 10 mg tablet TAKE 1 TABLET(S) EVERY DAY BY ORAL ROUTE.    multivitamin (THERAGRAN) per tablet Take 1 tablet by mouth once daily.    mupirocin (BACTROBAN) 2 % ointment mupirocin 2 % topical ointment   APPLY A SMALL AMOUNT TO THE AFFECTED AREA BY TOPICAL ROUTE 2 TIMES PER DAY    ondansetron (ZOFRAN) 4 MG tablet Take 1 tablet (4 mg total) by mouth every 8 (eight) hours as needed for Nausea.    oxyCODONE-acetaminophen (PERCOCET)  mg per tablet Take 1 tablet by  mouth every 8 (eight) hours as needed for Pain.    pantoprazole (PROTONIX) 40 MG tablet Take 1 tablet (40 mg total) by mouth once daily.    polymyxin B sulf-trimethoprim (POLYTRIM) 10,000 unit- 1 mg/mL Drop Place 1 drop into the right eye every 4 (four) hours.    traMADol (ULTRAM) 50 mg tablet     vitamin E 1000 UNIT capsule Take 1,000 Units by mouth once daily.     Family History     Problem Relation (Age of Onset)    Asthma Mother    Bipolar disorder Sister    Colon cancer Maternal Grandmother, Maternal Grandfather    Pleurisy Father        Tobacco Use    Smoking status: Never Smoker    Smokeless tobacco: Never Used   Substance and Sexual Activity    Alcohol use: Yes     Comment: everyday drinker. has not had any in the past 2 days    Drug use: No    Sexual activity: Yes     Review of Systems  Objective:     Vital Signs (Most Recent):  Temp: 96.6 °F (35.9 °C) (05/13/20 1002)  Pulse: (!) 138 (05/13/20 1200)  Resp: 18 (05/13/20 1002)  BP: 105/80 (05/13/20 1002)  SpO2: 99 % (05/13/20 1002) Vital Signs (24h Range):  Temp:  [96 °F (35.6 °C)-98.6 °F (37 °C)] 96.6 °F (35.9 °C)  Pulse:  [] 138  Resp:  [12-21] 18  SpO2:  [96 %-99 %] 99 %  BP: (105-134)/(80-87) 105/80     Weight: 33.5 kg (73 lb 13.7 oz)  Body mass index is 14.42 kg/m².    Physical Exam        Significant Labs:   Recent Lab Results     None        All pertinent labs within the past 24 hours have been reviewed.    Significant Imaging: I have reviewed and interpreted all pertinent imaging results/findings within the past 24 hours.      Assessment/Plan:      * Hypotension due to hypovolemia  Started on LRs @ 75 mL/hr  Continue IV antibiotics  Monitor Hgb   Monitor BP closely    5/10/2020  Patient moved overnight to ICU for monitoring  A-line placed  Unable to get central line placed because of anatomy - recommend IR if needed in AM  Continue IV antibiotics though patient does not necessarily have sepsis picture at this time  Monitor heart rate -  having episodes of sinus tach, troponin normal, BNP trending down  LRs increased to 125 mL/hr, may change to D5 NS  Required levophed briefly to maintain MAP of 65, wean today; oxygenation is good    05/13/2020:  Discharge patient to home hospice.  This decision was made by patient and family and is her wishes at this time.  Follow with hospice care and hospice attending as needed        Metabolic acidosis, normal anion gap (NAG)  PH 7.3  Respiratory compensation is appropriate  Renal function has recovered - possible RTA 2 vs 4  Cortisol pending, ACTH not ordered; will start stress dose steroids while labs are pending - hydrocortisone 100 mg q8h IV  Urine studies repeated for possible Type 2 RTA  Monitor pH      Gross hematuria  Patient with gross hematuria starting on the night of 5/8/2020. Arias inserted non-traumatically and output was immediately bloody. Hemoglobin is stable. Urology consulted given new gross hematuria in setting of prior bladder perforation. Continue to monitor urine output and CBC.       Hypokalemia  05/08/2020  Replete potassium as needed  Recheck labs in the a.m. to include CBC, CMP      Pressure injury of sacral region, stage 4  05/08/2020  Continue antibiotic therapy  Consult General surgery for possible debridement  Monitor blood cultures.  Follow-up otherwise as clinically indicated    5/9/2020  Continue IV antibiotics  General Surgery consulted - wound care instructions placed, no plan for debridement at this time  Blood cultures for fever/elevated WBC    05/11/2020:  Continue current antibiotics.  Follow-up labs in the a.m..        Debility  Cont with PT/OT for gait training and strengthening and restoration of ADL's   Encourage mobility, OOB in chair, and early ambulation as appropriate  Fall precautions   Monitor for bowel and bladder dysfunction  Monitor for and prevent skin breakdown and pressure ulcers    JOSELUIS (acute kidney injury)  Lab Results   Component Value Date    CREATININE  1.0 05/12/2020     Sr Cr above baseline. Baseline GFR is normal  Trail of IVF's  Retroperitoneal sonogram in AM  CT scan: The bladder is distended without a catheter in place and there is bilateral moderate to severe hydronephrosis.   Consult Urology  Cont to monitor with daily labs   Avoid nephrotoxins.   Renally dose all medications    Serum bicarb level 18. Monitor for need to initiate sodium bicarb if continues to decrease.     05/08/2020:  Agree with retroperitoneal sonogram  Will hold on urology consult for now  Continue hydration at current level.  Begin empiric antibiotics for urinary tract infection and possibly infected sacral decubitus.  Piperacillin 3.375 g IV q.8 hours  Vancomycin dosed by pharmacy    5/9/2020  Retroperitoneal ultrasound with thickened bladder wall (chronic), bladder perforation (likely chronic) vs diverticulum  sCr improving   Continue IV hydration, IV antibiotics  Urology consulted for gross hematuria though this is possibly from spence insertion    5/10/2020  Renal function has significantly improved  Continue IV fluids    05/11/2020:  Renal function continues to improve.  Continue IV fluids.  Monitor urine output.  So far today there is been very little urine put out.  Repeat labs in the a.m. to include CBC and CMP  If urine output does not improve will look to transfer for for Nephrology help    5/12/20:  Tranfer to Telemetryh  Continue same treatment  Dietary consult  D/C planning to start today    Constipation  Aggressive bowel regimen    5/9/2020  Continue bowel regimen   Minimize opioids    Hyponatremia  Appears to be chronic.  Check serum and urine osm  Check urine sodium     5/9/2020  Resolving  Monitor daily BMP    05/13/2020:  Discharge patient to home for home hospice  Continue gentamicin 250 mg IM daily  This will be done for 7 days.  Otherwise continue same home medications or as appropriate for hospice.      VTE Risk Mitigation (From admission, onward)         Ordered      Place MORIS hose  Until discontinued      05/07/20 1953     IP VTE LOW RISK PATIENT  Once      05/07/20 1953                      Jimenez Patel MD  Department of Hospital Medicine   [facility]

## 2020-05-13 NOTE — PLAN OF CARE
Problem: Adult Inpatient Plan of Care  Goal: Plan of Care Review  Outcome: Ongoing, Progressing     Problem: Wound  Goal: Optimal Wound Healing  Outcome: Ongoing, Progressing     Problem: Fluid Imbalance (Acute Kidney Injury/Impairment)  Goal: Optimal Fluid Balance  Outcome: Ongoing, Progressing

## 2020-05-13 NOTE — PLAN OF CARE
[facility]    FACILITY TRANSFER ORDERS      Patient Name: Obdulia Yepez  YOB: 1950    PCP: Og Perez MD   PCP Address: [address] [place] [address] [place] / [place] [address]  PCP Phone Number: [phone]  PCP Fax: [phone]    Encounter Date: 05/13/2020    Admit to: [facility]    Vital Signs:  Routine    Diagnoses:   Active Hospital Problems    Diagnosis  POA    *Hypotension due to hypovolemia [I95.89, E86.1]  Yes     Priority: 1 - High    Acute cystitis with hematuria [N30.01]  Unknown     Priority: 2     Metabolic acidosis, normal anion gap (NAG) [E87.2]  Yes    Gross hematuria [R31.0]  No    Hypotension [I95.9]  Yes    Pressure injury of sacral region, stage 4 [L89.154]  Yes    Hypokalemia [E87.6]  Yes    JOSELUIS (acute kidney injury) [N17.9]  Yes    Debility [R53.81]  Yes    Constipation [K59.00]  Yes    Hyponatremia [E87.1]  Yes      Resolved Hospital Problems   No resolved problems to display.       Allergies:  Review of patient's allergies indicates:   Allergen Reactions    Amoxicillin Diarrhea    Latex     Milk containing products     Opioids - morphine analogues     Peanut     Sodium phosphate     Soy     Sulfa (sulfonamide antibiotics)        Diet: regular diet    Activities: Activity as tolerated    Nursing: Routine     Labs: CBC Once     CONSULTS:    Physical Therapy to evaluate and treat.     MISCELLANEOUS CARE:  Routine Skin for Bedridden Patients: Apply moisture barrier cream to all skin folds and wet areas in perineal area daily and after baths and all bowel movements.    WOUND CARE ORDERS  Yes: Pressure Ulcer(s) Stage IV:  Location: Dress Wet to Dry daily    Consult ET nurse        Apply the following to wound:   Wet to Damp dressing: daily (frequency)    Medications: Review discharge medications with patient and family and provide education.      Current Discharge Medication List      CONTINUE these medications which have NOT CHANGED     Details   allopurinol (ZYLOPRIM) 300 MG tablet TAKE 1 TABLET BY MOUTH DAILY  Qty: 30 tablet, Refills: 14      budesonide-formoterol 160-4.5 mcg (SYMBICORT) 160-4.5 mcg/actuation HFAA Symbicort 160 mcg-4.5 mcg/actuation HFA aerosol inhaler   Inhale 2 puffs twice a day by inhalation route.      busPIRone (BUSPAR) 5 MG Tab Take 1 tablet (5 mg total) by mouth 2 (two) times daily.  Qty: 60 tablet, Refills: 11    Associated Diagnoses: JOYCE (generalized anxiety disorder)      cadexomer iodine (IODOSORB) 0.9 % gel Apply topically daily as needed for Wound Care.  Qty: 40 g, Refills: 0    Associated Diagnoses: Pressure injury of skin, unspecified injury stage, unspecified location      colestipol (COLESTID) 1 gram Tab Take 1 tablet (1 g total) by mouth 2 (two) times daily.  Qty: 180 tablet, Refills: 3      cyclobenzaprine (FLEXERIL) 10 MG tablet Take 1 tablet (10 mg total) by mouth 3 (three) times daily as needed.  Qty: 90 tablet, Refills: 0    Associated Diagnoses: Traumatic closed displaced fracture of surgical neck of humerus, left, initial encounter; Hyponatremia      cyproheptadine (PERIACTIN) 4 mg tablet TAKE 1 TABLET (4 MG TOTAL) BY MOUTH 3 (THREE) TIMES DAILY AS NEEDED.  Qty: 90 tablet, Refills: 2    Associated Diagnoses: Weight loss      escitalopram oxalate (LEXAPRO) 20 MG tablet Take 20 mg by mouth once daily.       ferrous sulfate, dried (SLOW FE) 160 mg (50 mg iron) TbSR Take 45 mg by mouth.       fluticasone (FLONASE) 50 mcg/actuation nasal spray fluticasone 50 mcg/actuation nasal spray,suspension   Inhale 1 spray every day by intranasal route.      gabapentin (NEURONTIN) 300 MG capsule Take 3 capsules (900 mg total) by mouth 3 (three) times daily.  Qty: 270 capsule, Refills: 3      hydrocortisone (CORTEF) 10 MG Tab TAKE 2 TABLETS BY MOUTH IN THE MORNING AND 1 TABLET IN THE EVENING  Refills: 0      ipratropium (ATROVENT) 0.03 % nasal spray 2 sprays by Nasal route 3 (three) times daily.  Qty: 30 mL, Refills: 2     Associated Diagnoses: Allergic rhinitis due to food      lubiprostone (AMITIZA) 24 MCG Cap Take 24 mcg by mouth 2 (two) times daily with meals.      mirtazapine (REMERON) 30 MG tablet Take 30 mg by mouth.      montelukast (SINGULAIR) 10 mg tablet TAKE 1 TABLET(S) EVERY DAY BY ORAL ROUTE.  Qty: 30 tablet, Refills: 11      multivitamin (THERAGRAN) per tablet Take 1 tablet by mouth once daily.      mupirocin (BACTROBAN) 2 % ointment mupirocin 2 % topical ointment   APPLY A SMALL AMOUNT TO THE AFFECTED AREA BY TOPICAL ROUTE 2 TIMES PER DAY      ondansetron (ZOFRAN) 4 MG tablet Take 1 tablet (4 mg total) by mouth every 8 (eight) hours as needed for Nausea.  Qty: 90 tablet, Refills: 5    Associated Diagnoses: Nausea      oxyCODONE-acetaminophen (PERCOCET)  mg per tablet Take 1 tablet by mouth every 8 (eight) hours as needed for Pain.  Qty: 90 tablet, Refills: 0    Comments: n/a       pantoprazole (PROTONIX) 40 MG tablet Take 1 tablet (40 mg total) by mouth once daily.  Qty: 30 tablet, Refills: 11    Associated Diagnoses: Gastroesophageal reflux disease, esophagitis presence not specified      polymyxin B sulf-trimethoprim (POLYTRIM) 10,000 unit- 1 mg/mL Drop Place 1 drop into the right eye every 4 (four) hours.  Qty: 10 mL, Refills: 0      traMADol (ULTRAM) 50 mg tablet     Comments: n/a       vitamin E 1000 UNIT capsule Take 1,000 Units by mouth once daily.            Gentamicin 250 mg IM daily for 7 days      _________________________________  Jimenez Patel MD  05/13/2020

## 2020-05-13 NOTE — ASSESSMENT & PLAN NOTE
Appears to be chronic.  Check serum and urine osm  Check urine sodium     5/9/2020  Resolving  Monitor daily BMP    05/13/2020:  Discharge patient to home for home hospice  Continue gentamicin 250 mg IM daily  This will be done for 7 days.  Otherwise continue same home medications or as appropriate for hospice.

## 2020-05-13 NOTE — PLAN OF CARE
05/13/20 1108   Discharge Reassessment   Assessment Type Discharge Planning Reassessment   Anticipated Discharge Disposition HospiceHome   Provided patient/caregiver education on the expected discharge date and the discharge plan Yes   Do you have any problems affording any of your prescribed medications? No   Discharge Plan A Hospice/home   DME Needed Upon Discharge  none   Patient choice form signed by patient/caregiver List from [facility] Post-Ac[facility]   Post-Acute Status   Post-Acute Authorization Placement   Post-Acute Placement Status Referrals Sent   Discharge Delays None known at this time     LORRI discussed hospice consult with the pt and her spouse (Pawan Yepez via phone). Answered questions as indicated by both. The couple has spoken to one another on the phone and agree hospice is accepted for home care and support. LORRI obtained agency choice from the pts spouse and completed patient choice form per his verbal consent. LORRI has faxed referral to [facility] and will continue to follow and coordinate discharge.

## 2020-05-13 NOTE — SUBJECTIVE & OBJECTIVE
Past Medical History:   Diagnosis Date    Allergy     Arthritis     Asthma     Depression     Gout     Hypotension     Neuropathy     Sepsis        Past Surgical History:   Procedure Laterality Date    APPENDECTOMY       SECTION      CHOLECYSTECTOMY      CYSTOSCOPY W/ RETROGRADES Bilateral 2019    Procedure: CYSTOSCOPY, WITH RETROGRADE PYELOGRAM;  Surgeon: Trip Bacon MD;  Location: [facility] OR;  Service: Urology;  Laterality: Bilateral;    CYSTOSCOPY WITH BIOPSY OF BLADDER  2019    Procedure: CYSTOSCOPY, WITH BLADDER BIOPSY, WITH FULGURATION IF INDICATED;  Surgeon: Trip Bacon MD;  Location: [facility] OR;  Service: Urology;;    ESOPHAGOGASTRODUODENOSCOPY N/A 12/10/2019    Procedure: EGD (ESOPHAGOGASTRODUODENOSCOPY);  Surgeon: Shakeel Perez MD;  Location: [facility];  Service: Endoscopy;  Laterality: N/A;    HYSTERECTOMY      SHOULDER SURGERY Right 2019    STOMACH SURGERY      URETEROSCOPY Right 2019    Procedure: URETEROSCOPY;  Surgeon: Trip Bacon MD;  Location: [facility] OR;  Service: Urology;  Laterality: Right;    WRIST SURGERY Left        Review of patient's allergies indicates:   Allergen Reactions    Amoxicillin Diarrhea    Latex     Milk containing products     Opioids - morphine analogues     Peanut     Sodium phosphate     Soy     Sulfa (sulfonamide antibiotics)        Current Facility-Administered Medications on File Prior to Encounter   Medication    hylan g-f 20 48 mg/6 mL injection 48 mg    hylan g-f 20 48 mg/6 mL injection 48 mg    triamcinolone acetonide injection 80 mg     Current Outpatient Medications on File Prior to Encounter   Medication Sig    allopurinol (ZYLOPRIM) 300 MG tablet TAKE 1 TABLET BY MOUTH DAILY    budesonide-formoterol 160-4.5 mcg (SYMBICORT) 160-4.5 mcg/actuation HFAA Symbicort 160 mcg-4.5 mcg/actuation HFA aerosol inhaler   Inhale 2 puffs twice a day by inhalation route.    busPIRone (BUSPAR) 5 MG Tab Take  1 tablet (5 mg total) by mouth 2 (two) times daily.    cadexomer iodine (IODOSORB) 0.9 % gel Apply topically daily as needed for Wound Care.    colestipol (COLESTID) 1 gram Tab Take 1 tablet (1 g total) by mouth 2 (two) times daily.    cyclobenzaprine (FLEXERIL) 10 MG tablet Take 1 tablet (10 mg total) by mouth 3 (three) times daily as needed.    cyproheptadine (PERIACTIN) 4 mg tablet TAKE 1 TABLET (4 MG TOTAL) BY MOUTH 3 (THREE) TIMES DAILY AS NEEDED.    escitalopram oxalate (LEXAPRO) 20 MG tablet Take 20 mg by mouth once daily.     ferrous sulfate, dried (SLOW FE) 160 mg (50 mg iron) TbSR Take 45 mg by mouth.     fluticasone (FLONASE) 50 mcg/actuation nasal spray fluticasone 50 mcg/actuation nasal spray,suspension   Inhale 1 spray every day by intranasal route.    gabapentin (NEURONTIN) 300 MG capsule Take 3 capsules (900 mg total) by mouth 3 (three) times daily. (Patient taking differently: Take 600 mg by mouth 2 (two) times daily. )    hydrocortisone (CORTEF) 10 MG Tab TAKE 2 TABLETS BY MOUTH IN THE MORNING AND 1 TABLET IN THE EVENING    ipratropium (ATROVENT) 0.03 % nasal spray 2 sprays by Nasal route 3 (three) times daily.    lubiprostone (AMITIZA) 24 MCG Cap Take 24 mcg by mouth 2 (two) times daily with meals.    mirtazapine (REMERON) 30 MG tablet Take 30 mg by mouth.    montelukast (SINGULAIR) 10 mg tablet TAKE 1 TABLET(S) EVERY DAY BY ORAL ROUTE.    multivitamin (THERAGRAN) per tablet Take 1 tablet by mouth once daily.    mupirocin (BACTROBAN) 2 % ointment mupirocin 2 % topical ointment   APPLY A SMALL AMOUNT TO THE AFFECTED AREA BY TOPICAL ROUTE 2 TIMES PER DAY    ondansetron (ZOFRAN) 4 MG tablet Take 1 tablet (4 mg total) by mouth every 8 (eight) hours as needed for Nausea.    oxyCODONE-acetaminophen (PERCOCET)  mg per tablet Take 1 tablet by mouth every 8 (eight) hours as needed for Pain.    pantoprazole (PROTONIX) 40 MG tablet Take 1 tablet (40 mg total) by mouth once daily.     polymyxin B sulf-trimethoprim (POLYTRIM) 10,000 unit- 1 mg/mL Drop Place 1 drop into the right eye every 4 (four) hours.    traMADol (ULTRAM) 50 mg tablet     vitamin E 1000 UNIT capsule Take 1,000 Units by mouth once daily.     Family History     Problem Relation (Age of Onset)    Asthma Mother    Bipolar disorder Sister    Colon cancer Maternal Grandmother, Maternal Grandfather    Pleurisy Father        Tobacco Use    Smoking status: Never Smoker    Smokeless tobacco: Never Used   Substance and Sexual Activity    Alcohol use: Yes     Comment: everyday drinker. has not had any in the past 2 days    Drug use: No    Sexual activity: Yes     Review of Systems  Objective:     Vital Signs (Most Recent):  Temp: 96.6 °F (35.9 °C) (05/13/20 1002)  Pulse: (!) 138 (05/13/20 1200)  Resp: 18 (05/13/20 1002)  BP: 105/80 (05/13/20 1002)  SpO2: 99 % (05/13/20 1002) Vital Signs (24h Range):  Temp:  [96 °F (35.6 °C)-98.6 °F (37 °C)] 96.6 °F (35.9 °C)  Pulse:  [] 138  Resp:  [12-21] 18  SpO2:  [96 %-99 %] 99 %  BP: (105-134)/(80-87) 105/80     Weight: 33.5 kg (73 lb 13.7 oz)  Body mass index is 14.42 kg/m².    Physical Exam        Significant Labs:   Recent Lab Results     None        All pertinent labs within the past 24 hours have been reviewed.    Significant Imaging: I have reviewed and interpreted all pertinent imaging results/findings within the past 24 hours.

## 2020-05-13 NOTE — PLAN OF CARE
05/13/20 1430   Final Note   Assessment Type Final Discharge Note   Anticipated Discharge Disposition [facility]ospiceHome   What phone number can be called within the next 1-3 days to see how you are doing after discharge? [phone]   Hospital Follow Up  Appt(s) scheduled? No   Discharge plans and expectations educations in teach back method with documentation complete? Yes   Post-Acute Status   Post-Acute Authorization Hospice   Post-Acute Placement Status Set-up Complete   Hospice Status Set-up Complete   Patient choice form signed by patient/caregiver List from System Post-Acute Care   Discharge Delays None known at this time     Confirmed with [facility] they accepted pt for admission. Per representative (Rula Parikh [phone]) equipment and nurse are ready in the home for pt. Informed physician of status and requested discharge orders. Nursing requested ambulance transfer home. This discharge plan is complete with no additional tasks noted for this LMSW.

## 2020-05-13 NOTE — ASSESSMENT & PLAN NOTE
Started on LRs @ 75 mL/hr  Continue IV antibiotics  Monitor Hgb   Monitor BP closely    5/10/2020  Patient moved overnight to ICU for monitoring  A-line placed  Unable to get central line placed because of anatomy - recommend IR if needed in AM  Continue IV antibiotics though patient does not necessarily have sepsis picture at this time  Monitor heart rate - having episodes of sinus tach, troponin normal, BNP trending down  LRs increased to 125 mL/hr, may change to D5 NS  Required levophed briefly to maintain MAP of 65, wean today; oxygenation is good    05/13/2020:  Discharge patient to home hospice.  This decision was made by patient and family and is her wishes at this time.  Follow with hospice care and hospice attending as needed

## 2020-05-20 ENCOUNTER — PATIENT MESSAGE (OUTPATIENT)
Dept: ADMINISTRATIVE | Facility: HOSPITAL | Age: 70
End: 2020-05-20

## 2020-06-24 ENCOUNTER — EXTERNAL HOME HEALTH (OUTPATIENT)
Dept: HOME HEALTH SERVICES | Facility: HOSPITAL | Age: 70
End: 2020-06-24
Payer: MEDICARE

## 2023-08-09 NOTE — LETTER
October 3, 2019      Og Perez MD  [address]  [place] [address]           [facility]  [place] [facility]  [address]  [place] [address]  Phone: [phone]  Fax: [phone]          Patient: Obdulia Yepez   MR Number: [id number]   YOB: 1950   Date of Visit: 10/3/2019       Dear Dr. Og Perez:    Thank you for referring Obdulia Yepez to me for evaluation. Attached you will find relevant portions of my assessment and plan of care.    If you have questions, please do not hesitate to call me. I look forward to following Obdulia Yepez along with you.    Sincerely,    Shakeel Perez MD    Enclosure  CC:  No Recipients    If you would like to receive this communication electronically, please contact [email or web address] or [phone] to request more information on [employer] access.    For providers and/or their staff who would like to refer a patient to [facility], please contact us through our one-stop-shop provider referral line, [facility], at [phone].    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

## 2023-09-20 NOTE — NURSING
Notified Dr. Bacon for Urology consult. He    informed me his is on vacation for the next few weeks and to contact Dr. Wiseman ([phone]). Dr. iWseman would like pt sent to [facility] for Urology consult.    Body Location Override (Optional - Billing Will Still Be Based On Selected Body Map Location If Applicable): right upper back

## 2024-01-24 NOTE — ANESTHESIA POSTPROCEDURE EVALUATION
Anesthesia Post Evaluation    Patient: Obdulia Yepez    Procedure(s) Performed: Procedure(s) (LRB):  EGD (ESOPHAGOGASTRODUODENOSCOPY) (N/A)    Final Anesthesia Type: general    Patient location during evaluation: PACU  Patient participation: Yes- Able to Participate  Level of consciousness: awake and awake and alert  Post-procedure vital signs: reviewed and stable  Pain management: adequate  Airway patency: patent    PONV status at discharge: No PONV  Anesthetic complications: no      Cardiovascular status: blood pressure returned to baseline  Respiratory status: unassisted and spontaneous ventilation  Hydration status: euvolemic  Follow-up not needed.          Vitals Value Taken Time   /91 12/10/2019 10:53 AM   Temp 36.7 °C (98 °F) 12/10/2019  9:19 AM   Pulse 88 12/10/2019 10:50 AM   Resp 18 12/10/2019 10:50 AM   SpO2 100 % 12/10/2019 10:47 AM   Vitals shown include unvalidated device data.      Event Time     Out of Recovery 10:54:00          Pain/Lamberto Score: Lamberto Score: 10 (12/10/2019 11:00 AM)        
negative
